# Patient Record
Sex: FEMALE | Race: WHITE | NOT HISPANIC OR LATINO | Employment: UNEMPLOYED | URBAN - METROPOLITAN AREA
[De-identification: names, ages, dates, MRNs, and addresses within clinical notes are randomized per-mention and may not be internally consistent; named-entity substitution may affect disease eponyms.]

---

## 2017-01-10 ENCOUNTER — ANTI-COAG VISIT (OUTPATIENT)
Dept: CARDIOLOGY | Facility: CLINIC | Age: 77
End: 2017-01-10
Payer: MEDICARE

## 2017-01-10 DIAGNOSIS — Z79.01 LONG TERM (CURRENT) USE OF ANTICOAGULANTS: Primary | ICD-10-CM

## 2017-01-10 LAB
CTP QC/QA: ABNORMAL
INR PPP: 3.4 (ref 2–3)

## 2017-01-10 PROCEDURE — 85610 PROTHROMBIN TIME: CPT | Mod: QW,S$GLB,,

## 2017-01-10 PROCEDURE — 99211 OFF/OP EST MAY X REQ PHY/QHP: CPT | Mod: 25,S$GLB,,

## 2017-01-10 NOTE — MR AVS SNAPSHOT
[facility]  [address] [facility]  [place] [address]  Phone: [phone]  Fax: [phone]                  Natalie Greene   1/10/2017 10:45 AM   Anti-coag visit    Description:  Female : 1940   Provider:  Noemi Montano PharmD   Department:  [facility]           Diagnoses this Visit        Comments    Long term (current) use of anticoagulants    -  Primary            To Do List           Future Appointments        Provider Department Dept Phone    1/10/2017 10:45 AM [facility] [phone]    2017 10:00 AM [facility] [phone]      Goals (5 Years of Data)     None      [facility]     [facility] On [facility] Care Line -  Assistance  Registered nurses in the [facility] On Call Center provide clinical advisement, health education, appointment booking, and other advisory services.  Call for this free service at [phone].             Medications           Message regarding Medications     Verify the changes and/or additions to your medication regime listed below are the same as discussed with your clinician today.  If any of these changes or additions are incorrect, please notify your healthcare provider.             Verify that the below list of medications is an accurate representation of the medications you are currently taking.  If none reported, the list may be blank. If incorrect, please contact your healthcare provider. Carry this list with you in case of emergency.           Current Medications     ACCU-CHEK JEN PLUS METER Misc     ACCU-CHEK JEN PLUS TEST STRP Strp     ACCU-CHEK SOFTCLIX LANCETS Misc     amlodipine (NORVASC) 5 MG tablet Take 5 mg by mouth once daily.    atenolol (TENORMIN) 100 MG tablet Take 100 mg by mouth once daily.     INCONTINENCE PAD,LINER,DISP (BLADDER CONTROL PADS EX ABSORB MISC)     indapamide (LOZOL) 1.25 MG Tab Take 1.25 mg by mouth once daily.      isosorbide mononitrate (IMDUR) 30 MG 24 hr tablet Take 1 tablet (30 mg total) by mouth once daily.    losartan (COZAAR) 100 MG tablet TAKE 1 TABLET ONCE DAILY.    potassium chloride SA (K-DUR,KLOR-CON) 20 MEQ tablet Take 20 mEq by mouth once daily.     tramadol (ULTRAM) 50 mg tablet     warfarin (COUMADIN) 6 MG tablet Take 1 tablet by mouth Monday through Saturday and 1 1/2 Sunday as directed by the [facility].           Clinical Reference Information           Allergies as of 1/10/2017     No Known Allergies      Immunizations Administered on Date of Encounter - 1/10/2017     None      Orders Placed During Today's Visit      Normal Orders This Visit    POCT PT/INR          1/10/2017 10:42 AM - Noemi Montano PharmD      Component Results     Component Value Flag Ref Range Units Status    INR 3.4 (A) 2.0 - 3.0  Final     Acceptable           December 2016 Details    Sun Mon Tue Wed Thu Fri Sat         1               2               3                 4               5               6   3.0   6 mg   See details      7      6 mg         8      6 mg         9      6 mg         10      6 mg           11      9 mg         12      6 mg         13      6 mg         14      6 mg         15      6 mg         16      6 mg         17      6 mg           18      9 mg         19      6 mg         20      6 mg         21      6 mg         22      6 mg         23      6 mg         24      6 mg           25      9 mg         26      6 mg         27      6 mg         28      6 mg         29      6 mg         30      6 mg         31      6 mg          Date Details   12/06 Last INR check   INR: 3.0                 January 2017 Details    Sun Mon Tue Wed Thu Fri Sat     1      9 mg         2      6 mg         3      6 mg         4      6 mg         5      6 mg         6      6 mg         7      6 mg           8      9 mg         9      6 mg         10   3.4   6 mg   See details      11      6 mg         12       6 mg         13      6 mg         14      6 mg           15      6 mg         16      6 mg         17      6 mg         18      6 mg         19      6 mg         20      6 mg         21      6 mg           22      6 mg         23      6 mg         24      6 mg         25      6 mg         26      6 mg         27      6 mg         28      6 mg           29      6 mg         30      6 mg         31                 Date Details   01/10 This INR check   INR: 3.4       Date of next INR:  1/31/2017               How to take your warfarin dose     To take:  6 mg Take 1 of the 6 mg tablets.           Anticoagulation Summary as of 1/10/2017     Maintenance plan 6 mg (6 mg x 1) every day    Full instructions 6 mg every day    Next INR check 1/31/2017      Anticoagulation Episode Summary     Comments       Patient Findings     Negatives Signs/symptoms of thrombosis, Signs/symptoms of bleeding, Laboratory test error suspected, Change in health, Change in alcohol use, Change in activity, Upcoming invasive procedure, Emergency department visit, Upcoming dental procedure, Missed doses, Extra doses, Change in medications, Change in diet/appetite, Hospital admission, Bruising, Other complaints      [facility] Sign-Up     Activating your [facility] account is as easy as 1-2-3!     1) Visit [email or web address], select Sign Up Now, enter this activation code and your date of birth, then select Next.  [email or web address]  Expires: 2/24/2017 10:44 AM      2) Create a username and password to use when you visit [facility] in the future and select a security question in case you lose your password and select Next.    3) Enter your e-mail address and click Sign Up!    Additional Information  If you have questions, please e-mail [email or web address] or call [phone] to talk to our [facility] staff. Remember, [facility] is NOT to be used for urgent needs. For medical emergencies, dial 911.

## 2017-01-10 NOTE — PROGRESS NOTES
INR remain supra-therapeutic despite dose adjustment.  Begin 6mg daily until follow-up. Patient reports no bleeding or bruising, no new medications and no diet changes.  I reminded the patient to call with any problems, changes or questions before the next visit.

## 2017-01-31 ENCOUNTER — ANTI-COAG VISIT (OUTPATIENT)
Dept: CARDIOLOGY | Facility: CLINIC | Age: 77
End: 2017-01-31
Payer: MEDICARE

## 2017-01-31 DIAGNOSIS — Z79.01 LONG TERM (CURRENT) USE OF ANTICOAGULANTS: Primary | ICD-10-CM

## 2017-01-31 LAB — INR PPP: 2.6 (ref 2–3)

## 2017-01-31 PROCEDURE — 85610 PROTHROMBIN TIME: CPT | Mod: QW,S$GLB,,

## 2017-01-31 NOTE — PROGRESS NOTES
INR is now therapeutic. Patient still displaced therefore possible diet changes. No other changes noted. Continue dose and diet until follow-up.

## 2017-01-31 NOTE — MR AVS SNAPSHOT
[facility]  [address] [facility]  [place] [address]  Phone: [phone]  Fax: [phone]                  Natalie Greene   2017 9:30 AM   Anti-coag visit    Description:  Female : 1940   Provider:  Noemi Montano PharmD   Department:  [facility]           Diagnoses this Visit        Comments    Long term (current) use of anticoagulants    -  Primary            To Do List           Future Appointments        Provider Department Dept Phone    2017 10:00 AM Noemi Montano PharmD [facility] [phone]      Goals (5 Years of Data)     None      [facility]     [facility] Care [facility] -  Assistance  Registered nurses in the [facility] On Call Center provide clinical advisement, health education, appointment booking, and other advisory services.  Call for this free service at [phone].             Medications           Message regarding Medications     Verify the changes and/or additions to your medication regime listed below are the same as discussed with your clinician today.  If any of these changes or additions are incorrect, please notify your healthcare provider.             Verify that the below list of medications is an accurate representation of the medications you are currently taking.  If none reported, the list may be blank. If incorrect, please contact your healthcare provider. Carry this list with you in case of emergency.           Current Medications     ACCU-CHEK JEN PLUS METER Misc     ACCU-CHEK JEN PLUS TEST STRP Strp     ACCU-CHEK SOFTCLIX LANCETS Misc     amlodipine (NORVASC) 5 MG tablet Take 5 mg by mouth once daily.    atenolol (TENORMIN) 100 MG tablet Take 100 mg by mouth once daily.     INCONTINENCE PAD,LINER,DISP (BLADDER CONTROL PADS EX ABSORB MISC)     indapamide (LOZOL) 1.25 MG Tab Take 1.25 mg by mouth once daily.     isosorbide mononitrate (IMDUR) 30 MG 24 hr tablet Take 1 tablet (30 mg total) by  mouth once daily.    losartan (COZAAR) 100 MG tablet TAKE 1 TABLET ONCE DAILY.    potassium chloride SA (K-DUR,KLOR-CON) 20 MEQ tablet Take 20 mEq by mouth once daily.     tramadol (ULTRAM) 50 mg tablet     warfarin (COUMADIN) 6 MG tablet Take 1 tablet by mouth Monday through Saturday and 1 1/2 Sunday as directed by the [facility].           Clinical Reference Information           Allergies as of 1/31/2017     No Known Allergies      Immunizations Administered on Date of Encounter - 1/31/2017     None      Orders Placed During Today's Visit      Normal Orders This Visit    POCT INR          1/31/2017  9:53 AM - Sarthak CruzD      Component Results     Component Value Flag Ref Range Units Status    INR 2.6  2.0 - 3.0  Final      January 2017 Details    Sun Mon Tue Wed Thu Fri Sat     1      9 mg         2      6 mg         3      6 mg         4      6 mg         5      6 mg         6      6 mg         7      6 mg           8      9 mg         9      6 mg         10   3.4   6 mg   See details      11      6 mg         12      6 mg         13      6 mg         14      6 mg           15      6 mg         16      6 mg         17      6 mg         18      6 mg         19      6 mg         20      6 mg         21      6 mg           22      6 mg         23      6 mg         24      6 mg         25      6 mg         26      6 mg         27      6 mg         28      6 mg           29      6 mg         30      6 mg         31   2.6   6 mg   See details           Date Details   01/10 Last INR check   INR: 3.4      01/31 This INR check   INR: 2.6                     How to take your warfarin dose     To take:  6 mg Take 1 of the 6 mg tablets.           February 2017 Details    Sun Mon Tue Wed Thu Fri Sat        1      6 mg         2      6 mg         3      6 mg         4      6 mg           5      6 mg         6      6 mg         7      6 mg         8      6 mg         9      6 mg         10      6 mg          11      6 mg           12      6 mg         13      6 mg         14      6 mg         15      6 mg         16      6 mg         17      6 mg         18      6 mg           19      6 mg         20      6 mg         21      6 mg         22      6 mg         23      6 mg         24      6 mg         25      6 mg           26      6 mg         27      6 mg         28                 Date Details   No additional details    Date of next INR:  2/28/2017         How to take your warfarin dose     To take:  6 mg Take 1 of the 6 mg tablets.           Anticoagulation Summary as of 1/31/2017     Maintenance plan 6 mg (6 mg x 1) every day    Full instructions 6 mg every day    Next INR check 2/28/2017      Anticoagulation Episode Summary     Comments       Patient Findings     Negatives Signs/symptoms of thrombosis, Signs/symptoms of bleeding, Laboratory test error suspected, Change in health, Change in alcohol use, Change in activity, Upcoming invasive procedure, Emergency department visit, Upcoming dental procedure, Missed doses, Extra doses, Change in medications, Change in diet/appetite, Hospital admission, Bruising, Other complaints      [facility] Sign-Up     Activating your [facility] account is as easy as 1-2-3!     1) Visit [email or web address], select Sign Up Now, enter this activation code and your date of birth, then select Next.  [email or web address]  Expires: 2/24/2017 10:44 AM      2) Create a username and password to use when you visit [facility] in the future and select a security question in case you lose your password and select Next.    3) Enter your e-mail address and click Sign Up!    Additional Information  If you have questions, please e-mail [email or web address] or call [phone] to talk to our [facility] staff. Remember, [facility] is NOT to be used for urgent needs. For medical emergencies, dial 911.

## 2017-02-15 DIAGNOSIS — Z79.01 LONG TERM (CURRENT) USE OF ANTICOAGULANTS: ICD-10-CM

## 2017-02-16 RX ORDER — WARFARIN 6 MG/1
TABLET ORAL
Qty: 102 TABLET | Refills: 0 | OUTPATIENT
Start: 2017-02-16

## 2017-02-28 ENCOUNTER — ANTI-COAG VISIT (OUTPATIENT)
Dept: CARDIOLOGY | Facility: CLINIC | Age: 77
End: 2017-02-28
Payer: MEDICARE

## 2017-02-28 DIAGNOSIS — Z79.01 LONG TERM (CURRENT) USE OF ANTICOAGULANTS: Primary | ICD-10-CM

## 2017-02-28 LAB — INR PPP: 3.5 (ref 2–3)

## 2017-02-28 PROCEDURE — 99211 OFF/OP EST MAY X REQ PHY/QHP: CPT | Mod: 25,S$GLB,,

## 2017-02-28 PROCEDURE — 85610 PROTHROMBIN TIME: CPT | Mod: QW,S$GLB,,

## 2017-02-28 RX ORDER — WARFARIN 6 MG/1
TABLET ORAL
Qty: 90 TABLET | Refills: 0 | Status: SHIPPED | OUTPATIENT
Start: 2017-02-28 | End: 2017-06-13 | Stop reason: SDUPTHER

## 2017-02-28 NOTE — PROGRESS NOTES
INR is supra-therapeutic. No bleeding issues. Patient now herbal medications for arthritis. Unsure of the names of these medications. Will hold x1 dose then lower total weekly dose. Repeat INR in 3 weeks.

## 2017-02-28 NOTE — MR AVS SNAPSHOT
[facility]  [address] [facility]  [place] [address]  Phone: [phone]  Fax: [phone]                  Natalie Greene   2017 10:00 AM   Anti-coag visit    Description:  Female : 1940   Provider:  Noemi Montano PharmD   Department:  [facility]           Diagnoses this Visit        Comments    Long term (current) use of anticoagulants    -  Primary            To Do List           Future Appointments        Provider Department Dept Phone    2017 10:00 AM [facility] [phone]    3/21/2017 10:00 AM [facility] [phone]      Goals (5 Years of Data)     None      [facility]     [facility] On Call Nurse Care Line -  Assistance  Registered nurses in the [facility] On Call Center provide clinical advisement, health education, appointment booking, and other advisory services.  Call for this free service at [phone].             Medications           Message regarding Medications     Verify the changes and/or additions to your medication regime listed below are the same as discussed with your clinician today.  If any of these changes or additions are incorrect, please notify your healthcare provider.             Verify that the below list of medications is an accurate representation of the medications you are currently taking.  If none reported, the list may be blank. If incorrect, please contact your healthcare provider. Carry this list with you in case of emergency.           Current Medications     ACCU-CHEK JEN PLUS METER Misc     ACCU-CHEK JEN PLUS TEST STRP Strp     ACCU-CHEK SOFTCLIX LANCETS Misc     amlodipine (NORVASC) 5 MG tablet Take 5 mg by mouth once daily.    atenolol (TENORMIN) 100 MG tablet Take 100 mg by mouth once daily.     INCONTINENCE PAD,LINER,DISP (BLADDER CONTROL PADS EX ABSORB MISC)     indapamide (LOZOL) 1.25 MG Tab Take 1.25 mg by mouth once daily.      isosorbide mononitrate (IMDUR) 30 MG 24 hr tablet Take 1 tablet (30 mg total) by mouth once daily.    losartan (COZAAR) 100 MG tablet TAKE 1 TABLET ONCE DAILY.    potassium chloride SA (K-DUR,KLOR-CON) 20 MEQ tablet Take 20 mEq by mouth once daily.     tramadol (ULTRAM) 50 mg tablet     warfarin (COUMADIN) 6 MG tablet Take 1 tablet by mouth Monday through Saturday and 1 1/2 Sunday as directed by the [facility] Clinic.           Clinical Reference Information           Allergies as of 2/28/2017     No Known Allergies      Immunizations Administered on Date of Encounter - 2/28/2017     None      Orders Placed During Today's Visit      Normal Orders This Visit    POCT INR          2/28/2017  9:49 AM - Noemi Montano, PharmD      Component Results     Component Value Flag Ref Range Units Status    INR 3.5 (A) 2.0 - 3.0  Final      January 2017 Details    Sun Mon Tue Wed Thu Fri Sat     1               2               3               4               5               6               7                 8               9               10               11               12               13               14                 15               16               17               18               19               20               21                 22               23               24               25               26               27               28                 29      6 mg         30      6 mg         31   2.6   6 mg   See details           Date Details   01/31 Last INR check   INR: 2.6                 February 2017 Details    Sun Mon Tue Wed Thu Fri Sat        1      6 mg         2      6 mg         3      6 mg         4      6 mg           5      6 mg         6      6 mg         7      6 mg         8      6 mg         9      6 mg         10      6 mg         11      6 mg           12      6 mg         13      6 mg         14      6 mg         15      6 mg         16      6 mg         17      6 mg         18      6 mg            19      6 mg         20      6 mg         21      6 mg         22      6 mg         23      6 mg         24      6 mg         25      6 mg           26      6 mg         27      6 mg         28   3.5   6 mg   See details           Date Details   02/28 This INR check   INR: 3.5                     How to take your warfarin dose     To take:  6 mg Take 1 of the 6 mg tablets.           March 2017 Details    Sun Mon Tue Wed Thu Fri Sat        1      Hold         2      3 mg         3      6 mg         4      6 mg           5      6 mg         6      6 mg         7      6 mg         8      6 mg         9      3 mg         10      6 mg         11      6 mg           12      6 mg         13      6 mg         14      6 mg         15      6 mg         16      3 mg         17      6 mg         18      6 mg           19      6 mg         20      6 mg         21            22               23               24               25                 26               27               28               29               30               31                 Date Details   No additional details    Date of next INR:  3/21/2017         How to take your warfarin dose     To take:  3 mg Take 0.5 of a 6 mg tablet.    To take:  6 mg Take 1 of the 6 mg tablets.    Hold Do not take your warfarin dose. See the Details table to the right for additional instructions.                Anticoagulation Summary as of 2/28/2017     Maintenance plan 3 mg (6 mg x 0.5) on Thu; 6 mg (6 mg x 1) all other days    Full instructions 3/1: Hold; 3/2: 3 mg; Otherwise 3 mg on Thu; 6 mg all other days    Next INR check 3/21/2017      Anticoagulation Episode Summary     Comments       Patient Findings     Negatives Signs/symptoms of thrombosis, Signs/symptoms of bleeding, Laboratory test error suspected, Change in health, Change in alcohol use, Change in activity, Upcoming invasive procedure, Emergency department visit, Upcoming dental procedure, Missed doses, Extra doses,  Change in medications, Change in diet/appetite, Hospital admission, Bruising, Other complaints      [facility] Sign-Up     Activating your [facility] account is as easy as 1-2-3!     1) Visit [email or web address], select Sign Up Now, enter this activation code and your date of birth, then select Next.  [email or web address]  Expires: 4/14/2017  9:51 AM      2) Create a username and password to use when you visit [facility] in the future and select a security question in case you lose your password and select Next.    3) Enter your e-mail address and click Sign Up!    Additional Information  If you have questions, please e-mail [email or web address] or call [phone] to talk to our [facility] staff. Remember, [facility] is NOT to be used for urgent needs. For medical emergencies, dial 911.         Language Assistance Services     ATTENTION: Language assistance services are available, free of charge. Please call [phone].      ATENCIÓN: Si habla español, tiene a lowe disposición servicios gratuitos de asistencia lingüística. Llame al [phone].     CHÚ Ý: N?u b?n nói Ti?ng Vi?t, có các d?ch v? h? tr? ngôn ng? mi?n phí dành cho b?n. G?i s? [phone].         [facility] complies with applicable Federal civil rights laws and does not discriminate on the basis of race, color, national origin, age, disability, or sex.

## 2017-03-21 ENCOUNTER — ANTI-COAG VISIT (OUTPATIENT)
Dept: CARDIOLOGY | Facility: CLINIC | Age: 77
End: 2017-03-21
Payer: MEDICARE

## 2017-03-21 DIAGNOSIS — Z79.01 LONG TERM (CURRENT) USE OF ANTICOAGULANTS: Primary | ICD-10-CM

## 2017-03-21 LAB — INR PPP: 2.2 (ref 2–3)

## 2017-03-21 PROCEDURE — 85610 PROTHROMBIN TIME: CPT | Mod: QW,S$GLB,,

## 2017-03-21 NOTE — PROGRESS NOTES
INR is therapeutic. Continue dose and diet until follow-up. Patient reports no bleeding or bruising, no new medications and no diet changes.  I reminded the patient to call with any problems, changes or questions before the next visit.

## 2017-03-21 NOTE — MR AVS SNAPSHOT
[facility]  [address] [facility]  [place] [address]  Phone: [phone]  Fax: [phone]                  Natalie Greene   3/21/2017 10:00 AM   Anti-coag visit    Description:  Female : 1940   Provider:  Noemi Montano PharmD   Department:  [facility]           Diagnoses this Visit        Comments    Long term (current) use of anticoagulants    -  Primary            To Do List           Future Appointments        Provider Department Dept Phone    3/21/2017 10:00 AM [facility] [phone]    2017 10:15 AM [facility] [phone]      Goals (5 Years of Data)     None      [facility]     [facility] Care Line -  Assistance  Registered nurses in the [facility] On Call Center provide clinical advisement, health education, appointment booking, and other advisory services.  Call for this free service at [phone].             Medications           Message regarding Medications     Verify the changes and/or additions to your medication regime listed below are the same as discussed with your clinician today.  If any of these changes or additions are incorrect, please notify your healthcare provider.             Verify that the below list of medications is an accurate representation of the medications you are currently taking.  If none reported, the list may be blank. If incorrect, please contact your healthcare provider. Carry this list with you in case of emergency.           Current Medications     ACCU-CHEK JEN PLUS METER Misc     ACCU-CHEK JEN PLUS TEST STRP Strp     ACCU-CHEK SOFTCLIX LANCETS Misc     amlodipine (NORVASC) 5 MG tablet Take 5 mg by mouth once daily.    atenolol (TENORMIN) 100 MG tablet Take 100 mg by mouth once daily.     INCONTINENCE PAD,LINER,DISP (BLADDER CONTROL PADS EX ABSORB MISC)     indapamide (LOZOL) 1.25 MG Tab Take 1.25 mg by mouth once daily.      isosorbide mononitrate (IMDUR) 30 MG 24 hr tablet Take 1 tablet (30 mg total) by mouth once daily.    losartan (COZAAR) 100 MG tablet TAKE 1 TABLET ONCE DAILY.    potassium chloride SA (K-DUR,KLOR-CON) 20 MEQ tablet Take 20 mEq by mouth once daily.     tramadol (ULTRAM) 50 mg tablet     warfarin (COUMADIN) 6 MG tablet Take 1 tablet by mouth every evening as directed by the [facility].           Clinical Reference Information           Allergies as of 3/21/2017     No Known Allergies      Immunizations Administered on Date of Encounter - 3/21/2017     None      Orders Placed During Today's Visit      Normal Orders This Visit    POCT INR          3/21/2017  9:46 AM - Sarthak CruzD      Component Results     Component Value Flag Ref Range Units Status    INR 2.2  2.0 - 3.0  Final      February 2017 Details    Sun Mon Tue Wed Thu Fri Sat        1               2               3               4                 5               6               7               8               9               10               11                 12               13               14               15               16               17               18                 19      6 mg         20      6 mg         21      6 mg         22      6 mg         23      6 mg         24      6 mg         25      6 mg           26      6 mg         27      6 mg         28   3.5   6 mg   See details           Date Details   02/28 Last INR check   INR: 3.5                 March 2017 Details    Sun Mon Tue Wed Thu Fri Sat        1      Hold         2      3 mg         3      6 mg         4      6 mg           5      6 mg         6      6 mg         7      6 mg         8      6 mg         9      3 mg         10      6 mg         11      6 mg           12      6 mg         13      6 mg         14      6 mg         15      6 mg         16      3 mg         17      6 mg         18      6 mg           19      6 mg         20      6 mg         21   2.2    6 mg   See details      22      6 mg         23      3 mg         24      6 mg         25      6 mg           26      6 mg         27      6 mg         28      6 mg         29      6 mg         30      3 mg         31      6 mg           Date Details   03/21 This INR check   INR: 2.2                     How to take your warfarin dose     To take:  3 mg Take 0.5 of a 6 mg tablet.    To take:  6 mg Take 1 of the 6 mg tablets.           April 2017 Details    Sun Mon Tue Wed Thu Fri Sat           1      6 mg           2      6 mg         3      6 mg         4      6 mg         5      6 mg         6      3 mg         7      6 mg         8      6 mg           9      6 mg         10      6 mg         11      6 mg         12      6 mg         13      3 mg         14      6 mg         15      6 mg           16      6 mg         17      6 mg         18            19               20               21               22                 23               24               25               26               27               28               29                 30                      Date Details   No additional details    Date of next INR:  4/18/2017         How to take your warfarin dose     To take:  3 mg Take 0.5 of a 6 mg tablet.    To take:  6 mg Take 1 of the 6 mg tablets.           Anticoagulation Summary as of 3/21/2017     Maintenance plan 3 mg (6 mg x 0.5) on Thu; 6 mg (6 mg x 1) all other days    Full instructions 3 mg on Thu; 6 mg all other days    Next INR check 4/18/2017      Anticoagulation Episode Summary     Comments       Patient Findings     Negatives Signs/symptoms of thrombosis, Signs/symptoms of bleeding, Laboratory test error suspected, Change in health, Change in alcohol use, Change in activity, Upcoming invasive procedure, Emergency department visit, Upcoming dental procedure, Missed doses, Extra doses, Change in medications, Change in diet/appetite, Hospital admission, Bruising, Other complaints       [facility] Sign-Up     Activating your [facility] account is as easy as 1-2-3!     1) Visit [email or web address], select Sign Up Now, enter this activation code and your date of birth, then select Next.  [email or web address]  Expires: 4/14/2017 10:51 AM      2) Create a username and password to use when you visit [facility] in the future and select a security question in case you lose your password and select Next.    3) Enter your e-mail address and click Sign Up!    Additional Information  If you have questions, please e-mail [email or web address] or call [phone] to talk to our [facility] staff. Remember, [facility] is NOT to be used for urgent needs. For medical emergencies, dial 911.         Language Assistance Services     ATTENTION: Language assistance services are available, free of charge. Please call [phone].      ATENCIÓN: Si habla pooja, tiene a lowe disposición servicios gratuitos de asistencia lingüística. Llame al [phone].     CHÚ Ý: N?u b?n nói Ti?ng Vi?t, có các d?ch v? h? tr? ngôn ng? mi?n phí dành cho b?n. G?i s? [phone].         [facility] complies with applicable Federal civil rights laws and does not discriminate on the basis of race, color, national origin, age, disability, or sex.

## 2017-04-18 ENCOUNTER — ANTI-COAG VISIT (OUTPATIENT)
Dept: CARDIOLOGY | Facility: CLINIC | Age: 77
End: 2017-04-18
Payer: MEDICARE

## 2017-04-18 DIAGNOSIS — Z79.01 LONG TERM (CURRENT) USE OF ANTICOAGULANTS: Primary | ICD-10-CM

## 2017-04-18 LAB — INR PPP: 2.1 (ref 2–3)

## 2017-04-18 PROCEDURE — 85610 PROTHROMBIN TIME: CPT | Mod: QW,S$GLB,, | Performed by: NUCLEAR MEDICINE

## 2017-04-18 NOTE — MR AVS SNAPSHOT
[facility]  [address] [facility]  [place] [address]  Phone: [phone]  Fax: [phone]                  Natalie Greene   2017 10:15 AM   Anti-coag visit    Description:  Female : 1940   Provider:  Noemi Montano PharmD   Department:  [facility]           Diagnoses this Visit        Comments    Long term (current) use of anticoagulants    -  Primary            To Do List           Future Appointments        Provider Department Dept Phone    2017 10:15 AM [facility] [phone]    2017 10:15 AM [facility] [phone]      Goals (5 Years of Data)     None      [facility]     [facility] On Call Nurse Care Line -  Assistance  Unless otherwise directed by your provider, please contact [facility], our nurse care line that is available for  assistance.     Registered nurses in the [facility] Center provide: appointment scheduling, clinical advisement, health education, and other advisory services.  Call: [phone] (toll free)               Medications           Message regarding Medications     Verify the changes and/or additions to your medication regime listed below are the same as discussed with your clinician today.  If any of these changes or additions are incorrect, please notify your healthcare provider.             Verify that the below list of medications is an accurate representation of the medications you are currently taking.  If none reported, the list may be blank. If incorrect, please contact your healthcare provider. Carry this list with you in case of emergency.           Current Medications     ACCU-CHEK JEN PLUS METER Misc     ACCU-CHEK JEN PLUS TEST STRP Strp     ACCU-CHEK SOFTCLIX LANCETS Misc     amlodipine (NORVASC) 5 MG tablet Take 5 mg by mouth once daily.    atenolol (TENORMIN) 100 MG tablet Take 100 mg by mouth once daily.      INCONTINENCE PAD,LINER,DISP (BLADDER CONTROL PADS EX ABSORB MISC)     indapamide (LOZOL) 1.25 MG Tab Take 1.25 mg by mouth once daily.     isosorbide mononitrate (IMDUR) 30 MG 24 hr tablet Take 1 tablet (30 mg total) by mouth once daily.    losartan (COZAAR) 100 MG tablet TAKE 1 TABLET ONCE DAILY.    potassium chloride SA (K-DUR,KLOR-CON) 20 MEQ tablet Take 20 mEq by mouth once daily.     tramadol (ULTRAM) 50 mg tablet     warfarin (COUMADIN) 6 MG tablet Take 1 tablet by mouth every evening as directed by the [facility] Clinic.           Clinical Reference Information           Allergies as of 4/18/2017     No Known Allergies      Immunizations Administered on Date of Encounter - 4/18/2017     None      Orders Placed During Today's Visit      Normal Orders This Visit    POCT INR          4/18/2017 10:09 AM - Sarthak CruzD      Component Results     Component Value Flag Ref Range Units Status    INR 2.1  2.0 - 3.0  Final      March 2017 Details    Sun Mon Tue Wed Thu Fri Sat        1               2               3               4                 5               6               7               8               9               10               11                 12               13               14               15               16               17               18                 19      6 mg         20      6 mg         21   2.2   6 mg   See details      22      6 mg         23      3 mg         24      6 mg         25      6 mg           26      6 mg         27      6 mg         28      6 mg         29      6 mg         30      3 mg         31      6 mg           Date Details   03/21 Last INR check   INR: 2.2                 April 2017 Details    Sun Mon Tue Wed Thu Fri Sat           1      6 mg           2      6 mg         3      6 mg         4      6 mg         5      6 mg         6      3 mg         7      6 mg         8      6 mg           9      6 mg         10      6 mg         11      6 mg          12      6 mg         13      3 mg         14      6 mg         15      6 mg           16      6 mg         17      6 mg         18   2.1   6 mg   See details      19      6 mg         20      3 mg         21      6 mg         22      6 mg           23      6 mg         24      6 mg         25      6 mg         26      6 mg         27      3 mg         28      6 mg         29      6 mg           30      6 mg                Date Details   04/18 This INR check   INR: 2.1                     How to take your warfarin dose     To take:  3 mg Take 0.5 of a 6 mg tablet.    To take:  6 mg Take 1 of the 6 mg tablets.           May 2017 Details    Sun Mon Tue Wed Thu Fri Sat      1      6 mg         2      6 mg         3      6 mg         4      3 mg         5      6 mg         6      6 mg           7      6 mg         8      6 mg         9      6 mg         10      6 mg         11      3 mg         12      6 mg         13      6 mg           14      6 mg         15      6 mg         16            17               18               19               20                 21               22               23               24               25               26               27                 28               29               30               31                   Date Details   No additional details    Date of next INR:  5/16/2017         How to take your warfarin dose     To take:  3 mg Take 0.5 of a 6 mg tablet.    To take:  6 mg Take 1 of the 6 mg tablets.           Anticoagulation Summary as of 4/18/2017     Maintenance plan 3 mg (6 mg x 0.5) on Thu; 6 mg (6 mg x 1) all other days    Full instructions 3 mg on Thu; 6 mg all other days    Next INR check 5/16/2017      Anticoagulation Episode Summary     Comments       Patient Findings     Negatives Signs/symptoms of thrombosis, Signs/symptoms of bleeding, Laboratory test error suspected, Change in health, Change in alcohol use, Change in activity, Upcoming invasive procedure,  Emergency department visit, Upcoming dental procedure, Missed doses, Extra doses, Change in medications, Change in diet/appetite, Hospital admission, Bruising, Other complaints      [facility] Sign-Up     Activating your [facility] account is as easy as 1-2-3!     1) Visit [email or web address], select Sign Up Now, enter this activation code and your date of birth, then select Next.  [email or web address]  Expires: 6/2/2017 10:10 AM      2) Create a username and password to use when you visit [facility] in the future and select a security question in case you lose your password and select Next.    3) Enter your e-mail address and click Sign Up!    Additional Information  If you have questions, please e-mail [email or web address] or call [phone] to talk to our [facility] staff. Remember, [facility] is NOT to be used for urgent needs. For medical emergencies, dial 911.         Language Assistance Services     ATTENTION: Language assistance services are available, free of charge. Please call [phone].      ATENCIÓN: Si habla español, tiene a lowe disposición servicios gratuitos de asistencia lingüística. Llame al [phone].     CHÚ Ý: N?u b?n nói Ti?ng Vi?t, có các d?ch v? h? tr? ngôn ng? mi?n phí dành cho b?n. G?i s? [phone].         [facility] complies with applicable Federal civil rights laws and does not discriminate on the basis of race, color, national origin, age, disability, or sex.

## 2017-04-18 NOTE — PROGRESS NOTES
INR remains therapeutic. Continue dose and diet until follow-up. Patient reports no bleeding or bruising, no new medications and no diet changes.

## 2017-05-16 ENCOUNTER — ANTI-COAG VISIT (OUTPATIENT)
Dept: CARDIOLOGY | Facility: CLINIC | Age: 77
End: 2017-05-16
Payer: MEDICARE

## 2017-05-16 DIAGNOSIS — Z79.01 LONG TERM (CURRENT) USE OF ANTICOAGULANTS: Primary | ICD-10-CM

## 2017-05-16 LAB — INR PPP: 2 (ref 2–3)

## 2017-05-16 PROCEDURE — 85610 PROTHROMBIN TIME: CPT | Mod: QW,S$GLB,, | Performed by: NUCLEAR MEDICINE

## 2017-05-16 NOTE — PROGRESS NOTES
INR remains therapeutic. Will watch possible downward trend. Patient reports no bleeding or bruising, no new medications and no diet changes.  Continue dose and diet until follow-up.

## 2017-05-16 NOTE — MR AVS SNAPSHOT
[facility]  [address] [facility]  [place] [address]  Phone: [phone]  Fax: [phone]                  Natalie Greene   2017 10:15 AM   Anti-coag visit    Description:  Female : 1940   Provider:  Noemi Montano PharmD   Department:  [facility]           Diagnoses this Visit        Comments    Long term (current) use of anticoagulants    -  Primary            To Do List           Future Appointments        Provider Department Dept Phone    2017 10:30 AM Noemi Montano, [facility] [phone]      Goals (5 Years of Data)     None      [facility]     [facility] On Call Nurse Care Line -  Assistance  Unless otherwise directed by your provider, please contact [facility], our nurse care line that is available for  assistance.     Registered nurses in the [facility] On Call Center provide: appointment scheduling, clinical advisement, health education, and other advisory services.  Call: [phone] (toll free)               Medications           Message regarding Medications     Verify the changes and/or additions to your medication regime listed below are the same as discussed with your clinician today.  If any of these changes or additions are incorrect, please notify your healthcare provider.             Verify that the below list of medications is an accurate representation of the medications you are currently taking.  If none reported, the list may be blank. If incorrect, please contact your healthcare provider. Carry this list with you in case of emergency.           Current Medications     ACCU-CHEK JEN PLUS METER Misc     ACCU-CHEK JEN PLUS TEST STRP Strp     ACCU-CHEK SOFTCLIX LANCETS Misc     amlodipine (NORVASC) 5 MG tablet Take 5 mg by mouth once daily.    atenolol (TENORMIN) 100 MG tablet Take 100 mg by mouth once daily.     INCONTINENCE PAD,LINER,DISP (BLADDER CONTROL PADS EX ABSORB MISC)     indapamide (LOZOL)  1.25 MG Tab Take 1.25 mg by mouth once daily.     isosorbide mononitrate (IMDUR) 30 MG 24 hr tablet Take 1 tablet (30 mg total) by mouth once daily.    losartan (COZAAR) 100 MG tablet TAKE 1 TABLET ONCE DAILY.    potassium chloride SA (K-DUR,KLOR-CON) 20 MEQ tablet Take 20 mEq by mouth once daily.     tramadol (ULTRAM) 50 mg tablet     warfarin (COUMADIN) 6 MG tablet Take 1 tablet by mouth every evening as directed by the [facility]madin Clinic.           Clinical Reference Information           Allergies as of 5/16/2017     No Known Allergies      Immunizations Administered on Date of Encounter - 5/16/2017     None      Orders Placed During Today's Visit      Normal Orders This Visit    POCT INR          5/16/2017 10:14 AM - Sarthak CruzD      Component Results     Component Value Flag Ref Range Units Status    INR 2.0  2.0 - 3.0  Final      April 2017 Details    Sun Mon Tue Wed Thu Fri Sat           1                 2               3               4               5               6               7               8                 9               10               11               12               13               14               15                 16      6 mg         17      6 mg         18   2.1   6 mg   See details      19      6 mg         20      3 mg         21      6 mg         22      6 mg           23      6 mg         24      6 mg         25      6 mg         26      6 mg         27      3 mg         28      6 mg         29      6 mg           30      6 mg                Date Details   04/18 Last INR check   INR: 2.1                 May 2017 Details    Sun Mon Tue Wed Thu Fri Sat      1      6 mg         2      6 mg         3      6 mg         4      3 mg         5      6 mg         6      6 mg           7      6 mg         8      6 mg         9      6 mg         10      6 mg         11      3 mg         12      6 mg         13      6 mg           14      6 mg         15      6 mg         16   2.0    6 mg   See details      17      6 mg         18      3 mg         19      6 mg         20      6 mg           21      6 mg         22      6 mg         23      6 mg         24      6 mg         25      3 mg         26      6 mg         27      6 mg           28      6 mg         29      6 mg         30      6 mg         31      6 mg             Date Details   05/16 This INR check   INR: 2.0                     How to take your warfarin dose     To take:  3 mg Take 0.5 of a 6 mg tablet.    To take:  6 mg Take 1 of the 6 mg tablets.           June 2017 Details    Sun Mon Tue Wed Thu Fri Sat         1      3 mg         2      6 mg         3      6 mg           4      6 mg         5      6 mg         6      6 mg         7      6 mg         8      3 mg         9      6 mg         10      6 mg           11      6 mg         12      6 mg         13            14               15               16               17                 18               19               20               21               22               23               24                 25               26               27               28               29               30                 Date Details   No additional details    Date of next INR:  6/13/2017         How to take your warfarin dose     To take:  3 mg Take 0.5 of a 6 mg tablet.    To take:  6 mg Take 1 of the 6 mg tablets.           Anticoagulation Summary as of 5/16/2017     Maintenance plan 3 mg (6 mg x 0.5) on Thu; 6 mg (6 mg x 1) all other days    Full instructions 3 mg on Thu; 6 mg all other days    Next INR check 6/13/2017      Anticoagulation Episode Summary     Comments       Patient Findings     Negatives Signs/symptoms of thrombosis, Signs/symptoms of bleeding, Laboratory test error suspected, Change in health, Change in alcohol use, Change in activity, Upcoming invasive procedure, Emergency department visit, Upcoming dental procedure, Missed doses, Extra doses, Change in medications,  Change in diet/appetite, Hospital admission, Bruising, Other complaints      [facility] Sign-Up     Activating your [facility] account is as easy as 1-2-3!     1) Visit [email or web address], select Sign Up Now, enter this activation code and your date of birth, then select Next.  [email or web address]  Expires: 6/2/2017 10:10 AM      2) Create a username and password to use when you visit [facility] in the future and select a security question in case you lose your password and select Next.    3) Enter your e-mail address and click Sign Up!    Additional Information  If you have questions, please e-mail [email or web address] or call [phone] to talk to our [facility] staff. Remember, [facility] is NOT to be used for urgent needs. For medical emergencies, dial 911.         Language Assistance Services     ATTENTION: Language assistance services are available, free of charge. Please call [phone].      ATENCIÓN: Si habla español, tiene a lowe disposición servicios gratuitos de asistencia lingüística. Llame al [phone].     CHÚ Ý: N?u b?n nói Ti?ng Vi?t, có các d?ch v? h? tr? ngôn ng? mi?n phí dành cho b?n. G?i s? [phone].         [facility] complies with applicable Federal civil rights laws and does not discriminate on the basis of race, color, national origin, age, disability, or sex.

## 2017-05-17 DIAGNOSIS — Z79.01 LONG TERM (CURRENT) USE OF ANTICOAGULANTS: ICD-10-CM

## 2017-05-17 RX ORDER — WARFARIN 6 MG/1
TABLET ORAL
Qty: 102 TABLET | Refills: 0 | Status: CANCELLED | OUTPATIENT
Start: 2017-05-17

## 2017-06-13 ENCOUNTER — ANTI-COAG VISIT (OUTPATIENT)
Dept: CARDIOLOGY | Facility: CLINIC | Age: 77
End: 2017-06-13
Payer: MEDICARE

## 2017-06-13 DIAGNOSIS — Z79.01 LONG TERM (CURRENT) USE OF ANTICOAGULANTS: Primary | ICD-10-CM

## 2017-06-13 LAB — INR PPP: 2.8 (ref 2–3)

## 2017-06-13 PROCEDURE — 85610 PROTHROMBIN TIME: CPT | Mod: QW,S$GLB,, | Performed by: NUCLEAR MEDICINE

## 2017-06-13 RX ORDER — WARFARIN 6 MG/1
TABLET ORAL
Qty: 90 TABLET | Refills: 0 | Status: SHIPPED | OUTPATIENT
Start: 2017-06-13 | End: 2017-08-22 | Stop reason: SDUPTHER

## 2017-06-28 ENCOUNTER — OFFICE VISIT (OUTPATIENT)
Dept: CARDIOLOGY | Facility: CLINIC | Age: 77
End: 2017-06-28
Payer: MEDICARE

## 2017-06-28 ENCOUNTER — LAB VISIT (OUTPATIENT)
Dept: LAB | Facility: HOSPITAL | Age: 77
End: 2017-06-28
Attending: FAMILY MEDICINE
Payer: MEDICARE

## 2017-06-28 ENCOUNTER — OFFICE VISIT (OUTPATIENT)
Dept: INTERNAL MEDICINE | Facility: CLINIC | Age: 77
End: 2017-06-28
Payer: MEDICARE

## 2017-06-28 VITALS
HEIGHT: 67 IN | DIASTOLIC BLOOD PRESSURE: 64 MMHG | WEIGHT: 217.5 LBS | BODY MASS INDEX: 34.14 KG/M2 | SYSTOLIC BLOOD PRESSURE: 138 MMHG | HEART RATE: 84 BPM

## 2017-06-28 VITALS
BODY MASS INDEX: 34.08 KG/M2 | WEIGHT: 217.13 LBS | HEIGHT: 67 IN | OXYGEN SATURATION: 96 % | TEMPERATURE: 97 F | HEART RATE: 96 BPM | SYSTOLIC BLOOD PRESSURE: 120 MMHG | DIASTOLIC BLOOD PRESSURE: 60 MMHG

## 2017-06-28 DIAGNOSIS — I10 ESSENTIAL HYPERTENSION: ICD-10-CM

## 2017-06-28 DIAGNOSIS — I20.89 ANGINAL EQUIVALENT: ICD-10-CM

## 2017-06-28 DIAGNOSIS — E78.5 HYPERLIPIDEMIA WITH TARGET LDL LESS THAN 100: ICD-10-CM

## 2017-06-28 DIAGNOSIS — M79.602 LEFT ARM PAIN: ICD-10-CM

## 2017-06-28 DIAGNOSIS — I48.20 CHRONIC ATRIAL FIBRILLATION: Primary | ICD-10-CM

## 2017-06-28 DIAGNOSIS — E66.01 MORBID OBESITY, UNSPECIFIED OBESITY TYPE: ICD-10-CM

## 2017-06-28 DIAGNOSIS — Z79.01 ANTICOAGULATED ON COUMADIN: ICD-10-CM

## 2017-06-28 DIAGNOSIS — E11.9 TYPE 2 DIABETES MELLITUS WITHOUT COMPLICATION, WITHOUT LONG-TERM CURRENT USE OF INSULIN: ICD-10-CM

## 2017-06-28 DIAGNOSIS — I48.20 CHRONIC ATRIAL FIBRILLATION: ICD-10-CM

## 2017-06-28 DIAGNOSIS — I10 ESSENTIAL HYPERTENSION: Primary | ICD-10-CM

## 2017-06-28 DIAGNOSIS — E66.9 OBESITY (BMI 30-39.9): ICD-10-CM

## 2017-06-28 LAB
ALBUMIN SERPL BCP-MCNC: 3.8 G/DL
ALP SERPL-CCNC: 83 U/L
ALT SERPL W/O P-5'-P-CCNC: 23 U/L
ANION GAP SERPL CALC-SCNC: 11 MMOL/L
AST SERPL-CCNC: 26 U/L
BASOPHILS # BLD AUTO: 0.01 K/UL
BASOPHILS NFR BLD: 0.2 %
BILIRUB SERPL-MCNC: 1 MG/DL
BUN SERPL-MCNC: 12 MG/DL
CALCIUM SERPL-MCNC: 9.4 MG/DL
CHLORIDE SERPL-SCNC: 102 MMOL/L
CO2 SERPL-SCNC: 27 MMOL/L
CREAT SERPL-MCNC: 0.8 MG/DL
DIFFERENTIAL METHOD: ABNORMAL
EOSINOPHIL # BLD AUTO: 0 K/UL
EOSINOPHIL NFR BLD: 0.7 %
ERYTHROCYTE [DISTWIDTH] IN BLOOD BY AUTOMATED COUNT: 14.3 %
EST. GFR  (AFRICAN AMERICAN): >60 ML/MIN/1.73 M^2
EST. GFR  (NON AFRICAN AMERICAN): >60 ML/MIN/1.73 M^2
GLUCOSE SERPL-MCNC: 131 MG/DL
HCT VFR BLD AUTO: 38.2 %
HGB BLD-MCNC: 12.5 G/DL
INR PPP: 1.9
LYMPHOCYTES # BLD AUTO: 1 K/UL
LYMPHOCYTES NFR BLD: 18 %
MCH RBC QN AUTO: 29.7 PG
MCHC RBC AUTO-ENTMCNC: 32.7 %
MCV RBC AUTO: 91 FL
MONOCYTES # BLD AUTO: 0.4 K/UL
MONOCYTES NFR BLD: 6.8 %
NEUTROPHILS # BLD AUTO: 4.3 K/UL
NEUTROPHILS NFR BLD: 74.3 %
PLATELET # BLD AUTO: 202 K/UL
PMV BLD AUTO: 11.3 FL
POTASSIUM SERPL-SCNC: 4.2 MMOL/L
PROT SERPL-MCNC: 7.6 G/DL
PROTHROMBIN TIME: 19.6 SEC
RBC # BLD AUTO: 4.21 M/UL
SODIUM SERPL-SCNC: 140 MMOL/L
WBC # BLD AUTO: 5.73 K/UL

## 2017-06-28 PROCEDURE — 1159F MED LIST DOCD IN RCRD: CPT | Mod: S$GLB,,, | Performed by: FAMILY MEDICINE

## 2017-06-28 PROCEDURE — 99214 OFFICE O/P EST MOD 30 MIN: CPT | Mod: S$GLB,,, | Performed by: INTERNAL MEDICINE

## 2017-06-28 PROCEDURE — 99999 PR PBB SHADOW E&M-EST. PATIENT-LVL IV: CPT | Mod: PBBFAC,,, | Performed by: FAMILY MEDICINE

## 2017-06-28 PROCEDURE — 4010F ACE/ARB THERAPY RXD/TAKEN: CPT | Mod: S$GLB,,, | Performed by: FAMILY MEDICINE

## 2017-06-28 PROCEDURE — 1159F MED LIST DOCD IN RCRD: CPT | Mod: S$GLB,,, | Performed by: INTERNAL MEDICINE

## 2017-06-28 PROCEDURE — 1126F AMNT PAIN NOTED NONE PRSNT: CPT | Mod: S$GLB,,, | Performed by: FAMILY MEDICINE

## 2017-06-28 PROCEDURE — 93000 ELECTROCARDIOGRAM COMPLETE: CPT | Mod: S$GLB,,, | Performed by: INTERNAL MEDICINE

## 2017-06-28 PROCEDURE — 99214 OFFICE O/P EST MOD 30 MIN: CPT | Mod: S$GLB,,, | Performed by: FAMILY MEDICINE

## 2017-06-28 PROCEDURE — 99999 PR PBB SHADOW E&M-EST. PATIENT-LVL III: CPT | Mod: PBBFAC,,, | Performed by: INTERNAL MEDICINE

## 2017-06-28 PROCEDURE — 3044F HG A1C LEVEL LT 7.0%: CPT | Mod: S$GLB,,, | Performed by: FAMILY MEDICINE

## 2017-06-28 RX ORDER — ATENOLOL 100 MG/1
100 TABLET ORAL DAILY
Qty: 30 TABLET | Refills: 0 | Status: SHIPPED | OUTPATIENT
Start: 2017-06-28 | End: 2017-07-19

## 2017-06-28 RX ORDER — METOPROLOL SUCCINATE 100 MG/1
TABLET, EXTENDED RELEASE ORAL
COMMUNITY
Start: 2017-06-27 | End: 2017-06-28

## 2017-06-28 NOTE — PATIENT INSTRUCTIONS
Discharge Instructions for Atrial Fibrillation  You have been diagnosed with an abnormal heart rhythm called atrial fibrillation. With this condition, your hearts 2 upper chambers quiver rather than squeeze the blood out in a normal pattern. This leads to an irregular and sometimes rapid heartbeat. Some people will develop associated symptoms such as a flip-flopping heartbeat, chest pain, lightheadedness, or shortness of breath. Other people may have no symptoms at all. Atrial fibrillation is serious because it affects the hearts ability to fill with blood as it should. Blood clots may form. This increases the risk for stroke. Untreated atrial fibrillation can also lead to heart failure. Atrial fibrillation can be controlled. With treatment, most people with atrial fibrillation lead normal lives.  Treatment options  Recommended treatment for atrial fibrillation depends on your age, symptoms, how long you have had atrial fibrillation, and other factors. You will have a complete evaluation to find out if you have any abnormalities that caused your heart to go into atrial fibrillation. This might be blocked heart arteries or a thyroid problem. Your doctor will assess your particular case and discuss choices with you.  Treatment choices may include:  · Treating an underlying disorder that puts you at risk for atrial fibrillation. For example, correcting an abnormal thyroid or electrolyte problem, or treating a blocked heart artery.  · Restoring a normal heart rhythm with an electrical shock (cardioversion) or with an antiarrhythmic medicine (chemical cardioversion)  · Using medicine to control your heart rate in atrial fibrillation.  · Preventing the risk for blood clot and stroke using blood-thinning medicines. Your doctor will tell you what he or she recommends. Choices may include aspirin, clopidogrel, warfarin, dabigatran, rivaroxaban, apixaban, and edoxaban.  · Doing catheter ablation or a surgical maze  procedure. These use different methods to destroy certain areas of heart tissue. This interrupts the electrical signals causing atrial fibrillation. One of these procedures may be a choice when medicines do not work, or as an alternative to long-term medicine.  · Other treatment choices may be recommended for you by your doctor.  Managing risk factors for stroke and preventing heart failure are important parts of any treatment plan for atrial fibrillation.  Home care  · Take your medicines exactly as directed. Dont skip doses.  · Work with your doctor to find the right medicines and doses for you.  · Learn to take your own pulse. Keep a record of your results. Ask your doctor which pulse rates mean that you need medical attention. Slowing your pulse is often the goal of treatment. Ask your doctor if its OK for you to use an automatic machine to check your pulse at home. Sometimes these machines dont count the pulse correctly when you have atrial fibrillation.  · Limit your intake of coffee, tea, cola, and other beverages with caffeine. Talk with your doctor about whether you should eliminate caffeine.  · Avoid over-the-counter medicines that have caffeine in them.  · Let your doctor know what medicines you take, including prescription and over-the-counter medicines, as well as any supplements. They interfere with some medicines given for atrial fibrillation.  · Ask your doctor about whether you can drink alcohol. Some people need to avoid alcohol to better treat atrial fibrillation. If you are taking blood-thinner medicines, alcohol may interfere with them by increasing their effect.  · Never take stimulants such as amphetamines or cocaine. These drugs can speed up your heart rate and trigger atrial fibrillation.  Follow-up care  Follow up with your doctor, or as advised.     When should I call my healthcare provider  Call your healthcare provider right away if you have any of the  following:  · Weakness  · Dizziness  · Fainting  · Fatigue  · Shortness of breath  · Chest pain with increased activity  · A change in the usual regularity of your heartbeat, or an unusually fast heartbeat   Date Last Reviewed: 4/23/2016  © 8722-3237 [employer]. [address] [place] [address], [place] [address]. All rights reserved. This information is not intended as a substitute for professional medical care. Always follow your healthcare professional's instructions.

## 2017-06-28 NOTE — ASSESSMENT & PLAN NOTE
On coumadin, followed by [facility]  Last visit with Cards in Dec 2016 and was told to follow up in year  Cath in 4/2016 with no sign of disease, normal EF  Rate not controlled, as she has been out of Toprol for a couple of weeks.  She is looking to establish with new Cards, as her previous had left.  She will get Rx for Atenolol 100 daily sent to her pharmacy and not wait for mail order.   Cards follow up coordinated during visit today  EKG Afib with rate 108, not controlled.  Same ST T wave changes in Anterior leads that she had prior to cath last year.

## 2017-06-28 NOTE — ASSESSMENT & PLAN NOTE
Lab Results   Component Value Date    CHOL 140 08/05/2016    CHOL 155 03/31/2016    CHOL 112 (L) 12/02/2014     Lab Results   Component Value Date    HDL 28 (L) 08/05/2016    HDL 38 (L) 03/31/2016    HDL 30 (L) 12/02/2014     Lab Results   Component Value Date    LDLCALC 75.0 08/05/2016    LDLCALC 94.6 03/31/2016    LDLCALC 64.2 12/02/2014     Lab Results   Component Value Date    TRIG 185 (H) 08/05/2016    TRIG 112 03/31/2016    TRIG 89 12/02/2014     Lab Results   Component Value Date    CHOLHDL 20.0 08/05/2016    CHOLHDL 24.5 03/31/2016    CHOLHDL 26.8 12/02/2014   LDL at goal

## 2017-06-28 NOTE — LETTER
July 1, 2017      Annabella Fenton MD  [address] [place] [id number]           [facility]  [address] [place] [id number]  Phone: [phone]  Fax: [phone]          Patient: Natalie Greene   MR Number: [id number]   YOB: 1940   Date of Visit: 6/28/2017       Dear Dr. Annabella Fenton:    Thank you for referring Natalie Greene to me for evaluation. Attached you will find relevant portions of my assessment and plan of care.    If you have questions, please do not hesitate to call me. I look forward to following Natalie Greene along with you.    Sincerely,    Danny Bonilla MD    Enclosure  CC:  No Recipients    If you would like to receive this communication electronically, please contact [email or web address] or [phone] to request more information on [employer] Link access.    For providers and/or their staff who would like to refer a patient to [facility], please contact us through our one-stop-shop provider referral line, [facility], [place], at [phone].    If you feel you have received this communication in error or would no longer like to receive these types of communications, please e-mail [email or web address]
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
## 2017-06-28 NOTE — ASSESSMENT & PLAN NOTE
Lab Results   Component Value Date    HGBA1C 6.4 (H) 08/05/2016     Diet controlled  Last HgA1c in August 6.4  Eye exam up to date  Microalbumin due in August  Foot exam due and completed in clinic today

## 2017-06-28 NOTE — ASSESSMENT & PLAN NOTE
Bp stable  Treated with Norvasc 5, Atenolol 100 daily, Indapamide 1.25 trujillo, Imdur 30 mg daily, Cozaar 100 mg daily

## 2017-06-28 NOTE — PROGRESS NOTES
Subjective:   Patient ID:  Natalie Greene is a 76 y.o. female who presents for follow-up of Atrial Fibrillation  Pt was seeing Dr. Montero. Pt with atypical CP non exertional.Pt ran out b blockers a few weeks.  Cath 2016 nonobstructive    Hypertension   This is a chronic problem. The current episode started more than 1 year ago. The problem has been gradually improving since onset. The problem is controlled. Pertinent negatives include no chest pain, palpitations or shortness of breath. Past treatments include calcium channel blockers and angiotensin blockers. The current treatment provides moderate improvement. Compliance problems include exercise.    Atrial Fibrillation   Presents for follow-up visit. Symptoms are negative for bradycardia, chest pain, dizziness, hemodynamic instability, hypertension, hypotension, pacemaker problem, palpitations, shortness of breath, syncope, tachycardia and weakness. The symptoms have been stable. Past medical history includes atrial fibrillation. Medication compliance problems include medication side effects.       Review of Systems   Constitution: Negative. Negative for weakness and weight gain.   HENT: Negative.    Eyes: Negative.    Cardiovascular: Negative.  Negative for chest pain, leg swelling, palpitations and syncope.   Respiratory: Negative.  Negative for shortness of breath.    Endocrine: Negative.    Hematologic/Lymphatic: Negative.    Skin: Negative.    Musculoskeletal: Negative for muscle weakness.   Gastrointestinal: Negative.    Genitourinary: Negative.    Neurological: Negative.  Negative for dizziness.   Psychiatric/Behavioral: Negative.    Allergic/Immunologic: Negative.      Family History   Problem Relation Age of Onset    Diabetes Sister     Hypertension Sister      Past Medical History:   Diagnosis Date    *Atrial fibrillation     Diabetes mellitus     Hyperlipidemia     Hypertension      Current Outpatient Prescriptions on File Prior to Visit    Medication Sig Dispense Refill    amlodipine (NORVASC) 5 MG tablet Take 5 mg by mouth once daily.      indapamide (LOZOL) 1.25 MG Tab Take 1.25 mg by mouth once daily.       losartan (COZAAR) 100 MG tablet TAKE 1 TABLET ONCE DAILY. 90 tablet 3    warfarin (COUMADIN) 6 MG tablet Take 1 tablet by mouth every evening as directed by the [facility]. 90 tablet 0    ACCU-CHEK JEN PLUS METER Misc       ACCU-CHEK JEN PLUS TEST STRP Strp       ACCU-CHEK SOFTCLIX LANCETS Misc       atenolol (TENORMIN) 100 MG tablet Take 1 tablet (100 mg total) by mouth once daily. 30 tablet 0    INCONTINENCE PAD,LINER,DISP (BLADDER CONTROL PADS EX ABSORB MISC)       INCONTINENCE PAD,LINER,DISP (BLADDER CONTROL PADS EX ABSORB MISC)       isosorbide mononitrate (IMDUR) 30 MG 24 hr tablet Take 1 tablet (30 mg total) by mouth once daily. 30 tablet 11    [DISCONTINUED] atenolol (TENORMIN) 100 MG tablet Take 100 mg by mouth once daily.       [DISCONTINUED] metoprolol succinate (TOPROL-XL) 100 MG 24 hr tablet       [DISCONTINUED] multivit,stress formula-zinc (STRESS FORMULA WITH ZINC) Tab Take by mouth once daily at 6am.      [DISCONTINUED] potassium chloride SA (K-DUR,KLOR-CON) 20 MEQ tablet Take 20 mEq by mouth once daily.       [DISCONTINUED] tramadol (ULTRAM) 50 mg tablet        No current facility-administered medications on file prior to visit.      Review of patient's allergies indicates:  No Known Allergies    Objective:     Physical Exam   Constitutional: She is oriented to person, place, and time. She appears well-developed and well-nourished.   HENT:   Head: Normocephalic and atraumatic.   Eyes: Conjunctivae and EOM are normal. Pupils are equal, round, and reactive to light.   Neck: Normal range of motion. Neck supple.   Cardiovascular: Normal rate, regular rhythm, normal heart sounds and intact distal pulses.    Pulmonary/Chest: Effort normal and breath sounds normal.   Abdominal: Soft. Bowel sounds are normal.    Musculoskeletal: Normal range of motion.   Neurological: She is alert and oriented to person, place, and time.   Skin: Skin is warm and dry.   Psychiatric: She has a normal mood and affect.   Nursing note and vitals reviewed.      Assessment:     1. Chronic atrial fibrillation    2. Essential hypertension    3. Hyperlipidemia with target LDL less than 100    4. Anticoagulated on Coumadin        Plan:     Chronic atrial fibrillation    Essential hypertension    Hyperlipidemia with target LDL less than 100    Anticoagulated on Coumadin    continue norvasc, losartan-htn  Restart b blockers  echo

## 2017-06-28 NOTE — ASSESSMENT & PLAN NOTE
Followed by [facility]  Lab Results   Component Value Date    WBC 6.35 04/21/2016    HGB 12.0 04/21/2016    HCT 36.7 (L) 04/21/2016    MCV 91 04/21/2016     04/21/2016     Lab Results   Component Value Date    INR 2.8 06/13/2017    INR 2.0 05/16/2017    INR 2.1 04/18/2017     INR in therapeutic range

## 2017-06-29 LAB
ESTIMATED AVG GLUCOSE: 146 MG/DL
HBA1C MFR BLD HPLC: 6.7 %

## 2017-07-03 LAB
ESTIMATED PA SYSTOLIC PRESSURE: 38.44
MITRAL VALVE MOBILITY: NORMAL
MITRAL VALVE REGURGITATION: ABNORMAL
RETIRED EF AND QEF - SEE NOTES: 60 (ref 55–65)
TRICUSPID VALVE REGURGITATION: ABNORMAL

## 2017-07-18 ENCOUNTER — ANTI-COAG VISIT (OUTPATIENT)
Dept: CARDIOLOGY | Facility: CLINIC | Age: 77
End: 2017-07-18
Payer: MEDICARE

## 2017-07-18 DIAGNOSIS — Z79.01 LONG TERM (CURRENT) USE OF ANTICOAGULANTS: Primary | ICD-10-CM

## 2017-07-18 LAB — INR PPP: 2.7 (ref 2–3)

## 2017-07-18 PROCEDURE — 99211 OFF/OP EST MAY X REQ PHY/QHP: CPT | Mod: 25,S$GLB,,

## 2017-07-18 PROCEDURE — 85610 PROTHROMBIN TIME: CPT | Mod: QW,S$GLB,,

## 2017-07-18 NOTE — PROGRESS NOTES
Natalie Greene  07/19/2017  [id number]    Annabella Fenton MD  Patient Care Team:  Annabella Fenton MD as PCP - General (Family Medicine)  Annabella Fenton MD as Consulting Physician (Family Medicine)  Has the patient seen any provider outside of the [facility] network since the last visit? (no). If yes, HIPPA forms completed and records requested.        Visit Type:a scheduled routine follow-up visit    Chief Complaint:  Chief Complaint   Patient presents with    Follow-up       History of Present Illness:  76 year old here for follow up.  She saw Cards for visit. She is back on her Metoprolol  mg. Rate is better.  She feels better than her last visit.    She would like to get a handicap sticker. She has one leg longer than other and currently ambulates with cane. She cannot walk long distances without stopping. She reports she has lower back pain and she gets short of breath.    We are still awaiting old PCP records. She believes she had immunizations.  None on file.    BS is controlled. HgA1c in goal range.    She denies any CP or Papulations. She is followed by Coumadin clinic for her A fib. INR therapeutic.        History:  Past Medical History:   Diagnosis Date    *Atrial fibrillation     Diabetes mellitus     Hyperlipidemia     Hypertension      Past Surgical History:   Procedure Laterality Date    CHOLECYSTECTOMY      COLON SURGERY      HYSTERECTOMY       Family History   Problem Relation Age of Onset    Diabetes Sister     Hypertension Sister      Social History     Social History    Marital status:      Spouse name: N/A    Number of children: N/A    Years of education: N/A     Occupational History    Not on file.     Social History Main Topics    Smoking status: Never Smoker    Smokeless tobacco: Never Used    Alcohol use No    Drug use: Unknown    Sexual activity: Not on file     Other Topics Concern    Not on file     Social History Narrative    No narrative on file      Patient Active Problem List   Diagnosis    A-fib    HTN (hypertension)    Hyperlipidemia with target LDL less than 100    DM (diabetes mellitus)    Anticoagulated on Coumadin    Obesity (BMI 30-39.9)    Abnormal stress test    Anginal equivalent    History of malignant neoplasm of colon    Left arm pain     Review of patient's allergies indicates:  No Known Allergies    The following were reviewed at this visit: active problem list, medication list, allergies, family history, social history, and health maintenance.    Medications:  Current Outpatient Prescriptions on File Prior to Visit   Medication Sig Dispense Refill    ACCU-CHEK JEN PLUS METER Misc       ACCU-CHEK JEN PLUS TEST STRP Strp       ACCU-CHEK SOFTCLIX LANCETS Misc       amlodipine (NORVASC) 5 MG tablet Take 5 mg by mouth once daily.      INCONTINENCE PAD,LINER,DISP (BLADDER CONTROL PADS EX ABSORB MISC)       INCONTINENCE PAD,LINER,DISP (BLADDER CONTROL PADS EX ABSORB MISC)       indapamide (LOZOL) 1.25 MG Tab Take 1.25 mg by mouth once daily.       isosorbide mononitrate (IMDUR) 30 MG 24 hr tablet Take 1 tablet (30 mg total) by mouth once daily. 30 tablet 11    losartan (COZAAR) 100 MG tablet TAKE 1 TABLET ONCE DAILY. 90 tablet 3    warfarin (COUMADIN) 6 MG tablet Take 1 tablet by mouth every evening as directed by the Coumadin Clinic. 90 tablet 0    [DISCONTINUED] atenolol (TENORMIN) 100 MG tablet Take 1 tablet (100 mg total) by mouth once daily. 30 tablet 0     No current facility-administered medications on file prior to visit.        Medications have been reviewed and reconciled with patient at this visit.  Barriers to medications present (no)    Adverse reactions to current medications (no)    Over the counter medications reviewed (Yes ), and if needed added to active Medication list at this visit.     Exam:  Wt Readings from Last 3 Encounters:   06/28/17 98.6 kg (217 lb 7.7 oz)   06/28/17 98.5 kg (217 lb 2.5 oz)    12/06/16 99.4 kg (219 lb 2.2 oz)     Temp Readings from Last 3 Encounters:   06/28/17 97.2 °F (36.2 °C) (Tympanic)   07/21/16 98.2 °F (36.8 °C)   04/21/16 96.4 °F (35.8 °C)     BP Readings from Last 3 Encounters:   06/28/17 138/64   06/28/17 120/60   12/06/16 138/74     Pulse Readings from Last 3 Encounters:   06/28/17 84   06/28/17 96   07/21/16 80     There is no height or weight on file to calculate BMI.      Review of Systems   Constitutional: Negative.  Negative for chills and fever.   HENT: Negative.    Eyes: Negative for blurred vision and double vision.   Cardiovascular: Negative for chest pain and palpitations.   Gastrointestinal: Negative.    Musculoskeletal: Positive for back pain and joint pain. Negative for falls.   Skin: Negative.  Negative for itching and rash.   Neurological: Negative.  Negative for dizziness and tingling.   Endo/Heme/Allergies: Negative.  Negative for polydipsia. Does not bruise/bleed easily.   Psychiatric/Behavioral: Negative.  Negative for depression and suicidal ideas.       Physical Exam    Laboratory Reviewed ({Yes)  Lab Results   Component Value Date    WBC 5.73 06/28/2017    HGB 12.5 06/28/2017    HCT 38.2 06/28/2017     06/28/2017    CHOL 140 08/05/2016    TRIG 185 (H) 08/05/2016    HDL 28 (L) 08/05/2016    ALT 23 06/28/2017    AST 26 06/28/2017     06/28/2017    K 4.2 06/28/2017     06/28/2017    CREATININE 0.8 06/28/2017    BUN 12 06/28/2017    CO2 27 06/28/2017    TSH 1.785 07/29/2014    INR 2.7 07/18/2017    HGBA1C 6.7 (H) 06/28/2017       Assessment:  The primary encounter diagnosis was Need for zoster vaccination. Diagnoses of Essential hypertension, Chronic atrial fibrillation, Type 2 diabetes mellitus without complication, without long-term current use of insulin, Obesity (BMI 30-39.9), Abnormal stress test, Anticoagulated on Coumadin, and Need for pneumococcal vaccination were also pertinent to this visit.     Plan  HTN (hypertension)  BP  stable  No change in medications.  Norvasc 5, Atenolol 100 daily  Indapamide 1.25 daily, Imdur 30 mg daily,   Cozaar 100 mg daily.    EF 55 - 65 60   Mitral Valve Regurgitation   MODERATE    Est. PA Systolic Pressure  38.44   Mitral Valve Mobility  NORMAL   Tricuspid Valve Regurgitation  MILD   Resulting Agency  [facility]   Narrative     Date of Procedure: 07/03/2017            A-fib  EF 55 - 65 60   Mitral Valve Regurgitation   MODERATE    Est. PA Systolic Pressure  38.44   Mitral Valve Mobility  NORMAL   Tricuspid Valve Regurgitation  MILD   Resulting Agency  [facility]   Narrative     Date of Procedure: 07/03/2017        Rate better with Atenolol  Followed by Coumadin clinic  Lab Results   Component Value Date    INR 2.7 07/18/2017    INR 1.9 (H) 06/28/2017    INR 2.8 06/13/2017     Therapeutic range      DM (diabetes mellitus)  Lab Results   Component Value Date    HGBA1C 6.7 (H) 06/28/2017     DM controlled  Never received documents on Pneumovax, will restart immunizations today        Obesity (BMI 30-39.9)  Diet, exercise, weight loss      Abnormal stress test  2/4/2014  Nuclear Quantitative Functional Analysis:   LVEF: 66 %    Impression: ABNORMAL MYOCARDIAL PERFUSION  1. There is evidence for mild myocardial ischemia in the anteroapical wall of the left ventricle.   2. The perfusion scan is free of evidence for myocardial injury.   3. Resting wall motion is physiologic.   4. Resting LV function is normal.   5. The ventricular volumes are normal at rest and stress.   6. The extracardiac distribution of radioactivity is normal.     Normal [facility] Cath in 4/2016 reported    Last visit with Cards, 6/28/2017      Anticoagulated on Coumadin  Lab Results   Component Value Date    INR 2.7 07/18/2017    INR 1.9 (H) 06/28/2017    INR 2.8 06/13/2017     Lab Results   Component Value Date    WBC 5.73 06/28/2017    HGB 12.5 06/28/2017    HCT 38.2 06/28/2017    MCV 91 06/28/2017     06/28/2017     Stable, managed by Coumadin  Clinic    Plan to get records, if not received by Sept, will give Pneumovax and Influenza  Recheck 2 months. Microalbumin and Eye exam then    Rate controlled today in office      Care Plan/Goals: Reviewed  (Yes)  Goals      Take at least one BP reading per week at various times of the day            Hypertension Goals/Individual Care Plan    Hypertension Goal Care Plan    1. Blood pressure goal is to be below 140/90. Normal Blood pressure is 120/80.  Patient's blood pressure is at goal today. (Yes)    2. Goal for self management is to check Blood Pressure daily. Record on Individual log. Patient is agreeable to self management plan. blood pressure log.   Patient will bring logs at next office visit, or communicate to /Care Management team for review for interval follow up.     3. Moderately intense physical activity, such as brisk walking, is beneficial when done regularly. Aim for a total of 40 minutes of moderate to vigorous activity three to four times a week to help lower blood pressure. Exercise of patients choice was recommended at this office visit. walking    4. Eating Healthy Diet is important in Blood Pressure control. As its name implies, the DASH (Dietary Approaches to Stop Hypertension) eating plan is designed to help you manage blood pressure. Emphasizing healthy food sources, it also limits:  Red meat   Sodium (salt)   Sweets, added sugars and sugar-containing beverages.     5. Barriers to goals reviewed and addressed with patient at visit. (Yes)    6. Adherence and Medication Side effects discussed (Yes)    Referral to on-site Pharmacy for Co-management of hypertension (No)  Patient enrolled in Tele-health Hypertension Management. (No)    Patient is under care of /Care management (No) Referral Sent (No)                  Follow up: No Follow-up on file.    After visit summary was printed and given to patient upon discharge today.  Patient goals and care plan are included in  After Visit Summary.

## 2017-07-18 NOTE — PROGRESS NOTES
INR remains therapeutic. Atenolol restarted. No other changes noted. Continue dose and diet until follow-up.

## 2017-07-19 ENCOUNTER — OFFICE VISIT (OUTPATIENT)
Dept: INTERNAL MEDICINE | Facility: CLINIC | Age: 77
End: 2017-07-19
Payer: MEDICARE

## 2017-07-19 VITALS
HEIGHT: 67 IN | OXYGEN SATURATION: 99 % | DIASTOLIC BLOOD PRESSURE: 78 MMHG | TEMPERATURE: 97 F | HEART RATE: 84 BPM | WEIGHT: 218.94 LBS | RESPIRATION RATE: 18 BRPM | BODY MASS INDEX: 34.36 KG/M2 | SYSTOLIC BLOOD PRESSURE: 120 MMHG

## 2017-07-19 DIAGNOSIS — Z23 NEED FOR ZOSTER VACCINATION: Primary | ICD-10-CM

## 2017-07-19 DIAGNOSIS — E11.9 TYPE 2 DIABETES MELLITUS WITHOUT COMPLICATION, WITHOUT LONG-TERM CURRENT USE OF INSULIN: ICD-10-CM

## 2017-07-19 DIAGNOSIS — R94.39 ABNORMAL STRESS TEST: ICD-10-CM

## 2017-07-19 DIAGNOSIS — E66.9 OBESITY (BMI 30-39.9): ICD-10-CM

## 2017-07-19 DIAGNOSIS — I48.20 CHRONIC ATRIAL FIBRILLATION: ICD-10-CM

## 2017-07-19 DIAGNOSIS — I10 ESSENTIAL HYPERTENSION: ICD-10-CM

## 2017-07-19 DIAGNOSIS — Z23 NEED FOR PNEUMOCOCCAL VACCINATION: ICD-10-CM

## 2017-07-19 DIAGNOSIS — Z79.01 ANTICOAGULATED ON COUMADIN: ICD-10-CM

## 2017-07-19 PROCEDURE — 1159F MED LIST DOCD IN RCRD: CPT | Mod: S$GLB,,, | Performed by: FAMILY MEDICINE

## 2017-07-19 PROCEDURE — 99999 PR PBB SHADOW E&M-EST. PATIENT-LVL III: CPT | Mod: PBBFAC,,, | Performed by: FAMILY MEDICINE

## 2017-07-19 PROCEDURE — 1126F AMNT PAIN NOTED NONE PRSNT: CPT | Mod: S$GLB,,, | Performed by: FAMILY MEDICINE

## 2017-07-19 PROCEDURE — 4010F ACE/ARB THERAPY RXD/TAKEN: CPT | Mod: S$GLB,,, | Performed by: FAMILY MEDICINE

## 2017-07-19 PROCEDURE — 99214 OFFICE O/P EST MOD 30 MIN: CPT | Mod: S$GLB,,, | Performed by: FAMILY MEDICINE

## 2017-07-19 PROCEDURE — 3044F HG A1C LEVEL LT 7.0%: CPT | Mod: S$GLB,,, | Performed by: FAMILY MEDICINE

## 2017-07-19 RX ORDER — METOPROLOL SUCCINATE 100 MG/1
100 TABLET, EXTENDED RELEASE ORAL DAILY
Qty: 30 TABLET | Refills: 11
Start: 2017-07-19 | End: 2018-04-20 | Stop reason: SDUPTHER

## 2017-07-19 NOTE — PATIENT INSTRUCTIONS
Discharge Instructions for Atrial Fibrillation  You have been diagnosed with an abnormal heart rhythm called atrial fibrillation. With this condition, your hearts 2 upper chambers quiver rather than squeeze the blood out in a normal pattern. This leads to an irregular and sometimes rapid heartbeat. Some people will develop associated symptoms such as a flip-flopping heartbeat, chest pain, lightheadedness, or shortness of breath. Other people may have no symptoms at all. Atrial fibrillation is serious because it affects the hearts ability to fill with blood as it should. Blood clots may form. This increases the risk for stroke. Untreated atrial fibrillation can also lead to heart failure. Atrial fibrillation can be controlled. With treatment, most people with atrial fibrillation lead normal lives.  Treatment options  Recommended treatment for atrial fibrillation depends on your age, symptoms, how long you have had atrial fibrillation, and other factors. You will have a complete evaluation to find out if you have any abnormalities that caused your heart to go into atrial fibrillation. This might be blocked heart arteries or a thyroid problem. Your doctor will assess your particular case and discuss choices with you.  Treatment choices may include:  · Treating an underlying disorder that puts you at risk for atrial fibrillation. For example, correcting an abnormal thyroid or electrolyte problem, or treating a blocked heart artery.  · Restoring a normal heart rhythm with an electrical shock (cardioversion) or with an antiarrhythmic medicine (chemical cardioversion)  · Using medicine to control your heart rate in atrial fibrillation.  · Preventing the risk for blood clot and stroke using blood-thinning medicines. Your doctor will tell you what he or she recommends. Choices may include aspirin, clopidogrel, warfarin, dabigatran, rivaroxaban, apixaban, and edoxaban.  · Doing catheter ablation or a surgical maze  procedure. These use different methods to destroy certain areas of heart tissue. This interrupts the electrical signals causing atrial fibrillation. One of these procedures may be a choice when medicines do not work, or as an alternative to long-term medicine.  · Other treatment choices may be recommended for you by your doctor.  Managing risk factors for stroke and preventing heart failure are important parts of any treatment plan for atrial fibrillation.  Home care  · Take your medicines exactly as directed. Dont skip doses.  · Work with your doctor to find the right medicines and doses for you.  · Learn to take your own pulse. Keep a record of your results. Ask your doctor which pulse rates mean that you need medical attention. Slowing your pulse is often the goal of treatment. Ask your doctor if its OK for you to use an automatic machine to check your pulse at home. Sometimes these machines dont count the pulse correctly when you have atrial fibrillation.  · Limit your intake of coffee, tea, cola, and other beverages with caffeine. Talk with your doctor about whether you should eliminate caffeine.  · Avoid over-the-counter medicines that have caffeine in them.  · Let your doctor know what medicines you take, including prescription and over-the-counter medicines, as well as any supplements. They interfere with some medicines given for atrial fibrillation.  · Ask your doctor about whether you can drink alcohol. Some people need to avoid alcohol to better treat atrial fibrillation. If you are taking blood-thinner medicines, alcohol may interfere with them by increasing their effect.  · Never take stimulants such as amphetamines or cocaine. These drugs can speed up your heart rate and trigger atrial fibrillation.  Follow-up care  Follow up with your doctor, or as advised.     When should I call my healthcare provider  Call your healthcare provider right away if you have any of the  following:  · Weakness  · Dizziness  · Fainting  · Fatigue  · Shortness of breath  · Chest pain with increased activity  · A change in the usual regularity of your heartbeat, or an unusually fast heartbeat   Date Last Reviewed: 4/23/2016  © 7872-4408 [employer]. [address] [place] [address], [place] [address]. All rights reserved. This information is not intended as a substitute for professional medical care. Always follow your healthcare professional's instructions.

## 2017-08-17 ENCOUNTER — TELEPHONE (OUTPATIENT)
Dept: INTERNAL MEDICINE | Facility: CLINIC | Age: 77
End: 2017-08-17

## 2017-08-17 NOTE — TELEPHONE ENCOUNTER
----- Message from Annabella Fenton MD sent at 8/14/2017 11:59 AM CDT -----  Her eye exam is now due.  Please ask who completed her eye exam last year.  I need her to get it done this month, and if she needs help, we can schedule with [facility].    Dr. Fenton

## 2017-08-17 NOTE — TELEPHONE ENCOUNTER
----- Message from Annabella Fenton MD sent at 8/17/2017 12:15 PM CDT -----  Dr. Vazquez is her eye doctor  Last exam was 8.9.2016  Please schedule her for an eye exam, she is due.

## 2017-08-17 NOTE — TELEPHONE ENCOUNTER
Spoke with pt, notified of message below, offered to make appt for pt,  pt says that she is driving and will call back to make appt

## 2017-08-22 ENCOUNTER — ANTI-COAG VISIT (OUTPATIENT)
Dept: CARDIOLOGY | Facility: CLINIC | Age: 77
End: 2017-08-22
Payer: MEDICARE

## 2017-08-22 DIAGNOSIS — Z79.01 LONG TERM (CURRENT) USE OF ANTICOAGULANTS: Primary | ICD-10-CM

## 2017-08-22 LAB — INR PPP: 2.1 (ref 2–3)

## 2017-08-22 PROCEDURE — 85610 PROTHROMBIN TIME: CPT | Mod: QW,S$GLB,,

## 2017-08-22 RX ORDER — WARFARIN 6 MG/1
TABLET ORAL
Qty: 90 TABLET | Refills: 0 | Status: SHIPPED | OUTPATIENT
Start: 2017-08-22 | End: 2017-10-24 | Stop reason: SDUPTHER

## 2017-08-23 ENCOUNTER — OFFICE VISIT (OUTPATIENT)
Dept: INTERNAL MEDICINE | Facility: CLINIC | Age: 77
End: 2017-08-23
Payer: MEDICARE

## 2017-08-23 ENCOUNTER — HOSPITAL ENCOUNTER (OUTPATIENT)
Dept: RADIOLOGY | Facility: HOSPITAL | Age: 77
Discharge: HOME | End: 2017-08-23
Attending: FAMILY MEDICINE
Payer: MEDICARE

## 2017-08-23 VITALS
TEMPERATURE: 98 F | WEIGHT: 213.81 LBS | BODY MASS INDEX: 33.56 KG/M2 | OXYGEN SATURATION: 98 % | HEIGHT: 67 IN | HEART RATE: 92 BPM | SYSTOLIC BLOOD PRESSURE: 120 MMHG | DIASTOLIC BLOOD PRESSURE: 60 MMHG

## 2017-08-23 DIAGNOSIS — M25.512 CHRONIC LEFT SHOULDER PAIN: ICD-10-CM

## 2017-08-23 DIAGNOSIS — E78.5 HYPERLIPIDEMIA WITH TARGET LDL LESS THAN 100: ICD-10-CM

## 2017-08-23 DIAGNOSIS — Z79.01 ANTICOAGULATED ON COUMADIN: ICD-10-CM

## 2017-08-23 DIAGNOSIS — G89.29 CHRONIC LEFT SHOULDER PAIN: ICD-10-CM

## 2017-08-23 DIAGNOSIS — I20.89 ANGINAL EQUIVALENT: ICD-10-CM

## 2017-08-23 DIAGNOSIS — E11.9 TYPE 2 DIABETES MELLITUS WITHOUT COMPLICATION, WITHOUT LONG-TERM CURRENT USE OF INSULIN: ICD-10-CM

## 2017-08-23 DIAGNOSIS — L97.521 FOOT ULCER, LEFT, LIMITED TO BREAKDOWN OF SKIN: ICD-10-CM

## 2017-08-23 DIAGNOSIS — I48.20 CHRONIC ATRIAL FIBRILLATION: ICD-10-CM

## 2017-08-23 DIAGNOSIS — I10 ESSENTIAL HYPERTENSION: ICD-10-CM

## 2017-08-23 PROBLEM — L97.529 FOOT ULCER, LEFT: Status: ACTIVE | Noted: 2017-08-23

## 2017-08-23 PROCEDURE — 73030 X-RAY EXAM OF SHOULDER: CPT | Mod: 26,LT,, | Performed by: RADIOLOGY

## 2017-08-23 PROCEDURE — 73630 X-RAY EXAM OF FOOT: CPT | Mod: TC,LT

## 2017-08-23 PROCEDURE — 73630 X-RAY EXAM OF FOOT: CPT | Mod: 26,LT,, | Performed by: RADIOLOGY

## 2017-08-23 PROCEDURE — 3044F HG A1C LEVEL LT 7.0%: CPT | Mod: S$GLB,,, | Performed by: FAMILY MEDICINE

## 2017-08-23 PROCEDURE — 3008F BODY MASS INDEX DOCD: CPT | Mod: S$GLB,,, | Performed by: FAMILY MEDICINE

## 2017-08-23 PROCEDURE — 99214 OFFICE O/P EST MOD 30 MIN: CPT | Mod: S$GLB,,, | Performed by: FAMILY MEDICINE

## 2017-08-23 PROCEDURE — 4010F ACE/ARB THERAPY RXD/TAKEN: CPT | Mod: S$GLB,,, | Performed by: FAMILY MEDICINE

## 2017-08-23 PROCEDURE — 3074F SYST BP LT 130 MM HG: CPT | Mod: S$GLB,,, | Performed by: FAMILY MEDICINE

## 2017-08-23 PROCEDURE — 3078F DIAST BP <80 MM HG: CPT | Mod: S$GLB,,, | Performed by: FAMILY MEDICINE

## 2017-08-23 PROCEDURE — 1159F MED LIST DOCD IN RCRD: CPT | Mod: S$GLB,,, | Performed by: FAMILY MEDICINE

## 2017-08-23 PROCEDURE — 99999 PR PBB SHADOW E&M-EST. PATIENT-LVL IV: CPT | Mod: PBBFAC,,, | Performed by: FAMILY MEDICINE

## 2017-08-23 PROCEDURE — 73030 X-RAY EXAM OF SHOULDER: CPT | Mod: TC,LT

## 2017-08-23 NOTE — PROGRESS NOTES
Natalie Greene  08/23/2017  [id number]    Annabella Fenton MD  Patient Care Team:  Annabella Fenton MD as PCP - General (Family Medicine)  Annabella Fenton MD as Consulting Physician (Family Medicine)  Has the patient seen any provider outside of the [facility] network since the last visit? (no). If yes, HIPPA forms completed and records requested.        Visit Type:an urgent visit for a new problem    Chief Complaint:  Chief Complaint   Patient presents with    Foot Injury     Under Left Foot    Arm Pain     Left       History of Present Illness:76 here for follow up.  She has history of a fib- she is now followed by Dr. Bonilla.  Rate is more controlled with compliance with Beta Blocker.    She has type 2 diabetes. Last HgA1c 6.7. Diet controlling.  She is due for eye exam and labs today.    She does not check BP at home. Bp controlled in office today.    She has injury under left foot. Not sure when it happened. Slight pain.   She is a DM, and is concerned.     She also is having left shoulder joint pain. Weakness when lifting arm. Has been chronic. Never xray. No injury. Possible chronic arthritis with now joint/muscle weakness. Does start to interfere with sleep and ADL.      History:  Past Medical History:   Diagnosis Date    *Atrial fibrillation     Diabetes mellitus     Hyperlipidemia     Hypertension      Past Surgical History:   Procedure Laterality Date    CHOLECYSTECTOMY      COLON SURGERY      HYSTERECTOMY       Family History   Problem Relation Age of Onset    Diabetes Sister     Hypertension Sister      Social History     Social History    Marital status:      Spouse name: N/A    Number of children: N/A    Years of education: N/A     Occupational History    Not on file.     Social History Main Topics    Smoking status: Never Smoker    Smokeless tobacco: Never Used    Alcohol use No    Drug use: No    Sexual activity: Not on file     Other Topics Concern    Not on file      Social History Narrative    No narrative on file     Patient Active Problem List   Diagnosis    A-fib    HTN (hypertension)    Hyperlipidemia with target LDL less than 100    DM (diabetes mellitus)    Anticoagulated on Coumadin    Obesity (BMI 30-39.9)    Abnormal stress test    Anginal equivalent    History of malignant neoplasm of colon    Left arm pain    Shoulder pain, left    Foot ulcer, left     Review of patient's allergies indicates:  No Known Allergies    The following were reviewed at this visit: active problem list, medication list, allergies, family history, social history, and health maintenance.    Medications:  Current Outpatient Prescriptions on File Prior to Visit   Medication Sig Dispense Refill    ACCU-CHEK JEN PLUS METER Misc       ACCU-CHEK JEN PLUS TEST STRP Strp       ACCU-CHEK SOFTCLIX LANCETS Misc       amlodipine (NORVASC) 5 MG tablet Take 5 mg by mouth once daily.      INCONTINENCE PAD,LINER,DISP (BLADDER CONTROL PADS EX ABSORB MISC)       indapamide (LOZOL) 1.25 MG Tab Take 1.25 mg by mouth once daily.       losartan (COZAAR) 100 MG tablet TAKE 1 TABLET ONCE DAILY. 90 tablet 3    metoprolol succinate (TOPROL-XL) 100 MG 24 hr tablet Take 1 tablet (100 mg total) by mouth once daily. 30 tablet 11    warfarin (COUMADIN) 6 MG tablet Take 1/2 tablet on Thursdays and 1 tablet all other days as directed by the [facility]. 90 tablet 0    isosorbide mononitrate (IMDUR) 30 MG 24 hr tablet Take 1 tablet (30 mg total) by mouth once daily. 30 tablet 11    [DISCONTINUED] INCONTINENCE PAD,LINER,DISP (BLADDER CONTROL PADS EX ABSORB MISC)        No current facility-administered medications on file prior to visit.        Medications have been reviewed and reconciled with patient at this visit.  Barriers to medications present (no)    Adverse reactions to current medications (no)    Over the counter medications reviewed (Yes ), and if needed added to active Medication  list at this visit.     Exam:  Wt Readings from Last 3 Encounters:   08/23/17 97 kg (213 lb 12.8 oz)   07/19/17 99.3 kg (218 lb 14.7 oz)   06/28/17 98.6 kg (217 lb 7.7 oz)     Temp Readings from Last 3 Encounters:   08/23/17 98.1 °F (36.7 °C) (Tympanic)   07/19/17 97.4 °F (36.3 °C)   06/28/17 97.2 °F (36.2 °C) (Tympanic)     BP Readings from Last 3 Encounters:   08/23/17 120/60   07/19/17 120/78   06/28/17 138/64     Pulse Readings from Last 3 Encounters:   08/23/17 92   07/19/17 84   06/28/17 84     Body mass index is 33.48 kg/m².      Review of Systems   Constitutional: Negative.  Negative for chills and fever.   HENT: Negative.    Eyes: Negative for blurred vision, double vision and photophobia.   Respiratory: Negative for cough.    Cardiovascular: Negative for chest pain, palpitations and orthopnea.   Gastrointestinal: Negative for abdominal pain, heartburn, nausea and vomiting.   Genitourinary: Negative.    Musculoskeletal: Positive for joint pain.   Skin: Negative for rash.        Ulcer under toe, left     Neurological: Negative for dizziness and tingling.   Endo/Heme/Allergies: Negative for polydipsia. Does not bruise/bleed easily.   Psychiatric/Behavioral: Negative for depression and substance abuse.       Physical Exam   Constitutional: She is oriented to person, place, and time. She appears well-developed and well-nourished.   HENT:   Head: Normocephalic and atraumatic.   Mouth/Throat: Oropharynx is clear and moist.   Eyes: EOM are normal. Pupils are equal, round, and reactive to light.   Neck: Normal range of motion. Neck supple. No thyromegaly present.   Cardiovascular: Normal rate and normal heart sounds.  An irregularly irregular rhythm present.   No murmur heard.  Pulmonary/Chest: Effort normal and breath sounds normal. No respiratory distress. She exhibits no tenderness.   Abdominal: Soft. Bowel sounds are normal. She exhibits no distension. There is no tenderness.   Musculoskeletal: She exhibits  tenderness. She exhibits no edema.        Left shoulder: She exhibits decreased range of motion and tenderness. She exhibits no bony tenderness.        Left ankle: She exhibits normal range of motion.        Arms:       Feet:    Varicose Veins    Bottom of left foot, below great toe.  Purple lesion in center of callus.      Lymphadenopathy:     She has no cervical adenopathy.   Neurological: She is alert and oriented to person, place, and time.   Psychiatric: She has a normal mood and affect. Her behavior is normal. Judgment and thought content normal.   Nursing note and vitals reviewed.      Laboratory Reviewed ({Yes)  Lab Results   Component Value Date    WBC 5.73 06/28/2017    HGB 12.5 06/28/2017    HCT 38.2 06/28/2017     06/28/2017    CHOL 140 08/05/2016    TRIG 185 (H) 08/05/2016    HDL 28 (L) 08/05/2016    ALT 23 06/28/2017    AST 26 06/28/2017     06/28/2017    K 4.2 06/28/2017     06/28/2017    CREATININE 0.8 06/28/2017    BUN 12 06/28/2017    CO2 27 06/28/2017    TSH 1.785 07/29/2014    INR 2.1 08/22/2017    HGBA1C 6.7 (H) 06/28/2017       Assessment:  Diagnoses of Hyperlipidemia with target LDL less than 100, Essential hypertension, Anginal equivalent, Chronic atrial fibrillation, Anticoagulated on Coumadin, Type 2 diabetes mellitus without complication, without long-term current use of insulin, Chronic left shoulder pain, and Foot ulcer, left, limited to breakdown of skin were pertinent to this visit.     Plan  Hyperlipidemia with target LDL less than 100  Lab Results   Component Value Date    CHOL 140 08/05/2016    CHOL 155 03/31/2016    CHOL 112 (L) 12/02/2014     Lab Results   Component Value Date    HDL 28 (L) 08/05/2016    HDL 38 (L) 03/31/2016    HDL 30 (L) 12/02/2014     Lab Results   Component Value Date    LDLCALC 75.0 08/05/2016    LDLCALC 94.6 03/31/2016    LDLCALC 64.2 12/02/2014     Lab Results   Component Value Date    TRIG 185 (H) 08/05/2016    TRIG 112 03/31/2016     TRIG 89 12/02/2014     Lab Results   Component Value Date    CHOLHDL 20.0 08/05/2016    CHOLHDL 24.5 03/31/2016    CHOLHDL 26.8 12/02/2014     LDL at goal      HTN (hypertension)  BP at goal  No change in medication.  Indapamide, Imdur, Losartan, Toprol 100 mg  BMP  Lab Results   Component Value Date     06/28/2017    K 4.2 06/28/2017     06/28/2017    CO2 27 06/28/2017    BUN 12 06/28/2017    CREATININE 0.8 06/28/2017    CALCIUM 9.4 06/28/2017    ANIONGAP 11 06/28/2017    ESTGFRAFRICA >60.0 06/28/2017    EGFRNONAA >60.0 06/28/2017         Anginal equivalent  Recent visit with Cards  No angina reported today  On Coumadin currently    A-fib  Rate control improved with compliance with Beta Blocker  Recent visit with Cards notes reviewed  Continue Toprol and Coumadin      Anticoagulated on Coumadin  Lab Results   Component Value Date    INR 2.1 08/22/2017    INR 2.7 07/18/2017    INR 1.9 (H) 06/28/2017         DM (diabetes mellitus)  Lab Results   Component Value Date    HGBA1C 6.7 (H) 06/28/2017     Diet controlled currently  Monitoring HgA1c  Schedule eye exam  No pneumovax came over in records  May need to start immunizations as no definitive records      Sent to Pharm for Flu and Prevnar 13  Recheck in 1-2 weeks.    Care Plan/Goals: Reviewed  (Yes)  Goals      Take at least one BP reading per week at various times of the day            Hypertension Goals/Individual Care Plan    Hypertension Goal Care Plan    1. Blood pressure goal is to be below 140/90. Normal Blood pressure is 120/80.  Patient's blood pressure is at goal today. (Yes)    2. Goal for self management is to check Blood Pressure daily. Record on Individual log. Patient is agreeable to self management plan. blood pressure log.   Patient will bring logs at next office visit, or communicate to /Care Management team for review for interval follow up.     3. Moderately intense physical activity, such as brisk walking, is beneficial  when done regularly. Aim for a total of 40 minutes of moderate to vigorous activity three to four times a week to help lower blood pressure. Exercise of patients choice was recommended at this office visit. walking    4. Eating Healthy Diet is important in Blood Pressure control. As its name implies, the DASH (Dietary Approaches to Stop Hypertension) eating plan is designed to help you manage blood pressure. Emphasizing healthy food sources, it also limits:  Red meat   Sodium (salt)   Sweets, added sugars and sugar-containing beverages.     5. Barriers to goals reviewed and addressed with patient at visit. (Yes)    6. Adherence and Medication Side effects discussed (Yes)    Referral to on-site Pharmacy for Co-management of hypertension (No)  Patient enrolled in Tele-health Hypertension Management. (No)    Patient is under care of /Care management (No) Referral Sent (No)                  Follow up: Return in about 3 months (around 11/23/2017) for Diabetes Recheck, Hypertension Recheck, Medication Recheck.    After visit summary was printed and given to patient upon discharge today.  Patient goals and care plan are included in After Visit Summary.

## 2017-08-23 NOTE — PATIENT INSTRUCTIONS
Taking Your Blood Pressure  Blood pressure is the force of blood against the artery wall as it moves from the heart through the blood vessels. You can take your own blood pressure reading using a digital monitor. Take readings as often as your healthcare provider instructs. Take each reading at the same time of day.  Step 1. Relax    · Take your blood pressure at the same time every day, such as in the morning or evening, or at the time your healthcare provider recommends.  · Wait at least a half-hour after smoking, eating, drinking caffeinated beverages, or exercising.  · Sit comfortably at a table with both feet on the floor. Do not cross your legs or feet. Place the monitor near you.  · Rest for a few minutes before you begin.  Step 2. Wrap the cuff    · Place your arm on the table, palm up. Your arm should be at the level of your heart. Wrap the cuff around your upper arm, just above your elbow. Its best done on bare skin, not over clothing. Most cuffs will indicate where the brachial artery (the blood vessel in the middle of the arm at the inner side of the elbow) should line up with the cuff. Look in your monitor's instruction booklet for an illustration. You can also bring your cuff to your healthcare provider and have them show you how to correctly place the cuff.  · Make sure your cuff fits. If it doesnt wrap around your upper arm, order a larger cuff.  Step 3. Inflate the cuff    · Pump the cuff until the scale reads 160. If you have a self-inflating cuff, push the button that starts the pump.  · The cuff will tighten, then loosen.  · The numbers will change. When they stop changing, your blood pressure reading will appear.  · Take 2 or 3 readings one minute apart.  Step 4. Write down the results of each reading    · Write down your blood pressure numbers for each reading. Note the date and time. Keep your results in one place, such as a notebook. Even if your monitor has a built-in memory, keep a hard  copy of the readings.  · Remove the cuff from your arm. Turn off the machine.  · Share your blood pressure records with your healthcare providers at each visit.  Date Last Reviewed: 4/27/2016  © 5224-8025 [employer]. [address] [place] [address], [place] [address]. All rights reserved. This information is not intended as a substitute for professional medical care. Always follow your healthcare professional's instructions.

## 2017-08-23 NOTE — ASSESSMENT & PLAN NOTE
Lab Results   Component Value Date    HGBA1C 6.7 (H) 06/28/2017     Diet controlled currently  Monitoring HgA1c  Schedule eye exam  No pneumovax came over in records  May need to start immunizations as no definitive records

## 2017-08-23 NOTE — ASSESSMENT & PLAN NOTE
Lab Results   Component Value Date    INR 2.1 08/22/2017    INR 2.7 07/18/2017    INR 1.9 (H) 06/28/2017

## 2017-08-23 NOTE — ASSESSMENT & PLAN NOTE
Rate control improved with compliance with Beta Blocker  Recent visit with Cards notes reviewed  Continue Toprol and Coumadin

## 2017-08-23 NOTE — ASSESSMENT & PLAN NOTE
BP at goal  No change in medication.  Indapamide, Imdur, Losartan, Toprol 100 mg  BMP  Lab Results   Component Value Date     06/28/2017    K 4.2 06/28/2017     06/28/2017    CO2 27 06/28/2017    BUN 12 06/28/2017    CREATININE 0.8 06/28/2017    CALCIUM 9.4 06/28/2017    ANIONGAP 11 06/28/2017    ESTGFRAFRICA >60.0 06/28/2017    EGFRNONAA >60.0 06/28/2017

## 2017-08-27 NOTE — ASSESSMENT & PLAN NOTE
Lab Results   Component Value Date    HGBA1C 6.5 (H) 08/23/2017     BS controlled  Eye exam next week  Microalbumin today

## 2017-08-27 NOTE — ASSESSMENT & PLAN NOTE
Xray completed  Comparison: none    Findings: There is no radiographic evidence of acute osseous, articular, or soft tissue abnormality. There are mild degenerative changes present at the acromioclavicular and glenohumeral joints.   Impression       As above. No acute findings.     Patient offered PT   She declines  Tylenol for arthritis pain

## 2017-08-27 NOTE — PROGRESS NOTES
Natalie Greene  08/28/2017  [id number]    Annabella Fenton MD  Patient Care Team:  Annabella Fenton MD as PCP - General (Family Medicine)  Annabella Fenton MD as Consulting Physician (Family Medicine)  Has the patient seen any provider outside of the [facility] network since the last visit? (no). If yes, HIPPA forms completed and records requested.        Visit Type:a scheduled routine follow-up visit    Chief Complaint:  Chief Complaint   Patient presents with    Follow-up     Check Left Foot       History of Present Illness:  Patient here for follow up  Seen last week for ulcer on left great toe.  She had Xray that did not show any gas or bone involvement. No real pain. She has appt with Podiatry today.    She has HTn, Afib- followed by Cards.  She is anticoagulated with Coumadin.    She was c/o pain, left shoulder.  Xray shows mild arthritis.  She was offered PT, but declined.    She had her Flu vaccine and Prevnar 13 vaccine last week.    She needs to complete the Microalbumin. Was not done with labs done last week.    LDL did improve with labs, 101, goal is 100. Working on Diet.         History:  Past Medical History:   Diagnosis Date    *Atrial fibrillation     Diabetes mellitus     Hyperlipidemia     Hypertension      Past Surgical History:   Procedure Laterality Date    CHOLECYSTECTOMY      COLON SURGERY      HYSTERECTOMY       Family History   Problem Relation Age of Onset    Diabetes Sister     Hypertension Sister      Social History     Social History    Marital status:      Spouse name: N/A    Number of children: N/A    Years of education: N/A     Occupational History    Not on file.     Social History Main Topics    Smoking status: Never Smoker    Smokeless tobacco: Never Used    Alcohol use No    Drug use: No    Sexual activity: Not on file     Other Topics Concern    Not on file     Social History Narrative    No narrative on file     Patient Active Problem List   Diagnosis     A-fib    HTN (hypertension)    Hyperlipidemia with target LDL less than 100    DM (diabetes mellitus)    Anticoagulated on Coumadin    Obesity (BMI 30-39.9)    Abnormal stress test    Anginal equivalent    History of malignant neoplasm of colon    Left arm pain    Shoulder pain, left    Foot ulcer, left    Aortic atherosclerosis     Review of patient's allergies indicates:  No Known Allergies    The following were reviewed at this visit: active problem list, medication list, allergies, family history, social history, and health maintenance.    Medications:  Current Outpatient Prescriptions on File Prior to Visit   Medication Sig Dispense Refill    ACCU-CHEK JEN PLUS METER Misc       ACCU-CHEK JEN PLUS TEST STRP Strp       ACCU-CHEK SOFTCLIX LANCETS Misc       amlodipine (NORVASC) 5 MG tablet Take 5 mg by mouth once daily.      INCONTINENCE PAD,LINER,DISP (BLADDER CONTROL PADS EX ABSORB MISC)       indapamide (LOZOL) 1.25 MG Tab Take 1.25 mg by mouth once daily.       losartan (COZAAR) 100 MG tablet TAKE 1 TABLET ONCE DAILY. 90 tablet 3    metoprolol succinate (TOPROL-XL) 100 MG 24 hr tablet Take 1 tablet (100 mg total) by mouth once daily. 30 tablet 11    warfarin (COUMADIN) 6 MG tablet Take 1/2 tablet on Thursdays and 1 tablet all other days as directed by the Coumadin Clinic. 90 tablet 0    isosorbide mononitrate (IMDUR) 30 MG 24 hr tablet Take 1 tablet (30 mg total) by mouth once daily. 30 tablet 11     No current facility-administered medications on file prior to visit.        Medications have been reviewed and reconciled with patient at this visit.  Barriers to medications present (no)    Adverse reactions to current medications (no)    Over the counter medications reviewed (Yes ), and if needed added to active Medication list at this visit.     Exam:  Wt Readings from Last 3 Encounters:   08/28/17 93 kg (205 lb)   08/23/17 97 kg (213 lb 12.8 oz)   07/19/17 99.3 kg (218 lb 14.7 oz)      Temp Readings from Last 3 Encounters:   08/28/17 97.2 °F (36.2 °C) (Tympanic)   08/23/17 98.1 °F (36.7 °C) (Tympanic)   07/19/17 97.4 °F (36.3 °C)     BP Readings from Last 3 Encounters:   08/28/17 118/70   08/23/17 120/60   07/19/17 120/78     Pulse Readings from Last 3 Encounters:   08/28/17 82   08/23/17 92   07/19/17 84     Body mass index is 32.1 kg/m².      Review of Systems   Constitutional: Negative.  Negative for chills and fever.   HENT: Negative.    Eyes: Negative for blurred vision and double vision.   Respiratory: Negative for cough and shortness of breath.    Cardiovascular: Negative.  Negative for chest pain and palpitations.   Gastrointestinal: Negative for heartburn, nausea and vomiting.   Musculoskeletal: Positive for joint pain.   Skin:        Toe Ulcer     Neurological: Negative for dizziness, tingling and tremors.   Endo/Heme/Allergies: Does not bruise/bleed easily.   Psychiatric/Behavioral: Negative for depression, substance abuse and suicidal ideas.       Physical Exam   Constitutional: She is oriented to person, place, and time. She appears well-developed and well-nourished.   HENT:   Head: Normocephalic and atraumatic.   Mouth/Throat: Oropharynx is clear and moist.   Eyes: EOM are normal. Pupils are equal, round, and reactive to light.   Neck: Normal range of motion. Neck supple.   Cardiovascular: Normal pulses.  An irregularly irregular rhythm present.   No murmur heard.  Pulmonary/Chest: Effort normal and breath sounds normal.   Abdominal: Soft. Bowel sounds are normal.   Lymphadenopathy:     She has no cervical adenopathy.   Neurological: She is alert and oriented to person, place, and time.   Skin: Capillary refill takes less than 2 seconds.   Plantar left foot/ great toe  Having peeling of callus, discoloration  Xray reviewed  No surrounding cellulitis. No pain  Podiatry consult today     Psychiatric: She has a normal mood and affect. Her behavior is normal. Judgment and thought  content normal.   Nursing note and vitals reviewed.      Laboratory Reviewed ({Yes)  Lab Results   Component Value Date    WBC 6.05 08/23/2017    HGB 12.2 08/23/2017    HCT 37.5 08/23/2017     08/23/2017    CHOL 155 08/23/2017    TRIG 150 08/23/2017    HDL 33 (L) 08/23/2017    ALT 23 06/28/2017    AST 26 06/28/2017     08/23/2017    K 3.7 08/23/2017    CL 99 08/23/2017    CREATININE 0.8 08/23/2017    BUN 14 08/23/2017    CO2 29 08/23/2017    TSH 1.785 07/29/2014    INR 2.1 08/22/2017    HGBA1C 6.5 (H) 08/23/2017       Assessment:  Diagnoses of Foot ulcer, left, limited to breakdown of skin, Essential hypertension, Chronic atrial fibrillation, Type 2 diabetes mellitus without complication, without long-term current use of insulin, Chronic left shoulder pain, Aortic atherosclerosis, and Obesity (BMI 30-39.9) were pertinent to this visit.     Plan  Foot ulcer, left  She will see Podiatry at end of day  Local wound care  Xray did not show any gas or bone involvement      HTN (hypertension)  BP in goal range  No change in medication      A-fib  Rate controlled  Recent Cards visit  On Toprol for rate control.  She is on Coumadin      DM (diabetes mellitus)  Lab Results   Component Value Date    HGBA1C 6.5 (H) 08/23/2017     BS controlled  Eye exam next week  Microalbumin today        Shoulder pain, left  Xray completed  Comparison: none    Findings: There is no radiographic evidence of acute osseous, articular, or soft tissue abnormality. There are mild degenerative changes present at the acromioclavicular and glenohumeral joints.   Impression       As above. No acute findings.     Patient offered PT   She declines  Tylenol for arthritis pain      Aortic atherosclerosis  Seen on Echo.  Followed by Cards  BP control  On Coumadin  LDL controlled.     Obesity (BMI 30-39.9)  Patient not in morbid obesity range with diet  Continue weight loss efforts        Care Plan/Goals: Reviewed  (Yes)  Goals      Take at  least one BP reading per week at various times of the day            Hypertension Goals/Individual Care Plan    Hypertension Goal Care Plan    1. Blood pressure goal is to be below 140/90. Normal Blood pressure is 120/80.  Patient's blood pressure is at goal today. (Yes)    2. Goal for self management is to check Blood Pressure daily. Record on Individual log. Patient is agreeable to self management plan. blood pressure log.   Patient will bring logs at next office visit, or communicate to /Care Management team for review for interval follow up.     3. Moderately intense physical activity, such as brisk walking, is beneficial when done regularly. Aim for a total of 40 minutes of moderate to vigorous activity three to four times a week to help lower blood pressure. Exercise of patients choice was recommended at this office visit. walking    4. Eating Healthy Diet is important in Blood Pressure control. As its name implies, the DASH (Dietary Approaches to Stop Hypertension) eating plan is designed to help you manage blood pressure. Emphasizing healthy food sources, it also limits:  Red meat   Sodium (salt)   Sweets, added sugars and sugar-containing beverages.     5. Barriers to goals reviewed and addressed with patient at visit. (Yes)    6. Adherence and Medication Side effects discussed (Yes)    Referral to on-site Pharmacy for Co-management of hypertension (No)  Patient enrolled in Tele-health Hypertension Management. (No)    Patient is under care of /Care management (No) Referral Sent (No)                  Follow up: Return in about 3 months (around 11/28/2017) for Diabetes Recheck.    After visit summary was printed and given to patient upon discharge today.  Patient goals and care plan are included in After Visit Summary.

## 2017-08-27 NOTE — ASSESSMENT & PLAN NOTE
She will see Podiatry at end of day  Local wound care  Xray did not show any gas or bone involvement

## 2017-08-28 ENCOUNTER — OFFICE VISIT (OUTPATIENT)
Dept: PODIATRY | Facility: CLINIC | Age: 77
End: 2017-08-28
Payer: MEDICARE

## 2017-08-28 ENCOUNTER — OFFICE VISIT (OUTPATIENT)
Dept: INTERNAL MEDICINE | Facility: CLINIC | Age: 77
End: 2017-08-28
Payer: MEDICARE

## 2017-08-28 VITALS
HEIGHT: 67 IN | HEART RATE: 82 BPM | BODY MASS INDEX: 32.18 KG/M2 | HEIGHT: 67 IN | DIASTOLIC BLOOD PRESSURE: 72 MMHG | BODY MASS INDEX: 32.18 KG/M2 | OXYGEN SATURATION: 96 % | WEIGHT: 205 LBS | DIASTOLIC BLOOD PRESSURE: 70 MMHG | HEART RATE: 82 BPM | TEMPERATURE: 97 F | WEIGHT: 205 LBS | SYSTOLIC BLOOD PRESSURE: 118 MMHG | SYSTOLIC BLOOD PRESSURE: 118 MMHG

## 2017-08-28 DIAGNOSIS — E11.9 TYPE 2 DIABETES MELLITUS WITHOUT COMPLICATION, WITHOUT LONG-TERM CURRENT USE OF INSULIN: ICD-10-CM

## 2017-08-28 DIAGNOSIS — E11.9 COMPREHENSIVE DIABETIC FOOT EXAMINATION, TYPE 2 DM, ENCOUNTER FOR: Primary | ICD-10-CM

## 2017-08-28 DIAGNOSIS — L84 PRE-ULCERATIVE CALLUSES: ICD-10-CM

## 2017-08-28 DIAGNOSIS — I10 ESSENTIAL HYPERTENSION: ICD-10-CM

## 2017-08-28 DIAGNOSIS — L97.521 FOOT ULCER, LEFT, LIMITED TO BREAKDOWN OF SKIN: ICD-10-CM

## 2017-08-28 DIAGNOSIS — I70.0 AORTIC ATHEROSCLEROSIS: ICD-10-CM

## 2017-08-28 DIAGNOSIS — G89.29 CHRONIC LEFT SHOULDER PAIN: ICD-10-CM

## 2017-08-28 DIAGNOSIS — M25.512 CHRONIC LEFT SHOULDER PAIN: ICD-10-CM

## 2017-08-28 DIAGNOSIS — E66.9 OBESITY (BMI 30-39.9): ICD-10-CM

## 2017-08-28 DIAGNOSIS — B35.1 DERMATOPHYTOSIS OF NAIL: ICD-10-CM

## 2017-08-28 DIAGNOSIS — E11.49 TYPE II DIABETES MELLITUS WITH NEUROLOGICAL MANIFESTATIONS: ICD-10-CM

## 2017-08-28 DIAGNOSIS — I48.20 CHRONIC ATRIAL FIBRILLATION: ICD-10-CM

## 2017-08-28 LAB
CREAT UR-MCNC: 189 MG/DL
MICROALBUMIN UR DL<=1MG/L-MCNC: 40 UG/ML
MICROALBUMIN/CREATININE RATIO: 21.2 UG/MG

## 2017-08-28 PROCEDURE — 11055 PARING/CUTG B9 HYPRKER LES 1: CPT | Mod: Q9,S$GLB,, | Performed by: PODIATRIST

## 2017-08-28 PROCEDURE — 11721 DEBRIDE NAIL 6 OR MORE: CPT | Mod: 59,Q9,S$GLB, | Performed by: PODIATRIST

## 2017-08-28 PROCEDURE — 3044F HG A1C LEVEL LT 7.0%: CPT | Mod: S$GLB,,, | Performed by: PODIATRIST

## 2017-08-28 PROCEDURE — 3008F BODY MASS INDEX DOCD: CPT | Mod: S$GLB,,, | Performed by: FAMILY MEDICINE

## 2017-08-28 PROCEDURE — 99999 PR PBB SHADOW E&M-EST. PATIENT-LVL III: CPT | Mod: PBBFAC,,, | Performed by: PODIATRIST

## 2017-08-28 PROCEDURE — 1159F MED LIST DOCD IN RCRD: CPT | Mod: S$GLB,,, | Performed by: PODIATRIST

## 2017-08-28 PROCEDURE — 3078F DIAST BP <80 MM HG: CPT | Mod: S$GLB,,, | Performed by: PODIATRIST

## 2017-08-28 PROCEDURE — 99214 OFFICE O/P EST MOD 30 MIN: CPT | Mod: 25,S$GLB,, | Performed by: PODIATRIST

## 2017-08-28 PROCEDURE — 3008F BODY MASS INDEX DOCD: CPT | Mod: S$GLB,,, | Performed by: PODIATRIST

## 2017-08-28 PROCEDURE — 1159F MED LIST DOCD IN RCRD: CPT | Mod: S$GLB,,, | Performed by: FAMILY MEDICINE

## 2017-08-28 PROCEDURE — 3078F DIAST BP <80 MM HG: CPT | Mod: S$GLB,,, | Performed by: FAMILY MEDICINE

## 2017-08-28 PROCEDURE — 82570 ASSAY OF URINE CREATININE: CPT

## 2017-08-28 PROCEDURE — 1126F AMNT PAIN NOTED NONE PRSNT: CPT | Mod: S$GLB,,, | Performed by: PODIATRIST

## 2017-08-28 PROCEDURE — 1126F AMNT PAIN NOTED NONE PRSNT: CPT | Mod: S$GLB,,, | Performed by: FAMILY MEDICINE

## 2017-08-28 PROCEDURE — 3074F SYST BP LT 130 MM HG: CPT | Mod: S$GLB,,, | Performed by: PODIATRIST

## 2017-08-28 PROCEDURE — 3044F HG A1C LEVEL LT 7.0%: CPT | Mod: S$GLB,,, | Performed by: FAMILY MEDICINE

## 2017-08-28 PROCEDURE — 99999 PR PBB SHADOW E&M-EST. PATIENT-LVL III: CPT | Mod: PBBFAC,,, | Performed by: FAMILY MEDICINE

## 2017-08-28 PROCEDURE — 4010F ACE/ARB THERAPY RXD/TAKEN: CPT | Mod: S$GLB,,, | Performed by: FAMILY MEDICINE

## 2017-08-28 PROCEDURE — 4010F ACE/ARB THERAPY RXD/TAKEN: CPT | Mod: S$GLB,,, | Performed by: PODIATRIST

## 2017-08-28 PROCEDURE — 3074F SYST BP LT 130 MM HG: CPT | Mod: S$GLB,,, | Performed by: FAMILY MEDICINE

## 2017-08-28 PROCEDURE — 99214 OFFICE O/P EST MOD 30 MIN: CPT | Mod: S$GLB,,, | Performed by: FAMILY MEDICINE

## 2017-08-28 RX ORDER — PNEUMOCOCCAL 13-VALENT CONJUGATE VACCINE 2.2; 2.2; 2.2; 2.2; 2.2; 4.4; 2.2; 2.2; 2.2; 2.2; 2.2; 2.2; 2.2 UG/.5ML; UG/.5ML; UG/.5ML; UG/.5ML; UG/.5ML; UG/.5ML; UG/.5ML; UG/.5ML; UG/.5ML; UG/.5ML; UG/.5ML; UG/.5ML; UG/.5ML
INJECTION, SUSPENSION INTRAMUSCULAR
COMMUNITY
Start: 2017-08-23 | End: 2017-12-26

## 2017-08-28 NOTE — LETTER
September 5, 2017      Annabella Fenton MD  [id number] [place] [id number]           [facility]  [address] [place] [id number]  Phone: [phone]  Fax: [phone]          Patient: Natalie Greene   MR Number: [id number]   YOB: 1940   Date of Visit: 8/28/2017       Dear Dr. Annabella Fenton:    Thank you for referring Natalie Greene to me for evaluation. Attached you will find relevant portions of my assessment and plan of care.    If you have questions, please do not hesitate to call me. I look forward to following Natalie Greene along with you.    Sincerely,    Raul Farooq  CC:  No Recipients    If you would like to receive this communication electronically, please contact [email or web address] or [phone] to request more information on [employer] Link access.    For providers and/or their staff who would like to refer a patient to [facility], please contact us through our one-stop-shop provider referral line, Milly Gonsales, at [phone].    If you feel you have received this communication in error or would no longer like to receive these types of communications, please e-mail [email or web address]

## 2017-08-28 NOTE — PATIENT INSTRUCTIONS

## 2017-08-28 NOTE — PROGRESS NOTES
Subjective:     Patient ID: Natalie Greene is a 76 y.o. female.    Chief Complaint: Foot Ulcer (Left foot ulcer ( area appears to be closed with dry skin peeling). Patient states no current pain.) and Diabetes Mellitus (Last PCP visit with - 8/28/17.)    Natalie is a 76 y.o. female who presents to the clinic for evaluation and treatment of high risk feet. Natalie has a past medical history of *Atrial fibrillation; Diabetes mellitus; DM (diabetes mellitus) (2002); Hyperlipidemia; and Hypertension. The patient's chief complaint is diabetic foot ulcer, left foot Patient states the ulcer is closed but there is a lot of peeling skin at the area. Patient admits she does not wear shoes around the house. This patient has documented high risk feet requiring routine maintenance secondary to diabetes mellitis and those secondary complications of diabetes, as mentioned..    PCP: Annabella Fenton MD    Date Last Seen by PCP: 8/28/17    Current shoe gear:  Affected Foot: Slip-on shoes     Unaffected Foot: Slip-on shoes    History of Trauma: positive  Sign of Infection: none    Hemoglobin A1C   Date Value Ref Range Status   08/23/2017 6.5 (H) 4.0 - 5.6 % Final     Comment:     According to ADA guidelines, hemoglobin A1c <7.0% represents  optimal control in non-pregnant diabetic patients. Different  metrics may apply to specific patient populations.   Standards of Medical Care in Diabetes-2016.  For the purpose of screening for the presence of diabetes:  <5.7%     Consistent with the absence of diabetes  5.7-6.4%  Consistent with increasing risk for diabetes   (prediabetes)  >or=6.5%  Consistent with diabetes  Currently, no consensus exists for use of hemoglobin A1c  for diagnosis of diabetes for children.  This Hemoglobin A1c assay has significant interference with fetal   hemoglobin   (HbF). The results are invalid for patients with abnormal amounts of   HbF,   including those with known Hereditary Persistence   of Fetal  Hemoglobin. Heterozygous hemoglobin variants (HbAS, HbAC,   HbAD, HbAE, HbA2) do not significantly interfere with this assay;   however, presence of multiple variants in a sample may impact the %   interference.     06/28/2017 6.7 (H) 4.0 - 5.6 % Final     Comment:     According to ADA guidelines, hemoglobin A1c <7.0% represents  optimal control in non-pregnant diabetic patients. Different  metrics may apply to specific patient populations.   Standards of Medical Care in Diabetes-2016.  For the purpose of screening for the presence of diabetes:  <5.7%     Consistent with the absence of diabetes  5.7-6.4%  Consistent with increasing risk for diabetes   (prediabetes)  >or=6.5%  Consistent with diabetes  Currently, no consensus exists for use of hemoglobin A1c  for diagnosis of diabetes for children.  This Hemoglobin A1c assay has significant interference with fetal   hemoglobin   (HbF). The results are invalid for patients with abnormal amounts of   HbF,   including those with known Hereditary Persistence   of Fetal Hemoglobin. Heterozygous hemoglobin variants (HbAS, HbAC,   HbAD, HbAE, HbA2) do not significantly interfere with this assay;   however, presence of multiple variants in a sample may impact the %   interference.     08/05/2016 6.4 (H) 4.5 - 6.2 % Final     Comment:     According to ADA guidelines, hemoglobin A1C <7.0% represents  optimal control in non-pregnant diabetic patients.  Different  metrics may apply to specific populations.   Standards of Medical Care in Diabetes - 2016.  For the purpose of screening for the presence of diabetes:  <5.7%     Consistent with the absence of diabetes  5.7-6.4%  Consistent with increasing risk for diabetes   (prediabetes)  >or=6.5%  Consistent with diabetes  Currently no consensus exists for use of hemoglobin A1C  for diagnosis of diabetes for children.             Patient Active Problem List   Diagnosis    A-fib    HTN (hypertension)    Hyperlipidemia with target  LDL less than 100    DM (diabetes mellitus)    Anticoagulated on Coumadin    Obesity (BMI 30-39.9)    Abnormal stress test    Anginal equivalent    History of malignant neoplasm of colon    Left arm pain    Shoulder pain, left    Foot ulcer, left    Aortic atherosclerosis       Medication List with Changes/Refills   Current Medications    ACCU-CHEK JEN PLUS METER MISC        ACCU-CHEK JEN PLUS TEST STRP STRP        ACCU-CHEK SOFTCLIX LANCETS MISC        AMLODIPINE (NORVASC) 5 MG TABLET    Take 5 mg by mouth once daily.    FLUZONE HIGH-DOSE 2017-18, PF, 180 MCG/0.5 ML VACCINE        INCONTINENCE PAD,LINER,DISP (BLADDER CONTROL PADS EX ABSORB MISC)        INDAPAMIDE (LOZOL) 1.25 MG TAB    Take 1.25 mg by mouth once daily.     ISOSORBIDE MONONITRATE (IMDUR) 30 MG 24 HR TABLET    Take 1 tablet (30 mg total) by mouth once daily.    LOSARTAN (COZAAR) 100 MG TABLET    TAKE 1 TABLET ONCE DAILY.    METOPROLOL SUCCINATE (TOPROL-XL) 100 MG 24 HR TABLET    Take 1 tablet (100 mg total) by mouth once daily.    PREVNAR 13, PF, 0.5 ML SYRG        WARFARIN (COUMADIN) 6 MG TABLET    Take 1/2 tablet on Thursdays and 1 tablet all other days as directed by the Coumadin Clinic.       Review of patient's allergies indicates:  No Known Allergies    Past Surgical History:   Procedure Laterality Date    CHOLECYSTECTOMY      COLON SURGERY      HYSTERECTOMY         Family History   Problem Relation Age of Onset    Diabetes Sister     Hypertension Sister     Hypertension Mother     Diabetes Sister        Social History     Social History    Marital status:      Spouse name: N/A    Number of children: N/A    Years of education: N/A     Occupational History    Not on file.     Social History Main Topics    Smoking status: Never Smoker    Smokeless tobacco: Never Used    Alcohol use No    Drug use: No    Sexual activity: Not on file     Other Topics Concern    Not on file     Social History Narrative    No  "narrative on file       Vitals:    08/28/17 1035   BP: 118/72   Pulse: 82   Weight: 93 kg (205 lb)   Height: 5' 7.01" (1.702 m)   PainSc: 0-No pain       Hemoglobin A1C   Date Value Ref Range Status   08/23/2017 6.5 (H) 4.0 - 5.6 % Final     Comment:     According to ADA guidelines, hemoglobin A1c <7.0% represents  optimal control in non-pregnant diabetic patients. Different  metrics may apply to specific patient populations.   Standards of Medical Care in Diabetes-2016.  For the purpose of screening for the presence of diabetes:  <5.7%     Consistent with the absence of diabetes  5.7-6.4%  Consistent with increasing risk for diabetes   (prediabetes)  >or=6.5%  Consistent with diabetes  Currently, no consensus exists for use of hemoglobin A1c  for diagnosis of diabetes for children.  This Hemoglobin A1c assay has significant interference with fetal   hemoglobin   (HbF). The results are invalid for patients with abnormal amounts of   HbF,   including those with known Hereditary Persistence   of Fetal Hemoglobin. Heterozygous hemoglobin variants (HbAS, HbAC,   HbAD, HbAE, HbA2) do not significantly interfere with this assay;   however, presence of multiple variants in a sample may impact the %   interference.     06/28/2017 6.7 (H) 4.0 - 5.6 % Final     Comment:     According to ADA guidelines, hemoglobin A1c <7.0% represents  optimal control in non-pregnant diabetic patients. Different  metrics may apply to specific patient populations.   Standards of Medical Care in Diabetes-2016.  For the purpose of screening for the presence of diabetes:  <5.7%     Consistent with the absence of diabetes  5.7-6.4%  Consistent with increasing risk for diabetes   (prediabetes)  >or=6.5%  Consistent with diabetes  Currently, no consensus exists for use of hemoglobin A1c  for diagnosis of diabetes for children.  This Hemoglobin A1c assay has significant interference with fetal   hemoglobin   (HbF). The results are invalid for patients " with abnormal amounts of   HbF,   including those with known Hereditary Persistence   of Fetal Hemoglobin. Heterozygous hemoglobin variants (HbAS, HbAC,   HbAD, HbAE, HbA2) do not significantly interfere with this assay;   however, presence of multiple variants in a sample may impact the %   interference.     08/05/2016 6.4 (H) 4.5 - 6.2 % Final     Comment:     According to ADA guidelines, hemoglobin A1C <7.0% represents  optimal control in non-pregnant diabetic patients.  Different  metrics may apply to specific populations.   Standards of Medical Care in Diabetes - 2016.  For the purpose of screening for the presence of diabetes:  <5.7%     Consistent with the absence of diabetes  5.7-6.4%  Consistent with increasing risk for diabetes   (prediabetes)  >or=6.5%  Consistent with diabetes  Currently no consensus exists for use of hemoglobin A1C  for diagnosis of diabetes for children.         Review of Systems   Constitutional: Negative for chills and fever.   Respiratory: Negative for shortness of breath.    Cardiovascular: Negative for chest pain, palpitations, orthopnea, claudication and leg swelling.   Gastrointestinal: Negative for diarrhea, nausea and vomiting.   Musculoskeletal: Negative for joint pain.   Skin: Negative for rash.   Neurological: Positive for tingling and sensory change. Negative for dizziness, focal weakness and weakness.   Psychiatric/Behavioral: Negative.              Objective:      PHYSICAL EXAM: Apperance: Alert and orient in no distress,well developed, and with good attention to grooming and body habits  Patient presents ambulating in [facility]  Lower Extremity Exam  VASCULAR: Dorsalis pedis pulses 1/4 bilateral and Posterior Tibial pulses 1/4 bilateral. Capillary fill time <4 seconds bilateral. Mild edema observed bilateral. Varicosities absent bilateral. Skin temperature of the lower extremities is warm to warm, proximal to distal. Hair growth dim bilateral. (--) lymphangitis or (--)  cellulitis noted bilateral.  DERMATOLOGICAL: (+) edema, (--) erythema, (--) malodor, (--) drainage, (--) warmth to left foot. No ope area noted to left plantar 1st submetatarsal. Dry peeling skin noted to left plantar medial foot. The dorsum surface of the feet are soft and supple.  The plantar aspects of both feet are dry and scaly. Webspaces 14clean, dry and without evidence of break in skin integrity bilateral. Nails 1,2,3,4,5 bilateral elongated, thickened, and discolored with subungual debris.  NEUROLOGICAL: Light touch, sharp-dull, proprioception all present and equal bilaterally.  Vibratory sensation absent at bilateral hallux and diminished at bilateral navicular. Protective sensation absent at 6/10 sites as tested with a Bulpitt-Jermaine 5.07 monofilament.   MUSCULOSKELETAL: Muscle strength is 5/5 for foot inverters, everters, plantarflexors, and dorsiflexors. Muscle tone is normal.  Inspection/palpation of bone, joints and muscles unremarkable with the exception of that noted above.          Assessment:       Encounter Diagnoses   Name Primary?    Comprehensive diabetic foot examination, type 2 DM, encounter for Yes    Pre-ulcerative calluses     Dermatophytosis of nail     Type II diabetes mellitus with neurological manifestations          Plan:   Comprehensive diabetic foot examination, type 2 DM, encounter for    Pre-ulcerative calluses    Dermatophytosis of nail    Type II diabetes mellitus with neurological manifestations      I counseled the patient on her conditions, regarding findings of my examination, my impressions, and usual treatment plan.   Greater than 50% of this visit spent on counseling and coordination of care.  Greater than 15 minutes of a 20 minute appointment spent on education about the diabetic foot, neuropathy, and prevention of limb loss.  Shoe inspection. Diabetic Foot Education. Patient reminded of the importance of good nutrition and blood sugar control to help prevent  podiatric complications of diabetes. Patient instructed on proper foot hygeine. We discussed wearing proper shoe gear, daily foot inspections, never walking without protective shoe gear, never putting sharp instruments to feet.    With patient's permission, nails 1-5 bilateral were debrided/trimmed in length and thickness aggressively to their soft tissue attachment mechanically and with electric , removing all offending nail and debris. Patient relates relief following the procedure.  With patient's permission, left dry skin callus trimmed in thickness with #15 blade in thickness without incident.   Patient  will continue to monitor the areas daily, inspect feet, wear protective shoe gear when ambulatory, moisturizer to maintain skin integrity. Patient reminded of the importance of good nutrition and blood sugar control to help prevent podiatric complications of diabetes.  Patient to return 5 months or sooner if needed.                 Isidro Menendez DPM  [facility]

## 2017-09-07 ENCOUNTER — OFFICE VISIT (OUTPATIENT)
Dept: OPHTHALMOLOGY | Facility: CLINIC | Age: 77
End: 2017-09-07
Payer: MEDICARE

## 2017-09-07 DIAGNOSIS — H52.7 REFRACTIVE ERROR: ICD-10-CM

## 2017-09-07 DIAGNOSIS — E11.9 DIABETES MELLITUS TYPE 2 WITHOUT RETINOPATHY: Primary | ICD-10-CM

## 2017-09-07 PROCEDURE — 92015 DETERMINE REFRACTIVE STATE: CPT | Mod: S$GLB,,, | Performed by: OPTOMETRIST

## 2017-09-07 PROCEDURE — 92014 COMPRE OPH EXAM EST PT 1/>: CPT | Mod: S$GLB,,, | Performed by: OPTOMETRIST

## 2017-09-07 PROCEDURE — 99999 PR PBB SHADOW E&M-EST. PATIENT-LVL I: CPT | Mod: PBBFAC,,, | Performed by: OPTOMETRIST

## 2017-09-07 NOTE — PROGRESS NOTES
HPI     NIDDM exam. Decrease near and distance visual acuity since last eye   visit. Last eye exam 08/09/2016 [facility]. Update glasses RX. New patient to   [facility]. DM since 2000    Last edited by Henry Rojas, OD on 9/7/2017 10:41 AM. (History)            Assessment /Plan     For exam results, see Encounter Report.    Diabetes mellitus type 2 without retinopathy    Nuclear cataract, bilateral    Refractive error      No Background Diabetic Retinopathy    Moderate cataracts OU, not surgical    Dispense Final Rx for glasses.  RTC 1 year

## 2017-09-26 ENCOUNTER — ANTI-COAG VISIT (OUTPATIENT)
Dept: CARDIOLOGY | Facility: CLINIC | Age: 77
End: 2017-09-26
Payer: MEDICARE

## 2017-09-26 DIAGNOSIS — Z79.01 LONG TERM (CURRENT) USE OF ANTICOAGULANTS: Primary | ICD-10-CM

## 2017-09-26 LAB — INR PPP: 1.6 (ref 2–3)

## 2017-09-26 PROCEDURE — 85610 PROTHROMBIN TIME: CPT | Mod: QW,S$GLB,,

## 2017-09-26 PROCEDURE — 99211 OFF/OP EST MAY X REQ PHY/QHP: CPT | Mod: 25,S$GLB,,

## 2017-09-26 NOTE — PROGRESS NOTES
INR is sub-therapeutic. Patient reports high vitamin k in diet, more than usual. Denies missed doses. Recent foot injury, no medication changes. Recently received flu vaccine. Will increase tonight's dose then re-challenge dose and diet until follow-up. Repeat INR in 2 weeks.

## 2017-10-12 ENCOUNTER — ANTI-COAG VISIT (OUTPATIENT)
Dept: CARDIOLOGY | Facility: CLINIC | Age: 77
End: 2017-10-12
Payer: MEDICARE

## 2017-10-12 DIAGNOSIS — Z79.01 LONG-TERM (CURRENT) USE OF ANTICOAGULANTS: Primary | ICD-10-CM

## 2017-10-12 LAB — INR PPP: 2.6 (ref 2–3)

## 2017-10-12 PROCEDURE — 85610 PROTHROMBIN TIME: CPT | Mod: QW,S$GLB,,

## 2017-10-12 PROCEDURE — 99211 OFF/OP EST MAY X REQ PHY/QHP: CPT | Mod: 25,S$GLB,,

## 2017-10-12 NOTE — PROGRESS NOTES
INR is now therapeutic. This is a quick follow-up after a sub-therapeutic INR on 9/26. Continue dose and diet until follow-up. Patient reports no bleeding or bruising, no new medications and no diet changes.  I reminded the patient to call with any problems, changes or questions before the next visit.

## 2017-10-24 DIAGNOSIS — Z79.01 LONG TERM CURRENT USE OF ANTICOAGULANT THERAPY: ICD-10-CM

## 2017-10-24 RX ORDER — WARFARIN 6 MG/1
TABLET ORAL
Qty: 84 TABLET | Refills: 0 | Status: SHIPPED | OUTPATIENT
Start: 2017-10-24 | End: 2017-11-29 | Stop reason: SDUPTHER

## 2017-11-09 ENCOUNTER — ANTI-COAG VISIT (OUTPATIENT)
Dept: CARDIOLOGY | Facility: CLINIC | Age: 77
End: 2017-11-09
Payer: MEDICARE

## 2017-11-09 DIAGNOSIS — Z79.01 LONG-TERM (CURRENT) USE OF ANTICOAGULANTS: Primary | ICD-10-CM

## 2017-11-09 LAB — INR PPP: 2.3 (ref 2–3)

## 2017-11-09 PROCEDURE — 85610 PROTHROMBIN TIME: CPT | Mod: QW,S$GLB,,

## 2017-11-28 PROBLEM — M79.602 LEFT ARM PAIN: Status: RESOLVED | Noted: 2017-06-28 | Resolved: 2017-11-28

## 2017-11-29 ENCOUNTER — OFFICE VISIT (OUTPATIENT)
Dept: INTERNAL MEDICINE | Facility: CLINIC | Age: 77
End: 2017-11-29
Payer: MEDICARE

## 2017-11-29 ENCOUNTER — LAB VISIT (OUTPATIENT)
Dept: LAB | Facility: HOSPITAL | Age: 77
End: 2017-11-29
Attending: FAMILY MEDICINE
Payer: MEDICARE

## 2017-11-29 VITALS
SYSTOLIC BLOOD PRESSURE: 114 MMHG | WEIGHT: 216.13 LBS | TEMPERATURE: 97 F | HEART RATE: 92 BPM | DIASTOLIC BLOOD PRESSURE: 58 MMHG | HEIGHT: 67 IN | OXYGEN SATURATION: 97 % | BODY MASS INDEX: 33.92 KG/M2

## 2017-11-29 DIAGNOSIS — R94.39 ABNORMAL STRESS TEST: ICD-10-CM

## 2017-11-29 DIAGNOSIS — E11.9 TYPE 2 DIABETES MELLITUS WITHOUT COMPLICATION, WITHOUT LONG-TERM CURRENT USE OF INSULIN: ICD-10-CM

## 2017-11-29 DIAGNOSIS — Z79.01 ANTICOAGULATED ON COUMADIN: ICD-10-CM

## 2017-11-29 DIAGNOSIS — E78.5 HYPERLIPIDEMIA WITH TARGET LDL LESS THAN 100: ICD-10-CM

## 2017-11-29 DIAGNOSIS — J32.2 ETHMOID SINUSITIS, UNSPECIFIED CHRONICITY: Primary | ICD-10-CM

## 2017-11-29 DIAGNOSIS — Z79.01 LONG TERM CURRENT USE OF ANTICOAGULANT THERAPY: ICD-10-CM

## 2017-11-29 DIAGNOSIS — L97.521 SKIN ULCER OF LEFT FOOT, LIMITED TO BREAKDOWN OF SKIN: ICD-10-CM

## 2017-11-29 DIAGNOSIS — I10 ESSENTIAL HYPERTENSION: ICD-10-CM

## 2017-11-29 DIAGNOSIS — I70.0 AORTIC ATHEROSCLEROSIS: ICD-10-CM

## 2017-11-29 DIAGNOSIS — I48.20 CHRONIC ATRIAL FIBRILLATION: ICD-10-CM

## 2017-11-29 LAB
ANION GAP SERPL CALC-SCNC: 10 MMOL/L
BUN SERPL-MCNC: 13 MG/DL
CALCIUM SERPL-MCNC: 8.9 MG/DL
CHLORIDE SERPL-SCNC: 102 MMOL/L
CO2 SERPL-SCNC: 26 MMOL/L
CREAT SERPL-MCNC: 0.8 MG/DL
EST. GFR  (AFRICAN AMERICAN): >60 ML/MIN/1.73 M^2
EST. GFR  (NON AFRICAN AMERICAN): >60 ML/MIN/1.73 M^2
ESTIMATED AVG GLUCOSE: 143 MG/DL
GLUCOSE SERPL-MCNC: 131 MG/DL
HBA1C MFR BLD HPLC: 6.6 %
POTASSIUM SERPL-SCNC: 3.5 MMOL/L
SODIUM SERPL-SCNC: 138 MMOL/L

## 2017-11-29 PROCEDURE — 80048 BASIC METABOLIC PNL TOTAL CA: CPT

## 2017-11-29 PROCEDURE — 83036 HEMOGLOBIN GLYCOSYLATED A1C: CPT

## 2017-11-29 PROCEDURE — 99214 OFFICE O/P EST MOD 30 MIN: CPT | Mod: S$GLB,,, | Performed by: FAMILY MEDICINE

## 2017-11-29 PROCEDURE — 99999 PR PBB SHADOW E&M-EST. PATIENT-LVL III: CPT | Mod: PBBFAC,,, | Performed by: FAMILY MEDICINE

## 2017-11-29 PROCEDURE — 36415 COLL VENOUS BLD VENIPUNCTURE: CPT

## 2017-11-29 RX ORDER — AMOXICILLIN 875 MG/1
875 TABLET, FILM COATED ORAL 2 TIMES DAILY
Qty: 20 TABLET | Refills: 0 | Status: SHIPPED | OUTPATIENT
Start: 2017-11-29 | End: 2017-12-26 | Stop reason: ALTCHOICE

## 2017-11-29 RX ORDER — BENZONATATE 200 MG/1
200 CAPSULE ORAL 3 TIMES DAILY PRN
Qty: 15 CAPSULE | Refills: 0 | Status: SHIPPED | OUTPATIENT
Start: 2017-11-29 | End: 2017-12-09

## 2017-11-29 RX ORDER — WARFARIN 6 MG/1
TABLET ORAL
Qty: 84 TABLET | Refills: 0 | Status: SHIPPED | OUTPATIENT
Start: 2017-11-29 | End: 2017-12-28 | Stop reason: SDUPTHER

## 2017-11-29 NOTE — PROGRESS NOTES
Natalie Greene  11/29/2017  [id number]    Annabella Fenton MD  Patient Care Team:  Annabella Fenton MD as PCP - General (Family Medicine)  Annabella Fenton MD as Consulting Physician (Family Medicine)  Has the patient seen any provider outside of the [facility] network since the last visit? (no). If yes, HIPPA forms completed and records requested.        Visit Type:a scheduled routine follow-up visit    Chief Complaint:  Chief Complaint   Patient presents with    Follow-up    Sinus Problem     x 1 Week    Medication Refill     Coumadin       History of Present Illness:  77 year old here for follow up.    She has history of atrial fib, which is rate controlled when compliant with Beta Blocker. She reports compliance today.  She is on chronic anticoagulation and is followed by [facility]. Last INR in goal range.    She had DM, which is diet control. Last HgA1c in goal range.    She has HLD, and is not currently on Statin. She has been able to diet control.    She needs her Coumadin refilled today    She has completed her seasonal flu vaccine and prevnar.  She does not have Zostavax on file    She is complaining of mild sinus congestion. No fever. She reports congestion for over 8 days.Sore throat from the PND.  She reports the drip makes her cough.  She denies wheeze.   She hasn't taken anything OTC for this.                  History:  Past Medical History:   Diagnosis Date    *Atrial fibrillation     Diabetes mellitus     DM (diabetes mellitus) 2002     09/06/2017 no medication    Hyperlipidemia     Hypertension      Past Surgical History:   Procedure Laterality Date    CHOLECYSTECTOMY      COLON SURGERY      HYSTERECTOMY       Family History   Problem Relation Age of Onset    Diabetes Sister     Hypertension Sister     Hypertension Mother     Diabetes Sister      Social History     Social History    Marital status:      Spouse name: N/A    Number of children: N/A    Years of  education: N/A     Occupational History    Not on file.     Social History Main Topics    Smoking status: Never Smoker    Smokeless tobacco: Never Used    Alcohol use No    Drug use: No    Sexual activity: Not on file     Other Topics Concern    Not on file     Social History Narrative    No narrative on file     Patient Active Problem List   Diagnosis    A-fib    HTN (hypertension)    Hyperlipidemia with target LDL less than 100    DM (diabetes mellitus)    Anticoagulated on Coumadin    Obesity (BMI 30-39.9)    Abnormal stress test    Anginal equivalent    History of malignant neoplasm of colon    Shoulder pain, left    Foot ulcer, left    Aortic atherosclerosis    Ethmoid sinusitis    Long term current use of anticoagulant therapy     Review of patient's allergies indicates:  No Known Allergies    The following were reviewed at this visit: active problem list, medication list, allergies, family history, social history, and health maintenance.    Medications:  Current Outpatient Prescriptions on File Prior to Visit   Medication Sig Dispense Refill    ACCU-CHEK JEN PLUS METER Misc       ACCU-CHEK JEN PLUS TEST STRP Strp       ACCU-CHEK SOFTCLIX LANCETS Misc       amlodipine (NORVASC) 5 MG tablet Take 5 mg by mouth once daily.      FLUZONE HIGH-DOSE 2017-18, PF, 180 mcg/0.5 mL vaccine       INCONTINENCE PAD,LINER,DISP (BLADDER CONTROL PADS EX ABSORB MISC)       indapamide (LOZOL) 1.25 MG Tab Take 1.25 mg by mouth once daily.       losartan (COZAAR) 100 MG tablet TAKE 1 TABLET ONCE DAILY. 90 tablet 3    metoprolol succinate (TOPROL-XL) 100 MG 24 hr tablet Take 1 tablet (100 mg total) by mouth once daily. 30 tablet 11    [DISCONTINUED] warfarin (COUMADIN) 6 MG tablet TAKE 1/2 TABLET ON THURSDAYS AND 1 TABLET ALL OTHER DAYS AS DIRECTED BY THE COUMADIN CLINIC. 84 tablet 0    isosorbide mononitrate (IMDUR) 30 MG 24 hr tablet Take 1 tablet (30 mg total) by mouth once daily. 30 tablet 11     PREVNAR 13, PF, 0.5 mL Syrg        No current facility-administered medications on file prior to visit.        Medications have been reviewed and reconciled with patient at this visit.  Barriers to medications present (no)    Adverse reactions to current medications (no)    Over the counter medications reviewed (Yes ), and if needed added to active Medication list at this visit.     Exam:  Wt Readings from Last 3 Encounters:   11/29/17 98 kg (216 lb 1.6 oz)   08/28/17 93 kg (205 lb)   08/28/17 93 kg (205 lb)     Temp Readings from Last 3 Encounters:   11/29/17 97 °F (36.1 °C) (Tympanic)   08/28/17 97.2 °F (36.2 °C) (Tympanic)   08/23/17 98.1 °F (36.7 °C) (Tympanic)     BP Readings from Last 3 Encounters:   11/29/17 (!) 114/58   08/28/17 118/72   08/28/17 118/70     Pulse Readings from Last 3 Encounters:   11/29/17 92   08/28/17 82   08/28/17 82     Body mass index is 33.84 kg/m².      Review of Systems   Constitutional: Negative.  Negative for chills and fever.   HENT: Negative.  Negative for congestion, sinus pain and sore throat.    Eyes: Negative for blurred vision and double vision.   Respiratory: Negative for cough, sputum production, shortness of breath and wheezing.    Cardiovascular: Negative for chest pain, palpitations and leg swelling.   Gastrointestinal: Negative for abdominal pain, constipation, diarrhea, heartburn, nausea and vomiting.   Genitourinary: Negative.    Musculoskeletal: Negative.    Skin: Negative.  Negative for rash.   Neurological: Negative.    Endo/Heme/Allergies: Negative.  Negative for polydipsia. Does not bruise/bleed easily.   Psychiatric/Behavioral: Negative for depression and substance abuse.       Physical Exam   Constitutional: She is oriented to person, place, and time. She appears well-developed and well-nourished. No distress.   HENT:   Head: Normocephalic and atraumatic.   Right Ear: External ear normal.   Left Ear: External ear normal.   Nose: Rhinorrhea present.    Mouth/Throat: Oropharynx is clear and moist. No oropharyngeal exudate. Tonsils are 0 on the right. Tonsils are 0 on the left. No tonsillar exudate.   Eyes: Conjunctivae and EOM are normal. Pupils are equal, round, and reactive to light. Right eye exhibits no discharge. Left eye exhibits no discharge.   Neck: Normal range of motion. Neck supple. No thyromegaly present.   Cardiovascular: Normal rate, regular rhythm, normal heart sounds and intact distal pulses.    No murmur heard.  Pulmonary/Chest: Effort normal and breath sounds normal. No respiratory distress. She has no wheezes.   Abdominal: Soft. Bowel sounds are normal. She exhibits no distension and no mass. There is no tenderness.   Musculoskeletal: Normal range of motion. She exhibits no edema.   Lymphadenopathy:     She has no cervical adenopathy.   Neurological: She is alert and oriented to person, place, and time. No cranial nerve deficit.   Skin: Capillary refill takes less than 2 seconds. She is not diaphoretic.   Psychiatric: She has a normal mood and affect. Her behavior is normal. Judgment and thought content normal.   Nursing note and vitals reviewed.      Laboratory Reviewed ({Yes)  Lab Results   Component Value Date    WBC 6.05 08/23/2017    HGB 12.2 08/23/2017    HCT 37.5 08/23/2017     08/23/2017    CHOL 155 08/23/2017    TRIG 150 08/23/2017    HDL 33 (L) 08/23/2017    ALT 23 06/28/2017    AST 26 06/28/2017     08/23/2017    K 3.7 08/23/2017    CL 99 08/23/2017    CREATININE 0.8 08/23/2017    BUN 14 08/23/2017    CO2 29 08/23/2017    TSH 1.785 07/29/2014    INR 2.3 11/09/2017    HGBA1C 6.5 (H) 08/23/2017       Assessment:  The primary encounter diagnosis was Ethmoid sinusitis, unspecified chronicity. Diagnoses of Skin ulcer of left foot, limited to breakdown of skin, Chronic atrial fibrillation, Essential hypertension, Hyperlipidemia with target LDL less than 100, Abnormal stress test, Aortic atherosclerosis, Anticoagulated on  Coumadin, Type 2 diabetes mellitus without complication, without long-term current use of insulin, and Long term current use of anticoagulant therapy were also pertinent to this visit.     Plan  Foot ulcer, left  Closed on toe.  Referred to podiatry in August.  Recommend proper shoe wear      A-fib  Rate controlled on Beta Blocker  On coumadin  INR with [facility] clinic  Lab Results   Component Value Date    INR 2.3 11/09/2017    INR 2.6 10/12/2017    INR 1.6 (A) 09/26/2017         HTN (hypertension)  BP in goal range  Continue Norvasc, Indapamide, Imdur, Cozaar, Toprol xl  BMP  Lab Results   Component Value Date     08/23/2017    K 3.7 08/23/2017    CL 99 08/23/2017    CO2 29 08/23/2017    BUN 14 08/23/2017    CREATININE 0.8 08/23/2017    CALCIUM 8.8 08/23/2017    ANIONGAP 8 08/23/2017    ESTGFRAFRICA >60.0 08/23/2017    EGFRNONAA >60.0 08/23/2017         Hyperlipidemia with target LDL less than 100  Lipid up to date.  Last LDL at goal, <100    Abnormal stress test  Followed by [facility]    Aortic atherosclerosis  Seen on echo  Cards follows  BP control  Lipid control    Anticoagulated on Coumadin  Lab Results   Component Value Date    INR 2.3 11/09/2017    INR 2.6 10/12/2017    INR 1.6 (A) 09/26/2017         DM (diabetes mellitus)  Lab Results   Component Value Date    HGBA1C 6.5 (H) 08/23/2017     Continue diet control  DM measure up to date      Ethmoid sinusitis  Amoxil 875 BID  Tessalon at night for cough  Coricidin congestion OTC    She declines Zostavax and Tdap today.      Care Plan/Goals: Reviewed  (Yes)  Goals      Take at least one BP reading per week at various times of the day            Hypertension Goals/Individual Care Plan    Hypertension Goal Care Plan    1. Blood pressure goal is to be below 140/90. Normal Blood pressure is 120/80.  Patient's blood pressure is at goal today. (Yes)    2. Goal for self management is to check Blood Pressure daily. Record on Individual log. Patient is agreeable to  self management plan. blood pressure log.   Patient will bring logs at next office visit, or communicate to /Care Management team for review for interval follow up.     3. Moderately intense physical activity, such as brisk walking, is beneficial when done regularly. Aim for a total of 40 minutes of moderate to vigorous activity three to four times a week to help lower blood pressure. Exercise of patients choice was recommended at this office visit. walking    4. Eating Healthy Diet is important in Blood Pressure control. As its name implies, the DASH (Dietary Approaches to Stop Hypertension) eating plan is designed to help you manage blood pressure. Emphasizing healthy food sources, it also limits:  Red meat   Sodium (salt)   Sweets, added sugars and sugar-containing beverages.     5. Barriers to goals reviewed and addressed with patient at visit. (Yes)    6. Adherence and Medication Side effects discussed (Yes)    Referral to on-site Pharmacy for Co-management of hypertension (No)  Patient enrolled in Tele-health Hypertension Management. (No)    Patient is under care of /Care management (No) Referral Sent (No)                  Follow up: No Follow-up on file.    After visit summary was printed and given to patient upon discharge today.  Patient goals and care plan are included in After Visit Summary.

## 2017-11-29 NOTE — ASSESSMENT & PLAN NOTE
Lab Results   Component Value Date    INR 2.3 11/09/2017    INR 2.6 10/12/2017    INR 1.6 (A) 09/26/2017

## 2017-11-29 NOTE — ASSESSMENT & PLAN NOTE
Rate controlled on Beta Blocker  On coumadin  INR with [facility] clinic  Lab Results   Component Value Date    INR 2.3 11/09/2017    INR 2.6 10/12/2017    INR 1.6 (A) 09/26/2017

## 2017-11-29 NOTE — ASSESSMENT & PLAN NOTE
Lab Results   Component Value Date    HGBA1C 6.5 (H) 08/23/2017     Continue diet control  DM measure up to date

## 2017-11-29 NOTE — ASSESSMENT & PLAN NOTE
BP in goal range  Continue Norvasc, Indapamide, Imdur, Cozaar, Toprol xl  BMP  Lab Results   Component Value Date     08/23/2017    K 3.7 08/23/2017    CL 99 08/23/2017    CO2 29 08/23/2017    BUN 14 08/23/2017    CREATININE 0.8 08/23/2017    CALCIUM 8.8 08/23/2017    ANIONGAP 8 08/23/2017    ESTGFRAFRICA >60.0 08/23/2017    EGFRNONAA >60.0 08/23/2017

## 2017-12-07 ENCOUNTER — ANTI-COAG VISIT (OUTPATIENT)
Dept: CARDIOLOGY | Facility: CLINIC | Age: 77
End: 2017-12-07
Payer: MEDICARE

## 2017-12-07 DIAGNOSIS — Z79.01 LONG-TERM (CURRENT) USE OF ANTICOAGULANTS: Primary | ICD-10-CM

## 2017-12-07 LAB — INR PPP: 3.1 (ref 2–3)

## 2017-12-07 PROCEDURE — 99211 OFF/OP EST MAY X REQ PHY/QHP: CPT | Mod: 25,S$GLB,,

## 2017-12-07 PROCEDURE — 85610 PROTHROMBIN TIME: CPT | Mod: QW,S$GLB,,

## 2017-12-07 NOTE — PROGRESS NOTES
INR is supra-therapeutic. No bleeding issues. Currently completing 10-day regimen of amoxil began 11/29. Will lower this week's dose then resume dose and diet until follow-up. Patient verbalized understanding.

## 2017-12-26 ENCOUNTER — OFFICE VISIT (OUTPATIENT)
Dept: URGENT CARE | Facility: CLINIC | Age: 77
End: 2017-12-26
Payer: MEDICARE

## 2017-12-26 VITALS
BODY MASS INDEX: 35.28 KG/M2 | HEART RATE: 84 BPM | TEMPERATURE: 97 F | DIASTOLIC BLOOD PRESSURE: 78 MMHG | WEIGHT: 225.31 LBS | SYSTOLIC BLOOD PRESSURE: 134 MMHG

## 2017-12-26 DIAGNOSIS — R21 RASH: Primary | ICD-10-CM

## 2017-12-26 PROCEDURE — 99999 PR PBB SHADOW E&M-EST. PATIENT-LVL III: CPT | Mod: PBBFAC,,, | Performed by: NURSE PRACTITIONER

## 2017-12-26 PROCEDURE — 99213 OFFICE O/P EST LOW 20 MIN: CPT | Mod: S$GLB,,, | Performed by: NURSE PRACTITIONER

## 2017-12-26 RX ORDER — HYDROXYZINE HYDROCHLORIDE 25 MG/1
25 TABLET, FILM COATED ORAL
Qty: 30 TABLET | Refills: 0 | Status: SHIPPED | OUTPATIENT
Start: 2017-12-26 | End: 2018-01-02

## 2017-12-26 NOTE — PATIENT INSTRUCTIONS
Self-Care for Skin Rashes     Pat your skin dry. Do not rub.     When your skin reacts to a substance your body is sensitive to, it can cause a rash. You can treat most rashes at home by keeping the skin clean and dry. Many rashes may get better on their own within 2 to 3 days. You may need medical attention if your rash itches, drains, or hurts, particularly if the rash is getting worse.  What can cause a skin rash?  · Sun poisoning, caused by too much exposure to the sun  · An irritant or allergic reaction to a certain type of food, plant, or chemical, such as  shellfish, poison ivy, and or cleaning products  · An infection caused by a fungus (ringworm), virus (chickenpox), or bacteria (strep)  · Bites or infestation caused by insects or pests, such as ticks, lice, or mites  · Dry skin, which is often seen during the winter months and in older people  How can I control itching and skin damage?  · Take soothing lukewarm baths in a colloidal oatmeal product. You can buy this at the [employer].  · Do your best not to scratch. Clip fingernails short, especially in young children, to reduce skin damage if scratching does occur.  · Use moisturizing skin lotion instead of scratching your dry skin.  · Use sunscreen whenever going out into direct sun.  · Use only mild cleansing agents whenever possible.  · Wash with mild, nonirritating soap and warm water.  · Wear clothing that breathes, such as cotton shirts or canvas shoes.  · If fluid is seeping from the rash, cover it loosely with clean gauze to absorb the discharge.  · Many rashes are contagious. Prevent the rash from spreading to others by washing your hands often before or after touching others with any skin rash.  Use medicine  · Antihistamines such as diphenhydramine can help control itching. But use with caution because they can make you drowsy.  · Using over-the-counter hydrocortisone cream on small rashes may help reduce swelling and itching  · Most  over-the-counter antifungal medicines can treat athletes foot and many other fungal infections of the skin.  Check with your healthcare provider  Call your healthcare provider if:  · You were told that you have a fungal infection on your skin to make sure you have the correct type of medicine.  · You have questions or concerns about medicines or their side effects.     Call 911  Call 911 if either of these occur:  · Your tongue or lips start to swell  · You have difficulty breathing      Call your healthcare provider  Call your healthcare provider if any of these occur:  · Temperature of more than 101.0°F (38.3°C), or as directed  · Sore throat, a cough, or unusual fatigue  · Red, oozy, or painful rash gets worse. These are signs of infection.  · Rash covers your face, genitals, or most of your body  · Crusty sores or red rings that begin to spread  · You were exposed to someone who has a contagious rash, such as scabies or lice.  · Red bulls-eye rash with a white center (a sign of Lyme disease)  · You were told that you have resistant bacteria (MRSA) on your skin.   Date Last Reviewed: 5/12/2015  © 6970-5363 [employer]. [address] [place] [address], [place] [address]. All rights reserved. This information is not intended as a substitute for professional medical care. Always follow your healthcare professional's instructions.

## 2017-12-26 NOTE — PROGRESS NOTES
Subjective:      Patient ID: Natalie Greene is a 77 y.o. female.    Chief Complaint: Rash    Rash   This is a new problem. The current episode started in the past 7 days. The problem has been gradually worsening since onset. The rash is characterized by itchiness and dryness. It is unknown if there was an exposure to a precipitant. Associated symptoms include coughing. Pertinent negatives include no facial edema, fever or shortness of breath. Past treatments include anti-itch cream. The treatment provided no relief. (Hives (unknown cause))     Review of Systems   Constitutional: Negative.  Negative for fever.   HENT: Negative.    Respiratory: Positive for cough. Negative for shortness of breath and wheezing.    Cardiovascular: Negative.    Gastrointestinal: Negative.    Musculoskeletal: Negative.    Skin: Positive for rash.   Neurological: Negative.    Hematological: Negative.        Objective:   /78   Pulse 84   Temp 96.8 °F (36 °C) (Tympanic)   Wt 102.2 kg (225 lb 5 oz)   BMI 35.28 kg/m²   Physical Exam   Constitutional: She is oriented to person, place, and time. She appears well-developed and well-nourished. No distress.   Neck: Normal range of motion. Neck supple.   Cardiovascular: Normal rate.    Pulmonary/Chest: Effort normal. No respiratory distress.   Neurological: She is alert and oriented to person, place, and time.   Skin: Skin is warm and dry. Rash noted. Rash is urticarial. She is not diaphoretic.   Urticarial appearing rash to abdomen. Legs and arms appear dry and have some excoriation marks from scratching.    Nursing note and vitals reviewed.    Assessment:      1. Rash       Plan:   Rash  -     hydrOXYzine HCl (ATARAX) 25 MG tablet; Take 1 tablet (25 mg total) by mouth every 6 to 8 hours as needed for Itching.  Dispense: 30 tablet; Refill: 0    Instructions, follow up, and supportive care as per AVS.  Follow up with PCP/derm if not improved or for any new or worsening symptoms.  Advised  of potential for drowsiness with hydroxyzine.

## 2017-12-28 DIAGNOSIS — Z79.01 LONG TERM CURRENT USE OF ANTICOAGULANT THERAPY: ICD-10-CM

## 2017-12-29 RX ORDER — WARFARIN 6 MG/1
TABLET ORAL
Qty: 84 TABLET | Refills: 0 | Status: SHIPPED | OUTPATIENT
Start: 2017-12-29 | End: 2018-02-13 | Stop reason: SDUPTHER

## 2018-01-11 ENCOUNTER — ANTI-COAG VISIT (OUTPATIENT)
Dept: CARDIOLOGY | Facility: CLINIC | Age: 78
End: 2018-01-11
Payer: MEDICARE

## 2018-01-11 DIAGNOSIS — Z79.01 LONG TERM (CURRENT) USE OF ANTICOAGULANTS: Primary | ICD-10-CM

## 2018-01-11 LAB — INR PPP: 2.6 (ref 2–3)

## 2018-01-11 PROCEDURE — 99211 OFF/OP EST MAY X REQ PHY/QHP: CPT | Mod: 25,S$GLB,,

## 2018-01-11 PROCEDURE — 85610 PROTHROMBIN TIME: CPT | Mod: QW,S$GLB,,

## 2018-01-11 NOTE — PROGRESS NOTES
INR is now therapeutic. Recently evaluated for a rash, prescribed hydroxyzine prn. No other changes since last visit. Continue dose and diet until follow-up. Patient reports no bleeding or bruising.

## 2018-01-30 ENCOUNTER — OFFICE VISIT (OUTPATIENT)
Dept: INTERNAL MEDICINE | Facility: CLINIC | Age: 78
End: 2018-01-30
Payer: MEDICARE

## 2018-01-30 VITALS
HEIGHT: 67 IN | DIASTOLIC BLOOD PRESSURE: 80 MMHG | BODY MASS INDEX: 34.81 KG/M2 | RESPIRATION RATE: 18 BRPM | TEMPERATURE: 98 F | SYSTOLIC BLOOD PRESSURE: 130 MMHG | WEIGHT: 221.81 LBS | HEART RATE: 76 BPM

## 2018-01-30 DIAGNOSIS — R07.9 CHEST PAIN, UNSPECIFIED TYPE: Primary | ICD-10-CM

## 2018-01-30 PROCEDURE — 1125F AMNT PAIN NOTED PAIN PRSNT: CPT | Mod: S$GLB,,, | Performed by: FAMILY MEDICINE

## 2018-01-30 PROCEDURE — 1159F MED LIST DOCD IN RCRD: CPT | Mod: S$GLB,,, | Performed by: FAMILY MEDICINE

## 2018-01-30 PROCEDURE — 99213 OFFICE O/P EST LOW 20 MIN: CPT | Mod: S$GLB,,, | Performed by: FAMILY MEDICINE

## 2018-01-30 PROCEDURE — 93005 ELECTROCARDIOGRAM TRACING: CPT | Mod: S$GLB,,, | Performed by: FAMILY MEDICINE

## 2018-01-30 PROCEDURE — 3008F BODY MASS INDEX DOCD: CPT | Mod: S$GLB,,, | Performed by: FAMILY MEDICINE

## 2018-01-30 PROCEDURE — 99999 PR PBB SHADOW E&M-EST. PATIENT-LVL III: CPT | Mod: PBBFAC,,, | Performed by: FAMILY MEDICINE

## 2018-01-30 PROCEDURE — 93010 ELECTROCARDIOGRAM REPORT: CPT | Mod: S$GLB,,, | Performed by: INTERNAL MEDICINE

## 2018-01-30 NOTE — PROGRESS NOTES
"Subjective:       Patient ID: Natalie Greene is a 77 y.o. female.    Chief Complaint: Chest Pain (Patient is experiencing some anxiety due to recent deaths but also has irregular HR and is on coumadin)    HPI     78 yo F    Chest aching   Yesterday and last night.  Now it doesn't hurt or ache.  She states she doesn't feel good.    States having no trouble breathing  She states she woke with the discomfort yesterday.  No pain with exertion.  Not present today.  No sweating  No nausea  No arm pain ( no new onset )   No new numnbess tingling in arms - ( R hand falls asleep)  No new jaw or back pain    Non smoker  Never smoked  No alcohol    She finds she has worse pain with stress - which she has had significant pain.    She has been coughing - for years (per ).  She believes this is from     She feels she is under significant stress.  2 deaths in her [facility] last week -  1 of them was a massive heart attack.      4/11/16 -  Cath Lab procedure  Normal LVEF  Non-obstructive CAD.    Formerly saw Dr. Montero.    States has had to pee several times.    Reports positional. When turns on right side.  Doesn't "necessarily" get chest pain when she moves arm.      Review of Systems   Constitutional: Negative for chills and fever.        States may have a small fever this morning.   HENT: Negative for trouble swallowing.    Respiratory: Positive for cough. Negative for shortness of breath.    Cardiovascular:        Chest pain not present today   Gastrointestinal: Negative for blood in stool.   Genitourinary: Negative for difficulty urinating.   Skin: Negative for color change.   Neurological:        Reports sometimes has some dizziness.  Today it is not worse than usual.       Objective:       Vitals:    01/30/18 1343   BP: 130/80   Pulse: 76   Resp: 18   Temp: 97.6 °F (36.4 °C)       Physical Exam   Constitutional: She is oriented to person, place, and time. She appears well-developed and well-nourished. She does not have a " sickly appearance. No distress.   HENT:   Head: Normocephalic and atraumatic.   Right Ear: External ear normal.   Left Ear: External ear normal.   Eyes: Conjunctivae, EOM and lids are normal.   Neck: Trachea normal, normal range of motion and full passive range of motion without pain.   Cardiovascular: Normal rate, regular rhythm and normal heart sounds.    Pulmonary/Chest: Effort normal and breath sounds normal. No stridor. No respiratory distress.   Abdominal: Soft. Normal appearance and bowel sounds are normal. She exhibits no distension. There is no tenderness. There is no guarding. No hernia.   Musculoskeletal: Normal range of motion.   Left chest wall tender to touch.   Lymphadenopathy:     She has no cervical adenopathy.   Neurological: She is alert and oriented to person, place, and time. She is not disoriented.   Skin: Skin is warm, dry and intact. No rash noted. She is not diaphoretic.   Psychiatric: She has a normal mood and affect. Her speech is normal and behavior is normal. Thought content normal.       Assessment:       1. Chest pain, unspecified type        Plan:   Chest pain, unspecified type  -     IN OFFICE EKG 12-LEAD (to Muse)    Resolved last night.  Was present yesterday. Given she has had cough and there is positional pain and pain with palpation of L chest I suspect this is muscular in nature.  EKG obtained. No obvious change as compared to previous EKG - June 28/17.  No ST changes - elevations or depressions.  No current chest pain or shortness of breath.  No exertional symptoms  Noted last Cath - stated non obstructive CAD.  Plan on F/U with Cardiologist. Patient will call and follow up with her cardiologist.  Encourage close follow up if chest pain return - especially if red flags symptoms develop as we discussed.       No Follow-up on file.

## 2018-02-01 RX ORDER — TRIAMCINOLONE ACETONIDE 1 MG/G
CREAM TOPICAL
COMMUNITY
Start: 2018-01-22 | End: 2019-02-09

## 2018-02-01 RX ORDER — HYDROXYZINE HYDROCHLORIDE 25 MG/1
25 TABLET, FILM COATED ORAL
COMMUNITY
Start: 2018-01-22 | End: 2018-04-10

## 2018-02-13 DIAGNOSIS — Z79.01 LONG TERM CURRENT USE OF ANTICOAGULANT THERAPY: ICD-10-CM

## 2018-02-13 RX ORDER — WARFARIN 6 MG/1
TABLET ORAL
Qty: 84 TABLET | Refills: 0 | Status: SHIPPED | OUTPATIENT
Start: 2018-02-13 | End: 2018-05-24 | Stop reason: SDUPTHER

## 2018-02-14 RX ORDER — AMLODIPINE BESYLATE 5 MG/1
TABLET ORAL
Qty: 90 TABLET | Refills: 1 | Status: SHIPPED | OUTPATIENT
Start: 2018-02-14 | End: 2019-10-23 | Stop reason: SDUPTHER

## 2018-02-14 RX ORDER — LOSARTAN POTASSIUM 100 MG/1
TABLET ORAL
Qty: 90 TABLET | Refills: 1 | Status: SHIPPED | OUTPATIENT
Start: 2018-02-14 | End: 2018-07-26 | Stop reason: SDUPTHER

## 2018-02-14 RX ORDER — INDAPAMIDE 1.25 MG/1
TABLET ORAL
Qty: 90 TABLET | Refills: 1 | Status: SHIPPED | OUTPATIENT
Start: 2018-02-14 | End: 2018-07-26 | Stop reason: SDUPTHER

## 2018-02-15 ENCOUNTER — ANTI-COAG VISIT (OUTPATIENT)
Dept: CARDIOLOGY | Facility: CLINIC | Age: 78
End: 2018-02-15
Payer: MEDICARE

## 2018-02-15 DIAGNOSIS — Z79.01 LONG TERM (CURRENT) USE OF ANTICOAGULANTS: Primary | ICD-10-CM

## 2018-02-15 LAB — INR PPP: 2.6 (ref 2–3)

## 2018-02-15 PROCEDURE — 85610 PROTHROMBIN TIME: CPT | Mod: QW,S$GLB,,

## 2018-03-15 ENCOUNTER — ANTI-COAG VISIT (OUTPATIENT)
Dept: CARDIOLOGY | Facility: CLINIC | Age: 78
End: 2018-03-15
Payer: MEDICARE

## 2018-03-15 DIAGNOSIS — Z79.01 LONG TERM (CURRENT) USE OF ANTICOAGULANTS: Primary | ICD-10-CM

## 2018-03-15 LAB — INR PPP: 2.3 (ref 2–3)

## 2018-03-15 PROCEDURE — 85610 PROTHROMBIN TIME: CPT | Mod: QW,S$GLB,,

## 2018-03-28 ENCOUNTER — TELEPHONE (OUTPATIENT)
Dept: INTERNAL MEDICINE | Facility: CLINIC | Age: 78
End: 2018-03-28

## 2018-03-28 NOTE — TELEPHONE ENCOUNTER
Called pt to schedule check up. Pt was unavailable. Left message for pt to call back at earliest convenience.

## 2018-03-28 NOTE — TELEPHONE ENCOUNTER
----- Message from Annabella Fenton MD sent at 3/26/2018 11:10 PM CDT -----  Please schedule patient with me, due for check up and to discuss lipid

## 2018-04-10 ENCOUNTER — OFFICE VISIT (OUTPATIENT)
Dept: CARDIOLOGY | Facility: CLINIC | Age: 78
End: 2018-04-10
Payer: MEDICARE

## 2018-04-10 VITALS
BODY MASS INDEX: 35.84 KG/M2 | WEIGHT: 228.38 LBS | HEIGHT: 67 IN | DIASTOLIC BLOOD PRESSURE: 64 MMHG | SYSTOLIC BLOOD PRESSURE: 122 MMHG | HEART RATE: 64 BPM

## 2018-04-10 DIAGNOSIS — G47.30 SLEEP APNEA, UNSPECIFIED TYPE: ICD-10-CM

## 2018-04-10 DIAGNOSIS — E66.9 OBESITY (BMI 30-39.9): ICD-10-CM

## 2018-04-10 DIAGNOSIS — I10 ESSENTIAL HYPERTENSION: ICD-10-CM

## 2018-04-10 DIAGNOSIS — Z79.01 LONG TERM CURRENT USE OF ANTICOAGULANT THERAPY: ICD-10-CM

## 2018-04-10 DIAGNOSIS — R06.00 DYSPNEA, UNSPECIFIED TYPE: ICD-10-CM

## 2018-04-10 DIAGNOSIS — I48.20 CHRONIC ATRIAL FIBRILLATION: ICD-10-CM

## 2018-04-10 DIAGNOSIS — E78.5 HYPERLIPIDEMIA WITH TARGET LDL LESS THAN 100: ICD-10-CM

## 2018-04-10 DIAGNOSIS — R07.9 CHEST PAIN, UNSPECIFIED TYPE: ICD-10-CM

## 2018-04-10 DIAGNOSIS — I70.0 AORTIC ATHEROSCLEROSIS: ICD-10-CM

## 2018-04-10 DIAGNOSIS — I25.119 CORONARY ARTERY DISEASE INVOLVING NATIVE CORONARY ARTERY OF NATIVE HEART WITH ANGINA PECTORIS: Primary | ICD-10-CM

## 2018-04-10 DIAGNOSIS — I20.89 ANGINAL EQUIVALENT: ICD-10-CM

## 2018-04-10 PROCEDURE — 99215 OFFICE O/P EST HI 40 MIN: CPT | Mod: S$GLB,,, | Performed by: INTERNAL MEDICINE

## 2018-04-10 PROCEDURE — 3074F SYST BP LT 130 MM HG: CPT | Mod: CPTII,S$GLB,, | Performed by: INTERNAL MEDICINE

## 2018-04-10 PROCEDURE — 3078F DIAST BP <80 MM HG: CPT | Mod: CPTII,S$GLB,, | Performed by: INTERNAL MEDICINE

## 2018-04-10 PROCEDURE — 99499 UNLISTED E&M SERVICE: CPT | Mod: S$GLB,,, | Performed by: INTERNAL MEDICINE

## 2018-04-10 PROCEDURE — 99999 PR PBB SHADOW E&M-EST. PATIENT-LVL III: CPT | Mod: PBBFAC,,, | Performed by: INTERNAL MEDICINE

## 2018-04-10 NOTE — PROGRESS NOTES
Patient, Natalie Greene (MRN #[id number]), presented with a recorded BMI of 35.77 kg/m^2 and a documented comorbidity(s):  - Hypertension  - Hyperlipidemia  - Atrial Fibrillation  to which the severe obesity is a contributing factor. This is consistent with the definition of severe obesity (BMI 35.0-35.9) with comorbidity (ICD-10 E66.01, Z68.35). The patient's severe obesity was monitored, evaluated, addressed and/or treated. This addendum to the medical record is made on 04/10/2018.

## 2018-04-10 NOTE — PROGRESS NOTES
Subjective:   Patient ID:  Natalie Greene is a 77 y.o. female who presents for cardiac consult of Chest Pain and Atrial Fibrillation      HPI  The patient came in today for cardiac consult of Chest Pain and Atrial Fibrillation    This is a 77 year old female pt with PMHx of HTN, non-obs CAD, atrial fibrillation (on Coumadin), hyperlipidemia, obesity, and DM.    Pt has left sided chest pain described as ache. Feels like it goes to left shoulder,arm,fingers. Feels like it goes straight through. The pain is intermittent, occurred the night before yesterday, feels more tired now due to it. Pain can occur at rest but not really exertional. Pt also has mild SOB but not necessary with ache, no diaphoresis but does have mild nausea. No dizziness/lightheaded. Does feel palpitations with Afib. Pt also has significant fatigue, thinks she snores a lot, has a dry mouth always and has not been tested for sleep apnea. Prior echo and cath results below - negative for signif CAD.     Patient feels no leg swelling, no PND, no dizziness, no syncope, no CNS symptoms.    Patient is compliant with medications.    2D ECHO 7/17  CONCLUSIONS     1 - Biatrial enlargement.     2 - Concentric hypertrophy.     3 - No wall motion abnormalities.     4 - Normal left ventricular systolic function (EF 60-65%).     5 - Indeterminate LV diastolic function.     6 - Normal right ventricular systolic function .     7 - Moderate mitral regurgitation.     8 - Mild tricuspid regurgitation.       4/11/2016 [facility]    Patient has a right dominant coronary artery.      - Left Main Coronary Artery:             The LM is normal. There is NICOLE 3 flow.     - Left Anterior Descending Artery:             The LAD has luminal irregularities. There is NICOLE 3 flow.     - Left Circumflex Artery:             The LCX is normal. There is NICOLE 3 flow.     - Right Coronary Artery:             The RCA has luminal irregularities. There is NICOLE 3 flow.    D. SUMMARY/POST-OPERATIVE  DIAGNOSIS:  1. Non-obstructive CAD.  2. Normal LVEF.    Past Medical History:   Diagnosis Date    *Atrial fibrillation     Coronary artery disease involving native coronary artery of native heart with angina pectoris 4/10/2018    Diabetes mellitus     DM (diabetes mellitus) 2002     09/06/2017 no medication    Hyperlipidemia     Hypertension        Past Surgical History:   Procedure Laterality Date    CHOLECYSTECTOMY      COLON SURGERY      HYSTERECTOMY         Social History   Substance Use Topics    Smoking status: Never Smoker    Smokeless tobacco: Never Used    Alcohol use No       Family History   Problem Relation Age of Onset    Diabetes Sister     Hypertension Sister     Hypertension Mother     Diabetes Sister        Patient's Medications   New Prescriptions    No medications on file   Previous Medications    ACCU-CHEK JEN PLUS METER MISC        ACCU-CHEK JEN PLUS TEST STRP STRP        ACCU-CHEK SOFTCLIX LANCETS MISC        AMLODIPINE (NORVASC) 5 MG TABLET    TAKE 1 TABLET EVERY DAY    FLUZONE HIGH-DOSE 2017-18, PF, 180 MCG/0.5 ML VACCINE        INCONTINENCE PAD,LINER,DISP (BLADDER CONTROL PADS EX ABSORB MISC)        INCONTINENCE PAD,LINER,DISP (BLADDER CONTROL PADS EX ABSORB MISC)        INDAPAMIDE (LOZOL) 1.25 MG TAB    TAKE 1 TABLET EVERY DAY    LOSARTAN (COZAAR) 100 MG TABLET    TAKE 1 TABLET EVERY DAY    METOPROLOL SUCCINATE (TOPROL-XL) 100 MG 24 HR TABLET    Take 1 tablet (100 mg total) by mouth once daily.    TRIAMCINOLONE ACETONIDE 0.1% (KENALOG) 0.1 % CREAM        WARFARIN (COUMADIN) 6 MG TABLET    TAKE 1/2 TABLET ON THURSDAYS AND 1 TABLET ALL OTHER DAYS AS DIRECTED BY THE COUMADIN CLINIC.   Modified Medications    No medications on file   Discontinued Medications    HYDROXYZINE HCL (ATARAX) 25 MG TABLET    Take 25 mg by mouth.        Review of Systems   Constitutional: Negative.    HENT: Negative.    Eyes: Negative.    Respiratory: Positive for shortness of breath.   "  Cardiovascular: Positive for chest pain and palpitations.   Gastrointestinal: Negative.    Genitourinary: Negative.    Musculoskeletal: Negative.    Skin: Negative.    Neurological: Negative.  Negative for dizziness.   Endo/Heme/Allergies: Negative.    Psychiatric/Behavioral: Negative.    All 12 systems otherwise negative.      Wt Readings from Last 3 Encounters:   04/10/18 103.6 kg (228 lb 6.3 oz)   01/30/18 100.6 kg (221 lb 12.5 oz)   12/26/17 102.2 kg (225 lb 5 oz)     Temp Readings from Last 3 Encounters:   01/30/18 97.6 °F (36.4 °C) (Tympanic)   12/26/17 96.8 °F (36 °C) (Tympanic)   11/29/17 97 °F (36.1 °C) (Tympanic)     BP Readings from Last 3 Encounters:   04/10/18 122/64   01/30/18 130/80   12/26/17 134/78     Pulse Readings from Last 3 Encounters:   04/10/18 64   01/30/18 76   12/26/17 84       /64 (BP Method: Large (Manual))   Pulse 64   Ht 5' 7" (1.702 m)   Wt 103.6 kg (228 lb 6.3 oz)   BMI 35.77 kg/m²     Objective:   Physical Exam   Constitutional: She is oriented to person, place, and time. She appears well-developed and well-nourished. No distress.   HENT:   Head: Normocephalic and atraumatic.   Nose: Nose normal.   Mouth/Throat: Oropharynx is clear and moist.   Eyes: Conjunctivae and EOM are normal. No scleral icterus.   Neck: Normal range of motion. Neck supple. No JVD present. No thyromegaly present.   Cardiovascular: Normal rate, S1 normal and S2 normal.  An irregularly irregular rhythm present. Exam reveals no gallop, no S3, no S4 and no friction rub.    No murmur heard.  Pulmonary/Chest: Effort normal and breath sounds normal. No stridor. No respiratory distress. She has no wheezes. She has no rales. She exhibits no tenderness.   Abdominal: Soft. Bowel sounds are normal. She exhibits no distension and no mass. There is no tenderness. There is no rebound.   Genitourinary:   Genitourinary Comments: Deferred   Musculoskeletal: Normal range of motion. She exhibits edema. She exhibits no " tenderness or deformity.   Lymphadenopathy:     She has no cervical adenopathy.   Neurological: She is alert and oriented to person, place, and time. She exhibits normal muscle tone. Coordination normal.   Skin: Skin is warm and dry. No rash noted. She is not diaphoretic. No erythema. No pallor.   Psychiatric: She has a normal mood and affect. Her behavior is normal. Judgment and thought content normal.   Nursing note and vitals reviewed.      Lab Results   Component Value Date     11/29/2017    K 3.5 11/29/2017     11/29/2017    CO2 26 11/29/2017    BUN 13 11/29/2017    CREATININE 0.8 11/29/2017     (H) 11/29/2017    HGBA1C 6.6 (H) 11/29/2017    MG 1.7 04/21/2016    AST 26 06/28/2017    ALT 23 06/28/2017    ALBUMIN 3.8 06/28/2017    PROT 7.6 06/28/2017    BILITOT 1.0 06/28/2017    WBC 6.05 08/23/2017    HGB 12.2 08/23/2017    HCT 37.5 08/23/2017    MCV 90 08/23/2017     08/23/2017    INR 2.3 03/15/2018    INR 1.9 (H) 06/28/2017    TSH 1.785 07/29/2014    CHOL 155 08/23/2017    HDL 33 (L) 08/23/2017    LDLCALC 92.0 08/23/2017    TRIG 150 08/23/2017    BNP 81 04/21/2016     Assessment:      1. Coronary artery disease involving native coronary artery of native heart with angina pectoris    2. Chronic atrial fibrillation    3. Essential hypertension    4. Hyperlipidemia with target LDL less than 100    5. Anginal equivalent    6. Aortic atherosclerosis    7. Long term current use of anticoagulant therapy    8. Obesity (BMI 30-39.9)    9. Chest pain, unspecified type    10. Dyspnea, unspecified type    11. Sleep apnea, unspecified type        Plan:   1. Chest pain, non-obs CAD  - will order pharm nuclear stress test, pt cannot walk due to back pain  - discussed symptoms atypical can also be due to reflux, MSK/OA, Pulm etiology/anxiety    2. Palpitations  - will check Holter    3. AF  - cont meds  - cont coumadin    4. LE edema  - rec stockings, elevate legs    5. Sleep apnea symptoms  - will  refer for sleep study    6. Obesity  - rec weight loss with diet and exercise    7. HTN  - cont meds    Thank you for allowing me to participate in this patient's care. Please do not hesitate to contact me with any questions or concerns. Consult note has been forwarded to the referral physician.

## 2018-04-11 ENCOUNTER — PATIENT OUTREACH (OUTPATIENT)
Dept: ADMINISTRATIVE | Facility: HOSPITAL | Age: 78
End: 2018-04-11

## 2018-04-11 NOTE — LETTER
April 11, 2018    [facility]               [facility]  [address]  [facility] [address]  Phone: [phone] April 11, 2018     Patient: Natalie Greene    YOB: 1940   Date of Visit: 4/11/2018         To whom it may concern       We are seeing Natalie GREEN LYNNETTE Greene.O.B is 1940, at [facility]. Annabella Fenton MD is their primary care physician. To help with our health maintenance records could you please send the following:     Most recent Colonoscopy Result.      Please send fax to [phone].    Thank-you in advance for your assistance. If you have any questions or concerns, please don't hesitate to contact me at [phone].     Aby MCKEON LPN Care Coordination   [facility]  Phone [phone] ext [phone],  Fax [phone]

## 2018-04-16 ENCOUNTER — TELEPHONE (OUTPATIENT)
Dept: CARDIOLOGY | Facility: CLINIC | Age: 78
End: 2018-04-16

## 2018-04-16 NOTE — TELEPHONE ENCOUNTER
Called to patient--states would like our office to give a call back in a week to discuss rescheduling Nuclear Stress Test

## 2018-04-19 ENCOUNTER — ANTI-COAG VISIT (OUTPATIENT)
Dept: CARDIOLOGY | Facility: CLINIC | Age: 78
End: 2018-04-19
Payer: MEDICARE

## 2018-04-19 DIAGNOSIS — Z79.01 LONG TERM (CURRENT) USE OF ANTICOAGULANTS: Primary | ICD-10-CM

## 2018-04-19 LAB — INR PPP: 3 (ref 2–3)

## 2018-04-19 PROCEDURE — 85610 PROTHROMBIN TIME: CPT | Mod: QW,S$GLB,, | Performed by: INTERNAL MEDICINE

## 2018-04-19 NOTE — PROGRESS NOTES
Patient's INR is therapeutic at 3.0.  No changes in dose.  Recheck in 1 month.  Please call should you have any questions or concerns at [phone] or [phone].

## 2018-04-20 RX ORDER — METOPROLOL SUCCINATE 100 MG/1
TABLET, EXTENDED RELEASE ORAL
Qty: 90 TABLET | Refills: 1 | Status: SHIPPED | OUTPATIENT
Start: 2018-04-20 | End: 2018-11-12 | Stop reason: SDUPTHER

## 2018-04-23 ENCOUNTER — LAB VISIT (OUTPATIENT)
Dept: LAB | Facility: HOSPITAL | Age: 78
End: 2018-04-23
Attending: FAMILY MEDICINE
Payer: MEDICARE

## 2018-04-23 ENCOUNTER — OFFICE VISIT (OUTPATIENT)
Dept: INTERNAL MEDICINE | Facility: CLINIC | Age: 78
End: 2018-04-23
Payer: MEDICARE

## 2018-04-23 VITALS
DIASTOLIC BLOOD PRESSURE: 66 MMHG | WEIGHT: 225.5 LBS | SYSTOLIC BLOOD PRESSURE: 128 MMHG | HEART RATE: 94 BPM | TEMPERATURE: 98 F | BODY MASS INDEX: 35.39 KG/M2 | OXYGEN SATURATION: 96 % | HEIGHT: 67 IN

## 2018-04-23 DIAGNOSIS — I25.119 CORONARY ARTERY DISEASE INVOLVING NATIVE CORONARY ARTERY OF NATIVE HEART WITH ANGINA PECTORIS: ICD-10-CM

## 2018-04-23 DIAGNOSIS — Z12.11 SCREEN FOR COLON CANCER: ICD-10-CM

## 2018-04-23 DIAGNOSIS — E11.9 TYPE 2 DIABETES MELLITUS WITHOUT COMPLICATION, WITHOUT LONG-TERM CURRENT USE OF INSULIN: ICD-10-CM

## 2018-04-23 DIAGNOSIS — J02.9 SORE THROAT: ICD-10-CM

## 2018-04-23 DIAGNOSIS — I70.0 AORTIC ATHEROSCLEROSIS: ICD-10-CM

## 2018-04-23 DIAGNOSIS — J32.0 MAXILLARY SINUSITIS, UNSPECIFIED CHRONICITY: Primary | ICD-10-CM

## 2018-04-23 DIAGNOSIS — L97.521 SKIN ULCER OF LEFT FOOT, LIMITED TO BREAKDOWN OF SKIN: ICD-10-CM

## 2018-04-23 LAB
ESTIMATED AVG GLUCOSE: 166 MG/DL
HBA1C MFR BLD HPLC: 7.4 %

## 2018-04-23 PROCEDURE — 3074F SYST BP LT 130 MM HG: CPT | Mod: CPTII,S$GLB,, | Performed by: FAMILY MEDICINE

## 2018-04-23 PROCEDURE — 83036 HEMOGLOBIN GLYCOSYLATED A1C: CPT

## 2018-04-23 PROCEDURE — 99999 PR PBB SHADOW E&M-EST. PATIENT-LVL III: CPT | Mod: PBBFAC,,, | Performed by: FAMILY MEDICINE

## 2018-04-23 PROCEDURE — 99214 OFFICE O/P EST MOD 30 MIN: CPT | Mod: S$GLB,,, | Performed by: FAMILY MEDICINE

## 2018-04-23 PROCEDURE — 3078F DIAST BP <80 MM HG: CPT | Mod: CPTII,S$GLB,, | Performed by: FAMILY MEDICINE

## 2018-04-23 PROCEDURE — 3044F HG A1C LEVEL LT 7.0%: CPT | Mod: CPTII,S$GLB,, | Performed by: FAMILY MEDICINE

## 2018-04-23 PROCEDURE — 36415 COLL VENOUS BLD VENIPUNCTURE: CPT

## 2018-04-23 RX ORDER — AMOXICILLIN AND CLAVULANATE POTASSIUM 875; 125 MG/1; MG/1
1 TABLET, FILM COATED ORAL 2 TIMES DAILY
Qty: 20 TABLET | Refills: 0 | Status: SHIPPED | OUTPATIENT
Start: 2018-04-23 | End: 2018-11-12

## 2018-04-23 RX ORDER — ATORVASTATIN CALCIUM 40 MG/1
40 TABLET, FILM COATED ORAL DAILY
Qty: 90 TABLET | Refills: 3 | Status: SHIPPED | OUTPATIENT
Start: 2018-04-23 | End: 2018-11-12 | Stop reason: SDUPTHER

## 2018-04-23 NOTE — PATIENT INSTRUCTIONS
"  Exercise to Manage Your Blood Sugar    Being physically active every day can help you manage your blood sugar. Thats because an active lifestyle can improve your bodys ability to use insulin. Daily activity can also help delay or prevent complications of diabetes. And its a great way to relieve stress. If you arent normally active, be sure to consult your healthcare provider before getting started.  How much activity do you need?  If daily activity is new to you, start slow and steady. Begin with 10 minutes of activity each day. Then work up to at least 150 minutes a week of physical activity. Don't let more than 2 days go by without being active. When sitting for long periods of time, get up for short sessions of light activity every 30 minutes  Just move!  You dont have to join a gym or own pricey sports equipment. Just get out and walk. Walking is an aerobic exercise that makes your heart and lungs work hard. It helps your heart and blood vessels. Walking needs only a sturdy pair of sneakers and your own two feet. The more you walk, the easier it gets:  · Schedule time every day to move your feet.  · Make it part of your daily routine.  · Walk with a friend or a group to keep it interesting and fun.  · Try taking several short walks during the day to meet your daily activity goal.  A pedometer makes every step count  A pedometer is a small device that keeps track of how many steps you take. You can clip it to your belt (or a strap on your arm or leg) and go about your daily routine. "Smartphones" now also have apps to record your walking. At the end of the day, the pedometer shows the total number of steps you took. Use a pedometer to set daily goals for yourself. For instance, if you walk 4,000 steps a day, try adding 200 more steps each day. Aim for a goal of 7,500. With every step, youre doing a little more to help your body use insulin.   Adding resistance exercise  Resistance exercise (also called " strength training), makes muscles stronger. It also helps muscles use insulin better. Ask your healthcare provider whether this type of exercise is right for you. If it is, your healthcare provider can help you work it in to your activity plan.  Staying safe  Being active may cause blood sugar to drop faster than usual. This is especially true if you take medicine to manage your blood sugar. But there are things you can do to help reduce the risk of accidental lows. Keep these tips in mind:  · Always carry identification when you exercise outside your home. Carry a cell phone to use in case of emergency.  · If you can, include friends and family in your activities.  · Wear a medical ID bracelet that says you have diabetes.  · Use the right safety equipment for the activity you do (such as a bicycle helmet when you ride a bicycle outdoors). Wear closed-toed shoes that fit your feet well.  · Drink plenty of water before and during activity.  · Keep a fast-acting sugar (such as glucose tablets) on hand in case of low blood sugar.  · Dress properly for the weather. Wear a hat if its martell, or wait until evening if its too hot.  · Avoid being active for long periods in very hot or very cold weather.  · Skip activity if youre sick.     Notice how activity affects blood sugar  Physical activity is important when you have diabetes. But you need to keep an eye on your blood sugar level. Check often if you have been active for longer than usual, or if the activity was unplanned. Make it a habit to check your blood sugar before being active. And check again a few hours later. Use your log book to write down how activity affects your numbers. If you take insulin, you may be able to adjust your dose before a planned activity. This can help prevent lows. You may also need to take a small carbohydrate snack before the exercise. Talk to your healthcare provider to learn more.    Date Last Reviewed: 6/1/2016  © 5141-5398 [employer]  [employer]. [address] [place] [address], [place] [address]. All rights reserved. This information is not intended as a substitute for professional medical care. Always follow your healthcare professional's instructions.

## 2018-04-23 NOTE — PROGRESS NOTES
"Natalie Greene  04/23/2018  [id number]    Annabella Fenton MD  Patient Care Team:  Annabella Fenton MD as PCP - General (Family Medicine)  Annabella Fenton MD as Consulting Physician (Family Medicine)  Has the patient seen any provider outside of the [facility] network since the last visit? (no). If yes, HIPPA forms completed and records requested.        Visit Type:an urgent visit for a new problem    Chief Complaint:  Chief Complaint   Patient presents with    Sore Throat      about four days ago    Otitis Media     possible been bothering her for awhile       History of Present Illness:  Patient here for urgent visit, sore throat.  She has had chest pain, and she has been having it for some time. She saw Cards and he wanted to do stress test.   She doesn't have mid sternum pain. Dr. Rod wanted her to have the test.     She now have PND and sinus drip. She has associated cough. No SOB. She takes some "stuff" OTC. She reports now her throat is sore and her ears feel full.     SHe is due for 3 year follow up on Colon.She is followed by Dr. Anne at [facility] [place].     She has history of Atrial Fib. She is on Coumadin.     She has history of CAD, and is medically managaged. She is followed by [facility], needs to have nuclear stress test for follow up. Ordered bbut not scheduled due to having this illness she is seeing me today for.  She is currently not on statin, and discussion today regarding use for prevention.    She has DM, but has been diet controlled.   Lab Results   Component Value Date    HGBA1C 6.6 (H) 11/29/2017     Bp control is with Indapamide and Norvasc and Toprol.       History:  Past Medical History:   Diagnosis Date    *Atrial fibrillation     Coronary artery disease involving native coronary artery of native heart with angina pectoris 4/10/2018    Diabetes mellitus     DM (diabetes mellitus) 2002     09/06/2017 no medication    Hyperlipidemia     Hypertension      Past Surgical " History:   Procedure Laterality Date    CHOLECYSTECTOMY      COLON SURGERY      HYSTERECTOMY       Family History   Problem Relation Age of Onset    Diabetes Sister     Hypertension Sister     Hypertension Mother     Diabetes Sister      Social History     Social History    Marital status:      Spouse name: N/A    Number of children: N/A    Years of education: N/A     Occupational History    Not on file.     Social History Main Topics    Smoking status: Never Smoker    Smokeless tobacco: Never Used    Alcohol use No    Drug use: No    Sexual activity: Not on file     Other Topics Concern    Not on file     Social History Narrative    No narrative on file     Patient Active Problem List   Diagnosis    A-fib    HTN (hypertension)    Hyperlipidemia with target LDL less than 100    DM (diabetes mellitus)    Anticoagulated on Coumadin    Obesity (BMI 30-39.9)    Abnormal stress test    Anginal equivalent    History of malignant neoplasm of colon    Shoulder pain, left    Foot ulcer, left    Aortic atherosclerosis    Ethmoid sinusitis    Long term current use of anticoagulant therapy    Coronary artery disease involving native coronary artery of native heart with angina pectoris     Review of patient's allergies indicates:  No Known Allergies    The following were reviewed at this visit: active problem list, medication list, allergies, family history, social history, and health maintenance.    Medications:  Current Outpatient Prescriptions on File Prior to Visit   Medication Sig Dispense Refill    ACCU-CHEK JEN PLUS METER Misc       ACCU-CHEK JEN PLUS TEST STRP Strp       ACCU-CHEK SOFTCLIX LANCETS Misc       amLODIPine (NORVASC) 5 MG tablet TAKE 1 TABLET EVERY DAY 90 tablet 1    indapamide (LOZOL) 1.25 MG Tab TAKE 1 TABLET EVERY DAY 90 tablet 1    losartan (COZAAR) 100 MG tablet TAKE 1 TABLET EVERY DAY 90 tablet 1    metoprolol succinate (TOPROL-XL) 100 MG 24 hr tablet  TAKE 1 TABLET EVERY DAY 90 tablet 1    triamcinolone acetonide 0.1% (KENALOG) 0.1 % cream       warfarin (COUMADIN) 6 MG tablet TAKE 1/2 TABLET ON THURSDAYS AND 1 TABLET ALL OTHER DAYS AS DIRECTED BY THE COUMADIN CLINIC. 84 tablet 0    INCONTINENCE PAD,LINER,DISP (BLADDER CONTROL PADS EX ABSORB MISC)       [DISCONTINUED] FLUZONE HIGH-DOSE 2017-18, PF, 180 mcg/0.5 mL vaccine       [DISCONTINUED] INCONTINENCE PAD,LINER,DISP (BLADDER CONTROL PADS EX ABSORB MISC)        No current facility-administered medications on file prior to visit.        Medications have been reviewed and reconciled with patient at this visit.  Barriers to medications present (no)    Adverse reactions to current medications (no)    Over the counter medications reviewed (Yes ), and if needed added to active Medication list at this visit.     Exam:  Wt Readings from Last 3 Encounters:   04/23/18 102.3 kg (225 lb 8.5 oz)   04/10/18 103.6 kg (228 lb 6.3 oz)   01/30/18 100.6 kg (221 lb 12.5 oz)     Temp Readings from Last 3 Encounters:   04/23/18 97.7 °F (36.5 °C) (Tympanic)   01/30/18 97.6 °F (36.4 °C) (Tympanic)   12/26/17 96.8 °F (36 °C) (Tympanic)     BP Readings from Last 3 Encounters:   04/23/18 128/66   04/10/18 122/64   01/30/18 130/80     Pulse Readings from Last 3 Encounters:   04/23/18 94   04/10/18 64   01/30/18 76     Body mass index is 35.32 kg/m².    Review of Systems   Constitutional: Negative.  Negative for chills and fever.   HENT: Positive for congestion and sore throat. Negative for sinus pain.    Eyes: Negative for blurred vision and double vision.   Respiratory: Positive for cough. Negative for sputum production, shortness of breath and wheezing.    Cardiovascular: Positive for chest pain. Negative for palpitations and leg swelling.   Gastrointestinal: Negative for abdominal pain, constipation, diarrhea, heartburn, nausea and vomiting.   Genitourinary: Negative.    Musculoskeletal: Negative.    Skin: Negative.  Negative for  rash.   Neurological: Negative.  Negative for dizziness.   Endo/Heme/Allergies: Negative.  Negative for polydipsia. Does not bruise/bleed easily.   Psychiatric/Behavioral: Negative for depression and substance abuse.         Physical Exam   Constitutional: She is oriented to person, place, and time. She appears well-developed and well-nourished. No distress.   HENT:   Head: Normocephalic and atraumatic.   Right Ear: External ear normal. A middle ear effusion is present.   Left Ear: External ear normal. A middle ear effusion is present.   Nose: Nose normal.   Mouth/Throat: Posterior oropharyngeal erythema present. No oropharyngeal exudate.   Eyes: Conjunctivae and EOM are normal. Pupils are equal, round, and reactive to light. Right eye exhibits no discharge. Left eye exhibits no discharge.   Neck: Normal range of motion. Neck supple. No thyromegaly present.   Cardiovascular: Normal rate, normal heart sounds and intact distal pulses.  An irregularly irregular rhythm present.   No murmur heard.  Pulmonary/Chest: Effort normal and breath sounds normal. No respiratory distress. She has no wheezes.   Abdominal: Soft. Bowel sounds are normal. She exhibits no distension and no mass. There is no tenderness.   Musculoskeletal: Normal range of motion. She exhibits no edema.        Right foot: There is deformity.        Left foot: There is deformity.   Feet:   Left Foot:   Skin Integrity: Positive for callus.   Lymphadenopathy:     She has no cervical adenopathy.   Neurological: She is alert and oriented to person, place, and time. No cranial nerve deficit.   Skin: Capillary refill takes less than 2 seconds. She is not diaphoretic.   Psychiatric: She has a normal mood and affect. Her behavior is normal. Judgment and thought content normal.   Nursing note and vitals reviewed.      Laboratory Reviewed ({N/A)  Lab Results   Component Value Date    WBC 6.05 08/23/2017    HGB 12.2 08/23/2017    HCT 37.5 08/23/2017      08/23/2017    CHOL 155 08/23/2017    TRIG 150 08/23/2017    HDL 33 (L) 08/23/2017    ALT 23 06/28/2017    AST 26 06/28/2017     11/29/2017    K 3.5 11/29/2017     11/29/2017    CREATININE 0.8 11/29/2017    BUN 13 11/29/2017    CO2 26 11/29/2017    TSH 1.785 07/29/2014    INR 3.0 04/19/2018    HGBA1C 6.6 (H) 11/29/2017       Assessment:  The primary encounter diagnosis was Maxillary sinusitis, unspecified chronicity. Diagnoses of Sore throat, Coronary artery disease involving native coronary artery of native heart with angina pectoris, Aortic atherosclerosis, Screen for colon cancer, Type 2 diabetes mellitus without complication, without long-term current use of insulin, and Skin ulcer of left foot, limited to breakdown of skin were also pertinent to this visit.     Plan       Maxillary sinusitis, unspecified chronicity  -     amoxicillin-clavulanate 875-125mg (AUGMENTIN) 875-125 mg per tablet; Take 1 tablet by mouth 2 (two) times daily.  Dispense: 20 tablet; Refill: 0    Sore throat   Tylenol prn pain    Coronary artery disease involving native coronary artery of native heart with angina pectoris  -     atorvastatin (LIPITOR) 40 MG tablet; Take 1 tablet (40 mg total) by mouth once daily.  Dispense: 90 tablet; Refill: 3   Start for prevention     Aortic atherosclerosis  -     atorvastatin (LIPITOR) 40 MG tablet; Take 1 tablet (40 mg total) by mouth once daily.  Dispense: 90 tablet; Refill: 3   Start for prevention    Screen for colon cancer   With Dr. Anne later this year   Needs to complete stress test first    Type 2 diabetes mellitus without complication, without long-term current use of insulin  -     Hemoglobin A1c; Future; Expected date: 04/23/2018    Skin ulcer of left foot, limited to breakdown of skin   Now with callus.   Followed by Podiatry        Care Plan/Goals: Reviewed  (Yes)  Goals      Take at least one BP reading per week at various times of the day            Hypertension  Goals/Individual Care Plan    Hypertension Goal Care Plan    1. Blood pressure goal is to be below 140/90. Normal Blood pressure is 120/80.  Patient's blood pressure is at goal today. (Yes)    2. Goal for self management is to check Blood Pressure daily. Record on Individual log. Patient is agreeable to self management plan. blood pressure log.   Patient will bring logs at next office visit, or communicate to /Care Management team for review for interval follow up.     3. Moderately intense physical activity, such as brisk walking, is beneficial when done regularly. Aim for a total of 40 minutes of moderate to vigorous activity three to four times a week to help lower blood pressure. Exercise of patients choice was recommended at this office visit. walking    4. Eating Healthy Diet is important in Blood Pressure control. As its name implies, the DASH (Dietary Approaches to Stop Hypertension) eating plan is designed to help you manage blood pressure. Emphasizing healthy food sources, it also limits:  Red meat   Sodium (salt)   Sweets, added sugars and sugar-containing beverages.     5. Barriers to goals reviewed and addressed with patient at visit. (Yes)    6. Adherence and Medication Side effects discussed (Yes)    Referral to on-site Pharmacy for Co-management of hypertension (No)  Patient enrolled in Tele-health Hypertension Management. (No)    Patient is under care of /Care management (No) Referral Sent (No)                  Follow up: No Follow-up on file.    After visit summary was printed and given to patient upon discharge today.  Patient goals and care plan are included in After Visit Summary.

## 2018-04-24 ENCOUNTER — TELEPHONE (OUTPATIENT)
Dept: INTERNAL MEDICINE | Facility: CLINIC | Age: 78
End: 2018-04-24

## 2018-04-24 RX ORDER — METFORMIN HYDROCHLORIDE 500 MG/1
500 TABLET, EXTENDED RELEASE ORAL
Qty: 90 TABLET | Refills: 3 | Status: SHIPPED | OUTPATIENT
Start: 2018-04-24 | End: 2018-11-12 | Stop reason: SDUPTHER

## 2018-04-24 NOTE — PROGRESS NOTES
Notify patient, her hgA1c has increased to 7.4. This means that her BS is not as controlled as I would have liked.  I have send Metformin xR to the pharmacy, she needs to start taking once in am to help with her blood sugars.

## 2018-04-24 NOTE — TELEPHONE ENCOUNTER
----- Message from Annabella Fenton MD sent at 4/24/2018  8:02 AM CDT -----  Notify patient, her hgA1c has increased to 7.4. This means that her BS is not as controlled as I would have liked.  I have send Metformin xR to the pharmacy, she needs to start taking once in am to help with her blood sugars.

## 2018-04-26 ENCOUNTER — TELEPHONE (OUTPATIENT)
Dept: INTERNAL MEDICINE | Facility: CLINIC | Age: 78
End: 2018-04-26

## 2018-04-26 NOTE — TELEPHONE ENCOUNTER
Left a message to inform her of the rx that was sent to her pharmacy so she can start controlling her bs.

## 2018-04-30 ENCOUNTER — CLINICAL SUPPORT (OUTPATIENT)
Dept: CARDIOLOGY | Facility: CLINIC | Age: 78
End: 2018-04-30
Attending: INTERNAL MEDICINE
Payer: MEDICARE

## 2018-04-30 DIAGNOSIS — I48.20 CHRONIC ATRIAL FIBRILLATION: ICD-10-CM

## 2018-04-30 PROCEDURE — 93224 XTRNL ECG REC UP TO 48 HRS: CPT | Mod: S$GLB,,, | Performed by: INTERNAL MEDICINE

## 2018-05-04 RX ORDER — DILTIAZEM HYDROCHLORIDE 180 MG/1
180 CAPSULE, COATED, EXTENDED RELEASE ORAL DAILY
Qty: 90 CAPSULE | Refills: 3 | Status: SHIPPED | OUTPATIENT
Start: 2018-05-04 | End: 2018-11-15 | Stop reason: SDUPTHER

## 2018-05-10 ENCOUNTER — TELEPHONE (OUTPATIENT)
Dept: CARDIOLOGY | Facility: CLINIC | Age: 78
End: 2018-05-10

## 2018-05-10 NOTE — TELEPHONE ENCOUNTER
----- Message from Law Rod MD sent at 5/4/2018  1:21 PM CDT -----  Please call the patient regarding her abnormal result.  Heart rates are elevated will add Cardizem 180 mg to take daily to control HR, scrip sent to pharmacy. Will reassess with Holter again at next visit.

## 2018-05-10 NOTE — TELEPHONE ENCOUNTER
Called to patient to give results of holter and medication dosage--left voice message to call our office back at [phone].

## 2018-05-24 ENCOUNTER — ANTI-COAG VISIT (OUTPATIENT)
Dept: CARDIOLOGY | Facility: CLINIC | Age: 78
End: 2018-05-24
Payer: MEDICARE

## 2018-05-24 DIAGNOSIS — Z79.01 LONG TERM CURRENT USE OF ANTICOAGULANT THERAPY: ICD-10-CM

## 2018-05-24 DIAGNOSIS — Z79.01 LONG TERM (CURRENT) USE OF ANTICOAGULANTS: Primary | ICD-10-CM

## 2018-05-24 LAB — INR PPP: 1.8 (ref 2–3)

## 2018-05-24 PROCEDURE — 85610 PROTHROMBIN TIME: CPT | Mod: QW,S$GLB,, | Performed by: INTERNAL MEDICINE

## 2018-05-24 RX ORDER — WARFARIN 6 MG/1
6 TABLET ORAL DAILY
Qty: 90 TABLET | Refills: 1 | Status: SHIPPED | OUTPATIENT
Start: 2018-05-24 | End: 2018-05-29 | Stop reason: SDUPTHER

## 2018-05-24 NOTE — PROGRESS NOTES
Patient's INR is slightly low at 1.8.  Reports missed x 1 dose last week.  No changes in dose.  Recheck in 1 month.  Please call should you have any questions or concerns at [phone] or [phone].

## 2018-05-29 DIAGNOSIS — Z79.01 LONG TERM CURRENT USE OF ANTICOAGULANT THERAPY: ICD-10-CM

## 2018-05-29 PROBLEM — L97.529 FOOT ULCER, LEFT: Status: RESOLVED | Noted: 2017-08-23 | Resolved: 2018-05-29

## 2018-05-29 PROBLEM — J32.2 ETHMOID SINUSITIS: Status: RESOLVED | Noted: 2017-11-29 | Resolved: 2018-05-29

## 2018-05-29 RX ORDER — WARFARIN 6 MG/1
6 TABLET ORAL DAILY
Qty: 90 TABLET | Refills: 1 | Status: SHIPPED | OUTPATIENT
Start: 2018-05-29 | End: 2018-11-12

## 2018-05-29 NOTE — PROGRESS NOTES
Natalie Greene  05/30/2018  [id number]    Annabella Fenton MD  Patient Care Team:  Annabella Fenton MD as PCP - General (Family Medicine)  Annabella Fenton MD as Consulting Physician (Family Medicine)  Has the patient seen any provider outside of the [facility] network since the last visit? (no). If yes, HIPPA forms completed and records requested.        Visit Type:a scheduled routine follow-up visit    Chief Complaint:  Chief Complaint   Patient presents with    Follow-up     lack of sleep at night. take little naps during the day       History of Present Illness:  Patient here to discuss concerns.    She has had chest pain, and she has been having it for some time. She saw Cards and he wanted to do stress test.   She doesn't have mid sternum pain. Dr. Rod wanted her to have the test. She did a 48 hour holter and it showed her elevated HR. Dr. Rod advised to add Cardizem to her medicaitons. She reports she is afraid to do stress test.     She is due for 3 year follow up on Colon.She is followed [facility].  We were going to wait to do the stress test before I sent her for colon screen.     She has history of Atrial Fib. She is on Coumadin.  She is followed by Coumadin clinic.       She has history of CAD, and is medically managaged. [facility] needs to have nuclear stress test for follow up. Ordered bbut not scheduled due to having this illness she is seeing me today for.  She is currently not on statin, and discussion today regarding use for prevention.     She has DM, but has been diet controlled.         Lab Results   Component Value Date     HGBA1C 6.6 (H) 11/29/2017     Lab Results   Component Value Date    HGBA1C 7.4 (H) 04/23/2018       Bp control is with Indapamide and Norvasc and Toprol, Cardizem 180 daily. Pulse today 95    She is having sleep issues. She reports she wakes up to go to the bathroom. She also reports she snores at night.  She feels sleeping in  am, and does takes Naps during day.      History:  Past Medical History:   Diagnosis Date    *Atrial fibrillation     Coronary artery disease involving native coronary artery of native heart with angina pectoris 4/10/2018    Diabetes mellitus     DM (diabetes mellitus) 2002     09/06/2017 no medication    Hyperlipidemia     Hypertension      Past Surgical History:   Procedure Laterality Date    CHOLECYSTECTOMY      COLON SURGERY      HYSTERECTOMY       Family History   Problem Relation Age of Onset    Diabetes Sister     Hypertension Sister     Hypertension Mother     Diabetes Sister      Social History     Social History    Marital status:      Spouse name: N/A    Number of children: N/A    Years of education: N/A     Occupational History    Not on file.     Social History Main Topics    Smoking status: Never Smoker    Smokeless tobacco: Never Used    Alcohol use No    Drug use: No    Sexual activity: Not on file     Other Topics Concern    Not on file     Social History Narrative    No narrative on file     Patient Active Problem List   Diagnosis    A-fib    HTN (hypertension)    Hyperlipidemia with target LDL less than 100    DM (diabetes mellitus)    Anticoagulated on Coumadin    Obesity (BMI 30-39.9)    Abnormal stress test    Anginal equivalent    History of malignant neoplasm of colon    Shoulder pain, left    Aortic atherosclerosis    Long term current use of anticoagulant therapy    Coronary artery disease involving native coronary artery of native heart with angina pectoris     Review of patient's allergies indicates:  No Known Allergies    The following were reviewed at this visit: active problem list, medication list, allergies, family history, social history, and health maintenance.    Medications:  Current Outpatient Prescriptions on File Prior to Visit   Medication Sig Dispense Refill    ACCU-CHEK JEN PLUS METER Misc       ACCU-CHEK JEN PLUS TEST  STRP Strp       ACCU-CHEK SOFTCLIX LANCETS Misc       amLODIPine (NORVASC) 5 MG tablet TAKE 1 TABLET EVERY DAY 90 tablet 1    amoxicillin-clavulanate 875-125mg (AUGMENTIN) 875-125 mg per tablet Take 1 tablet by mouth 2 (two) times daily. 20 tablet 0    atorvastatin (LIPITOR) 40 MG tablet Take 1 tablet (40 mg total) by mouth once daily. 90 tablet 3    diltiaZEM (CARDIZEM CD) 180 MG 24 hr capsule Take 1 capsule (180 mg total) by mouth once daily. 90 capsule 3    INCONTINENCE PAD,LINER,DISP (BLADDER CONTROL PADS EX ABSORB MISC)       indapamide (LOZOL) 1.25 MG Tab TAKE 1 TABLET EVERY DAY 90 tablet 1    losartan (COZAAR) 100 MG tablet TAKE 1 TABLET EVERY DAY 90 tablet 1    metFORMIN (GLUCOPHAGE-XR) 500 MG 24 hr tablet Take 1 tablet (500 mg total) by mouth daily with breakfast. 90 tablet 3    metoprolol succinate (TOPROL-XL) 100 MG 24 hr tablet TAKE 1 TABLET EVERY DAY 90 tablet 1    triamcinolone acetonide 0.1% (KENALOG) 0.1 % cream       warfarin (COUMADIN) 6 MG tablet Take 1 tablet (6 mg total) by mouth Daily. 90 tablet 1     No current facility-administered medications on file prior to visit.        Medications have been reviewed and reconciled with patient at this visit.  Barriers to medications present (no)    Adverse reactions to current medications (no)    Over the counter medications reviewed (Yes ), and if needed added to active Medication list at this visit.     Exam:  Wt Readings from Last 3 Encounters:   04/23/18 102.3 kg (225 lb 8.5 oz)   04/10/18 103.6 kg (228 lb 6.3 oz)   01/30/18 100.6 kg (221 lb 12.5 oz)     Temp Readings from Last 3 Encounters:   05/30/18 97.8 °F (36.6 °C)   04/23/18 97.7 °F (36.5 °C) (Tympanic)   01/30/18 97.6 °F (36.4 °C) (Tympanic)     BP Readings from Last 3 Encounters:   05/30/18 127/67   04/23/18 128/66   04/10/18 122/64     Pulse Readings from Last 3 Encounters:   05/30/18 96   04/23/18 94   04/10/18 64     There is no height or weight on file to calculate  BMI.    Review of Systems   Constitutional: Negative.  Negative for chills and fever.   HENT: Negative.  Negative for congestion, sinus pain and sore throat.    Eyes: Negative for blurred vision and double vision.   Respiratory: Negative for cough, sputum production, shortness of breath and wheezing.    Cardiovascular: Positive for chest pain. Negative for palpitations and leg swelling.   Gastrointestinal: Negative for abdominal pain, constipation, diarrhea, heartburn, nausea and vomiting.   Genitourinary: Negative.    Musculoskeletal: Negative.    Skin: Negative.  Negative for rash.   Neurological: Negative.    Endo/Heme/Allergies: Negative.  Negative for polydipsia. Does not bruise/bleed easily.   Psychiatric/Behavioral: Negative for depression and substance abuse.         Physical Exam   Constitutional: She is oriented to person, place, and time. She appears well-developed and well-nourished. No distress.   HENT:   Head: Normocephalic and atraumatic.   Right Ear: External ear normal.   Left Ear: External ear normal.   Nose: Nose normal.   Mouth/Throat: Oropharynx is clear and moist. No oropharyngeal exudate.   Eyes: Conjunctivae and EOM are normal. Pupils are equal, round, and reactive to light. Right eye exhibits no discharge. Left eye exhibits no discharge.   Neck: Normal range of motion. Neck supple. No thyromegaly present.   Cardiovascular: Normal rate, normal heart sounds and intact distal pulses.  An irregularly irregular rhythm present.   No murmur heard.  Pulmonary/Chest: Effort normal and breath sounds normal. No respiratory distress. She has no wheezes.   Abdominal: Soft. Bowel sounds are normal. She exhibits no distension and no mass. There is no tenderness.   Musculoskeletal: Normal range of motion. She exhibits no edema.   Lymphadenopathy:     She has no cervical adenopathy.   Neurological: She is alert and oriented to person, place, and time. No cranial nerve deficit.   Skin: Capillary refill takes  less than 2 seconds. She is not diaphoretic.   Psychiatric: She has a normal mood and affect. Her behavior is normal. Judgment and thought content normal.   Nursing note and vitals reviewed.      Laboratory Reviewed ({Yes)  Lab Results   Component Value Date    WBC 6.05 08/23/2017    HGB 12.2 08/23/2017    HCT 37.5 08/23/2017     08/23/2017    CHOL 155 08/23/2017    TRIG 150 08/23/2017    HDL 33 (L) 08/23/2017    ALT 23 06/28/2017    AST 26 06/28/2017     11/29/2017    K 3.5 11/29/2017     11/29/2017    CREATININE 0.8 11/29/2017    BUN 13 11/29/2017    CO2 26 11/29/2017    TSH 1.785 07/29/2014    INR 1.8 (A) 05/24/2018    HGBA1C 7.4 (H) 04/23/2018       Assessment:  The primary encounter diagnosis was Chronic atrial fibrillation. Diagnoses of Essential hypertension, Aortic atherosclerosis, Hyperlipidemia with target LDL less than 100, Coronary artery disease involving native coronary artery of native heart with angina pectoris, Long term current use of anticoagulant therapy, History of malignant neoplasm of colon, and Type 2 diabetes mellitus without complication, without long-term current use of insulin were also pertinent to this visit.     Plan     Chronic atrial fibrillation    INR reviewed   On Cardizem now, pulse imporved      Essential hypertension   BP in goal range in office      Aortic atherosclerosis   LDL at goal   Followed by Cards   On lipitor    Hyperlipidemia with target LDL less than 100   On Statin now.    LDL due in August    Coronary artery disease involving native coronary artery of native heart with angina pectoris   Stress test ordered   Discussed testing with patient   She will be agreeable to schedule    Long term current use of anticoagulant therapy   [facility] follows    History of malignant neoplasm of colon   Await Stress test since she is having anginal sx.   Once stress test completed and if negative, [facility].    Type 2  diabetes mellitus without complication, without long-term current use of insulin   LAst HgA1c was elevated   Given Metfromin, not really taking   Recheck Hga1c in August      Lipid due in August  Hga1c August  Await stress test results      Care Plan/Goals: Reviewed  (N/A)  Goals      Take at least one BP reading per week at various times of the day            Hypertension Goals/Individual Care Plan    Hypertension Goal Care Plan    1. Blood pressure goal is to be below 140/90. Normal Blood pressure is 120/80.  Patient's blood pressure is at goal today. (Yes)    2. Goal for self management is to check Blood Pressure daily. Record on Individual log. Patient is agreeable to self management plan. blood pressure log.   Patient will bring logs at next office visit, or communicate to /Care Management team for review for interval follow up.     3. Moderately intense physical activity, such as brisk walking, is beneficial when done regularly. Aim for a total of 40 minutes of moderate to vigorous activity three to four times a week to help lower blood pressure. Exercise of patients choice was recommended at this office visit. walking    4. Eating Healthy Diet is important in Blood Pressure control. As its name implies, the DASH (Dietary Approaches to Stop Hypertension) eating plan is designed to help you manage blood pressure. Emphasizing healthy food sources, it also limits:  Red meat   Sodium (salt)   Sweets, added sugars and sugar-containing beverages.     5. Barriers to goals reviewed and addressed with patient at visit. (Yes)    6. Adherence and Medication Side effects discussed (Yes)    Referral to on-site Pharmacy for Co-management of hypertension (No)  Patient enrolled in Tele-health Hypertension Management. (No)    Patient is under care of /Care management (No) Referral Sent (No)                  Follow up: No Follow-up on file.    After visit summary was printed and given to patient upon  discharge today.  Patient goals and care plan are included in After Visit Summary.

## 2018-05-30 ENCOUNTER — OFFICE VISIT (OUTPATIENT)
Dept: INTERNAL MEDICINE | Facility: CLINIC | Age: 78
End: 2018-05-30
Payer: MEDICARE

## 2018-05-30 VITALS — HEART RATE: 96 BPM | DIASTOLIC BLOOD PRESSURE: 67 MMHG | TEMPERATURE: 98 F | SYSTOLIC BLOOD PRESSURE: 127 MMHG

## 2018-05-30 DIAGNOSIS — E11.9 TYPE 2 DIABETES MELLITUS WITHOUT COMPLICATION, WITHOUT LONG-TERM CURRENT USE OF INSULIN: ICD-10-CM

## 2018-05-30 DIAGNOSIS — Z79.01 LONG TERM CURRENT USE OF ANTICOAGULANT THERAPY: ICD-10-CM

## 2018-05-30 DIAGNOSIS — E78.5 HYPERLIPIDEMIA WITH TARGET LDL LESS THAN 100: ICD-10-CM

## 2018-05-30 DIAGNOSIS — Z85.038 HISTORY OF MALIGNANT NEOPLASM OF COLON: ICD-10-CM

## 2018-05-30 DIAGNOSIS — I48.20 CHRONIC ATRIAL FIBRILLATION: Primary | ICD-10-CM

## 2018-05-30 DIAGNOSIS — I70.0 AORTIC ATHEROSCLEROSIS: ICD-10-CM

## 2018-05-30 DIAGNOSIS — I25.119 CORONARY ARTERY DISEASE INVOLVING NATIVE CORONARY ARTERY OF NATIVE HEART WITH ANGINA PECTORIS: ICD-10-CM

## 2018-05-30 DIAGNOSIS — I10 ESSENTIAL HYPERTENSION: ICD-10-CM

## 2018-05-30 PROCEDURE — 99214 OFFICE O/P EST MOD 30 MIN: CPT | Mod: S$GLB,,, | Performed by: FAMILY MEDICINE

## 2018-05-30 PROCEDURE — 3078F DIAST BP <80 MM HG: CPT | Mod: CPTII,S$GLB,, | Performed by: FAMILY MEDICINE

## 2018-05-30 PROCEDURE — 3074F SYST BP LT 130 MM HG: CPT | Mod: CPTII,S$GLB,, | Performed by: FAMILY MEDICINE

## 2018-05-30 PROCEDURE — 3045F PR MOST RECENT HEMOGLOBIN A1C LEVEL 7.0-9.0%: CPT | Mod: CPTII,S$GLB,, | Performed by: FAMILY MEDICINE

## 2018-05-30 PROCEDURE — 99999 PR PBB SHADOW E&M-EST. PATIENT-LVL III: CPT | Mod: PBBFAC,,, | Performed by: FAMILY MEDICINE

## 2018-05-30 NOTE — PATIENT INSTRUCTIONS
Discharge Instructions for Atrial Fibrillation  You have been diagnosed with an abnormal heart rhythm called atrial fibrillation. With this condition, your hearts 2 upper chambers quiver rather than squeeze the blood out in a normal pattern. This leads to an irregular and sometimes rapid heartbeat. Some people will develop associated symptoms such as a flip-flopping heartbeat, chest pain, lightheadedness, or shortness of breath. Other people may have no symptoms at all. Atrial fibrillation is serious because it affects the hearts ability to fill with blood as it should. Blood clots may form. This increases the risk for stroke. Untreated atrial fibrillation can also lead to heart failure. Atrial fibrillation can be controlled. With treatment, most people with atrial fibrillation lead normal lives.  Treatment options  Recommended treatment for atrial fibrillation depends on your age, symptoms, how long you have had atrial fibrillation, and other factors. You will have a complete evaluation to find out if you have any abnormalities that caused your heart to go into atrial fibrillation. This might be blocked heart arteries or a thyroid problem. Your doctor will assess your particular case and discuss choices with you.  Treatment choices may include:  · Treating an underlying disorder that puts you at risk for atrial fibrillation. For example, correcting an abnormal thyroid or electrolyte problem, or treating a blocked heart artery.  · Restoring a normal heart rhythm with an electrical shock (cardioversion) or with an antiarrhythmic medicine (chemical cardioversion)  · Using medicine to control your heart rate in atrial fibrillation.  · Preventing the risk for blood clot and stroke using blood-thinning medicines. Your doctor will tell you what he or she recommends. Choices may include aspirin, clopidogrel, warfarin, dabigatran, rivaroxaban, apixaban, and edoxaban.  · Doing catheter ablation or a surgical maze  procedure. These use different methods to destroy certain areas of heart tissue. This interrupts the electrical signals causing atrial fibrillation. One of these procedures may be a choice when medicines do not work, or as an alternative to long-term medicine.  · Other treatment choices may be recommended for you by your doctor.  Managing risk factors for stroke and preventing heart failure are important parts of any treatment plan for atrial fibrillation.  Home care  · Take your medicines exactly as directed. Dont skip doses.  · Work with your doctor to find the right medicines and doses for you.  · Learn to take your own pulse. Keep a record of your results. Ask your doctor which pulse rates mean that you need medical attention. Slowing your pulse is often the goal of treatment. Ask your doctor if its OK for you to use an automatic machine to check your pulse at home. Sometimes these machines dont count the pulse correctly when you have atrial fibrillation.  · Limit your intake of coffee, tea, cola, and other beverages with caffeine. Talk with your doctor about whether you should eliminate caffeine.  · Avoid over-the-counter medicines that have caffeine in them.  · Let your doctor know what medicines you take, including prescription and over-the-counter medicines, as well as any supplements. They interfere with some medicines given for atrial fibrillation.  · Ask your doctor about whether you can drink alcohol. Some people need to avoid alcohol to better treat atrial fibrillation. If you are taking blood-thinner medicines, alcohol may interfere with them by increasing their effect.  · Never take stimulants such as amphetamines or cocaine. These drugs can speed up your heart rate and trigger atrial fibrillation.  Follow-up care  Follow up with your doctor, or as advised.     When should I call my healthcare provider  Call your healthcare provider right away if you have any of the  following:  · Weakness  · Dizziness  · Fainting  · Fatigue  · Shortness of breath  · Chest pain with increased activity  · A change in the usual regularity of your heartbeat, or an unusually fast heartbeat   Date Last Reviewed: 4/23/2016  © 1207-8354 [employer]. [address] [place] [address], [place] [address]. All rights reserved. This information is not intended as a substitute for professional medical care. Always follow your healthcare professional's instructions.

## 2018-06-12 ENCOUNTER — CLINICAL SUPPORT (OUTPATIENT)
Dept: CARDIOLOGY | Facility: CLINIC | Age: 78
End: 2018-06-12
Attending: INTERNAL MEDICINE
Payer: MEDICARE

## 2018-06-12 ENCOUNTER — HOSPITAL ENCOUNTER (OUTPATIENT)
Dept: RADIOLOGY | Facility: HOSPITAL | Age: 78
Discharge: HOME | End: 2018-06-12
Attending: INTERNAL MEDICINE
Payer: MEDICARE

## 2018-06-12 DIAGNOSIS — R06.00 DYSPNEA, UNSPECIFIED TYPE: ICD-10-CM

## 2018-06-12 DIAGNOSIS — I25.119 CORONARY ARTERY DISEASE INVOLVING NATIVE CORONARY ARTERY OF NATIVE HEART WITH ANGINA PECTORIS: ICD-10-CM

## 2018-06-12 DIAGNOSIS — R07.9 CHEST PAIN, UNSPECIFIED TYPE: ICD-10-CM

## 2018-06-12 LAB — DIASTOLIC DYSFUNCTION: NO

## 2018-06-12 PROCEDURE — A9502 TC99M TETROFOSMIN: HCPCS | Mod: PO

## 2018-06-12 PROCEDURE — 93015 CV STRESS TEST SUPVJ I&R: CPT | Mod: S$GLB,,, | Performed by: INTERNAL MEDICINE

## 2018-06-12 PROCEDURE — 78452 HT MUSCLE IMAGE SPECT MULT: CPT | Mod: 26,,, | Performed by: INTERNAL MEDICINE

## 2018-06-18 ENCOUNTER — TELEPHONE (OUTPATIENT)
Dept: CARDIOLOGY | Facility: CLINIC | Age: 78
End: 2018-06-18

## 2018-06-18 NOTE — TELEPHONE ENCOUNTER
----- Message from Law Rod MD sent at 6/13/2018  9:06 AM CDT -----  Please call patient regarding normal result of nuclear stress test. If he/she has any questions please let me know or have him/her schedule an appt to see me soon to discuss. Thank you

## 2018-06-22 ENCOUNTER — ANTI-COAG VISIT (OUTPATIENT)
Dept: CARDIOLOGY | Facility: CLINIC | Age: 78
End: 2018-06-22
Payer: MEDICARE

## 2018-06-22 ENCOUNTER — OFFICE VISIT (OUTPATIENT)
Dept: URGENT CARE | Facility: CLINIC | Age: 78
End: 2018-06-22
Payer: MEDICARE

## 2018-06-22 VITALS
RESPIRATION RATE: 20 BRPM | OXYGEN SATURATION: 98 % | HEIGHT: 67 IN | SYSTOLIC BLOOD PRESSURE: 148 MMHG | BODY MASS INDEX: 36.4 KG/M2 | HEART RATE: 106 BPM | DIASTOLIC BLOOD PRESSURE: 85 MMHG | WEIGHT: 231.94 LBS

## 2018-06-22 DIAGNOSIS — R73.9 HYPERGLYCEMIA: Primary | ICD-10-CM

## 2018-06-22 DIAGNOSIS — R03.0 ELEVATED BLOOD PRESSURE READING: ICD-10-CM

## 2018-06-22 DIAGNOSIS — Z79.01 LONG TERM (CURRENT) USE OF ANTICOAGULANTS: Primary | ICD-10-CM

## 2018-06-22 LAB — INR PPP: 2.9 (ref 2–3)

## 2018-06-22 PROCEDURE — 85610 PROTHROMBIN TIME: CPT | Mod: QW,S$GLB,, | Performed by: INTERNAL MEDICINE

## 2018-06-22 PROCEDURE — 99999 PR PBB SHADOW E&M-EST. PATIENT-LVL III: CPT | Mod: PBBFAC,,, | Performed by: PHYSICIAN ASSISTANT

## 2018-06-22 PROCEDURE — 3079F DIAST BP 80-89 MM HG: CPT | Mod: CPTII,S$GLB,, | Performed by: PHYSICIAN ASSISTANT

## 2018-06-22 PROCEDURE — 99213 OFFICE O/P EST LOW 20 MIN: CPT | Mod: S$GLB,,, | Performed by: PHYSICIAN ASSISTANT

## 2018-06-22 PROCEDURE — 3077F SYST BP >= 140 MM HG: CPT | Mod: CPTII,S$GLB,, | Performed by: PHYSICIAN ASSISTANT

## 2018-06-22 NOTE — PROGRESS NOTES
Patient's INR is therapeutic at 2.9.  No changes in dose.  Recheck in 1 month.  Please call should you have any questions or concerns at [phone] or [phone].

## 2018-06-22 NOTE — PROGRESS NOTES
"Subjective:       Patient ID: Natalie Greene is a 77 y.o. female.    Chief Complaint: Hypertension    Hypertension   This is a chronic problem. The current episode started today (at home had BP reading of 157/102 ). The problem has been gradually improving (in clinic is 148/85) since onset. The problem is controlled (per chart review her HTN appears well controlled on most recent visits). Associated symptoms include anxiety (had just had high blood glucose reading and was very upset about it). Pertinent negatives include no blurred vision, chest pain, headaches, orthopnea, palpitations, peripheral edema, PND or shortness of breath. There are no associated agents to hypertension. Risk factors for coronary artery disease include diabetes mellitus. Past treatments include diuretics, beta blockers, calcium channel blockers and angiotensin blockers. The current treatment provides significant improvement. There are no compliance problems.      Review of Systems   Constitutional: Negative for chills, fatigue and fever.   HENT: Negative for congestion, ear discharge, ear pain, postnasal drip, rhinorrhea, sinus pressure, sneezing and sore throat.    Eyes: Negative for blurred vision.   Respiratory: Negative for cough, shortness of breath and wheezing.    Cardiovascular: Negative for chest pain, palpitations, orthopnea, leg swelling and PND.   Gastrointestinal: Negative for abdominal pain, nausea and vomiting.   Skin: Negative for rash.   Neurological: Negative for headaches.       Objective:      BP (!) 148/85   Pulse 106   Resp 20   Ht 5' 7" (1.702 m)   Wt 105.2 kg (231 lb 14.8 oz)   SpO2 98%   BMI 36.32 kg/m²   Physical Exam   Constitutional: She is oriented to person, place, and time. She appears well-developed and well-nourished. No distress.   HENT:   Head: Normocephalic and atraumatic.   Right Ear: External ear normal.   Left Ear: External ear normal.   Nose: Nose normal.   Eyes: Conjunctivae and EOM are normal. " Right eye exhibits no discharge. Left eye exhibits no discharge.   Neck: Normal range of motion. Neck supple.   Cardiovascular: Normal rate, regular rhythm, normal heart sounds and intact distal pulses.  Exam reveals no gallop and no friction rub.    No murmur heard.  Pulmonary/Chest: Effort normal and breath sounds normal. No respiratory distress. She has no wheezes. She has no rales.   Abdominal: Soft. Bowel sounds are normal. She exhibits no distension. There is no tenderness. There is no rebound, no guarding and no CVA tenderness.   Neurological: She is alert and oriented to person, place, and time.   Skin: Skin is warm and dry. No rash noted. She is not diaphoretic. No erythema.   Vitals reviewed.      Assessment:       1. Hyperglycemia    2. Elevated blood pressure reading        Plan:       Hyperglycemia  -     POCT GLUCOSE    Elevated blood pressure reading    Most likely glucose reading of 198 was prematurely taken after snack of potato chips. In clinic POC glucose 96. Her AM fasting glucose today was in the 130's.  Patient also states she needs a new meter and was supposed to get a new one from insurance, encouraged to follow through with this plan and discuss results with her PCP.  Of note not been taking the metformin.    Her blood pressure was taken after her elevated glucose reading and she admits to be highly stressed about the result. Also her BP cuff may not be functioning properly. Advised to obtain new machine and reviewed correct techniques with her. Record numbers and follow up with Cardiology vs PCP with theses. BP in clinic 148/85, no symptoms of urgency, and she is compliant with all meds.    Get a new blood pressure cuff and glucose meter.  Monitor your blood pressures and blood sugars and record these numbers as you have been doing.  Follow up with your Cardiologist and Primary Care doctor about these numbers in 2-3 weeks.    Heather Trant PA-C [facility]

## 2018-06-22 NOTE — PATIENT INSTRUCTIONS
Low-Salt Diet  This diet removes foods that are high in salt. It also limits the amount of salt you use when cooking. It is most often used for people with high blood pressure, edema (fluid retention), and kidney, liver, or heart disease.  Table salt contains the mineral sodium. Your body needs sodium to work normally. But too much sodium can make your health problems worse. Your healthcare provider is recommending a low-salt (also called low-sodium) diet for you. Your total daily allowance of salt is 1,500 to 2,300 milligrams (mg). It is less than 1 teaspoon of table salt. This means you can have only about 500 to 700 mg of sodium at each meal. People with certain health problems should limit salt intake to the lower end of the recommended range.    When you cook, dont add much salt. If you can cook without using salt, even better. Dont add salt to your food at the table.  When shopping, read food labels. Salt is often called sodium on the label. Choose foods that are salt-free, low salt, or very low salt. Note that foods with reduced salt may not lower your salt intake enough.    Beans, potatoes, and pasta  Ok: Dry beans, split peas, lentils, potatoes, rice, macaroni, pasta, spaghetti without added salt  Avoid: Potato chips, tortilla chips, and similar products  Breads and cereals  Ok: Low-sodium breads, rolls, cereals, and cakes; low-salt crackers, matzo crackers  Avoid: Salted crackers, pretzels, popcorn, Greenlandic toast, pancakes, muffins  Dairy  Ok: Milk, chocolate milk, hot chocolate mix, low-salt cheeses, and yogurt  Avoid: Processed cheese and cheese spreads; Roquefort, Camembert, and cottage cheese; buttermilk, instant breakfast drink  Desserts  Ok: Ice cream, frozen yogurt, juice bars, gelatin, cookies and pies, sugar, honey, jelly, hard candy  Avoid: Most pies, cakes and cookies prepared or processed with salt; instant pudding  Drinks  Ok: Tea, coffee, fizzy (carbonated) drinks, juices  Avoid: Flavored  coffees, electrolyte replacement drinks, sports drinks  Meats  Ok: All fresh meat, fish, poultry, low-salt tuna, eggs, egg substitute  Avoid: Smoked, pickled, brine-cured, or salted meats and fish. This includes waldron, chipped beef, corned beef, hot dogs, deli meats, ham, kosher meats, salt pork, sausage, canned tuna, salted codfish, smoked salmon, herring, sardines, or anchovies.  Seasonings and spices  Ok: Most seasonings are okay. Good substitutes for salt include: fresh herb blends, hot sauce, lemon, garlic, bolanos, vinegar, dry mustard, parsley, cilantro, horseradish, tomato paste, regular margarine, mayonnaise, unsalted butter, cream cheese, vegetable oil, cream, low-salt salad dressing and gravy.  Avoid: Regular ketchup, relishes, pickles, soy sauce, teriyaki sauce, Worcestershire sauce, BBQ sauce, tartar sauce, meat tenderizer, chili sauce, regular gravy, regular salad dressing, salted butter  Soups  Ok: Low-salt soups and broths made with allowed foods  Avoid: Bouillon cubes, soups with smoked or salted meats, regular soup and broth  Vegetables  Ok: Most vegetables are okay; also low-salt tomato and vegetable juices  Avoid: Sauerkraut and other brine-soaked vegetables; pickles and other pickled vegetables; tomato juice, olives  Date Last Reviewed: 8/1/2016 © 2000-2017 [employer]. [address] [place] [address]. All rights reserved. This information is not intended as a substitute for professional medical care. Always follow your healthcare professional's instructions.    Get a new blood pressure cuff and glucose meter.  Monitor your blood pressures and blood sugars and record these numbers as you have been doing.  Follow up with your Cardiologist and Primary Care doctor about these numbers in 2-3 weeks.

## 2018-06-26 ENCOUNTER — TELEPHONE (OUTPATIENT)
Dept: CARDIOLOGY | Facility: CLINIC | Age: 78
End: 2018-06-26

## 2018-06-26 NOTE — TELEPHONE ENCOUNTER
Spoke with pt with normal MPI.      ----- Message from Law Rod MD sent at 6/13/2018  9:06 AM CDT -----  Please call patient regarding normal result of nuclear stress test. If he/she has any questions please let me know or have him/her schedule an appt to see me soon to discuss. Thank you

## 2018-06-26 NOTE — TELEPHONE ENCOUNTER
Attempted without success to contact pt with test results.  Left vm to return call.    ----- Message from Law Rod MD sent at 6/13/2018  9:06 AM CDT -----  Please call patient regarding normal result of nuclear stress test. If he/she has any questions please let me know or have him/her schedule an appt to see me soon to discuss. Thank you

## 2018-07-27 RX ORDER — AMLODIPINE BESYLATE 5 MG/1
TABLET ORAL
Qty: 90 TABLET | Refills: 1 | OUTPATIENT
Start: 2018-07-27

## 2018-07-27 RX ORDER — LOSARTAN POTASSIUM 100 MG/1
TABLET ORAL
Qty: 90 TABLET | Refills: 1 | Status: SHIPPED | OUTPATIENT
Start: 2018-07-27 | End: 2018-11-12 | Stop reason: SDUPTHER

## 2018-07-27 RX ORDER — INDAPAMIDE 1.25 MG/1
TABLET ORAL
Qty: 90 TABLET | Refills: 1 | Status: SHIPPED | OUTPATIENT
Start: 2018-07-27 | End: 2018-11-12

## 2018-07-27 NOTE — TELEPHONE ENCOUNTER
Reviewed med list prior to refill.  Dr. Rod started Cardizem on Patient in April/May After Holter.  We will d/c Norvasc and remain on the Cardizem.  She does not need two calcium channel blockers.   Please notify patient of the change, review her medication list with her to make sure she had the correct medication list.

## 2018-08-03 ENCOUNTER — ANTI-COAG VISIT (OUTPATIENT)
Dept: CARDIOLOGY | Facility: CLINIC | Age: 78
End: 2018-08-03
Payer: MEDICARE

## 2018-08-03 DIAGNOSIS — Z79.01 LONG TERM (CURRENT) USE OF ANTICOAGULANTS: Primary | ICD-10-CM

## 2018-08-03 LAB — INR PPP: 2.4 (ref 2–3)

## 2018-08-03 PROCEDURE — 85610 PROTHROMBIN TIME: CPT | Mod: QW,S$GLB,, | Performed by: INTERNAL MEDICINE

## 2018-09-07 ENCOUNTER — ANTI-COAG VISIT (OUTPATIENT)
Dept: CARDIOLOGY | Facility: CLINIC | Age: 78
End: 2018-09-07
Payer: MEDICARE

## 2018-09-07 DIAGNOSIS — Z79.01 LONG TERM (CURRENT) USE OF ANTICOAGULANTS: Primary | ICD-10-CM

## 2018-09-07 LAB — INR PPP: 2.9 (ref 2–3)

## 2018-09-07 PROCEDURE — 85610 PROTHROMBIN TIME: CPT | Mod: PBBFAC

## 2018-09-07 NOTE — PROGRESS NOTES
Patient's INR is therapeutic at 2.9.  No changes in dose.  Recheck in 1 month.  Please call should you have any questions or concerns at [phone] or [phone].

## 2018-10-05 ENCOUNTER — ANTI-COAG VISIT (OUTPATIENT)
Dept: CARDIOLOGY | Facility: CLINIC | Age: 78
End: 2018-10-05
Payer: MEDICARE

## 2018-10-05 DIAGNOSIS — Z79.01 LONG TERM (CURRENT) USE OF ANTICOAGULANTS: Primary | ICD-10-CM

## 2018-10-05 LAB — INR PPP: 1.9 (ref 2–3)

## 2018-10-05 PROCEDURE — 99211 OFF/OP EST MAY X REQ PHY/QHP: CPT | Mod: PBBFAC

## 2018-10-05 PROCEDURE — 85610 PROTHROMBIN TIME: CPT | Mod: PBBFAC

## 2018-10-05 PROCEDURE — 99999 PR PBB SHADOW E&M-EST. PATIENT-LVL I: CPT | Mod: PBBFAC,,,

## 2018-10-05 NOTE — PROGRESS NOTES
"Patient's INR is slightly low at 1.9.  Reported eating "too much greens".  Instructed to decrease Vitamin K in diet.  No changes in dose.  Recheck in 1 month.  Please call should you have any questions or concerns at [phone] or [phone].  "

## 2018-11-08 ENCOUNTER — ANTI-COAG VISIT (OUTPATIENT)
Dept: CARDIOLOGY | Facility: CLINIC | Age: 78
End: 2018-11-08
Payer: MEDICARE

## 2018-11-08 DIAGNOSIS — Z79.01 LONG TERM (CURRENT) USE OF ANTICOAGULANTS: Primary | ICD-10-CM

## 2018-11-08 LAB — INR PPP: 2.4 (ref 2–3)

## 2018-11-08 PROCEDURE — 85610 PROTHROMBIN TIME: CPT | Mod: QW,HCNC,S$GLB, | Performed by: INTERNAL MEDICINE

## 2018-11-08 NOTE — PROGRESS NOTES
Patient's INR is therapeutic at 2.4.  Instructed to maintain current dose of Warfarin 3 mg on Thursday and 6 mg on all other days per dosing calendar given.  Recheck in 1 month.

## 2018-11-12 ENCOUNTER — CLINICAL SUPPORT (OUTPATIENT)
Dept: CARDIOLOGY | Facility: CLINIC | Age: 78
End: 2018-11-12
Payer: MEDICARE

## 2018-11-12 ENCOUNTER — OFFICE VISIT (OUTPATIENT)
Dept: INTERNAL MEDICINE | Facility: CLINIC | Age: 78
End: 2018-11-12
Payer: MEDICARE

## 2018-11-12 ENCOUNTER — LAB VISIT (OUTPATIENT)
Dept: LAB | Facility: HOSPITAL | Age: 78
End: 2018-11-12
Attending: FAMILY MEDICINE
Payer: MEDICARE

## 2018-11-12 VITALS
DIASTOLIC BLOOD PRESSURE: 88 MMHG | SYSTOLIC BLOOD PRESSURE: 154 MMHG | HEIGHT: 67 IN | TEMPERATURE: 99 F | BODY MASS INDEX: 35.02 KG/M2 | OXYGEN SATURATION: 96 % | HEART RATE: 118 BPM | WEIGHT: 223.13 LBS

## 2018-11-12 DIAGNOSIS — B96.89 ACUTE BACTERIAL SINUSITIS: ICD-10-CM

## 2018-11-12 DIAGNOSIS — R09.81 SINUS CONGESTION: ICD-10-CM

## 2018-11-12 DIAGNOSIS — I70.0 AORTIC ATHEROSCLEROSIS: ICD-10-CM

## 2018-11-12 DIAGNOSIS — I25.119 CORONARY ARTERY DISEASE INVOLVING NATIVE CORONARY ARTERY OF NATIVE HEART WITH ANGINA PECTORIS: ICD-10-CM

## 2018-11-12 DIAGNOSIS — I10 ESSENTIAL HYPERTENSION: ICD-10-CM

## 2018-11-12 DIAGNOSIS — I48.20 CHRONIC ATRIAL FIBRILLATION: ICD-10-CM

## 2018-11-12 DIAGNOSIS — J01.90 ACUTE BACTERIAL SINUSITIS: ICD-10-CM

## 2018-11-12 DIAGNOSIS — I48.20 CHRONIC ATRIAL FIBRILLATION: Primary | ICD-10-CM

## 2018-11-12 DIAGNOSIS — E78.5 HYPERLIPIDEMIA WITH TARGET LDL LESS THAN 100: ICD-10-CM

## 2018-11-12 DIAGNOSIS — E11.9 TYPE 2 DIABETES MELLITUS WITHOUT COMPLICATION, WITHOUT LONG-TERM CURRENT USE OF INSULIN: ICD-10-CM

## 2018-11-12 LAB
ALBUMIN SERPL BCP-MCNC: 3.7 G/DL
ALP SERPL-CCNC: 85 U/L
ALT SERPL W/O P-5'-P-CCNC: 18 U/L
ANION GAP SERPL CALC-SCNC: 10 MMOL/L
AST SERPL-CCNC: 26 U/L
BILIRUB SERPL-MCNC: 0.9 MG/DL
BUN SERPL-MCNC: 13 MG/DL
CALCIUM SERPL-MCNC: 9 MG/DL
CHLORIDE SERPL-SCNC: 101 MMOL/L
CHOLEST SERPL-MCNC: 171 MG/DL
CHOLEST/HDLC SERPL: 4.3 {RATIO}
CO2 SERPL-SCNC: 27 MMOL/L
CREAT SERPL-MCNC: 0.8 MG/DL
EST. GFR  (AFRICAN AMERICAN): >60 ML/MIN/1.73 M^2
EST. GFR  (NON AFRICAN AMERICAN): >60 ML/MIN/1.73 M^2
ESTIMATED AVG GLUCOSE: 140 MG/DL
GLUCOSE SERPL-MCNC: 110 MG/DL
HBA1C MFR BLD HPLC: 6.5 %
HDLC SERPL-MCNC: 40 MG/DL
HDLC SERPL: 23.4 %
LDLC SERPL CALC-MCNC: 108.8 MG/DL
NONHDLC SERPL-MCNC: 131 MG/DL
POTASSIUM SERPL-SCNC: 3.7 MMOL/L
PROT SERPL-MCNC: 7.5 G/DL
SODIUM SERPL-SCNC: 138 MMOL/L
TRIGL SERPL-MCNC: 111 MG/DL

## 2018-11-12 PROCEDURE — 3077F SYST BP >= 140 MM HG: CPT | Mod: CPTII,HCNC,S$GLB, | Performed by: FAMILY MEDICINE

## 2018-11-12 PROCEDURE — 93005 ELECTROCARDIOGRAM TRACING: CPT | Mod: HCNC,S$GLB,, | Performed by: FAMILY MEDICINE

## 2018-11-12 PROCEDURE — 83036 HEMOGLOBIN GLYCOSYLATED A1C: CPT | Mod: HCNC

## 2018-11-12 PROCEDURE — 1101F PT FALLS ASSESS-DOCD LE1/YR: CPT | Mod: CPTII,HCNC,S$GLB, | Performed by: FAMILY MEDICINE

## 2018-11-12 PROCEDURE — 3079F DIAST BP 80-89 MM HG: CPT | Mod: CPTII,HCNC,S$GLB, | Performed by: FAMILY MEDICINE

## 2018-11-12 PROCEDURE — 80061 LIPID PANEL: CPT | Mod: HCNC

## 2018-11-12 PROCEDURE — 80053 COMPREHEN METABOLIC PANEL: CPT | Mod: HCNC

## 2018-11-12 PROCEDURE — 99214 OFFICE O/P EST MOD 30 MIN: CPT | Mod: HCNC,S$GLB,, | Performed by: FAMILY MEDICINE

## 2018-11-12 PROCEDURE — 36415 COLL VENOUS BLD VENIPUNCTURE: CPT | Mod: HCNC

## 2018-11-12 PROCEDURE — 99999 PR PBB SHADOW E&M-EST. PATIENT-LVL IV: CPT | Mod: PBBFAC,HCNC,, | Performed by: FAMILY MEDICINE

## 2018-11-12 PROCEDURE — 93000 ELECTROCARDIOGRAM COMPLETE: CPT | Mod: HCNC,S$GLB,, | Performed by: NUCLEAR MEDICINE

## 2018-11-12 RX ORDER — METOPROLOL SUCCINATE 100 MG/1
100 TABLET, EXTENDED RELEASE ORAL DAILY
Qty: 90 TABLET | Refills: 1 | Status: SHIPPED | OUTPATIENT
Start: 2018-11-12 | End: 2019-02-12 | Stop reason: SDUPTHER

## 2018-11-12 RX ORDER — ATORVASTATIN CALCIUM 40 MG/1
40 TABLET, FILM COATED ORAL DAILY
Qty: 90 TABLET | Refills: 3 | Status: SHIPPED | OUTPATIENT
Start: 2018-11-12 | End: 2019-02-12 | Stop reason: SDUPTHER

## 2018-11-12 RX ORDER — WARFARIN SODIUM 5 MG/1
TABLET ORAL
COMMUNITY
End: 2018-12-06 | Stop reason: CLARIF

## 2018-11-12 RX ORDER — INDAPAMIDE 1.25 MG/1
1.25 TABLET ORAL
COMMUNITY
End: 2019-02-08 | Stop reason: SDUPTHER

## 2018-11-12 RX ORDER — METFORMIN HYDROCHLORIDE 500 MG/1
500 TABLET, EXTENDED RELEASE ORAL
Qty: 90 TABLET | Refills: 3 | Status: SHIPPED | OUTPATIENT
Start: 2018-11-12 | End: 2019-10-23 | Stop reason: SDUPTHER

## 2018-11-12 RX ORDER — METFORMIN HYDROCHLORIDE 500 MG/1
500 TABLET ORAL
COMMUNITY
End: 2018-11-12

## 2018-11-12 RX ORDER — LOSARTAN POTASSIUM 100 MG/1
100 TABLET ORAL DAILY
Qty: 90 TABLET | Refills: 1 | Status: SHIPPED | OUTPATIENT
Start: 2018-11-12 | End: 2019-02-12 | Stop reason: SDUPTHER

## 2018-11-12 RX ORDER — AMOXICILLIN AND CLAVULANATE POTASSIUM 875; 125 MG/1; MG/1
1 TABLET, FILM COATED ORAL 2 TIMES DAILY
Qty: 20 TABLET | Refills: 0 | Status: SHIPPED | OUTPATIENT
Start: 2018-11-12 | End: 2019-01-15

## 2018-11-12 NOTE — PATIENT INSTRUCTIONS
When to Use Antibiotics   Antibiotics are medicines used to treat infections caused by bacteria. They dont work for illnesses caused by viruses or an allergic reaction. In fact, taking antibiotics for reasons other than a bacterial infection can cause problems. For example, you may have side effects from the medicine. And if you really need an antibiotic, it may not work well.                                                                                                                                              When antibiotics wont help  Your healthcare provider wont usually prescribe antibiotics for the following conditions. You can help by not asking for them if you have:   · A cold. This type of illness is caused by a virus. It can cause a runny nose, stuffed-up nose, sneezing, coughing, headache, mild body aches, and low fever. A cold gets better on its own in a few days to a week.  · The flu (influenza). This is a respiratory illness caused by a virus. The flu usually goes away on its own in a week or so. It can cause fever, body aches, sore throat, and fatigue.  · Bronchitis. This is an infection in the lungs most often caused by a virus. You may have coughing, phlegm, body aches, and a low fever. A common type of bronchitis is known as a chest cold (acute bronchitis). This often happens after you have a respiratory infection like a common cold. Bronchitis can take weeks to go away, but antibiotics usually dont help.  · Most sore throats. Sore throats are most often caused by viruses. Your throat may feel scratchy or achy, and it may hurt to swallow. You may also have a low fever and body aches. A sore throat usually gets better in a few days.  · Most ear infections. An ear infection may be caused by a virus or bacteria. It causes pain in the ear. Antibiotics usually dont help, and the infection goes away on its own.  · Most sinus infections (sinusitis). This kind of infection causes sinus pain and  swelling, and a runny nose. In most cases, sinusitis goes away on its own, and antibiotics dont make recovery quicker.  · Allergic rhinitis. This is a set of symptoms caused by an allergic reaction. You may have sneezing, a runny nose, itchy or watery eyes, or a sore throat. Allergies are not treated with antibiotics.  · Low fever. A mild fever thats less than 100.4°F (38°C) most likely doesnt need treatment with antibiotics.   When antibiotics can help   Antibiotics can be used to treat:                                                     · Strep throat. This is a throat infectioncaused by a certain type of bacteria. Symptoms of strep throat include a sore throat, white patches on the tonsils, red spots on the roof of the mouth, fever, body aches, and nausea and vomiting.  · Urinary tract infection (UTI). This is a bacterial infection of the bladder and the tube that takes urine out of the body. It can cause burning pain and urine thats cloudy or tinted with blood. UTIs are very common. Antibiotics usually help treat these infections.  · Some ear infections. In some cases, a healthcare provider may prescribe antibiotics for an ear infection. You may need a test to show whats causing the ear infection.  · Some sinus infections. In some cases, yourhealthcare provider may give you antibiotics. He or she may first need to make sure your symptoms arent caused by a virus, fungus, allergies, or air pollutants such as smoke.   Your doctor may also recommend antibiotics if you have a condition that can affect your immune system, such as diabetes or cancer.   Self-care at home   If your infection cant be treated with antibiotics, you can take other steps to feel better. Try the remedies below. In general:   · Rest and sleep as much as needed.  · Drink water and other clear fluids.  · Dont smoke, and avoid smoke from other people.  · Use over-the-counter medicine such as acetaminophen to ease pain or fever, as  directed by your healthcare provider.   To treat sinus pain or nasal congestion:   · Put a warm, moist washcloth on your face where you feel sinus pain or pressure.  · Use a nasal spray with medicine or saline, as directed by your healthcare provider.  · Breathe in steam from a hot shower.  · Use a humidifier or cool mist vaporizer.   To quiet a cough:   · Use a humidifier or cool mist vaporizer.  · Breathe in steam from a hot shower.  · Use cough lozenges.   To sooth a sore throat:   · Suck on ice chips, popsicles, or lozenges.  · Use a sore throat spray.  · Use a humidifier or cool mist vaporizer.  · Gargle with saltwater.  · Drink warm liquids.   To ease ear pain:   · Hold a warm, moist washcloth on the ear for 10 minutes at a time.  Date Last Reviewed: 9/1/2016  © 7950-6668 [employer]. [address] [place] [address], [place] [address]. All rights reserved. This information is not intended as a substitute for professional medical care. Always follow your healthcare professional's instructions.

## 2018-11-12 NOTE — PROGRESS NOTES
Natalie Greene  11/12/2018  [id number]    Annabella Fenton MD  Patient Care Team:  Annabella Fenton MD as PCP - General (Family Medicine)  Annabella Fenton MD as Consulting Physician (Family Medicine)  Lisbet Lopez LPN as Care Coordinator (Internal Medicine)  Has the patient seen any provider outside of the [facility] network since the last visit? (no). If yes, HIPPA forms completed and records requested.        Visit Type:a scheduled routine follow-up visit    Chief Complaint:  Chief Complaint   Patient presents with    Sinus Problem    Cough    Nasal Congestion       History of Present Illness:  Patient is here for follow up. She also c/o cough and congestion and sinus congestion.  She denies fever. Sinus drip makes her cough.    She hasn't been checking BS. She is due for labs.      She has history of Atrial Fib. She is on Coumadin.  She is followed by Coumadin clinic. INR in range.        She has history of CAD, and is medically managaged. She is followed by [facility], She had a negative Nuclear stress test in June at [facility].   She is currently not sure if she is taking statin, and discussion today regarding use for prevention.  She however isnt' sure what medication she is taking, and reports probably not taking the medication she needs to take.  She has a HR in office around 130 and irregular. Known atrial fib.     She has DM, but has been diet controlled. Due for FOOT exam .Needs to have eye exam.    She was to complete the colon screen once she completed her Nuclear stress test. She has to go back to work, and finances are an issue.                   History:  Past Medical History:   Diagnosis Date    *Atrial fibrillation     Coronary artery disease involving native coronary artery of native heart with angina pectoris 4/10/2018    Diabetes mellitus     DM (diabetes mellitus) 2002     09/06/2017 no medication    Hyperlipidemia     Hypertension      Past Surgical History:   Procedure  Laterality Date    CHOLECYSTECTOMY      COLON SURGERY      HYSTERECTOMY       Family History   Problem Relation Age of Onset    Diabetes Sister     Hypertension Sister     Hypertension Mother     Diabetes Sister      Social History     Socioeconomic History    Marital status:      Spouse name: Not on file    Number of children: Not on file    Years of education: Not on file    Highest education level: Not on file   Social Needs    Financial resource strain: Not on file    Food insecurity - worry: Not on file    Food insecurity - inability: Not on file    Transportation needs - medical: Not on file    Transportation needs - non-medical: Not on file   Occupational History    Not on file   Tobacco Use    Smoking status: Never Smoker    Smokeless tobacco: Never Used   Substance and Sexual Activity    Alcohol use: No    Drug use: No    Sexual activity: Not on file   Other Topics Concern    Not on file   Social History Narrative    Not on file     Patient Active Problem List   Diagnosis    A-fib    HTN (hypertension)    Hyperlipidemia with target LDL less than 100    DM (diabetes mellitus)    Anticoagulated on Coumadin    Obesity (BMI 30-39.9)    Abnormal stress test    Anginal equivalent    History of malignant neoplasm of colon    Shoulder pain, left    Aortic atherosclerosis    Long term current use of anticoagulant therapy    Coronary artery disease involving native coronary artery of native heart with angina pectoris     Review of patient's allergies indicates:  No Known Allergies    The following were reviewed at this visit: active problem list, medication list, allergies, family history, social history, and health maintenance.    Medications:  Current Outpatient Medications on File Prior to Visit   Medication Sig Dispense Refill    ACCU-CHEK JEN PLUS METER Misc       ACCU-CHEK JEN PLUS TEST STRP Strp       ACCU-CHEK SOFTCLIX LANCETS Misc       amLODIPine (NORVASC)  5 MG tablet TAKE 1 TABLET EVERY DAY 90 tablet 1    atorvastatin (LIPITOR) 40 MG tablet Take 1 tablet (40 mg total) by mouth once daily. 90 tablet 3    indapamide (LOZOL) 1.25 MG Tab TAKE 1 TABLET EVERY DAY 90 tablet 1    losartan (COZAAR) 100 MG tablet TAKE 1 TABLET EVERY DAY 90 tablet 1    metFORMIN (GLUCOPHAGE-XR) 500 MG 24 hr tablet Take 1 tablet (500 mg total) by mouth daily with breakfast. 90 tablet 3    metoprolol succinate (TOPROL-XL) 100 MG 24 hr tablet TAKE 1 TABLET EVERY DAY 90 tablet 1    triamcinolone acetonide 0.1% (KENALOG) 0.1 % cream       warfarin (COUMADIN) 6 MG tablet Take 1 tablet (6 mg total) by mouth Daily. (Patient taking differently: Take 6 mg by mouth Daily. Take 1/2 tablet on Thursdays and 1 tablet on all other days every evening as directed by the Coumadin Clinic.) 90 tablet 1    diltiaZEM (CARDIZEM CD) 180 MG 24 hr capsule Take 1 capsule (180 mg total) by mouth once daily. 90 capsule 3    INCONTINENCE PAD,LINER,DISP (BLADDER CONTROL PADS EX ABSORB MISC)       [DISCONTINUED] amoxicillin-clavulanate 875-125mg (AUGMENTIN) 875-125 mg per tablet Take 1 tablet by mouth 2 (two) times daily. 20 tablet 0     No current facility-administered medications on file prior to visit.        Medications have been reviewed and reconciled with patient at this visit.  Barriers to medications present (no)    Adverse reactions to current medications (no)    Over the counter medications reviewed (Yes ), and if needed added to active Medication list at this visit.     Exam:  Wt Readings from Last 3 Encounters:   11/12/18 101.2 kg (223 lb 1.7 oz)   06/22/18 105.2 kg (231 lb 14.8 oz)   04/23/18 102.3 kg (225 lb 8.5 oz)     Temp Readings from Last 3 Encounters:   11/12/18 98.8 °F (37.1 °C) (Tympanic)   05/30/18 97.8 °F (36.6 °C)   04/23/18 97.7 °F (36.5 °C) (Tympanic)     BP Readings from Last 3 Encounters:   06/22/18 (!) 148/85   05/30/18 127/67   04/23/18 128/66     Pulse Readings from Last 3 Encounters:    11/12/18 (!) 125   06/22/18 106   05/30/18 96     Body mass index is 34.94 kg/m².      Review of Systems   Constitutional: Negative.  Negative for chills and fever.   HENT: Positive for congestion and sinus pain. Negative for sore throat.    Eyes: Negative for blurred vision and double vision.   Respiratory: Positive for cough. Negative for sputum production and wheezing.    Cardiovascular: Negative for chest pain, palpitations and leg swelling.   Gastrointestinal: Negative for abdominal pain, constipation, diarrhea, heartburn, nausea and vomiting.   Genitourinary: Negative.    Musculoskeletal: Negative.    Skin: Negative.  Negative for rash.   Neurological: Negative.    Endo/Heme/Allergies: Negative.  Negative for polydipsia. Does not bruise/bleed easily.   Psychiatric/Behavioral: Negative for depression and substance abuse.     Physical Exam   Constitutional: She is oriented to person, place, and time. She appears well-developed and well-nourished. No distress.   HENT:   Head: Normocephalic and atraumatic.   Right Ear: External ear normal.   Left Ear: External ear normal.   Nose: Rhinorrhea present. Right sinus exhibits frontal sinus tenderness. Left sinus exhibits frontal sinus tenderness.   Mouth/Throat: Posterior oropharyngeal erythema present. No oropharyngeal exudate.   Eyes: Conjunctivae and EOM are normal. Pupils are equal, round, and reactive to light. Right eye exhibits no discharge. Left eye exhibits no discharge.   Neck: Normal range of motion. Neck supple. No thyromegaly present.   Cardiovascular: Intact distal pulses. An irregularly irregular rhythm present. Tachycardia present.   No murmur heard.  Rapid A fib   Pulmonary/Chest: Effort normal and breath sounds normal. No respiratory distress. She has no wheezes.   Abdominal: Soft. Bowel sounds are normal. She exhibits no distension and no mass. There is no tenderness.   Musculoskeletal: Normal range of motion. She exhibits no edema.    Lymphadenopathy:     She has no cervical adenopathy.   Neurological: She is alert and oriented to person, place, and time. No cranial nerve deficit.   Skin: Capillary refill takes less than 2 seconds. She is not diaphoretic.   Psychiatric: She has a normal mood and affect. Her behavior is normal. Judgment and thought content normal.   Nursing note and vitals reviewed.  Protective Sensation (w/ 10 gram monofilament):  Right: Decreased  Left: Decreased    Visual Inspection:  Skin Breakdown -  Bilateral and Callus -  Bilateral    Pedal Pulses:   Right: Present  Left: Present    Posterior tibialis:   Right:Present  Left: Present      Lab Results   Component Value Date    WBC 6.05 08/23/2017    HGB 12.2 08/23/2017    HCT 37.5 08/23/2017     08/23/2017    CHOL 155 08/23/2017    TRIG 150 08/23/2017    HDL 33 (L) 08/23/2017    ALT 23 06/28/2017    AST 26 06/28/2017     11/29/2017    K 3.5 11/29/2017     11/29/2017    CREATININE 0.8 11/29/2017    BUN 13 11/29/2017    CO2 26 11/29/2017    TSH 1.785 07/29/2014    INR 2.4 11/08/2018    HGBA1C 7.4 (H) 04/23/2018       Natalie was seen today for sinus problem, cough and nasal congestion.    Diagnoses and all orders for this visit:    Type 2 diabetes mellitus without complication, without long-term current use of insulin  -     Hemoglobin A1c; Future  -     Comprehensive metabolic panel; Future  -     Microalbumin/creatinine urine ratio; Future    Chronic atrial fibrillation  -     Lipid panel; Future    Essential hypertension  -     Lipid panel; Future    Hyperlipidemia with target LDL less than 100  -     Lipid panel; Future  -     Comprehensive metabolic panel; Future    Sinus Infection   Mucinex   Augmentin    Avoid decongestants.    I scheduled her with Cards today EKG, Afib with rate 116  Rapid afib due to not being on medication  Needs follow up with Cardiology  INR 2.4 4 days ago.  Patient rate usually controlled with Beta Blocker and  Cardizem.                    Care Plan/Goals: Reviewed  (Yes)  Goals      Take at least one BP reading per week at various times of the day      Hypertension Goals/Individual Care Plan    Hypertension Goal Care Plan    1. Blood pressure goal is to be below 140/90. Normal Blood pressure is 120/80.  Patient's blood pressure is at goal today. (Yes)    2. Goal for self management is to check Blood Pressure daily. Record on Individual log. Patient is agreeable to self management plan. blood pressure log.   Patient will bring logs at next office visit, or communicate to /Care Management team for review for interval follow up.     3. Moderately intense physical activity, such as brisk walking, is beneficial when done regularly. Aim for a total of 40 minutes of moderate to vigorous activity three to four times a week to help lower blood pressure. Exercise of patients choice was recommended at this office visit. walking    4. Eating Healthy Diet is important in Blood Pressure control. As its name implies, the DASH (Dietary Approaches to Stop Hypertension) eating plan is designed to help you manage blood pressure. Emphasizing healthy food sources, it also limits:  Red meat   Sodium (salt)   Sweets, added sugars and sugar-containing beverages.     5. Barriers to goals reviewed and addressed with patient at visit. (Yes)    6. Adherence and Medication Side effects discussed (Yes)    Referral to on-site Pharmacy for Co-management of hypertension (No)  Patient enrolled in Tele-health Hypertension Management. (No)    Patient is under care of /Care management (No) Referral Sent (No)                  Follow up: No Follow-up on file.    After visit summary was printed and given to patient upon discharge today.  Patient goals and care plan are included in After Visit Summary.

## 2018-11-13 ENCOUNTER — TELEPHONE (OUTPATIENT)
Dept: CARDIOLOGY | Facility: CLINIC | Age: 78
End: 2018-11-13

## 2018-11-13 ENCOUNTER — TELEPHONE (OUTPATIENT)
Dept: INTERNAL MEDICINE | Facility: CLINIC | Age: 78
End: 2018-11-13

## 2018-11-13 NOTE — TELEPHONE ENCOUNTER
----- Message from Mayda Arredondo PA-C sent at 11/13/2018  3:28 PM CST -----  She was in afib with RVR yesterday at PCP's office, she needs clinic appt    Please arrange    Thanks

## 2018-11-13 NOTE — TELEPHONE ENCOUNTER
Call patient  Cards also tried to call her    Review her medication  I sent refills, please make sure she has all meds    Needs appt with Cards this week, 2 weeks BP recheck    Pulse rate should be better with medication

## 2018-11-15 ENCOUNTER — HOSPITAL ENCOUNTER (OUTPATIENT)
Dept: RADIOLOGY | Facility: HOSPITAL | Age: 78
Discharge: HOME | End: 2018-11-15
Attending: FAMILY MEDICINE
Payer: MEDICARE

## 2018-11-15 ENCOUNTER — OFFICE VISIT (OUTPATIENT)
Dept: INTERNAL MEDICINE | Facility: CLINIC | Age: 78
End: 2018-11-15
Payer: MEDICARE

## 2018-11-15 ENCOUNTER — TELEPHONE (OUTPATIENT)
Dept: INTERNAL MEDICINE | Facility: CLINIC | Age: 78
End: 2018-11-15

## 2018-11-15 VITALS
OXYGEN SATURATION: 96 % | BODY MASS INDEX: 34.78 KG/M2 | TEMPERATURE: 98 F | DIASTOLIC BLOOD PRESSURE: 70 MMHG | HEIGHT: 67 IN | SYSTOLIC BLOOD PRESSURE: 140 MMHG | WEIGHT: 221.56 LBS | HEART RATE: 115 BPM

## 2018-11-15 DIAGNOSIS — R05.9 COUGH: ICD-10-CM

## 2018-11-15 DIAGNOSIS — I48.20 CHRONIC ATRIAL FIBRILLATION: ICD-10-CM

## 2018-11-15 DIAGNOSIS — J40 BRONCHITIS: Primary | ICD-10-CM

## 2018-11-15 DIAGNOSIS — J40 BRONCHITIS: ICD-10-CM

## 2018-11-15 PROCEDURE — 71046 X-RAY EXAM CHEST 2 VIEWS: CPT | Mod: 26,HCNC,, | Performed by: RADIOLOGY

## 2018-11-15 PROCEDURE — 99214 OFFICE O/P EST MOD 30 MIN: CPT | Mod: HCNC,S$GLB,, | Performed by: FAMILY MEDICINE

## 2018-11-15 PROCEDURE — 3078F DIAST BP <80 MM HG: CPT | Mod: CPTII,HCNC,S$GLB, | Performed by: FAMILY MEDICINE

## 2018-11-15 PROCEDURE — 1101F PT FALLS ASSESS-DOCD LE1/YR: CPT | Mod: CPTII,HCNC,S$GLB, | Performed by: FAMILY MEDICINE

## 2018-11-15 PROCEDURE — 3077F SYST BP >= 140 MM HG: CPT | Mod: CPTII,HCNC,S$GLB, | Performed by: FAMILY MEDICINE

## 2018-11-15 PROCEDURE — 71046 X-RAY EXAM CHEST 2 VIEWS: CPT | Mod: TC,HCNC

## 2018-11-15 PROCEDURE — 99999 PR PBB SHADOW E&M-EST. PATIENT-LVL IV: CPT | Mod: PBBFAC,HCNC,, | Performed by: FAMILY MEDICINE

## 2018-11-15 RX ORDER — METHYLPREDNISOLONE 4 MG/1
TABLET ORAL
Qty: 1 PACKAGE | Refills: 0 | Status: SHIPPED | OUTPATIENT
Start: 2018-11-15 | End: 2018-12-06

## 2018-11-15 RX ORDER — DILTIAZEM HYDROCHLORIDE 180 MG/1
180 CAPSULE, COATED, EXTENDED RELEASE ORAL DAILY
Qty: 90 CAPSULE | Refills: 3 | Status: SHIPPED | OUTPATIENT
Start: 2018-11-15 | End: 2019-02-12 | Stop reason: SDUPTHER

## 2018-11-15 NOTE — TELEPHONE ENCOUNTER
Patient was seen today and f/u cards appoint,emt scheduled. Advised patient to start bacl taking meds. Will call patient tomorrow to get pulse.

## 2018-11-15 NOTE — TELEPHONE ENCOUNTER
----- Message from Iris Ceron sent at 11/15/2018  2:58 PM CST -----  Patient needs call back rg today's visit..[phone] (home)

## 2018-11-15 NOTE — PROGRESS NOTES
Natalie Greene  11/15/2018  [id number]    Annabella Fenton MD  Patient Care Team:  Annabella Fenton MD as PCP - General (Family Medicine)  Annabella Fenton MD as Consulting Physician (Family Medicine)  Lisbet Lopez LPN as Care Coordinator (Internal Medicine)  Has the patient seen any provider outside of the [facility] network since the last visit? (no). If yes, HIPPA forms completed and records requested.        Visit Type:an urgent visit for an existing problem     Chief Complaint:  Chief Complaint   Patient presents with    Follow-up       History of Present Illness:  Patient was see 2 days ago.  She was c/o sinus congestion.  She is back today because she didn't feel well. She continues to cough.  She does not have fever.  She is on Augmentin.    Her exam 2 days ago, her heart rate was elevated, she has known A fib and is on Coumadin. She admitted not taking her medication, and did not refill. Since then she has filled and did take her medication this am for her A fib and rate control. Cards did try and contact her, but she did not answer her phone to see them for follow up.  She did a recent stress test with them, which was negative for ischemia.    She reports cough kept her up all night.  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    HgA1C WAS 6.5, AND HER CMP was stable  Last week INR therapeutic at 2.4.    Pulse in office ranges  dependending on coughing.     History:  Past Medical History:   Diagnosis Date    *Atrial fibrillation     Coronary artery disease involving native coronary artery of native heart with angina pectoris 4/10/2018    Diabetes mellitus     DM (diabetes mellitus) 2002     09/06/2017 no medication    Hyperlipidemia     Hypertension      Past Surgical History:   Procedure Laterality Date    CHOLECYSTECTOMY      COLON SURGERY      HYSTERECTOMY       Family History   Problem Relation Age of Onset    Diabetes Sister      Hypertension Sister     Hypertension Mother     Diabetes Sister      Social History     Socioeconomic History    Marital status:      Spouse name: Not on file    Number of children: Not on file    Years of education: Not on file    Highest education level: Not on file   Social Needs    Financial resource strain: Not on file    Food insecurity - worry: Not on file    Food insecurity - inability: Not on file    Transportation needs - medical: Not on file    Transportation needs - non-medical: Not on file   Occupational History    Not on file   Tobacco Use    Smoking status: Never Smoker    Smokeless tobacco: Never Used   Substance and Sexual Activity    Alcohol use: No    Drug use: No    Sexual activity: Not on file   Other Topics Concern    Not on file   Social History Narrative    Not on file     Patient Active Problem List   Diagnosis    A-fib    HTN (hypertension)    Hyperlipidemia with target LDL less than 100    DM (diabetes mellitus)    Anticoagulated on Coumadin    Obesity (BMI 30-39.9)    Abnormal stress test    Anginal equivalent    History of malignant neoplasm of colon    Shoulder pain, left    Aortic atherosclerosis    Long term current use of anticoagulant therapy    Coronary artery disease involving native coronary artery of native heart with angina pectoris     Review of patient's allergies indicates:  No Known Allergies    The following were reviewed at this visit: active problem list, medication list, allergies, family history, social history, and health maintenance.    Medications:  Current Outpatient Medications on File Prior to Visit   Medication Sig Dispense Refill    ACCU-CHEK JEN PLUS METER Misc       ACCU-CHEK JEN PLUS TEST STRP Strp       ACCU-CHEK SOFTCLIX LANCETS Misc       amLODIPine (NORVASC) 5 MG tablet TAKE 1 TABLET EVERY DAY 90 tablet 1    amoxicillin-clavulanate 875-125mg (AUGMENTIN) 875-125 mg per tablet Take 1 tablet by mouth 2 (two)  times daily. 20 tablet 0    atorvastatin (LIPITOR) 40 MG tablet Take 1 tablet (40 mg total) by mouth once daily. 90 tablet 3    indapamide (LOZOL) 1.25 MG Tab Take 1.25 mg by mouth.      losartan (COZAAR) 100 MG tablet Take 1 tablet (100 mg total) by mouth once daily. 90 tablet 1    metFORMIN (GLUCOPHAGE-XR) 500 MG 24 hr tablet Take 1 tablet (500 mg total) by mouth daily with breakfast. 90 tablet 3    metoprolol succinate (TOPROL-XL) 100 MG 24 hr tablet Take 1 tablet (100 mg total) by mouth once daily. 90 tablet 1    warfarin (COUMADIN) 5 MG tablet Take 5 mg by mouth.      diltiaZEM (CARDIZEM CD) 180 MG 24 hr capsule Take 1 capsule (180 mg total) by mouth once daily. 90 capsule 3    INCONTINENCE PAD,LINER,DISP (BLADDER CONTROL PADS EX ABSORB MISC)       triamcinolone acetonide 0.1% (KENALOG) 0.1 % cream        No current facility-administered medications on file prior to visit.        Medications have been reviewed and reconciled with patient at this visit.  Barriers to medications present (no)    Adverse reactions to current medications (no)    Over the counter medications reviewed (Yes ), and if needed added to active Medication list at this visit.     Exam:  Wt Readings from Last 3 Encounters:   11/15/18 100.5 kg (221 lb 9 oz)   11/12/18 101.2 kg (223 lb 1.7 oz)   06/22/18 105.2 kg (231 lb 14.8 oz)     Temp Readings from Last 3 Encounters:   11/12/18 98.8 °F (37.1 °C) (Tympanic)   05/30/18 97.8 °F (36.6 °C)   04/23/18 97.7 °F (36.5 °C) (Tympanic)     BP Readings from Last 3 Encounters:   11/12/18 (!) 154/88   06/22/18 (!) 148/85   05/30/18 127/67     Pulse Readings from Last 3 Encounters:   11/12/18 (!) 118   06/22/18 106   05/30/18 96     Body mass index is 34.7 kg/m².      Review of Systems   Constitutional: Negative.  Negative for chills and fever.   HENT: Positive for congestion. Negative for sinus pain and sore throat.    Eyes: Negative for blurred vision and double vision.   Respiratory: Positive  for cough and sputum production. Negative for shortness of breath and wheezing.    Cardiovascular: Positive for chest pain. Negative for palpitations and leg swelling.   Gastrointestinal: Negative for abdominal pain, constipation, diarrhea, heartburn, nausea and vomiting.   Genitourinary: Negative.    Musculoskeletal: Negative.    Skin: Negative.  Negative for rash.   Neurological: Negative.    Endo/Heme/Allergies: Negative.  Negative for polydipsia. Does not bruise/bleed easily.   Psychiatric/Behavioral: Negative for depression and substance abuse.     Physical Exam   Constitutional: She is oriented to person, place, and time. She appears well-developed and well-nourished. No distress.   HENT:   Head: Normocephalic and atraumatic.   Right Ear: External ear normal.   Left Ear: External ear normal.   Nose: Nose normal.   Mouth/Throat: Oropharynx is clear and moist. No oropharyngeal exudate.   Eyes: Conjunctivae and EOM are normal. Pupils are equal, round, and reactive to light. Right eye exhibits no discharge. Left eye exhibits no discharge.   Neck: Normal range of motion. Neck supple. No thyromegaly present.   Cardiovascular: Normal rate, normal heart sounds and intact distal pulses. An irregularly irregular rhythm present.   No murmur heard.  Pulmonary/Chest: Effort normal. No respiratory distress. She has wheezes in the right upper field and the left upper field.   Abdominal: Soft. Bowel sounds are normal. She exhibits no distension and no mass. There is no tenderness.   Musculoskeletal: Normal range of motion. She exhibits no edema.   Lymphadenopathy:     She has no cervical adenopathy.   Neurological: She is alert and oriented to person, place, and time. No cranial nerve deficit.   Skin: Capillary refill takes less than 2 seconds. She is not diaphoretic.   Psychiatric: She has a normal mood and affect. Her behavior is normal. Judgment and thought content normal.   Nursing note and vitals  reviewed.      Laboratory Reviewed ({N/A)  Lab Results   Component Value Date    WBC 6.05 08/23/2017    HGB 12.2 08/23/2017    HCT 37.5 08/23/2017     08/23/2017    CHOL 171 11/12/2018    TRIG 111 11/12/2018    HDL 40 11/12/2018    ALT 18 11/12/2018    AST 26 11/12/2018     11/12/2018    K 3.7 11/12/2018     11/12/2018    CREATININE 0.8 11/12/2018    BUN 13 11/12/2018    CO2 27 11/12/2018    TSH 1.785 07/29/2014    INR 2.4 11/08/2018    HGBA1C 6.5 (H) 11/12/2018       Natalie was seen today for follow-up.    Diagnoses and all orders for this visit:    Bronchitis  -     methylPREDNISolone (MEDROL DOSEPACK) 4 mg tablet; use as directed  -     X-Ray Chest PA And Lateral; Future    Cough  -     methylPREDNISolone (MEDROL DOSEPACK) 4 mg tablet; use as directed  -     X-Ray Chest PA And Lateral; Future    Chronic atrial fibrillation     I spoke with patient Card on Epic post visit   Patient unable to get to appt today   She denies any Dizziness, CP from her A fib.    Suspect her illness has played role in variable pulse, but when at rest, rate does drop.   Patient will go and  her Cardizem after visit. She has ability to monitor pulse rate.     She will call tomorrow with Pulse rate on Metoprolol and Cardizem   ER precautions if feel dizziness, palpitations.   No sign of HF. Chest xray clear.    She is anticoagulated.   Recheck Tomorrow with phone call.               Care Plan/Goals: Reviewed  (Yes)  Goals      Take at least one BP reading per week at various times of the day      Hypertension Goals/Individual Care Plan    Hypertension Goal Care Plan    1. Blood pressure goal is to be below 140/90. Normal Blood pressure is 120/80.  Patient's blood pressure is at goal today. (Yes)    2. Goal for self management is to check Blood Pressure daily. Record on Individual log. Patient is agreeable to self management plan. blood pressure log.   Patient will bring logs at next office visit, or communicate  to /Care Management team for review for interval follow up.     3. Moderately intense physical activity, such as brisk walking, is beneficial when done regularly. Aim for a total of 40 minutes of moderate to vigorous activity three to four times a week to help lower blood pressure. Exercise of patients choice was recommended at this office visit. walking    4. Eating Healthy Diet is important in Blood Pressure control. As its name implies, the DASH (Dietary Approaches to Stop Hypertension) eating plan is designed to help you manage blood pressure. Emphasizing healthy food sources, it also limits:  Red meat   Sodium (salt)   Sweets, added sugars and sugar-containing beverages.     5. Barriers to goals reviewed and addressed with patient at visit. (Yes)    6. Adherence and Medication Side effects discussed (Yes)    Referral to on-site Pharmacy for Co-management of hypertension (No)  Patient enrolled in Tele-health Hypertension Management. (No)    Patient is under care of /Care management (No) Referral Sent (No)                  Follow up: No Follow-up on file.    After visit summary was printed and given to patient upon discharge today.  Patient goals and care plan are included in After Visit Summary.

## 2018-11-15 NOTE — TELEPHONE ENCOUNTER
----- Message from Valeri Figueroa LPN sent at 11/15/2018  3:09 PM CST -----  Spoke with patient's spouse--states patient is unable to come to scheduled appointment today with Dr. Rod--states needs to speak with someone in Dr. Annabella Fenton' office again in reference to patient's chest xray.

## 2018-11-15 NOTE — TELEPHONE ENCOUNTER
Called and spoke with patient. Let them know xray didn't show any pneumonia. Also I said I would try to reschedule the appointment with Dr. Rod

## 2018-11-16 NOTE — PROGRESS NOTES
Call patient today  Please have her report her Pulse rate and BP to me, now that she has Cardizem and metoprolol.    Chest xray was clear.

## 2018-11-20 NOTE — PROGRESS NOTES
She has appt next week with me to recheck EKG and pulse in office. She also has follow up with Cards.

## 2018-11-27 ENCOUNTER — OFFICE VISIT (OUTPATIENT)
Dept: CARDIOLOGY | Facility: CLINIC | Age: 78
End: 2018-11-27
Payer: MEDICARE

## 2018-11-27 VITALS
HEART RATE: 72 BPM | SYSTOLIC BLOOD PRESSURE: 118 MMHG | BODY MASS INDEX: 34.08 KG/M2 | DIASTOLIC BLOOD PRESSURE: 56 MMHG | WEIGHT: 217.13 LBS | HEIGHT: 67 IN

## 2018-11-27 DIAGNOSIS — I70.0 AORTIC ATHEROSCLEROSIS: ICD-10-CM

## 2018-11-27 DIAGNOSIS — I10 ESSENTIAL HYPERTENSION: ICD-10-CM

## 2018-11-27 DIAGNOSIS — E78.5 HYPERLIPIDEMIA WITH TARGET LDL LESS THAN 100: ICD-10-CM

## 2018-11-27 DIAGNOSIS — Z79.01 ANTICOAGULATED ON COUMADIN: ICD-10-CM

## 2018-11-27 DIAGNOSIS — I48.20 CHRONIC ATRIAL FIBRILLATION: Primary | ICD-10-CM

## 2018-11-27 DIAGNOSIS — G47.30 SLEEP APNEA, UNSPECIFIED TYPE: ICD-10-CM

## 2018-11-27 DIAGNOSIS — I25.119 CORONARY ARTERY DISEASE INVOLVING NATIVE CORONARY ARTERY OF NATIVE HEART WITH ANGINA PECTORIS: ICD-10-CM

## 2018-11-27 PROCEDURE — 1101F PT FALLS ASSESS-DOCD LE1/YR: CPT | Mod: CPTII,HCNC,S$GLB, | Performed by: INTERNAL MEDICINE

## 2018-11-27 PROCEDURE — 3074F SYST BP LT 130 MM HG: CPT | Mod: CPTII,HCNC,S$GLB, | Performed by: INTERNAL MEDICINE

## 2018-11-27 PROCEDURE — 3078F DIAST BP <80 MM HG: CPT | Mod: CPTII,HCNC,S$GLB, | Performed by: INTERNAL MEDICINE

## 2018-11-27 PROCEDURE — 99214 OFFICE O/P EST MOD 30 MIN: CPT | Mod: HCNC,S$GLB,, | Performed by: INTERNAL MEDICINE

## 2018-11-27 PROCEDURE — 99999 PR PBB SHADOW E&M-EST. PATIENT-LVL III: CPT | Mod: PBBFAC,HCNC,, | Performed by: INTERNAL MEDICINE

## 2018-11-27 NOTE — PROGRESS NOTES
Subjective:   Patient ID:  Natalie Greene is a 78 y.o. female who presents for cardiac consult of Hypertension (f/u)      Hypertension   Associated symptoms include shortness of breath. Pertinent negatives include no chest pain or palpitations.     The patient came in today for cardiac consult of Hypertension (f/u)    This is a 77 year old female pt with HTN, non-obs CAD, atrial fibrillation (on Coumadin), hyperlipidemia, obesity, and DM presents for follow up.    4/10/18  Pt has left sided chest pain described as ache. Feels like it goes to left shoulder,arm,fingers. Feels like it goes straight through. The pain is intermittent, occurred the night before yesterday, feels more tired now due to it. Pain can occur at rest but not really exertional. Pt also has mild SOB but not necessary with ache, no diaphoresis but does have mild nausea. No dizziness/lightheaded. Does feel palpitations with Afib. Pt also has significant fatigue, thinks she snores a lot, has a dry mouth always and has not been tested for sleep apnea. Prior echo and cath results below - negative for signif CAD.     11/27/18  Overall has been doing well. Occ palpitations. She had neg pharm stress test 6/18. Has not gotten sleep study yet but still symptomatic with snoring, dry mouth, fatigue.  present who was diagnosed with DORA but has not gotten CPAP yet. No CP or SOB now. Has varicose veins, has not gotten compression stockings yet.     Patient feels no leg swelling, no PND, no dizziness, no syncope, no CNS symptoms.    Patient is compliant with medications.    Nuclear stress  Nuclear Quantitative Functional Analysis:   LVEF: >= 70 %    Impression: NORMAL MYOCARDIAL PERFUSION  1. The perfusion scan is free of evidence for myocardial ischemia or injury.   2. There is a mild intensity fixed defect in the anterior wall of the left ventricle, secondary to soft tissue attenuation.   3. Resting wall motion is physiologic.   4. Resting LV function is  normal.   5. The ventricular volumes are normal at rest and stress.   6. The extracardiac distribution of radioactivity is normal.   This document has been electronically    SIGNED BY: Oscar Taveras MD On: 06/12/2018 16:15      2D ECHO 7/17  CONCLUSIONS     1 - Biatrial enlargement.     2 - Concentric hypertrophy.     3 - No wall motion abnormalities.     4 - Normal left ventricular systolic function (EF 60-65%).     5 - Indeterminate LV diastolic function.     6 - Normal right ventricular systolic function .     7 - Moderate mitral regurgitation.     8 - Mild tricuspid regurgitation.       4/11/2016 [facility]    Patient has a right dominant coronary artery.      - Left Main Coronary Artery:             The LM is normal. There is NICOLE 3 flow.     - Left Anterior Descending Artery:             The LAD has luminal irregularities. There is NICOLE 3 flow.     - Left Circumflex Artery:             The LCX is normal. There is NICOLE 3 flow.     - Right Coronary Artery:             The RCA has luminal irregularities. There is NICOLE 3 flow.    D. SUMMARY/POST-OPERATIVE DIAGNOSIS:  1. Non-obstructive CAD.  2. Normal LVEF.    Past Medical History:   Diagnosis Date    *Atrial fibrillation     Coronary artery disease involving native coronary artery of native heart with angina pectoris 4/10/2018    Diabetes mellitus     DM (diabetes mellitus) 2002     09/06/2017 no medication    Hyperlipidemia     Hypertension        Past Surgical History:   Procedure Laterality Date    CHOLECYSTECTOMY      COLON SURGERY      HYSTERECTOMY         Social History     Tobacco Use    Smoking status: Never Smoker    Smokeless tobacco: Never Used   Substance Use Topics    Alcohol use: No    Drug use: No       Family History   Problem Relation Age of Onset    Diabetes Sister     Hypertension Sister     Hypertension Mother     Diabetes Sister           Medication List           Accurate as of 11/27/18  2:41 PM. If you have any questions, ask  your nurse or doctor.               CONTINUE taking these medications    ACCU-CHEK JEN PLUS METER Misc  Generic drug:  blood-glucose meter     ACCU-CHEK EJN PLUS TEST STRP Strp  Generic drug:  blood sugar diagnostic     ACCU-CHEK SOFTCLIX LANCETS Misc  Generic drug:  lancets     amLODIPine 5 MG tablet  Commonly known as:  NORVASC  TAKE 1 TABLET EVERY DAY     amoxicillin-clavulanate 875-125mg 875-125 mg per tablet  Commonly known as:  AUGMENTIN  Take 1 tablet by mouth 2 (two) times daily.     atorvastatin 40 MG tablet  Commonly known as:  LIPITOR  Take 1 tablet (40 mg total) by mouth once daily.     BLADDER CONTROL PADS EX ABSORB MISC     diltiaZEM 180 MG 24 hr capsule  Commonly known as:  CARDIZEM CD  Take 1 capsule (180 mg total) by mouth once daily.     indapamide 1.25 MG Tab  Commonly known as:  LOZOL     losartan 100 MG tablet  Commonly known as:  COZAAR  Take 1 tablet (100 mg total) by mouth once daily.     metFORMIN 500 MG 24 hr tablet  Commonly known as:  GLUCOPHAGE-XR  Take 1 tablet (500 mg total) by mouth daily with breakfast.     methylPREDNISolone 4 mg tablet  Commonly known as:  MEDROL DOSEPACK  use as directed     metoprolol succinate 100 MG 24 hr tablet  Commonly known as:  TOPROL-XL  Take 1 tablet (100 mg total) by mouth once daily.     triamcinolone acetonide 0.1% 0.1 % cream  Commonly known as:  KENALOG     warfarin 5 MG tablet  Commonly known as:  COUMADIN            Review of Systems   Constitutional: Negative.    HENT: Negative.    Eyes: Negative.    Respiratory: Positive for shortness of breath.    Cardiovascular: Positive for leg swelling. Negative for chest pain and palpitations.   Gastrointestinal: Negative.    Genitourinary: Negative.    Musculoskeletal: Negative.    Skin: Negative.    Neurological: Negative.  Negative for dizziness.   Endo/Heme/Allergies: Negative.    Psychiatric/Behavioral: Negative.    All 12 systems otherwise negative.      Wt Readings from Last 3 Encounters:  "  11/27/18 98.5 kg (217 lb 2.5 oz)   11/15/18 100.5 kg (221 lb 9 oz)   11/12/18 101.2 kg (223 lb 1.7 oz)     Temp Readings from Last 3 Encounters:   11/15/18 98.1 °F (36.7 °C) (Tympanic)   11/12/18 98.8 °F (37.1 °C) (Tympanic)   05/30/18 97.8 °F (36.6 °C)     BP Readings from Last 3 Encounters:   11/27/18 (!) 118/56   11/15/18 (!) 140/70   11/12/18 (!) 154/88     Pulse Readings from Last 3 Encounters:   11/27/18 72   11/15/18 (!) 115   11/12/18 (!) 118       BP (!) 118/56 (Patient Position: Sitting, BP Method: Small (Manual))   Pulse 72   Ht 5' 7" (1.702 m)   Wt 98.5 kg (217 lb 2.5 oz)   BMI 34.01 kg/m²     Objective:   Physical Exam   Constitutional: She is oriented to person, place, and time. She appears well-developed and well-nourished. No distress.   HENT:   Head: Normocephalic and atraumatic.   Nose: Nose normal.   Mouth/Throat: Oropharynx is clear and moist.   Eyes: Conjunctivae and EOM are normal. No scleral icterus.   Neck: Normal range of motion. Neck supple. No JVD present. No thyromegaly present.   Cardiovascular: Normal rate, S1 normal and S2 normal. An irregularly irregular rhythm present. Exam reveals no gallop, no S3, no S4 and no friction rub.   No murmur heard.  Pulmonary/Chest: Effort normal and breath sounds normal. No stridor. No respiratory distress. She has no wheezes. She has no rales. She exhibits no tenderness.   Abdominal: Soft. Bowel sounds are normal. She exhibits no distension and no mass. There is no tenderness. There is no rebound.   Genitourinary:   Genitourinary Comments: Deferred   Musculoskeletal: Normal range of motion. She exhibits edema (with varicosities, 1+ b/l). She exhibits no tenderness or deformity.   Lymphadenopathy:     She has no cervical adenopathy.   Neurological: She is alert and oriented to person, place, and time. She exhibits normal muscle tone. Coordination normal.   Skin: Skin is warm and dry. No rash noted. She is not diaphoretic. No erythema. No pallor. "   Psychiatric: She has a normal mood and affect. Her behavior is normal. Judgment and thought content normal.   Nursing note and vitals reviewed.      Lab Results   Component Value Date     11/12/2018    K 3.7 11/12/2018     11/12/2018    CO2 27 11/12/2018    BUN 13 11/12/2018    CREATININE 0.8 11/12/2018     11/12/2018    HGBA1C 6.5 (H) 11/12/2018    MG 1.7 04/21/2016    AST 26 11/12/2018    ALT 18 11/12/2018    ALBUMIN 3.7 11/12/2018    PROT 7.5 11/12/2018    BILITOT 0.9 11/12/2018    WBC 6.05 08/23/2017    HGB 12.2 08/23/2017    HCT 37.5 08/23/2017    MCV 90 08/23/2017     08/23/2017    INR 2.4 11/08/2018    INR 1.9 (H) 06/28/2017    TSH 1.785 07/29/2014    CHOL 171 11/12/2018    HDL 40 11/12/2018    LDLCALC 108.8 11/12/2018    TRIG 111 11/12/2018    BNP 81 04/21/2016     Assessment:      1. Chronic atrial fibrillation    2. Essential hypertension    3. Hyperlipidemia with target LDL less than 100    4. Aortic atherosclerosis    5. Coronary artery disease involving native coronary artery of native heart with angina pectoris    6. Anticoagulated on Coumadin    7. Sleep apnea, unspecified type        Plan:   1. Chest pain, non-obs CAD  - stress test negative   - discussed symptoms atypical can also be due to reflux, MSK/OA, Pulm etiology/anxiety    2. AF  - cont meds  - cont coumadin    3. LE edema  - rec stockings, elevate legs    4. Sleep apnea symptoms  - referred for sleep study    5. Obesity  - rec weight loss with diet and exercise    6. HTN  - cont meds    Thank you for allowing me to participate in this patient's care. Please do not hesitate to contact me with any questions or concerns. Consult note has been forwarded to the referral physician.

## 2018-12-06 ENCOUNTER — ANTI-COAG VISIT (OUTPATIENT)
Dept: CARDIOLOGY | Facility: CLINIC | Age: 78
End: 2018-12-06
Payer: MEDICARE

## 2018-12-06 DIAGNOSIS — Z79.01 LONG TERM (CURRENT) USE OF ANTICOAGULANTS: Primary | ICD-10-CM

## 2018-12-06 LAB — INR PPP: 2.3 (ref 2–3)

## 2018-12-06 PROCEDURE — 85610 PROTHROMBIN TIME: CPT | Mod: QW,HCNC,S$GLB, | Performed by: INTERNAL MEDICINE

## 2018-12-06 RX ORDER — WARFARIN 6 MG/1
TABLET ORAL DAILY
COMMUNITY
End: 2019-02-08 | Stop reason: SDUPTHER

## 2018-12-06 NOTE — PROGRESS NOTES
Patient is therapeutic at 2.3.  Reports taking 3 mg every Thursday and 6 mg on all other days.  Instructed to maintain current dose of Warfarin.  Recheck in 1 month.

## 2019-01-03 ENCOUNTER — ANTI-COAG VISIT (OUTPATIENT)
Dept: CARDIOLOGY | Facility: CLINIC | Age: 79
End: 2019-01-03
Payer: MEDICARE

## 2019-01-03 DIAGNOSIS — Z79.01 LONG TERM (CURRENT) USE OF ANTICOAGULANTS: Primary | ICD-10-CM

## 2019-01-03 LAB — INR PPP: 2.2 (ref 2–3)

## 2019-01-03 PROCEDURE — 85610 POCT INR: ICD-10-PCS | Mod: QW,HCNC,S$GLB, | Performed by: INTERNAL MEDICINE

## 2019-01-03 PROCEDURE — 85610 PROTHROMBIN TIME: CPT | Mod: QW,HCNC,S$GLB, | Performed by: INTERNAL MEDICINE

## 2019-01-03 NOTE — PROGRESS NOTES
Patient's INR is therapeutic at 2.2.  Reports no changes.  Instructed to maintain current dose of Warfarin 3 mg every Thursday and 6 mg on all other days per dosing calendar given.  Recheck in 1 month.

## 2019-01-07 NOTE — TELEPHONE ENCOUNTER
----- Message from Viv Quinn sent at 4/14/2018 10:21 AM CDT -----  Contact: self  Pt called and stated that she has three appts for nuclear testing scheduled on Monday 4/16 that she will be unable to make due to being sick with a bad cough and sinus infection. Pt did request that those appts be canceled, however she would like for the nurse to give her a call back to get them rescheduled on a later date. Please advise.    Pt can be reached at [phone]   no

## 2019-01-15 ENCOUNTER — HOSPITAL ENCOUNTER (OUTPATIENT)
Facility: HOSPITAL | Age: 79
LOS: 1 days | Discharge: HOME | End: 2019-01-17
Attending: EMERGENCY MEDICINE | Admitting: HOSPITALIST
Payer: MEDICARE

## 2019-01-15 DIAGNOSIS — S30.1XXA ABDOMINAL WALL SEROMA, INITIAL ENCOUNTER: ICD-10-CM

## 2019-01-15 DIAGNOSIS — S30.1XXA ABDOMINAL HEMATOMA: ICD-10-CM

## 2019-01-15 DIAGNOSIS — R10.84 GENERALIZED ABDOMINAL PAIN: Primary | ICD-10-CM

## 2019-01-15 DIAGNOSIS — I48.91 A-FIB: ICD-10-CM

## 2019-01-15 DIAGNOSIS — R07.9 CHEST PAIN: ICD-10-CM

## 2019-01-15 LAB
ALBUMIN SERPL BCP-MCNC: 4.2 G/DL
ALP SERPL-CCNC: 82 U/L
ALT SERPL W/O P-5'-P-CCNC: 24 U/L
AMYLASE SERPL-CCNC: 39 U/L
ANION GAP SERPL CALC-SCNC: 16 MMOL/L
AST SERPL-CCNC: 33 U/L
BASOPHILS # BLD AUTO: 0.01 K/UL
BASOPHILS NFR BLD: 0.1 %
BILIRUB SERPL-MCNC: 2 MG/DL
BILIRUB UR QL STRIP: NEGATIVE
BUN SERPL-MCNC: 14 MG/DL
CALCIUM SERPL-MCNC: 9.8 MG/DL
CHLORIDE SERPL-SCNC: 95 MMOL/L
CLARITY UR: CLEAR
CO2 SERPL-SCNC: 25 MMOL/L
COLOR UR: YELLOW
CREAT SERPL-MCNC: 0.8 MG/DL
DIFFERENTIAL METHOD: ABNORMAL
EOSINOPHIL # BLD AUTO: 0.1 K/UL
EOSINOPHIL NFR BLD: 0.5 %
ERYTHROCYTE [DISTWIDTH] IN BLOOD BY AUTOMATED COUNT: 16.2 %
EST. GFR  (AFRICAN AMERICAN): >60 ML/MIN/1.73 M^2
EST. GFR  (NON AFRICAN AMERICAN): >60 ML/MIN/1.73 M^2
GLUCOSE SERPL-MCNC: 135 MG/DL
GLUCOSE UR QL STRIP: NEGATIVE
HCT VFR BLD AUTO: 42.5 %
HGB BLD-MCNC: 14.5 G/DL
HGB UR QL STRIP: NEGATIVE
KETONES UR QL STRIP: ABNORMAL
LEUKOCYTE ESTERASE UR QL STRIP: NEGATIVE
LIPASE SERPL-CCNC: 34 U/L
LYMPHOCYTES # BLD AUTO: 1.1 K/UL
LYMPHOCYTES NFR BLD: 8.4 %
MCH RBC QN AUTO: 31 PG
MCHC RBC AUTO-ENTMCNC: 34.1 G/DL
MCV RBC AUTO: 91 FL
MONOCYTES # BLD AUTO: 0.7 K/UL
MONOCYTES NFR BLD: 5.2 %
NEUTROPHILS # BLD AUTO: 11.1 K/UL
NEUTROPHILS NFR BLD: 85.8 %
NITRITE UR QL STRIP: NEGATIVE
PH UR STRIP: 7 [PH] (ref 5–8)
PLATELET # BLD AUTO: 235 K/UL
PMV BLD AUTO: 10.8 FL
POTASSIUM SERPL-SCNC: 3.9 MMOL/L
PROT SERPL-MCNC: 8.3 G/DL
PROT UR QL STRIP: NEGATIVE
RBC # BLD AUTO: 4.67 M/UL
SODIUM SERPL-SCNC: 136 MMOL/L
SP GR UR STRIP: 1.01 (ref 1–1.03)
TROPONIN I SERPL DL<=0.01 NG/ML-MCNC: <0.006 NG/ML
URN SPEC COLLECT METH UR: ABNORMAL
UROBILINOGEN UR STRIP-ACNC: NEGATIVE EU/DL
WBC # BLD AUTO: 12.9 K/UL

## 2019-01-15 PROCEDURE — 96374 THER/PROPH/DIAG INJ IV PUSH: CPT | Mod: HCNC

## 2019-01-15 PROCEDURE — 82150 ASSAY OF AMYLASE: CPT | Mod: HCNC

## 2019-01-15 PROCEDURE — 80053 COMPREHEN METABOLIC PANEL: CPT | Mod: HCNC

## 2019-01-15 PROCEDURE — 25500020 PHARM REV CODE 255: Mod: HCNC | Performed by: EMERGENCY MEDICINE

## 2019-01-15 PROCEDURE — 25000003 PHARM REV CODE 250: Mod: HCNC | Performed by: REGISTERED NURSE

## 2019-01-15 PROCEDURE — 81003 URINALYSIS AUTO W/O SCOPE: CPT | Mod: HCNC

## 2019-01-15 PROCEDURE — 83690 ASSAY OF LIPASE: CPT | Mod: HCNC

## 2019-01-15 PROCEDURE — 93010 ELECTROCARDIOGRAM REPORT: CPT | Mod: HCNC,,, | Performed by: INTERNAL MEDICINE

## 2019-01-15 PROCEDURE — 96361 HYDRATE IV INFUSION ADD-ON: CPT | Mod: HCNC

## 2019-01-15 PROCEDURE — 99285 EMERGENCY DEPT VISIT HI MDM: CPT | Mod: 25,HCNC

## 2019-01-15 PROCEDURE — 93010 EKG 12-LEAD: ICD-10-PCS | Mod: HCNC,,, | Performed by: INTERNAL MEDICINE

## 2019-01-15 PROCEDURE — 85025 COMPLETE CBC W/AUTO DIFF WBC: CPT | Mod: HCNC

## 2019-01-15 PROCEDURE — 84484 ASSAY OF TROPONIN QUANT: CPT | Mod: HCNC

## 2019-01-15 PROCEDURE — 63600175 PHARM REV CODE 636 W HCPCS: Mod: HCNC | Performed by: REGISTERED NURSE

## 2019-01-15 PROCEDURE — 96375 TX/PRO/DX INJ NEW DRUG ADDON: CPT | Mod: HCNC

## 2019-01-15 RX ORDER — ONDANSETRON 2 MG/ML
4 INJECTION INTRAMUSCULAR; INTRAVENOUS
Status: COMPLETED | OUTPATIENT
Start: 2019-01-15 | End: 2019-01-15

## 2019-01-15 RX ORDER — MORPHINE SULFATE 10 MG/ML
4 INJECTION INTRAMUSCULAR; INTRAVENOUS; SUBCUTANEOUS
Status: COMPLETED | OUTPATIENT
Start: 2019-01-15 | End: 2019-01-15

## 2019-01-15 RX ADMIN — ONDANSETRON 4 MG: 2 INJECTION INTRAMUSCULAR; INTRAVENOUS at 08:01

## 2019-01-15 RX ADMIN — IOHEXOL 75 ML: 350 INJECTION, SOLUTION INTRAVENOUS at 09:01

## 2019-01-15 RX ADMIN — SODIUM CHLORIDE 1000 ML: 0.9 INJECTION, SOLUTION INTRAVENOUS at 08:01

## 2019-01-15 RX ADMIN — MORPHINE SULFATE 4 MG: 10 INJECTION INTRAVENOUS at 08:01

## 2019-01-16 PROBLEM — R11.14 BILIOUS VOMITING WITH NAUSEA: Status: ACTIVE | Noted: 2019-01-16

## 2019-01-16 PROBLEM — R10.84 GENERALIZED ABDOMINAL PAIN: Status: ACTIVE | Noted: 2019-01-16

## 2019-01-16 PROBLEM — S30.1XXA HEMATOMA OF ABDOMINAL WALL: Status: ACTIVE | Noted: 2019-01-16

## 2019-01-16 PROBLEM — S30.1XXA ABDOMINAL WALL SEROMA: Status: ACTIVE | Noted: 2019-01-16

## 2019-01-16 LAB
ABO + RH BLD: NORMAL
ALBUMIN SERPL BCP-MCNC: 3.4 G/DL
ALP SERPL-CCNC: 66 U/L
ALT SERPL W/O P-5'-P-CCNC: 22 U/L
ANION GAP SERPL CALC-SCNC: 7 MMOL/L
APTT BLDCRRT: 32.6 SEC
AST SERPL-CCNC: 27 U/L
BASOPHILS # BLD AUTO: 0.01 K/UL
BASOPHILS NFR BLD: 0.1 %
BILIRUB SERPL-MCNC: 1.3 MG/DL
BLD GP AB SCN CELLS X3 SERPL QL: NORMAL
BLOOD GROUP ANTIBODIES SERPL: NORMAL
BUN SERPL-MCNC: 12 MG/DL
CALCIUM SERPL-MCNC: 8.6 MG/DL
CHLORIDE SERPL-SCNC: 100 MMOL/L
CO2 SERPL-SCNC: 28 MMOL/L
CREAT SERPL-MCNC: 0.8 MG/DL
CRP SERPL-MCNC: 21.46 MG/L
DIFFERENTIAL METHOD: ABNORMAL
EOSINOPHIL # BLD AUTO: 0.1 K/UL
EOSINOPHIL NFR BLD: 1.3 %
ERYTHROCYTE [DISTWIDTH] IN BLOOD BY AUTOMATED COUNT: 16.5 %
ERYTHROCYTE [SEDIMENTATION RATE] IN BLOOD BY WESTERGREN METHOD: 34 MM/HR
EST. GFR  (AFRICAN AMERICAN): >60 ML/MIN/1.73 M^2
EST. GFR  (NON AFRICAN AMERICAN): >60 ML/MIN/1.73 M^2
GLUCOSE SERPL-MCNC: 109 MG/DL
HCT VFR BLD AUTO: 39.1 %
HGB BLD-MCNC: 12.8 G/DL
INR PPP: 1.9
LYMPHOCYTES # BLD AUTO: 1.6 K/UL
LYMPHOCYTES NFR BLD: 18.8 %
MAGNESIUM SERPL-MCNC: 2 MG/DL
MCH RBC QN AUTO: 30.2 PG
MCHC RBC AUTO-ENTMCNC: 32.7 G/DL
MCV RBC AUTO: 92 FL
MONOCYTES # BLD AUTO: 0.5 K/UL
MONOCYTES NFR BLD: 6.2 %
NEUTROPHILS # BLD AUTO: 6.1 K/UL
NEUTROPHILS NFR BLD: 73.6 %
PHOSPHATE SERPL-MCNC: 4.2 MG/DL
PLATELET # BLD AUTO: 184 K/UL
PMV BLD AUTO: 10.8 FL
POCT GLUCOSE: 105 MG/DL (ref 70–110)
POCT GLUCOSE: 107 MG/DL (ref 70–110)
POCT GLUCOSE: 112 MG/DL (ref 70–110)
POTASSIUM SERPL-SCNC: 3.9 MMOL/L
PROT SERPL-MCNC: 6.7 G/DL
PROTHROMBIN TIME: 19.8 SEC
RBC # BLD AUTO: 4.24 M/UL
SODIUM SERPL-SCNC: 135 MMOL/L
TROPONIN I SERPL DL<=0.01 NG/ML-MCNC: <0.006 NG/ML
WBC # BLD AUTO: 8.24 K/UL

## 2019-01-16 PROCEDURE — 80053 COMPREHEN METABOLIC PANEL: CPT | Mod: HCNC

## 2019-01-16 PROCEDURE — 25000003 PHARM REV CODE 250: Mod: HCNC | Performed by: INTERNAL MEDICINE

## 2019-01-16 PROCEDURE — 87040 BLOOD CULTURE FOR BACTERIA: CPT | Mod: HCNC

## 2019-01-16 PROCEDURE — 83735 ASSAY OF MAGNESIUM: CPT | Mod: HCNC

## 2019-01-16 PROCEDURE — G0378 HOSPITAL OBSERVATION PER HR: HCPCS | Mod: HCNC

## 2019-01-16 PROCEDURE — 36415 COLL VENOUS BLD VENIPUNCTURE: CPT | Mod: HCNC

## 2019-01-16 PROCEDURE — 86901 BLOOD TYPING SEROLOGIC RH(D): CPT | Mod: HCNC

## 2019-01-16 PROCEDURE — C9113 INJ PANTOPRAZOLE SODIUM, VIA: HCPCS | Mod: HCNC | Performed by: HOSPITALIST

## 2019-01-16 PROCEDURE — 99220 PR INITIAL OBSERVATION CARE,LEVL III: ICD-10-PCS | Mod: HCNC,,, | Performed by: INTERNAL MEDICINE

## 2019-01-16 PROCEDURE — 86141 C-REACTIVE PROTEIN HS: CPT | Mod: HCNC

## 2019-01-16 PROCEDURE — 63600175 PHARM REV CODE 636 W HCPCS: Mod: HCNC | Performed by: INTERNAL MEDICINE

## 2019-01-16 PROCEDURE — 86870 RBC ANTIBODY IDENTIFICATION: CPT | Mod: HCNC

## 2019-01-16 PROCEDURE — 99220 PR INITIAL OBSERVATION CARE,LEVL III: CPT | Mod: HCNC,,, | Performed by: INTERNAL MEDICINE

## 2019-01-16 PROCEDURE — 85730 THROMBOPLASTIN TIME PARTIAL: CPT | Mod: HCNC

## 2019-01-16 PROCEDURE — 25000003 PHARM REV CODE 250: Mod: HCNC | Performed by: HOSPITALIST

## 2019-01-16 PROCEDURE — 85651 RBC SED RATE NONAUTOMATED: CPT | Mod: HCNC

## 2019-01-16 PROCEDURE — 85025 COMPLETE CBC W/AUTO DIFF WBC: CPT | Mod: HCNC

## 2019-01-16 PROCEDURE — 84484 ASSAY OF TROPONIN QUANT: CPT | Mod: HCNC

## 2019-01-16 PROCEDURE — 63600175 PHARM REV CODE 636 W HCPCS: Mod: HCNC | Performed by: HOSPITALIST

## 2019-01-16 PROCEDURE — 84100 ASSAY OF PHOSPHORUS: CPT | Mod: HCNC

## 2019-01-16 PROCEDURE — S0030 INJECTION, METRONIDAZOLE: HCPCS | Mod: HCNC | Performed by: INTERNAL MEDICINE

## 2019-01-16 PROCEDURE — 99233 SBSQ HOSP IP/OBS HIGH 50: CPT | Mod: HCNC,,, | Performed by: SURGERY

## 2019-01-16 PROCEDURE — 99233 PR SUBSEQUENT HOSPITAL CARE,LEVL III: ICD-10-PCS | Mod: HCNC,,, | Performed by: SURGERY

## 2019-01-16 PROCEDURE — 85610 PROTHROMBIN TIME: CPT | Mod: HCNC

## 2019-01-16 RX ORDER — GLUCAGON 1 MG
1 KIT INJECTION
Status: DISCONTINUED | OUTPATIENT
Start: 2019-01-16 | End: 2019-01-17 | Stop reason: HOSPADM

## 2019-01-16 RX ORDER — ONDANSETRON 2 MG/ML
4 INJECTION INTRAMUSCULAR; INTRAVENOUS EVERY 8 HOURS PRN
Status: DISCONTINUED | OUTPATIENT
Start: 2019-01-16 | End: 2019-01-17 | Stop reason: HOSPADM

## 2019-01-16 RX ORDER — METRONIDAZOLE 500 MG/100ML
500 INJECTION, SOLUTION INTRAVENOUS
Status: DISCONTINUED | OUTPATIENT
Start: 2019-01-16 | End: 2019-01-17 | Stop reason: HOSPADM

## 2019-01-16 RX ORDER — DILTIAZEM HYDROCHLORIDE 180 MG/1
180 CAPSULE, COATED, EXTENDED RELEASE ORAL DAILY
Status: DISCONTINUED | OUTPATIENT
Start: 2019-01-16 | End: 2019-01-16

## 2019-01-16 RX ORDER — ATORVASTATIN CALCIUM 40 MG/1
40 TABLET, FILM COATED ORAL NIGHTLY
Status: DISCONTINUED | OUTPATIENT
Start: 2019-01-16 | End: 2019-01-17 | Stop reason: HOSPADM

## 2019-01-16 RX ORDER — ACETAMINOPHEN 325 MG/1
650 TABLET ORAL EVERY 8 HOURS PRN
Status: DISCONTINUED | OUTPATIENT
Start: 2019-01-16 | End: 2019-01-17 | Stop reason: HOSPADM

## 2019-01-16 RX ORDER — CIPROFLOXACIN 2 MG/ML
400 INJECTION, SOLUTION INTRAVENOUS
Status: DISCONTINUED | OUTPATIENT
Start: 2019-01-16 | End: 2019-01-17 | Stop reason: HOSPADM

## 2019-01-16 RX ORDER — INSULIN ASPART 100 [IU]/ML
0-5 INJECTION, SOLUTION INTRAVENOUS; SUBCUTANEOUS EVERY 6 HOURS PRN
Status: DISCONTINUED | OUTPATIENT
Start: 2019-01-16 | End: 2019-01-17 | Stop reason: HOSPADM

## 2019-01-16 RX ORDER — HYDROMORPHONE HYDROCHLORIDE 2 MG/ML
1 INJECTION, SOLUTION INTRAMUSCULAR; INTRAVENOUS; SUBCUTANEOUS EVERY 4 HOURS PRN
Status: DISCONTINUED | OUTPATIENT
Start: 2019-01-16 | End: 2019-01-16

## 2019-01-16 RX ORDER — MORPHINE SULFATE 4 MG/ML
2 INJECTION, SOLUTION INTRAMUSCULAR; INTRAVENOUS EVERY 4 HOURS PRN
Status: DISCONTINUED | OUTPATIENT
Start: 2019-01-16 | End: 2019-01-17 | Stop reason: HOSPADM

## 2019-01-16 RX ORDER — SODIUM CHLORIDE 0.9 % (FLUSH) 0.9 %
5 SYRINGE (ML) INJECTION
Status: DISCONTINUED | OUTPATIENT
Start: 2019-01-16 | End: 2019-01-17 | Stop reason: HOSPADM

## 2019-01-16 RX ORDER — PANTOPRAZOLE SODIUM 40 MG/10ML
40 INJECTION, POWDER, LYOPHILIZED, FOR SOLUTION INTRAVENOUS DAILY
Status: DISCONTINUED | OUTPATIENT
Start: 2019-01-16 | End: 2019-01-17 | Stop reason: HOSPADM

## 2019-01-16 RX ORDER — METOPROLOL SUCCINATE 50 MG/1
100 TABLET, EXTENDED RELEASE ORAL DAILY
Status: DISCONTINUED | OUTPATIENT
Start: 2019-01-16 | End: 2019-01-17 | Stop reason: HOSPADM

## 2019-01-16 RX ORDER — MORPHINE SULFATE 10 MG/ML
2 INJECTION INTRAMUSCULAR; INTRAVENOUS; SUBCUTANEOUS EVERY 4 HOURS PRN
Status: DISCONTINUED | OUTPATIENT
Start: 2019-01-16 | End: 2019-01-16

## 2019-01-16 RX ADMIN — METRONIDAZOLE 500 MG: 500 INJECTION, SOLUTION INTRAVENOUS at 06:01

## 2019-01-16 RX ADMIN — METOPROLOL SUCCINATE 100 MG: 50 TABLET, EXTENDED RELEASE ORAL at 08:01

## 2019-01-16 RX ADMIN — CIPROFLOXACIN 400 MG: 2 INJECTION, SOLUTION INTRAVENOUS at 05:01

## 2019-01-16 RX ADMIN — PANTOPRAZOLE SODIUM 40 MG: 40 INJECTION, POWDER, LYOPHILIZED, FOR SOLUTION INTRAVENOUS at 08:01

## 2019-01-16 RX ADMIN — ATORVASTATIN CALCIUM 40 MG: 40 TABLET, FILM COATED ORAL at 09:01

## 2019-01-16 NOTE — H&P
[facility]  History & Physical    Patient Name: Natalie Greene  MRN: [id number]  Admission Date: 1/15/2019  Attending Physician: Ruben Ortez MD   Primary Care Provider: Annabella Fenton MD         Patient information was obtained from patient and ER records.     Subjective:     Principal Problem:Hematoma of abdominal wall    Chief Complaint:   Chief Complaint   Patient presents with    Emesis     abdominal pain and vomiting since yesterday     Abdominal Pain        HPI: 78F h/o Afib, T2DM and HTN presents with n/v x 1.  Emesis is yellowish.  Associated with diffuse abdominal pain.  Patient denies any CP, SOB, HA, fever, chills, cough, diarrhea, constipation, dysuria, hematuria, frequency, flank pain, and all other sxs at this time. No further complaints or concerns at this time. In ER, CT abdomen show fluid collection around hernia mesh larger than prior CT in 2016 (from 15.5 x 8.4 x 17.7cm to 17.5 x 13.4 x 19.2 cm).   H/H 14.5/42.5 .  GI consulted in ER and plans to see patient in AM.          Past Medical History:   Diagnosis Date    *Atrial fibrillation     Coronary artery disease involving native coronary artery of native heart with angina pectoris 4/10/2018    Diabetes mellitus     DM (diabetes mellitus) 2002     09/06/2017 no medication    Hyperlipidemia     Hypertension        Past Surgical History:   Procedure Laterality Date    CHOLECYSTECTOMY      COLON SURGERY      HYSTERECTOMY         Review of patient's allergies indicates:  No Known Allergies    No current facility-administered medications on file prior to encounter.      Current Outpatient Medications on File Prior to Encounter   Medication Sig    amLODIPine (NORVASC) 5 MG tablet TAKE 1 TABLET EVERY DAY    atorvastatin (LIPITOR) 40 MG tablet Take 1 tablet (40 mg total) by mouth once daily.    losartan (COZAAR) 100 MG tablet Take 1 tablet (100 mg total) by mouth once daily.    metFORMIN  (GLUCOPHAGE-XR) 500 MG 24 hr tablet Take 1 tablet (500 mg total) by mouth daily with breakfast.    metoprolol succinate (TOPROL-XL) 100 MG 24 hr tablet Take 1 tablet (100 mg total) by mouth once daily.    warfarin (COUMADIN) 6 MG tablet Take by mouth once daily. Take 0.5 mg every Thursday and 1 tablet on all other days as directed by the [facility].    ACCU-CHEK JEN PLUS METER Misc     ACCU-CHEK JEN PLUS TEST STRP Strp     ACCU-CHEK SOFTCLIX LANCETS Misc     diltiaZEM (CARDIZEM CD) 180 MG 24 hr capsule Take 1 capsule (180 mg total) by mouth once daily.    INCONTINENCE PAD,LINER,DISP (BLADDER CONTROL PADS EX ABSORB MISC)     indapamide (LOZOL) 1.25 MG Tab Take 1.25 mg by mouth.    triamcinolone acetonide 0.1% (KENALOG) 0.1 % cream      Family History     Problem Relation (Age of Onset)    Diabetes Sister, Sister    Hypertension Sister, Mother        Tobacco Use    Smoking status: Never Smoker    Smokeless tobacco: Never Used   Substance and Sexual Activity    Alcohol use: No    Drug use: No    Sexual activity: Not on file     Review of Systems   Constitutional: Negative for chills, diaphoresis, fatigue, fever and unexpected weight change.   HENT: Negative for congestion, facial swelling, sore throat and trouble swallowing.    Eyes: Negative for photophobia, redness and visual disturbance.   Respiratory: Negative for apnea, cough, chest tightness, shortness of breath and wheezing.    Cardiovascular: Negative for chest pain, palpitations and leg swelling.   Gastrointestinal: Positive for abdominal pain, nausea and vomiting. Negative for abdominal distention, blood in stool, constipation and diarrhea.   Endocrine: Negative for polydipsia, polyphagia and polyuria.   Genitourinary: Negative for difficulty urinating, dysuria, flank pain, frequency, hematuria and urgency.   Musculoskeletal: Negative for arthralgias, back pain, joint swelling, myalgias and neck stiffness.   Skin: Negative for pallor  and rash.   Allergic/Immunologic: Negative for immunocompromised state.   Neurological: Negative for dizziness, tremors, syncope, weakness, light-headedness and headaches.   Psychiatric/Behavioral: Negative for agitation, confusion and suicidal ideas.   All other systems reviewed and are negative.    Objective:     Vital Signs (Most Recent):  Temp: 97.4 °F (36.3 °C) (01/16/19 0102)  Pulse: 81 (01/16/19 0331)  Resp: 18 (01/16/19 0102)  BP: (!) 142/70 (01/16/19 0102)  SpO2: 98 % (01/16/19 0102) Vital Signs (24h Range):  Temp:  [97.4 °F (36.3 °C)-98.6 °F (37 °C)] 97.4 °F (36.3 °C)  Pulse:  [] 81  Resp:  [17-20] 18  SpO2:  [97 %-100 %] 98 %  BP: (142-155)/(70-89) 142/70     Weight: 98.7 kg (217 lb 9.5 oz)  Body mass index is 34.08 kg/m².    Physical Exam   Constitutional: She is oriented to person, place, and time. She appears well-developed and well-nourished. No distress.   HENT:   Head: Normocephalic and atraumatic.   Mouth/Throat: Oropharynx is clear and moist.   Eyes: Conjunctivae and EOM are normal. Pupils are equal, round, and reactive to light. No scleral icterus.   Neck: Normal range of motion. Neck supple. No JVD present. No thyromegaly present.   Cardiovascular: Normal rate, regular rhythm and intact distal pulses. Exam reveals no gallop and no friction rub.   No murmur heard.  Pulmonary/Chest: Effort normal and breath sounds normal. No respiratory distress. She has no wheezes. She has no rales. She exhibits no tenderness.   Abdominal: Soft. Bowel sounds are normal. She exhibits distension (mild). She exhibits no mass. There is no tenderness. There is no guarding.   Musculoskeletal: Normal range of motion. She exhibits no tenderness.   Neurological: She is alert and oriented to person, place, and time. No cranial nerve deficit.   Skin: Skin is warm and dry. Capillary refill takes less than 2 seconds. No rash noted. She is not diaphoretic. No erythema.   Psychiatric: She has a normal mood and affect.    Nursing note and vitals reviewed.        CRANIAL NERVES     CN III, IV, VI   Pupils are equal, round, and reactive to light.  Extraocular motions are normal.        Significant Labs:   CBC:   Recent Labs   Lab 01/15/19  2000   WBC 12.90*   HGB 14.5   HCT 42.5        CMP:   Recent Labs   Lab 01/15/19  2000      K 3.9   CL 95   CO2 25   *   BUN 14   CREATININE 0.8   CALCIUM 9.8   PROT 8.3   ALBUMIN 4.2   BILITOT 2.0*   ALKPHOS 82   AST 33   ALT 24   ANIONGAP 16   EGFRNONAA >60     Coagulation:   Recent Labs   Lab 01/16/19  0025   INR 1.9*   APTT 32.6*     All pertinent labs within the past 24 hours have been reviewed.    Significant Imaging: I have reviewed all pertinent imaging results/findings within the past 24 hours.   Imaging Results          X-Ray Chest 1 View (Final result)  Result time 01/15/19 21:50:47    Final result by Jone Dior Jr., MD (01/15/19 21:50:47)                 Impression:      No acute abnormality.  Cardiomegaly.      Electronically signed by: Jone Dior Jr., MD  Date:    01/15/2019  Time:    21:50             Narrative:    EXAMINATION:  XR CHEST 1 VIEW    CLINICAL HISTORY:  Chest pain, unspecified    TECHNIQUE:  Single frontal view of the chest was performed.    COMPARISON:  None    FINDINGS:  The lungs are clear, with normal appearance of pulmonary vasculature and no pleural effusion or pneumothorax.    The cardiac silhouette is enlarged.  Calcific atheromatous change of the aortic knob.  The hilar and mediastinal contours are unremarkable.    Bones are intact.                               CT Abdomen Pelvis With Contrast (Final result)  Result time 01/15/19 21:35:44    Final result by Duane Betancourt MD (01/15/19 21:35:44)                 Impression:      The organized fluid collections surrounding the ventral abdominal wall mesh from previous hernia repair has significantly increased in size and has become more hyperdense suggesting internal hemorrhage.   Superimposed infection is not excluded.    Nonspecific scattered air-fluid levels throughout the small bowel.  No CT evidence of bowel obstruction.    Colonic diverticulosis.    All CT scans at this facility are performed  using dose modulation techniques as appropriate to performed exam including the following:  automated exposure control; adjustment of mA and/or kV according to the patients size (this includes techniques or standardized protocols for targeted exams where dose is matched to indication/reason for exam: i.e. extremities or head);  iterative reconstruction technique.      Electronically signed by: Duane Betancourt MD  Date:    01/15/2019  Time:    21:35             Narrative:    EXAMINATION:  CT ABDOMEN PELVIS WITH CONTRAST    CLINICAL HISTORY:  abdominal pain;    TECHNIQUE:  Axial CT imaging was performed through the abdomen and pelvis with  75cc  of intravenous contrast. Multiplanar reformats were performed and interpreted.    COMPARISON:  04/21/2016    FINDINGS:  No acute basilar infiltrate detected.    Probable cysts in the left liver measuring less than a cm.  Benign left upper pole renal cyst measuring up to 1.7 cm.  The right kidney is within normal limits.    The spleen, adrenal glands, pancreas are within normal limits.    Status post cholecystectomy.    There has been marked interval large amount of the fluid collection on both sides of the hernia mesh that measures 17.5 x 13.4 x 19.2 compared to 15.5 x 8.4 x 17.7 cm in the previous examination.  The fluid within the collection is heterogeneously hyperdense.  No free air or intra-abdominal abscess identified.  There are scattered air-fluid levels throughout the small bowel.  No definitive transition point identified.  The patient is status post right hemicolectomy with an ileocolic anastomosis.  Extensive colonic diverticulosis.    Aortic atherosclerosis without evidence of aneurysm.    The uterus has been removed.  No free pelvic fluid or  adenopathy.    The urinary bladder is unremarkable.    L2 vertebral body hemangioma.  Chronic moderate central compression fracture of the L4 vertebral body.  Diffuse idiopathic skeletal hyperostosis.                                  Assessment/Plan:     * Hematoma of abdominal wall    -  CT abdomen show fluid collection around hernia mesh larger than prior CT in 2016 (from 15.5 x 8.4 x 17.7cm to 17.5 x 13.4 x 19.2 cm).     - H/H stable 14.5/42.5   - type and screen  - hold warfarin  - Gen surgery plans to see in AM  - zofran prn nausea, morphine IV prn ab pain  - keep NPO     DM (diabetes mellitus)    - hold home meds  - start POC glucose q6h. SSI PRN       HTN (hypertension)    BP low normal likely from pain medication  - restart metoprolol for rate and BP control  - hold diltiazem and amlodipine, restart per day team if BP tolerates       A-fib    - rate controlled  - hold warfarin due to abdominal hematoma  - INR 1.9  - recommend consult cardiology for anticoagulation management and cardiology risk assessment but will defer decision per day team if needed         VTE Risk Mitigation (From admission, onward)        Ordered     IP VTE HIGH RISK PATIENT  Once      01/16/19 0458     Place JACK hose  Until discontinued      01/16/19 0458     Place sequential compression device  Until discontinued      01/16/19 0458     Reason for No Pharmacological VTE Prophylaxis  Once      01/16/19 0458             Ruben Ortez MD  Department of Hospital Medicine   [facility]

## 2019-01-16 NOTE — SUBJECTIVE & OBJECTIVE
Past Medical History:   Diagnosis Date    *Atrial fibrillation     Coronary artery disease involving native coronary artery of native heart with angina pectoris 4/10/2018    Diabetes mellitus     DM (diabetes mellitus) 2002     09/06/2017 no medication    Hyperlipidemia     Hypertension        Past Surgical History:   Procedure Laterality Date    CHOLECYSTECTOMY      COLON SURGERY      HYSTERECTOMY         Review of patient's allergies indicates:  No Known Allergies    No current facility-administered medications on file prior to encounter.      Current Outpatient Medications on File Prior to Encounter   Medication Sig    amLODIPine (NORVASC) 5 MG tablet TAKE 1 TABLET EVERY DAY    atorvastatin (LIPITOR) 40 MG tablet Take 1 tablet (40 mg total) by mouth once daily.    losartan (COZAAR) 100 MG tablet Take 1 tablet (100 mg total) by mouth once daily.    metFORMIN (GLUCOPHAGE-XR) 500 MG 24 hr tablet Take 1 tablet (500 mg total) by mouth daily with breakfast.    metoprolol succinate (TOPROL-XL) 100 MG 24 hr tablet Take 1 tablet (100 mg total) by mouth once daily.    warfarin (COUMADIN) 6 MG tablet Take by mouth once daily. Take 0.5 mg every Thursday and 1 tablet on all other days as directed by the [facility].    ACCU-CHEK JEN PLUS METER Misc     ACCU-CHEK JEN PLUS TEST STRP Strp     ACCU-CHEK SOFTCLIX LANCETS Misc     diltiaZEM (CARDIZEM CD) 180 MG 24 hr capsule Take 1 capsule (180 mg total) by mouth once daily.    INCONTINENCE PAD,LINER,DISP (BLADDER CONTROL PADS EX ABSORB MISC)     indapamide (LOZOL) 1.25 MG Tab Take 1.25 mg by mouth.    triamcinolone acetonide 0.1% (KENALOG) 0.1 % cream      Family History     Problem Relation (Age of Onset)    Diabetes Sister, Sister    Hypertension Sister, Mother        Tobacco Use    Smoking status: Never Smoker    Smokeless tobacco: Never Used   Substance and Sexual Activity    Alcohol use: No    Drug use: No    Sexual activity: Not on  file     Review of Systems   Constitutional: Negative for chills, diaphoresis, fatigue, fever and unexpected weight change.   HENT: Negative for congestion, facial swelling, sore throat and trouble swallowing.    Eyes: Negative for photophobia, redness and visual disturbance.   Respiratory: Negative for apnea, cough, chest tightness, shortness of breath and wheezing.    Cardiovascular: Negative for chest pain, palpitations and leg swelling.   Gastrointestinal: Positive for abdominal pain, nausea and vomiting. Negative for abdominal distention, blood in stool, constipation and diarrhea.   Endocrine: Negative for polydipsia, polyphagia and polyuria.   Genitourinary: Negative for difficulty urinating, dysuria, flank pain, frequency, hematuria and urgency.   Musculoskeletal: Negative for arthralgias, back pain, joint swelling, myalgias and neck stiffness.   Skin: Negative for pallor and rash.   Allergic/Immunologic: Negative for immunocompromised state.   Neurological: Negative for dizziness, tremors, syncope, weakness, light-headedness and headaches.   Psychiatric/Behavioral: Negative for agitation, confusion and suicidal ideas.   All other systems reviewed and are negative.    Objective:     Vital Signs (Most Recent):  Temp: 97.4 °F (36.3 °C) (01/16/19 0102)  Pulse: 81 (01/16/19 0331)  Resp: 18 (01/16/19 0102)  BP: (!) 142/70 (01/16/19 0102)  SpO2: 98 % (01/16/19 0102) Vital Signs (24h Range):  Temp:  [97.4 °F (36.3 °C)-98.6 °F (37 °C)] 97.4 °F (36.3 °C)  Pulse:  [] 81  Resp:  [17-20] 18  SpO2:  [97 %-100 %] 98 %  BP: (142-155)/(70-89) 142/70     Weight: 98.7 kg (217 lb 9.5 oz)  Body mass index is 34.08 kg/m².    Physical Exam   Constitutional: She is oriented to person, place, and time. She appears well-developed and well-nourished. No distress.   HENT:   Head: Normocephalic and atraumatic.   Mouth/Throat: Oropharynx is clear and moist.   Eyes: Conjunctivae and EOM are normal. Pupils are equal, round, and  reactive to light. No scleral icterus.   Neck: Normal range of motion. Neck supple. No JVD present. No thyromegaly present.   Cardiovascular: Normal rate, regular rhythm and intact distal pulses. Exam reveals no gallop and no friction rub.   No murmur heard.  Pulmonary/Chest: Effort normal and breath sounds normal. No respiratory distress. She has no wheezes. She has no rales. She exhibits no tenderness.   Abdominal: Soft. Bowel sounds are normal. She exhibits distension (mild). She exhibits no mass. There is no tenderness. There is no guarding.   Musculoskeletal: Normal range of motion. She exhibits no tenderness.   Neurological: She is alert and oriented to person, place, and time. No cranial nerve deficit.   Skin: Skin is warm and dry. Capillary refill takes less than 2 seconds. No rash noted. She is not diaphoretic. No erythema.   Psychiatric: She has a normal mood and affect.   Nursing note and vitals reviewed.        CRANIAL NERVES     CN III, IV, VI   Pupils are equal, round, and reactive to light.  Extraocular motions are normal.        Significant Labs:   CBC:   Recent Labs   Lab 01/15/19  2000   WBC 12.90*   HGB 14.5   HCT 42.5        CMP:   Recent Labs   Lab 01/15/19  2000      K 3.9   CL 95   CO2 25   *   BUN 14   CREATININE 0.8   CALCIUM 9.8   PROT 8.3   ALBUMIN 4.2   BILITOT 2.0*   ALKPHOS 82   AST 33   ALT 24   ANIONGAP 16   EGFRNONAA >60     Coagulation:   Recent Labs   Lab 01/16/19  0025   INR 1.9*   APTT 32.6*     All pertinent labs within the past 24 hours have been reviewed.    Significant Imaging: I have reviewed all pertinent imaging results/findings within the past 24 hours.   Imaging Results          X-Ray Chest 1 View (Final result)  Result time 01/15/19 21:50:47    Final result by Jone Dior Jr., MD (01/15/19 21:50:47)                 Impression:      No acute abnormality.  Cardiomegaly.      Electronically signed by: Jone Dior Jr.,  MD  Date:    01/15/2019  Time:    21:50             Narrative:    EXAMINATION:  XR CHEST 1 VIEW    CLINICAL HISTORY:  Chest pain, unspecified    TECHNIQUE:  Single frontal view of the chest was performed.    COMPARISON:  None    FINDINGS:  The lungs are clear, with normal appearance of pulmonary vasculature and no pleural effusion or pneumothorax.    The cardiac silhouette is enlarged.  Calcific atheromatous change of the aortic knob.  The hilar and mediastinal contours are unremarkable.    Bones are intact.                               CT Abdomen Pelvis With Contrast (Final result)  Result time 01/15/19 21:35:44    Final result by Duane Betancourt MD (01/15/19 21:35:44)                 Impression:      The organized fluid collections surrounding the ventral abdominal wall mesh from previous hernia repair has significantly increased in size and has become more hyperdense suggesting internal hemorrhage.  Superimposed infection is not excluded.    Nonspecific scattered air-fluid levels throughout the small bowel.  No CT evidence of bowel obstruction.    Colonic diverticulosis.    All CT scans at this facility are performed  using dose modulation techniques as appropriate to performed exam including the following:  automated exposure control; adjustment of mA and/or kV according to the patients size (this includes techniques or standardized protocols for targeted exams where dose is matched to indication/reason for exam: i.e. extremities or head);  iterative reconstruction technique.      Electronically signed by: Duane Betancourt MD  Date:    01/15/2019  Time:    21:35             Narrative:    EXAMINATION:  CT ABDOMEN PELVIS WITH CONTRAST    CLINICAL HISTORY:  abdominal pain;    TECHNIQUE:  Axial CT imaging was performed through the abdomen and pelvis with  75cc  of intravenous contrast. Multiplanar reformats were performed and interpreted.    COMPARISON:  04/21/2016    FINDINGS:  No acute basilar infiltrate  detected.    Probable cysts in the left liver measuring less than a cm.  Benign left upper pole renal cyst measuring up to 1.7 cm.  The right kidney is within normal limits.    The spleen, adrenal glands, pancreas are within normal limits.    Status post cholecystectomy.    There has been marked interval large amount of the fluid collection on both sides of the hernia mesh that measures 17.5 x 13.4 x 19.2 compared to 15.5 x 8.4 x 17.7 cm in the previous examination.  The fluid within the collection is heterogeneously hyperdense.  No free air or intra-abdominal abscess identified.  There are scattered air-fluid levels throughout the small bowel.  No definitive transition point identified.  The patient is status post right hemicolectomy with an ileocolic anastomosis.  Extensive colonic diverticulosis.    Aortic atherosclerosis without evidence of aneurysm.    The uterus has been removed.  No free pelvic fluid or adenopathy.    The urinary bladder is unremarkable.    L2 vertebral body hemangioma.  Chronic moderate central compression fracture of the L4 vertebral body.  Diffuse idiopathic skeletal hyperostosis.

## 2019-01-16 NOTE — CONSULTS
[facility]  Cardiology  Consult Note    Patient Name: Natalie Greene  MRN: [id number]  Admission Date: 1/15/2019  Hospital Length of Stay: 1 days  Code Status: Full Code   Attending Provider: Ethan Lara, Opal   Consulting Provider: SOFIA Long  Primary Care Physician: Annabella Fenton MD  Principal Problem:Hematoma of abdominal wall    Patient information was obtained from patient and ER records.     Inpatient consult to Cardiology  Consult performed by: SOFIA Long  Consult ordered by: Ethan Lara MD  Reason for consult: abdominal hematoma         Subjective:     Chief Complaint:  Abdominal pain     HPI:   Ms. Greene is a 77 y/o female who has PMH of A-fib,non-obs CAD,  HLP, HTN, DM. Patient anticoagulated with coumadin. Last INR  1.9.    Patient presented to the ED with diffuse cramping abdominal pain and 1 episode of nausea and vomiting.  She underwent a CT scan that showed a fluid collection around piece of mesh that was placed in 2012. Surgery consulted and has no plans for surgical intervention. Suggestion made to continue Coumadin for now. There is mention that patient may have developed a seroma after hernia repair surgery in 2012. Given that there is no evidence of infection at this time, surgery recommends checking ESR and C reactive protein and if elevated,  then elective aspiration can be done to look for signs of infection/culture. Patient currently hemodynamically stable.     Had negative stress test in June 2018. Echo in July 2017 showed EF 60% with moderate MR.     Past Medical History:   Diagnosis Date    *Atrial fibrillation     Coronary artery disease involving native coronary artery of native heart with angina pectoris 4/10/2018    Diabetes mellitus     DM (diabetes mellitus) 2002     09/06/2017 no medication    Hyperlipidemia     Hypertension        Past Surgical History:   Procedure Laterality Date    CHOLECYSTECTOMY       COLON SURGERY      HYSTERECTOMY         Review of patient's allergies indicates:  No Known Allergies    No current facility-administered medications on file prior to encounter.      Current Outpatient Medications on File Prior to Encounter   Medication Sig    amLODIPine (NORVASC) 5 MG tablet TAKE 1 TABLET EVERY DAY    atorvastatin (LIPITOR) 40 MG tablet Take 1 tablet (40 mg total) by mouth once daily.    losartan (COZAAR) 100 MG tablet Take 1 tablet (100 mg total) by mouth once daily.    metFORMIN (GLUCOPHAGE-XR) 500 MG 24 hr tablet Take 1 tablet (500 mg total) by mouth daily with breakfast.    metoprolol succinate (TOPROL-XL) 100 MG 24 hr tablet Take 1 tablet (100 mg total) by mouth once daily.    warfarin (COUMADIN) 6 MG tablet Take by mouth once daily. Take 0.5 mg every Thursday and 1 tablet on all other days as directed by the [facility].    ACCU-CHEK JEN PLUS METER Misc     ACCU-CHEK JEN PLUS TEST STRP Strp     ACCU-CHEK SOFTCLIX LANCETS Misc     diltiaZEM (CARDIZEM CD) 180 MG 24 hr capsule Take 1 capsule (180 mg total) by mouth once daily.    INCONTINENCE PAD,LINER,DISP (BLADDER CONTROL PADS EX ABSORB MISC)     indapamide (LOZOL) 1.25 MG Tab Take 1.25 mg by mouth.    triamcinolone acetonide 0.1% (KENALOG) 0.1 % cream      Family History     Problem Relation (Age of Onset)    Diabetes Sister, Sister    Hypertension Sister, Mother        Tobacco Use    Smoking status: Never Smoker    Smokeless tobacco: Never Used   Substance and Sexual Activity    Alcohol use: No    Drug use: No    Sexual activity: Not on file     Review of Systems   Constitution: Negative for diaphoresis, weakness, malaise/fatigue, weight gain and weight loss.   HENT: Negative for congestion and nosebleeds.    Cardiovascular: Negative for chest pain, claudication, cyanosis, dyspnea on exertion, irregular heartbeat, leg swelling, near-syncope, orthopnea, palpitations, paroxysmal nocturnal dyspnea and syncope.    Respiratory: Negative for cough, hemoptysis, shortness of breath, sleep disturbances due to breathing, snoring, sputum production and wheezing.    Hematologic/Lymphatic: Negative for bleeding problem. Does not bruise/bleed easily.   Skin: Negative for rash.   Musculoskeletal: Negative for arthritis, back pain, falls, joint pain, muscle cramps and muscle weakness.   Gastrointestinal: Negative for abdominal pain, constipation, diarrhea, heartburn, hematemesis, hematochezia, melena and nausea.        Abdominal pain present on admit has resolved    Genitourinary: Negative for dysuria, hematuria and nocturia.   Neurological: Negative for excessive daytime sleepiness, dizziness, headaches, light-headedness, loss of balance, numbness and vertigo.     Objective:     Vital Signs (Most Recent):  Temp: 97.8 °F (36.6 °C) (01/16/19 1125)  Pulse: 86 (01/16/19 1538)  Resp: 17 (01/16/19 1125)  BP: 119/60 (01/16/19 1125)  SpO2: 98 % (01/16/19 1125) Vital Signs (24h Range):  Temp:  [97.4 °F (36.3 °C)-98.6 °F (37 °C)] 97.8 °F (36.6 °C)  Pulse:  [] 86  Resp:  [17-20] 17  SpO2:  [96 %-100 %] 98 %  BP: (113-155)/(56-89) 119/60     Weight: 98.7 kg (217 lb 9.5 oz)  Body mass index is 34.08 kg/m².    SpO2: 98 %  O2 Device (Oxygen Therapy): room air      Intake/Output Summary (Last 24 hours) at 1/16/2019 1602  Last data filed at 1/16/2019 1300  Gross per 24 hour   Intake 1716 ml   Output --   Net 1716 ml       Lines/Drains/Airways     Peripheral Intravenous Line                 Peripheral IV - Single Lumen 01/15/19 2000 Left Antecubital less than 1 day                Physical Exam   Constitutional: She is oriented to person, place, and time. She appears well-developed and well-nourished.   Neck: Neck supple. No JVD present.   Cardiovascular: Normal rate, regular rhythm, normal heart sounds and normal pulses. Exam reveals no friction rub.   No murmur heard.  Pulmonary/Chest: Effort normal and breath sounds normal. No respiratory  distress. She has no wheezes. She has no rales.   Abdominal: Soft. Bowel sounds are normal. She exhibits no distension.   Palpable mass to    Musculoskeletal: She exhibits no edema or tenderness.   Neurological: She is alert and oriented to person, place, and time.   Skin: Skin is warm and dry. No rash noted.   Palpable mass to umbilicus      Psychiatric: She has a normal mood and affect. Her behavior is normal.   Nursing note and vitals reviewed.      Significant Labs:   All pertinent lab results from the last 24 hours have been reviewed. and   Recent Lab Results       01/16/19  1127   01/16/19  1051   01/16/19  0545   01/16/19  0442   01/16/19  0025        Albumin       3.4       Alkaline Phosphatase       66       ALT       22       Amylase               Anion Gap       7       Antibody Identification               Appearance, UA               aPTT         32.6  Comment:  aPTT therapeutic range = 39-69 seconds     AST       27       Baso #       0.01       Basophil%       0.1       Bilirubin (UA)               Total Bilirubin       1.3  Comment:  For infants and newborns, interpretation of results should be based  on gestational age, weight and in agreement with clinical  observations.  Premature Infant recommended reference ranges:  Up to 24 hours.............<8.0 mg/dL  Up to 48 hours............<12.0 mg/dL  3-5 days..................<15.0 mg/dL  6-29 days.................<15.0 mg/dL         BUN, Bld       12       Calcium       8.6       Chloride       100       CO2       28       Color, UA               Creatinine       0.8       Differential Method       Automated       eGFR if        >60       eGFR if non        >60  Comment:  Calculation used to obtain the estimated glomerular filtration  rate (eGFR) is the CKD-EPI equation.          Eos #       0.1       Eosinophil%       1.3       Glucose       109       Glucose, UA               Gran # (ANC)       6.1       Gran%        73.6       Group & Rh               Hematocrit       39.1       Hemoglobin       12.8       INDIRECT REYNALDO               Coumadin Monitoring INR         1.9  Comment:  Coumadin Therapy:  2.0 - 3.0 for INR for all indicators except mechanical heart valves  and antiphospholipid syndromes which should use 2.5 - 3.5.       Ketones, UA               Leukocytes, UA               Lipase               Lymph #       1.6       Lymph%       18.8       Magnesium       2.0       MCH       30.2       MCHC       32.7       MCV       92       Mono #       0.5       Mono%       6.2       MPV       10.8       Nitrite, UA               Occult Blood UA               pH, UA               Phosphorus       4.2       Platelets       184       POCT Glucose   107 112         Potassium       3.9       Total Protein       6.7       Protein, UA               Protime         19.8     RBC       4.24       RDW       16.5       Sed Rate 34             Sodium       135       Specific [place], UA               Specimen UA               Troponin I               Urobilinogen, UA               WBC       8.24                        01/16/19  0023   01/15/19  2145   01/15/19  2000        Albumin     4.2     Alkaline Phosphatase     82     ALT     24     Amylase     39     Anion Gap     16     Antibody Identification POS  Comment:  Anti-D  Undetermined Specificity  [C]         Appearance, UA   Clear       aPTT           AST     33     Baso #     0.01     Basophil%     0.1     Bilirubin (UA)   Negative       Total Bilirubin     2.0  Comment:  For infants and newborns, interpretation of results should be based  on gestational age, weight and in agreement with clinical  observations.  Premature Infant recommended reference ranges:  Up to 24 hours.............<8.0 mg/dL  Up to 48 hours............<12.0 mg/dL  3-5 days..................<15.0 mg/dL  6-29 days.................<15.0 mg/dL       BUN, Bld     14     Calcium     9.8     Chloride     95     CO2      25     Color, UA   Yellow       Creatinine     0.8     Differential Method     Automated     eGFR if      >60     eGFR if non      >60  Comment:  Calculation used to obtain the estimated glomerular filtration  rate (eGFR) is the CKD-EPI equation.        Eos #     0.1     Eosinophil%     0.5     Glucose     135     Glucose, UA   Negative       Gran # (ANC)     11.1     Gran%     85.8     Group & Rh A NEG         Hematocrit     42.5     Hemoglobin     14.5     INDIRECT REYNALDO POS         Coumadin Monitoring INR           Ketones, UA   Trace       Leukocytes, UA   Negative       Lipase     34     Lymph #     1.1     Lymph%     8.4     Magnesium           MCH     31.0     MCHC     34.1     MCV     91     Mono #     0.7     Mono%     5.2     MPV     10.8     Nitrite, UA   Negative       Occult Blood UA   Negative       pH, UA   7.0       Phosphorus           Platelets     235     POCT Glucose           Potassium     3.9     Total Protein     8.3     Protein, UA   Negative  Comment:  Recommend a 24 hour urine protein or a urine   protein/creatinine ratio if globulin induced proteinuria is  clinically suspected.         Protime           RBC     4.67     RDW     16.2     Sed Rate           Sodium     136     Specific [place], UA   1.010       Specimen UA   Urine, Clean Catch       Troponin I <0.006  Comment:  The reference interval for Troponin I represents the 99th percentile   cutoff   for our facility and is consistent with 3rd generation assay   performance.     <0.006  Comment:  The reference interval for Troponin I represents the 99th percentile   cutoff   for our facility and is consistent with 3rd generation assay   performance.       Urobilinogen, UA   Negative       WBC     12.90           Significant Imaging: Echocardiogram:   2D echo with color flow doppler:   Results for orders placed or performed in visit on 06/28/17   2D echo with color flow doppler   Result Value Ref Range     QEF 60 55 - 65    Mitral Valve Regurgitation MODERATE (A)     Est. PA Systolic Pressure 38.44     Mitral Valve Mobility NORMAL     Tricuspid Valve Regurgitation MILD      Assessment and Plan:     Abdominal wall seroma    -CT scan showed a fluid collection around piece of mesh that was placed in 2012. Surgery consulted and no plans for surgical intervention at this time.   -Appears that seroma developed following her hernia repair surgery in 2012  -Given that there is no evidence of infection at this time, surgery recommends checking ESR and C reactive protein and if elevated,  then elective aspiration can be done to look for signs of infection/culture. Otherwise doesn't require intervention per surgery   -Will await decision for intervention before holding OAC     HTN (hypertension)    -BP controlled on current medical management        A-fib    -Chronic A-fib. Rate controlled on current medical management  -Chronically anticoagulated with coumadin for CVA prophylaxis   -Last INR 1.9   -Had CT scan that showed a fluid collection around piece of mesh that was placed in 2012. Surgery consulted and no plans for surgical intervention at this time. Suggestion made to continue Coumadin for now.  -continue metoprolol 100mg daily   -Had negative stress test in June 2018  - Echo in July 2017 showed EF 60% with moderate MR.   -Consider switching to NOAC, but patient not interested at this time   -Repeat 2D echo          VTE Risk Mitigation (From admission, onward)        Ordered     IP VTE HIGH RISK PATIENT  Once      01/16/19 0458     Place JACK hose  Until discontinued      01/16/19 0458     Place sequential compression device  Until discontinued      01/16/19 0458     Reason for No Pharmacological VTE Prophylaxis  Once      01/16/19 0458        Chart reviewed. Patient examined by Dr. Taveras and agrees with plan that has been outlined.       Thank you for your consult. I will follow-up with patient. Please contact us if you  have any additional questions.    SOFIA Long  Cardiology   [facility]

## 2019-01-16 NOTE — ASSESSMENT & PLAN NOTE
BP low normal likely from pain medication  - restart metoprolol for rate and BP control  - hold diltiazem and amlodipine, restart per day team if BP tolerates

## 2019-01-16 NOTE — SUBJECTIVE & OBJECTIVE
No current facility-administered medications on file prior to encounter.      Current Outpatient Medications on File Prior to Encounter   Medication Sig    amLODIPine (NORVASC) 5 MG tablet TAKE 1 TABLET EVERY DAY    atorvastatin (LIPITOR) 40 MG tablet Take 1 tablet (40 mg total) by mouth once daily.    losartan (COZAAR) 100 MG tablet Take 1 tablet (100 mg total) by mouth once daily.    metFORMIN (GLUCOPHAGE-XR) 500 MG 24 hr tablet Take 1 tablet (500 mg total) by mouth daily with breakfast.    metoprolol succinate (TOPROL-XL) 100 MG 24 hr tablet Take 1 tablet (100 mg total) by mouth once daily.    warfarin (COUMADIN) 6 MG tablet Take by mouth once daily. Take 0.5 mg every Thursday and 1 tablet on all other days as directed by the [facility].    ACCU-CHEK JEN PLUS METER Misc     ACCU-CHEK JEN PLUS TEST STRP Strp     ACCU-CHEK SOFTCLIX LANCETS Misc     diltiaZEM (CARDIZEM CD) 180 MG 24 hr capsule Take 1 capsule (180 mg total) by mouth once daily.    INCONTINENCE PAD,LINER,DISP (BLADDER CONTROL PADS EX ABSORB MISC)     indapamide (LOZOL) 1.25 MG Tab Take 1.25 mg by mouth.    triamcinolone acetonide 0.1% (KENALOG) 0.1 % cream        Review of patient's allergies indicates:  No Known Allergies    Past Medical History:   Diagnosis Date    *Atrial fibrillation     Coronary artery disease involving native coronary artery of native heart with angina pectoris 4/10/2018    Diabetes mellitus     DM (diabetes mellitus) 2002     09/06/2017 no medication    Hyperlipidemia     Hypertension      Past Surgical History:   Procedure Laterality Date    CHOLECYSTECTOMY      COLON SURGERY      HYSTERECTOMY       Family History     Problem Relation (Age of Onset)    Diabetes Sister, Sister    Hypertension Sister, Mother        Tobacco Use    Smoking status: Never Smoker    Smokeless tobacco: Never Used   Substance and Sexual Activity    Alcohol use: No    Drug use: No    Sexual activity: Not on file      Review of Systems   Constitutional: Negative for appetite change, chills, fatigue, fever and unexpected weight change.   HENT: Negative for hearing loss and rhinorrhea.    Eyes: Negative for visual disturbance.   Respiratory: Negative for apnea, cough, shortness of breath and wheezing.    Cardiovascular: Negative for chest pain and palpitations.   Gastrointestinal: Positive for abdominal pain, nausea and vomiting. Negative for abdominal distention, blood in stool, constipation and diarrhea.   Genitourinary: Negative for dysuria, frequency and urgency.   Musculoskeletal: Negative for arthralgias and neck pain.   Skin: Negative for rash.   Neurological: Negative for seizures, weakness, numbness and headaches.   Hematological: Negative for adenopathy. Does not bruise/bleed easily.   Psychiatric/Behavioral: Negative for hallucinations. The patient is not nervous/anxious.      Objective:     Vital Signs (Most Recent):  Temp: 98.6 °F (37 °C) (01/16/19 0732)  Pulse: 88 (01/16/19 0925)  Resp: 18 (01/16/19 0732)  BP: (!) 135/59 (01/16/19 0732)  SpO2: 98 % (01/16/19 0732) Vital Signs (24h Range):  Temp:  [97.4 °F (36.3 °C)-98.6 °F (37 °C)] 98.6 °F (37 °C)  Pulse:  [] 88  Resp:  [17-20] 18  SpO2:  [96 %-100 %] 98 %  BP: (113-155)/(56-89) 135/59     Weight: 98.7 kg (217 lb 9.5 oz)  Body mass index is 34.08 kg/m².    Physical Exam   Constitutional: She is oriented to person, place, and time. No distress.   Obese   HENT:   Head: Normocephalic.   Eyes: EOM are normal. Pupils are equal, round, and reactive to light.   Neck: No JVD present. No tracheal deviation present. No thyromegaly present.   Cardiovascular: Normal rate, regular rhythm and normal heart sounds.   Pulmonary/Chest: Breath sounds normal. She has no wheezes.   Abdominal: Soft. Bowel sounds are normal. She exhibits no distension and no mass. There is no hepatosplenomegaly. There is no tenderness. There is no rigidity, no rebound and no guarding. No hernia.    There is a fullness of the abdomen centered around the umbilicus   Musculoskeletal: Normal range of motion. She exhibits no edema.   Lymphadenopathy:     She has no cervical adenopathy.   Neurological: She is alert and oriented to person, place, and time.   Skin: Skin is warm and dry. Capillary refill takes less than 2 seconds. No rash noted. No erythema.   Psychiatric: She has a normal mood and affect.       Significant Labs:  CBC:   Recent Labs   Lab 01/16/19 0442   WBC 8.24   RBC 4.24   HGB 12.8   HCT 39.1      MCV 92   MCH 30.2   MCHC 32.7     BMP:   Recent Labs   Lab 01/16/19 0442      *   K 3.9      CO2 28   BUN 12   CREATININE 0.8   CALCIUM 8.6*   MG 2.0     CMP:   Recent Labs   Lab 01/16/19 0442      CALCIUM 8.6*   ALBUMIN 3.4*   PROT 6.7   *   K 3.9   CO2 28      BUN 12   CREATININE 0.8   ALKPHOS 66   ALT 22   AST 27   BILITOT 1.3*       Significant Diagnostics:  CT: I have reviewed all pertinent results/findings within the past 24 hours and my personal findings are:      Narrative     EXAMINATION:  CT ABDOMEN PELVIS WITH CONTRAST    CLINICAL HISTORY:  abdominal pain;    TECHNIQUE:  Axial CT imaging was performed through the abdomen and pelvis with  75cc  of intravenous contrast. Multiplanar reformats were performed and interpreted.    COMPARISON:  04/21/2016    FINDINGS:  No acute basilar infiltrate detected.    Probable cysts in the left liver measuring less than a cm.  Benign left upper pole renal cyst measuring up to 1.7 cm.  The right kidney is within normal limits.    The spleen, adrenal glands, pancreas are within normal limits.    Status post cholecystectomy.    There has been marked interval large amount of the fluid collection on both sides of the hernia mesh that measures 17.5 x 13.4 x 19.2 compared to 15.5 x 8.4 x 17.7 cm in the previous examination.  The fluid within the collection is heterogeneously hyperdense.  No free air or intra-abdominal  abscess identified.  There are scattered air-fluid levels throughout the small bowel.  No definitive transition point identified.  The patient is status post right hemicolectomy with an ileocolic anastomosis.  Extensive colonic diverticulosis.    Aortic atherosclerosis without evidence of aneurysm.    The uterus has been removed.  No free pelvic fluid or adenopathy.    The urinary bladder is unremarkable.    L2 vertebral body hemangioma.  Chronic moderate central compression fracture of the L4 vertebral body.  Diffuse idiopathic skeletal hyperostosis.      Impression       The organized fluid collections surrounding the ventral abdominal wall mesh from previous hernia repair has significantly increased in size and has become more hyperdense suggesting internal hemorrhage.  Superimposed infection is not excluded.    Nonspecific scattered air-fluid levels throughout the small bowel.  No CT evidence of bowel obstruction.    Colonic diverticulosis.    All CT scans at this facility are performed  using dose modulation techniques as appropriate to performed exam including the following:  automated exposure control; adjustment of mA and/or kV according to the patients size (this includes techniques or standardized protocols for targeted exams where dose is matched to indication/reason for exam: i.e. extremities or head);  iterative reconstruction technique.      Electronically signed by: Duane Betancourt MD  Date: 01/15/2019  Time: 21:35     CT scan from 04/21/2016 was reviewed and is essentially the same

## 2019-01-16 NOTE — HOSPITAL COURSE
Currently the patient denies any abdominal pain. She reports no chest pain.  She does not report any episodes of fever or chills    No abdominal pain, no fever, tolerated a diet, no interested in aspiration fluid at this time as no clinic signs of mesh infection  Juvencio and CRP elevated, but non specific, like seroma with secondary hematoma/blood in the seroma

## 2019-01-16 NOTE — HPI
Ms. Greene is a 77 y/o female who has PMH of A-fib,non-obs CAD,  HLP, HTN, DM. Patient anticoagulated with coumadin. Last INR  1.9.    Patient presented to the ED with diffuse cramping abdominal pain and 1 episode of nausea and vomiting.  She underwent a CT scan that showed a fluid collection around piece of mesh that was placed in 2012. Surgery consulted and has no plans for surgical intervention. Suggestion made to continue Coumadin for now. There is mention that patient may have developed a seroma after hernia repair surgery in 2012. Given that there is no evidence of infection at this time, surgery recommends checking ESR and C reactive protein and if elevated,  then elective aspiration can be done to look for signs of infection/culture. Patient currently hemodynamically stable.     Had negative stress test in June 2018. Echo in July 2017 showed EF 60% with moderate MR.

## 2019-01-16 NOTE — PLAN OF CARE
Problem: Adult Inpatient Plan of Care  Goal: Plan of Care Review  Outcome: Ongoing (interventions implemented as appropriate)  POC reviewed, verbalized understanding. Pt remained free from falls, fall precautions in place. Pt is NSR on monitor, 70-90s. VSS. No other c/o at this time. PIV intact, saline locked. Call bell and personal belongings within reach. Hourly rounding complete. Reminded to call for assistance. Will continue to monitor.

## 2019-01-16 NOTE — PLAN OF CARE
Sw met with patient at bedside to complete assessment. No family was present at bedside at time of visit. Pt reports she is independent and denies any post hospital needs or services at this time. Transitional Care Folder, Discharge Planning Begins on Admission pamphlet, [facility] Bedside Delivery pamphlet, Advance Directive information given to patient along with the contact information given. Sw instructed patient or family to call with any questions or concerns.    Pt's pcp is Annabella Fenton MD. Pt uses   [facility] [address] [facility] [address] [place] [address]  [address] [address] [address]  Phone: [phone] Fax: [phone]    [facility] Mail Delivery - [facility] [address] [facility]  [address] [facility] [address]  Phone: [phone] Fax: [phone]    [facility] [address] [place]  [facility] [address]  Phone: [phone] Fax: [phone]    [facility] [address] [facility] [address] [facility]  [address] [facility] [address]  Phone: [phone] Fax: [phone]    D/c Plan: Home  D/C Trans: Spouse       01/16/19 1445   Discharge Assessment   Assessment Type Discharge Planning Assessment   Confirmed/corrected address and phone number on facesheet? Yes   Assessment information obtained from? Patient   Communicated expected length of stay with patient/caregiver yes   Prior to hospitilization cognitive status: Alert/Oriented   Prior to hospitalization functional status: Independent   Current cognitive status: Alert/Oriented   Current Functional Status: Independent   Facility Arrived From: Home   Lives With spouse   Able to Return to Prior Arrangements yes   Is patient able to care for self after discharge? Yes   Who are your caregiver(s) and their phone number(s)? spouse   Patient's perception of discharge disposition home or selfcare   Readmission Within the Last 30 Days no previous admission  in last 30 days   Patient currently being followed by outpatient case management? No   Patient currently receives any other outside agency services? No   Equipment Currently Used at Home none   Do you have any problems affording any of your prescribed medications? TBD   Is the patient taking medications as prescribed? yes   Does the patient have transportation home? Yes   Transportation Anticipated family or friend will provide   Does the patient receive services at the Coumadin Clinic? No   Discharge Plan A Home with family   Discharge Plan B Home with family   DME Needed Upon Discharge  none   Patient/Family in Agreement with Plan yes

## 2019-01-16 NOTE — PLAN OF CARE
Pt was seen and examined at beside . She was admitted with a dx of Lower abdominal pain  And abnormal CT abdomen . The ct abdomen show The organized fluid collections surrounding the ventral abdominal wall mesh from previous hernia repair has significantly increased in size and has become more hyperdense suggesting internal hemorrhage. Surgery was consulted and  Recommend  Possible seroma vs infection , no surgical intervention at this time .  Pt will be started on IVAB . Cardiology consulted to help with  OAC recommendation for afib . Infectious etiology low probability  .

## 2019-01-16 NOTE — ASSESSMENT & PLAN NOTE
-Chronic A-fib. Rate controlled on current medical management  -Chronically anticoagulated with coumadin for CVA prophylaxis   -Last INR 1.9   -Had CT scan that showed a fluid collection around piece of mesh that was placed in 2012. Surgery consulted and no plans for surgical intervention at this time. Suggestion made to continue Coumadin for now.  -continue metoprolol 100mg daily   -Had negative stress test in June 2018  - Echo in July 2017 showed EF 60% with moderate MR.   -Consider switching to NOAC, but patient not interested at this time

## 2019-01-16 NOTE — ED NOTES
Per radiology tech, pt reported new onset chest pain during transport for CT.  Provider made aware.

## 2019-01-16 NOTE — ASSESSMENT & PLAN NOTE
- rate controlled  - hold warfarin due to abdominal hematoma  - INR 1.9  - recommend consult cardiology for anticoagulation management and cardiology risk assessment but will defer decision per day team if needed

## 2019-01-16 NOTE — CONSULTS
[facility] -   General Surgery  Consult Note    Patient Name: Natalie Greene  MRN: [id number]  Code Status: Full Code  Admission Date: 1/15/2019  Hospital Length of Stay: 0 days  Attending Physician: Ethan Lara, *  Primary Care Provider: Annabella Fenton MD    Patient information was obtained from patient, spouse/SO, past medical records and ER records.     Inpatient consult to General Surgery  Consult performed by: Shawn Baca MD  Consult ordered by: Artemio Best Jr., FNP  Reason for consult: Hematoma  Assessment/Recommendations: Patient likely blood into a chronic seroma.  As long as is no active bleeding this can be observed.        Subjective:     Principal Problem: Hematoma of abdominal wall    History of Present Illness: Patient presented to the emergency room with diffuse cramping abdominal pain and 1 episode of nausea and vomiting.  She underwent a CT scan that showed a fluid collection around piece of mesh that was placed in 2012.  The after the CT scans complained of chest pain.  The patient was admitted by the medical service.        No current facility-administered medications on file prior to encounter.      Current Outpatient Medications on File Prior to Encounter   Medication Sig    amLODIPine (NORVASC) 5 MG tablet TAKE 1 TABLET EVERY DAY    atorvastatin (LIPITOR) 40 MG tablet Take 1 tablet (40 mg total) by mouth once daily.    losartan (COZAAR) 100 MG tablet Take 1 tablet (100 mg total) by mouth once daily.    metFORMIN (GLUCOPHAGE-XR) 500 MG 24 hr tablet Take 1 tablet (500 mg total) by mouth daily with breakfast.    metoprolol succinate (TOPROL-XL) 100 MG 24 hr tablet Take 1 tablet (100 mg total) by mouth once daily.    warfarin (COUMADIN) 6 MG tablet Take by mouth once daily. Take 0.5 mg every Thursday and 1 tablet on all other days as directed by the Coumadin Clinic.    ACCU-CHEK JEN PLUS METER Misc     ACCU-CHEK JEN PLUS TEST STRP Strp      ACCU-CHEK SOFTCLIX LANCETS Misc     diltiaZEM (CARDIZEM CD) 180 MG 24 hr capsule Take 1 capsule (180 mg total) by mouth once daily.    INCONTINENCE PAD,LINER,DISP (BLADDER CONTROL PADS EX ABSORB MISC)     indapamide (LOZOL) 1.25 MG Tab Take 1.25 mg by mouth.    triamcinolone acetonide 0.1% (KENALOG) 0.1 % cream        Review of patient's allergies indicates:  No Known Allergies    Past Medical History:   Diagnosis Date    *Atrial fibrillation     Coronary artery disease involving native coronary artery of native heart with angina pectoris 4/10/2018    Diabetes mellitus     DM (diabetes mellitus) 2002     09/06/2017 no medication    Hyperlipidemia     Hypertension      Past Surgical History:   Procedure Laterality Date    CHOLECYSTECTOMY      COLON SURGERY      HYSTERECTOMY       Family History     Problem Relation (Age of Onset)    Diabetes Sister, Sister    Hypertension Sister, Mother        Tobacco Use    Smoking status: Never Smoker    Smokeless tobacco: Never Used   Substance and Sexual Activity    Alcohol use: No    Drug use: No    Sexual activity: Not on file     Review of Systems   Constitutional: Negative for appetite change, chills, fatigue, fever and unexpected weight change.   HENT: Negative for hearing loss and rhinorrhea.    Eyes: Negative for visual disturbance.   Respiratory: Negative for apnea, cough, shortness of breath and wheezing.    Cardiovascular: Negative for chest pain and palpitations.   Gastrointestinal: Positive for abdominal pain, nausea and vomiting. Negative for abdominal distention, blood in stool, constipation and diarrhea.   Genitourinary: Negative for dysuria, frequency and urgency.   Musculoskeletal: Negative for arthralgias and neck pain.   Skin: Negative for rash.   Neurological: Negative for seizures, weakness, numbness and headaches.   Hematological: Negative for adenopathy. Does not bruise/bleed easily.   Psychiatric/Behavioral: Negative for  hallucinations. The patient is not nervous/anxious.      Objective:     Vital Signs (Most Recent):  Temp: 98.6 °F (37 °C) (01/16/19 0732)  Pulse: 88 (01/16/19 0925)  Resp: 18 (01/16/19 0732)  BP: (!) 135/59 (01/16/19 0732)  SpO2: 98 % (01/16/19 0732) Vital Signs (24h Range):  Temp:  [97.4 °F (36.3 °C)-98.6 °F (37 °C)] 98.6 °F (37 °C)  Pulse:  [] 88  Resp:  [17-20] 18  SpO2:  [96 %-100 %] 98 %  BP: (113-155)/(56-89) 135/59     Weight: 98.7 kg (217 lb 9.5 oz)  Body mass index is 34.08 kg/m².    Physical Exam   Constitutional: She is oriented to person, place, and time. No distress.   Obese   HENT:   Head: Normocephalic.   Eyes: EOM are normal. Pupils are equal, round, and reactive to light.   Neck: No JVD present. No tracheal deviation present. No thyromegaly present.   Cardiovascular: Normal rate, regular rhythm and normal heart sounds.   Pulmonary/Chest: Breath sounds normal. She has no wheezes.   Abdominal: Soft. Bowel sounds are normal. She exhibits no distension and no mass. There is no hepatosplenomegaly. There is no tenderness. There is no rigidity, no rebound and no guarding. No hernia.   There is a fullness of the abdomen centered around the umbilicus   Musculoskeletal: Normal range of motion. She exhibits no edema.   Lymphadenopathy:     She has no cervical adenopathy.   Neurological: She is alert and oriented to person, place, and time.   Skin: Skin is warm and dry. Capillary refill takes less than 2 seconds. No rash noted. No erythema.   Psychiatric: She has a normal mood and affect.       Significant Labs:  CBC:   Recent Labs   Lab 01/16/19  0442   WBC 8.24   RBC 4.24   HGB 12.8   HCT 39.1      MCV 92   MCH 30.2   MCHC 32.7     BMP:   Recent Labs   Lab 01/16/19  0442      *   K 3.9      CO2 28   BUN 12   CREATININE 0.8   CALCIUM 8.6*   MG 2.0     CMP:   Recent Labs   Lab 01/16/19  0442      CALCIUM 8.6*   ALBUMIN 3.4*   PROT 6.7   *   K 3.9   CO2 28       BUN 12   CREATININE 0.8   ALKPHOS 66   ALT 22   AST 27   BILITOT 1.3*       Significant Diagnostics:  CT: I have reviewed all pertinent results/findings within the past 24 hours and my personal findings are:      Narrative     EXAMINATION:  CT ABDOMEN PELVIS WITH CONTRAST    CLINICAL HISTORY:  abdominal pain;    TECHNIQUE:  Axial CT imaging was performed through the abdomen and pelvis with  75cc  of intravenous contrast. Multiplanar reformats were performed and interpreted.    COMPARISON:  04/21/2016    FINDINGS:  No acute basilar infiltrate detected.    Probable cysts in the left liver measuring less than a cm.  Benign left upper pole renal cyst measuring up to 1.7 cm.  The right kidney is within normal limits.    The spleen, adrenal glands, pancreas are within normal limits.    Status post cholecystectomy.    There has been marked interval large amount of the fluid collection on both sides of the hernia mesh that measures 17.5 x 13.4 x 19.2 compared to 15.5 x 8.4 x 17.7 cm in the previous examination.  The fluid within the collection is heterogeneously hyperdense.  No free air or intra-abdominal abscess identified.  There are scattered air-fluid levels throughout the small bowel.  No definitive transition point identified.  The patient is status post right hemicolectomy with an ileocolic anastomosis.  Extensive colonic diverticulosis.    Aortic atherosclerosis without evidence of aneurysm.    The uterus has been removed.  No free pelvic fluid or adenopathy.    The urinary bladder is unremarkable.    L2 vertebral body hemangioma.  Chronic moderate central compression fracture of the L4 vertebral body.  Diffuse idiopathic skeletal hyperostosis.      Impression       The organized fluid collections surrounding the ventral abdominal wall mesh from previous hernia repair has significantly increased in size and has become more hyperdense suggesting internal hemorrhage.  Superimposed infection is not  excluded.    Nonspecific scattered air-fluid levels throughout the small bowel.  No CT evidence of bowel obstruction.    Colonic diverticulosis.    All CT scans at this facility are performed  using dose modulation techniques as appropriate to performed exam including the following:  automated exposure control; adjustment of mA and/or kV according to the patients size (this includes techniques or standardized protocols for targeted exams where dose is matched to indication/reason for exam: i.e. extremities or head);  iterative reconstruction technique.      Electronically signed by: Duane Betancourt MD  Date: 01/15/2019  Time: 21:35     CT scan from 04/21/2016 was reviewed and is essentially the same    Assessment/Plan:     * Hematoma of abdominal wall    Do not feel that this is an acute hematoma based on her previous CT scans .  CT findings likely represent a chronic seroma.  There is no need for surgical intervention.  There is no need to hold the Coumadin.    We will check an ESR and C reactive protein if these are elevated then elective aspiration can be done to look for signs of infection/culture     Abdominal wall seroma    Seroma developed after her hernia repair in 2012.  Please review the notes in the Care everywhere section of the chart.  There is no luis evidence of infection at this time.    I would recommend checking a erythrocyte sedimentation rate and C reactive protein.  These are significant elevated then aspiration of the seroma fluid would be warranted.  If not the seroma does not require any intervention       HTN (hypertension)    Management per Medicine     A-fib    Continue anticoagulation at this time as there is no immediate plans for surgery       VTE Risk Mitigation (From admission, onward)        Ordered     IP VTE HIGH RISK PATIENT  Once      01/16/19 0458     Place JACK hose  Until discontinued      01/16/19 0458     Place sequential compression device  Until discontinued      01/16/19 0458      Reason for No Pharmacological VTE Prophylaxis  Once      01/16/19 0458        In review of the patient's chart, including the postoperative notes after her hernia repair in 2012, she developed a seroma almost immediately after surgery. The seroma is essentially unchanged from a CT scan in 2016.  There is no need for surgical intervention at this time.    I would check a CRP and ESR if these are significantly elevated an aspiration for culture can be arranged.    The patient was started on a clear liquid diet.  This can be increased as tolerated per the Medicine Service.  Disposition per Medicine    Thank you for your consult. I will follow-up with patient. Please contact us if you have any additional questions.    Shawn Baca MD  General Surgery  [facility] -

## 2019-01-16 NOTE — PROVIDER PROGRESS NOTES - EMERGENCY DEPT.
Encounter Date: 1/15/2019    ED Physician Progress Notes         I have seen and evaluated the patient independently and with Mr. meléndez.  I agree with the assessment and plan of care.

## 2019-01-16 NOTE — CONSULTS
"  [facility]  Adult Nutrition  Consult Note    SUMMARY     Recommendations    Recommendation/Intervention: 1. Continue to advance diet as medically feasible to CCD    2. Monitor PO intake/tolerance    3.  RD to follow   Goals: Patient to meet >85% of EEN/EPN   Nutrition Goal Status: new  Communication of RD Recs: reviewed with RN    Reason for Assessment    Reason For Assessment: consult  Diagnosis: (Hematoma abdominal wall )  Relevant Medical History: DM2, HTN, HLD, AFib, Emesis  Interdisciplinary Rounds: did not attend  General Information Comments: Patient recently moved to room from ED and diet just advanced to Clear Liquid. No other information available at time of assessment   Nutrition Discharge Planning: Home on PO diet with adequate intake     Nutrition Risk Screen    Nutrition Risk Screen: no indicators present    Nutrition/Diet History    Patient Reported Diet/Restrictions/Preferences: general  Typical Food/Fluid Intake: Adequate prior to admit.  Several days of severe N/V per patient   Food Preferences: Reviewed   Spiritual, Cultural Beliefs, Uatsdin Practices, Values that Affect Care: no  Factors Affecting Nutritional Intake: nausea/vomiting, abdominal pain, clear liquid diet    Anthropometrics    Temp: 98.6 °F (37 °C)  Height: 5' 7" (170.2 cm)  Height (inches): 67 in  Weight Method: Bed Scale  Weight: 98.7 kg (217 lb 9.5 oz)  Weight (lb): 217.6 lb  Ideal Body Weight (IBW), Female: 135 lb  % Ideal Body Weight, Female (lb): 161.19 lb  BMI (Calculated): 34.2  BMI Grade: 30 - 34.9- obesity - grade I       Lab/Procedures/Meds    Pertinent Labs Reviewed: reviewed  Pertinent Medications Reviewed: reviewed    Physical Findings/Assessment         Estimated/Assessed Needs    Weight Used For Calorie Calculations: 61.4 kg (135 lb 5.8 oz)  Energy Calorie Requirements (kcal): 6053-6190  Energy Need Method: Kcal/kg(25-30kcals/kg/IBW )  Protein Requirements: 60-75(1.0-1.2gmsProtein/kg/IBW )  Weight " Used For Protein Calculations: 61.4 kg (135 lb 5.8 oz)  Fluid Requirements (mL): (1ml/1kcal or MD Rx )  Estimated Fluid Requirement Method: RDA Method  RDA Method (mL): 1500         Nutrition Prescription Ordered    Current Diet Order: Clear Liquid   Nutrition Order Comments: Recently advanced since admit to room from ED.  Monitor for continued advancement     Evaluation of Received Nutrient/Fluid Intake    Energy Calories Required: not meeting needs  Protein Required: not meeting needs  Fluid Required: meeting needs  Tolerance: tolerating  % Intake of Estimated Energy Needs: 25 - 50 %  % Meal Intake: 25 - 50 %    Nutrition Risk    Level of Risk/Frequency of Follow-up: moderate     Assessment and Plan    P: Potential for suboptimal nutrient intake  E:  Related to recent admission; clear liquid diet  S:  As evidenced by RD assessment     Monitor and Evaluation    Food and Nutrient Intake: food and beverage intake  Food and Nutrient Adminstration: diet order  Physical Activity and Function: nutrition-related ADLs and IADLs  Anthropometric Measurements: weight, weight change  Biochemical Data, Medical Tests and Procedures: electrolyte and renal panel, gastrointestinal profile, glucose/endocrine profile, inflammatory profile  Nutrition-Focused Physical Findings: overall appearance     Malnutrition Assessment    NFPE to be performed upon follow up     Nutrition Follow-Up    RD Follow-up?: Yes

## 2019-01-16 NOTE — PLAN OF CARE
Problem: Adult Inpatient Plan of Care  Goal: Patient-Specific Goal (Individualization)  Outcome: Ongoing (interventions implemented as appropriate)  Pt AAO  afib on tele   Cards consulted  Pt tolerating ada diet  BG AC and HS with SSI in place.   gen sx following the pt  Blood cultures pending   cipro Q 12 hrs  Flagyl Q 8 hrs   Pt can ambulate independently  Fall precautions in place

## 2019-01-16 NOTE — ASSESSMENT & PLAN NOTE
Do not feel that this is an acute hematoma based on her previous CT scans .  CT findings likely represent a chronic seroma.  There is no need for surgical intervention.  There is no need to hold the Coumadin.    We will check an ESR and C reactive protein if these are elevated then elective aspiration can be done to look for signs of infection/culture

## 2019-01-16 NOTE — ASSESSMENT & PLAN NOTE
-CT scan showed a fluid collection around piece of mesh that was placed in 2012. Surgery consulted and no plans for surgical intervention at this time.   -Appears that seroma developed following her hernia repair surgery in 2012  -Given that there is no evidence of infection at this time, surgery recommends checking ESR and C reactive protein and if elevated,  then elective aspiration can be done to look for signs of infection/culture. Otherwise doesn't require intervention per surgery   -Will await decision for intervention before holding OAC

## 2019-01-16 NOTE — ASSESSMENT & PLAN NOTE
-  CT abdomen show fluid collection around hernia mesh larger than prior CT in 2016 (from 15.5 x 8.4 x 17.7cm to 17.5 x 13.4 x 19.2 cm).     - H/H stable 14.5/42.5   - type and screen  - hold warfarin  - Gen surgery plans to see in AM  - zofran prn nausea, morphine IV prn ab pain  - keep NPO

## 2019-01-16 NOTE — NURSING
Patient arrived to room  from ED accompanied by nurse Rodriguez. Transferred to bed. Heart monitor in place, vital signs taken.   Patient is aaox4 with no distress noted.  Assessment as charted.   Patient has been orientated to room, call light, fall precautions, rounding sheet, and board. No questions or concerns at this time.

## 2019-01-16 NOTE — HPI
78F h/o Afib, T2DM and HTN presents with n/v x 1.  Emesis is yellowish.  Associated with diffuse abdominal pain.  Patient denies any CP, SOB, HA, fever, chills, cough, diarrhea, constipation, dysuria, hematuria, frequency, flank pain, and all other sxs at this time. No further complaints or concerns at this time. In ER, CT abdomen show fluid collection around hernia mesh larger than prior CT in 2016 (from 15.5 x 8.4 x 17.7cm to 17.5 x 13.4 x 19.2 cm).   H/H 14.5/42.5 .  GI consulted in ER and plans to see patient in AM.

## 2019-01-16 NOTE — SUBJECTIVE & OBJECTIVE
Past Medical History:   Diagnosis Date    *Atrial fibrillation     Coronary artery disease involving native coronary artery of native heart with angina pectoris 4/10/2018    Diabetes mellitus     DM (diabetes mellitus) 2002     09/06/2017 no medication    Hyperlipidemia     Hypertension        Past Surgical History:   Procedure Laterality Date    CHOLECYSTECTOMY      COLON SURGERY      HYSTERECTOMY         Review of patient's allergies indicates:  No Known Allergies    No current facility-administered medications on file prior to encounter.      Current Outpatient Medications on File Prior to Encounter   Medication Sig    amLODIPine (NORVASC) 5 MG tablet TAKE 1 TABLET EVERY DAY    atorvastatin (LIPITOR) 40 MG tablet Take 1 tablet (40 mg total) by mouth once daily.    losartan (COZAAR) 100 MG tablet Take 1 tablet (100 mg total) by mouth once daily.    metFORMIN (GLUCOPHAGE-XR) 500 MG 24 hr tablet Take 1 tablet (500 mg total) by mouth daily with breakfast.    metoprolol succinate (TOPROL-XL) 100 MG 24 hr tablet Take 1 tablet (100 mg total) by mouth once daily.    warfarin (COUMADIN) 6 MG tablet Take by mouth once daily. Take 0.5 mg every Thursday and 1 tablet on all other days as directed by the [facility].    ACCU-CHEK JEN PLUS METER Misc     ACCU-CHEK JEN PLUS TEST STRP Strp     ACCU-CHEK SOFTCLIX LANCETS Misc     diltiaZEM (CARDIZEM CD) 180 MG 24 hr capsule Take 1 capsule (180 mg total) by mouth once daily.    INCONTINENCE PAD,LINER,DISP (BLADDER CONTROL PADS EX ABSORB MISC)     indapamide (LOZOL) 1.25 MG Tab Take 1.25 mg by mouth.    triamcinolone acetonide 0.1% (KENALOG) 0.1 % cream      Family History     Problem Relation (Age of Onset)    Diabetes Sister, Sister    Hypertension Sister, Mother        Tobacco Use    Smoking status: Never Smoker    Smokeless tobacco: Never Used   Substance and Sexual Activity    Alcohol use: No    Drug use: No    Sexual activity: Not on  file     Review of Systems   Constitution: Negative for diaphoresis, weakness, malaise/fatigue, weight gain and weight loss.   HENT: Negative for congestion and nosebleeds.    Cardiovascular: Negative for chest pain, claudication, cyanosis, dyspnea on exertion, irregular heartbeat, leg swelling, near-syncope, orthopnea, palpitations, paroxysmal nocturnal dyspnea and syncope.   Respiratory: Negative for cough, hemoptysis, shortness of breath, sleep disturbances due to breathing, snoring, sputum production and wheezing.    Hematologic/Lymphatic: Negative for bleeding problem. Does not bruise/bleed easily.   Skin: Negative for rash.   Musculoskeletal: Negative for arthritis, back pain, falls, joint pain, muscle cramps and muscle weakness.   Gastrointestinal: Negative for abdominal pain, constipation, diarrhea, heartburn, hematemesis, hematochezia, melena and nausea.        Abdominal pain present on admit has resolved    Genitourinary: Negative for dysuria, hematuria and nocturia.   Neurological: Negative for excessive daytime sleepiness, dizziness, headaches, light-headedness, loss of balance, numbness and vertigo.     Objective:     Vital Signs (Most Recent):  Temp: 97.8 °F (36.6 °C) (01/16/19 1125)  Pulse: 86 (01/16/19 1538)  Resp: 17 (01/16/19 1125)  BP: 119/60 (01/16/19 1125)  SpO2: 98 % (01/16/19 1125) Vital Signs (24h Range):  Temp:  [97.4 °F (36.3 °C)-98.6 °F (37 °C)] 97.8 °F (36.6 °C)  Pulse:  [] 86  Resp:  [17-20] 17  SpO2:  [96 %-100 %] 98 %  BP: (113-155)/(56-89) 119/60     Weight: 98.7 kg (217 lb 9.5 oz)  Body mass index is 34.08 kg/m².    SpO2: 98 %  O2 Device (Oxygen Therapy): room air      Intake/Output Summary (Last 24 hours) at 1/16/2019 1602  Last data filed at 1/16/2019 1300  Gross per 24 hour   Intake 1716 ml   Output --   Net 1716 ml       Lines/Drains/Airways     Peripheral Intravenous Line                 Peripheral IV - Single Lumen 01/15/19 2000 Left Antecubital less than 1 day                 Physical Exam   Constitutional: She is oriented to person, place, and time. She appears well-developed and well-nourished.   Neck: Neck supple. No JVD present.   Cardiovascular: Normal rate, regular rhythm, normal heart sounds and normal pulses. Exam reveals no friction rub.   No murmur heard.  Pulmonary/Chest: Effort normal and breath sounds normal. No respiratory distress. She has no wheezes. She has no rales.   Abdominal: Soft. Bowel sounds are normal. She exhibits no distension.   Palpable mass to    Musculoskeletal: She exhibits no edema or tenderness.   Neurological: She is alert and oriented to person, place, and time.   Skin: Skin is warm and dry. No rash noted.   Palpable mass to umbilicus      Psychiatric: She has a normal mood and affect. Her behavior is normal.   Nursing note and vitals reviewed.      Significant Labs:   All pertinent lab results from the last 24 hours have been reviewed. and   Recent Lab Results       01/16/19  1127   01/16/19  1051   01/16/19  0545   01/16/19  0442   01/16/19  0025        Albumin       3.4       Alkaline Phosphatase       66       ALT       22       Amylase               Anion Gap       7       Antibody Identification               Appearance, UA               aPTT         32.6  Comment:  aPTT therapeutic range = 39-69 seconds     AST       27       Baso #       0.01       Basophil%       0.1       Bilirubin (UA)               Total Bilirubin       1.3  Comment:  For infants and newborns, interpretation of results should be based  on gestational age, weight and in agreement with clinical  observations.  Premature Infant recommended reference ranges:  Up to 24 hours.............<8.0 mg/dL  Up to 48 hours............<12.0 mg/dL  3-5 days..................<15.0 mg/dL  6-29 days.................<15.0 mg/dL         BUN, Bld       12       Calcium       8.6       Chloride       100       CO2       28       Color, UA               Creatinine       0.8       Differential  Method       Automated       eGFR if        >60       eGFR if non        >60  Comment:  Calculation used to obtain the estimated glomerular filtration  rate (eGFR) is the CKD-EPI equation.          Eos #       0.1       Eosinophil%       1.3       Glucose       109       Glucose, UA               Gran # (ANC)       6.1       Gran%       73.6       Group & Rh               Hematocrit       39.1       Hemoglobin       12.8       INDIRECT REYNALDO               Coumadin Monitoring INR         1.9  Comment:  Coumadin Therapy:  2.0 - 3.0 for INR for all indicators except mechanical heart valves  and antiphospholipid syndromes which should use 2.5 - 3.5.       Ketones, UA               Leukocytes, UA               Lipase               Lymph #       1.6       Lymph%       18.8       Magnesium       2.0       MCH       30.2       MCHC       32.7       MCV       92       Mono #       0.5       Mono%       6.2       MPV       10.8       Nitrite, UA               Occult Blood UA               pH, UA               Phosphorus       4.2       Platelets       184       POCT Glucose   107 112         Potassium       3.9       Total Protein       6.7       Protein, UA               Protime         19.8     RBC       4.24       RDW       16.5       Sed Rate 34             Sodium       135       Specific [place], UA               Specimen UA               Troponin I               Urobilinogen, UA               WBC       8.24                        01/16/19  0023   01/15/19  2145   01/15/19  2000        Albumin     4.2     Alkaline Phosphatase     82     ALT     24     Amylase     39     Anion Gap     16     Antibody Identification POS  Comment:  Anti-D  Undetermined Specificity  [C]         Appearance, UA   Clear       aPTT           AST     33     Baso #     0.01     Basophil%     0.1     Bilirubin (UA)   Negative       Total Bilirubin     2.0  Comment:  For infants and newborns, interpretation of  results should be based  on gestational age, weight and in agreement with clinical  observations.  Premature Infant recommended reference ranges:  Up to 24 hours.............<8.0 mg/dL  Up to 48 hours............<12.0 mg/dL  3-5 days..................<15.0 mg/dL  6-29 days.................<15.0 mg/dL       BUN, Bld     14     Calcium     9.8     Chloride     95     CO2     25     Color, UA   Yellow       Creatinine     0.8     Differential Method     Automated     eGFR if      >60     eGFR if non      >60  Comment:  Calculation used to obtain the estimated glomerular filtration  rate (eGFR) is the CKD-EPI equation.        Eos #     0.1     Eosinophil%     0.5     Glucose     135     Glucose, UA   Negative       Gran # (ANC)     11.1     Gran%     85.8     Group & Rh A NEG         Hematocrit     42.5     Hemoglobin     14.5     INDIRECT REYNALDO POS         Coumadin Monitoring INR           Ketones, UA   Trace       Leukocytes, UA   Negative       Lipase     34     Lymph #     1.1     Lymph%     8.4     Magnesium           MCH     31.0     MCHC     34.1     MCV     91     Mono #     0.7     Mono%     5.2     MPV     10.8     Nitrite, UA   Negative       Occult Blood UA   Negative       pH, UA   7.0       Phosphorus           Platelets     235     POCT Glucose           Potassium     3.9     Total Protein     8.3     Protein, UA   Negative  Comment:  Recommend a 24 hour urine protein or a urine   protein/creatinine ratio if globulin induced proteinuria is  clinically suspected.         Protime           RBC     4.67     RDW     16.2     Sed Rate           Sodium     136     Specific [place], UA   1.010       Specimen UA   Urine, Clean Catch       Troponin I <0.006  Comment:  The reference interval for Troponin I represents the 99th percentile   cutoff   for our facility and is consistent with 3rd generation assay   performance.     <0.006  Comment:  The reference interval for Troponin I  represents the 99th percentile   cutoff   for our facility and is consistent with 3rd generation assay   performance.       Urobilinogen, UA   Negative       WBC     12.90           Significant Imaging: Echocardiogram:   2D echo with color flow doppler:   Results for orders placed or performed in visit on 06/28/17   2D echo with color flow doppler   Result Value Ref Range    QEF 60 55 - 65    Mitral Valve Regurgitation MODERATE (A)     Est. PA Systolic Pressure 38.44     Mitral Valve Mobility NORMAL     Tricuspid Valve Regurgitation MILD

## 2019-01-16 NOTE — ASSESSMENT & PLAN NOTE
Seroma developed after her hernia repair in 2012.  Please review the notes in the Care everywhere section of the chart.  There is no luis evidence of infection at this time.    I would recommend checking a erythrocyte sedimentation rate and C reactive protein.  These are significant elevated then aspiration of the seroma fluid would be warranted.  If not the seroma does not require any intervention

## 2019-01-16 NOTE — HPI
Patient presented to the emergency room with diffuse cramping abdominal pain and 1 episode of nausea and vomiting.  She underwent a CT scan that showed a fluid collection around piece of mesh that was placed in 2012.  The after the CT scans complained of chest pain.  The patient was admitted by the medical service.

## 2019-01-17 VITALS
OXYGEN SATURATION: 96 % | SYSTOLIC BLOOD PRESSURE: 149 MMHG | RESPIRATION RATE: 18 BRPM | WEIGHT: 217.63 LBS | HEIGHT: 67 IN | TEMPERATURE: 98 F | HEART RATE: 86 BPM | BODY MASS INDEX: 34.16 KG/M2 | DIASTOLIC BLOOD PRESSURE: 70 MMHG

## 2019-01-17 PROBLEM — K52.9 GASTROENTERITIS: Status: ACTIVE | Noted: 2019-01-16

## 2019-01-17 LAB
ALBUMIN SERPL BCP-MCNC: 3.2 G/DL
ALP SERPL-CCNC: 63 U/L
ALT SERPL W/O P-5'-P-CCNC: 20 U/L
ANION GAP SERPL CALC-SCNC: 11 MMOL/L
AST SERPL-CCNC: 23 U/L
BASOPHILS # BLD AUTO: 0.01 K/UL
BASOPHILS NFR BLD: 0.2 %
BILIRUB SERPL-MCNC: 1 MG/DL
BUN SERPL-MCNC: 12 MG/DL
CALCIUM SERPL-MCNC: 8.6 MG/DL
CHLORIDE SERPL-SCNC: 103 MMOL/L
CO2 SERPL-SCNC: 21 MMOL/L
CREAT SERPL-MCNC: 0.8 MG/DL
DIASTOLIC DYSFUNCTION: NO
DIFFERENTIAL METHOD: ABNORMAL
EOSINOPHIL # BLD AUTO: 0.1 K/UL
EOSINOPHIL NFR BLD: 2.3 %
ERYTHROCYTE [DISTWIDTH] IN BLOOD BY AUTOMATED COUNT: 16.9 %
EST. GFR  (AFRICAN AMERICAN): >60 ML/MIN/1.73 M^2
EST. GFR  (NON AFRICAN AMERICAN): >60 ML/MIN/1.73 M^2
ESTIMATED PA SYSTOLIC PRESSURE: 41.69
GLUCOSE SERPL-MCNC: 118 MG/DL
HCT VFR BLD AUTO: 37.3 %
HGB BLD-MCNC: 12.1 G/DL
LYMPHOCYTES # BLD AUTO: 1.2 K/UL
LYMPHOCYTES NFR BLD: 22.3 %
MAGNESIUM SERPL-MCNC: 1.8 MG/DL
MCH RBC QN AUTO: 30 PG
MCHC RBC AUTO-ENTMCNC: 32.4 G/DL
MCV RBC AUTO: 93 FL
MITRAL VALVE REGURGITATION: ABNORMAL
MONOCYTES # BLD AUTO: 0.5 K/UL
MONOCYTES NFR BLD: 8.8 %
NEUTROPHILS # BLD AUTO: 3.5 K/UL
NEUTROPHILS NFR BLD: 66.4 %
PHOSPHATE SERPL-MCNC: 3.3 MG/DL
PLATELET # BLD AUTO: 161 K/UL
PMV BLD AUTO: 10.4 FL
POCT GLUCOSE: 105 MG/DL (ref 70–110)
POCT GLUCOSE: 122 MG/DL (ref 70–110)
POTASSIUM SERPL-SCNC: 3.4 MMOL/L
PROT SERPL-MCNC: 6.3 G/DL
RBC # BLD AUTO: 4.03 M/UL
RETIRED EF AND QEF - SEE NOTES: 60 (ref 55–65)
SODIUM SERPL-SCNC: 135 MMOL/L
TRICUSPID VALVE REGURGITATION: ABNORMAL
WBC # BLD AUTO: 5.21 K/UL

## 2019-01-17 PROCEDURE — 36415 COLL VENOUS BLD VENIPUNCTURE: CPT | Mod: HCNC

## 2019-01-17 PROCEDURE — C9113 INJ PANTOPRAZOLE SODIUM, VIA: HCPCS | Mod: HCNC | Performed by: HOSPITALIST

## 2019-01-17 PROCEDURE — 85025 COMPLETE CBC W/AUTO DIFF WBC: CPT | Mod: HCNC

## 2019-01-17 PROCEDURE — 99214 OFFICE O/P EST MOD 30 MIN: CPT | Mod: HCNC,,, | Performed by: SURGERY

## 2019-01-17 PROCEDURE — 93306 2D ECHO WITH COLOR FLOW DOPPLER: ICD-10-PCS | Mod: 26,HCNC,, | Performed by: INTERNAL MEDICINE

## 2019-01-17 PROCEDURE — 99214 PR OFFICE/OUTPT VISIT, EST, LEVL IV, 30-39 MIN: ICD-10-PCS | Mod: HCNC,,, | Performed by: SURGERY

## 2019-01-17 PROCEDURE — 63600175 PHARM REV CODE 636 W HCPCS: Mod: HCNC | Performed by: INTERNAL MEDICINE

## 2019-01-17 PROCEDURE — 93306 TTE W/DOPPLER COMPLETE: CPT | Mod: 26,HCNC,, | Performed by: INTERNAL MEDICINE

## 2019-01-17 PROCEDURE — 25000003 PHARM REV CODE 250: Mod: HCNC | Performed by: NURSE PRACTITIONER

## 2019-01-17 PROCEDURE — 80053 COMPREHEN METABOLIC PANEL: CPT | Mod: HCNC

## 2019-01-17 PROCEDURE — 84100 ASSAY OF PHOSPHORUS: CPT | Mod: HCNC

## 2019-01-17 PROCEDURE — 83735 ASSAY OF MAGNESIUM: CPT | Mod: HCNC

## 2019-01-17 PROCEDURE — 93306 TTE W/DOPPLER COMPLETE: CPT | Mod: HCNC

## 2019-01-17 PROCEDURE — 25000003 PHARM REV CODE 250: Mod: HCNC | Performed by: INTERNAL MEDICINE

## 2019-01-17 PROCEDURE — 25000003 PHARM REV CODE 250: Mod: HCNC | Performed by: HOSPITALIST

## 2019-01-17 PROCEDURE — 63600175 PHARM REV CODE 636 W HCPCS: Mod: HCNC | Performed by: HOSPITALIST

## 2019-01-17 PROCEDURE — G0378 HOSPITAL OBSERVATION PER HR: HCPCS | Mod: HCNC

## 2019-01-17 PROCEDURE — S0030 INJECTION, METRONIDAZOLE: HCPCS | Mod: HCNC | Performed by: INTERNAL MEDICINE

## 2019-01-17 RX ORDER — POTASSIUM CHLORIDE 20 MEQ/1
20 TABLET, EXTENDED RELEASE ORAL ONCE
Status: COMPLETED | OUTPATIENT
Start: 2019-01-17 | End: 2019-01-17

## 2019-01-17 RX ADMIN — PANTOPRAZOLE SODIUM 40 MG: 40 INJECTION, POWDER, LYOPHILIZED, FOR SOLUTION INTRAVENOUS at 09:01

## 2019-01-17 RX ADMIN — POTASSIUM CHLORIDE 20 MEQ: 1500 TABLET, EXTENDED RELEASE ORAL at 09:01

## 2019-01-17 RX ADMIN — METOPROLOL SUCCINATE 100 MG: 50 TABLET, EXTENDED RELEASE ORAL at 09:01

## 2019-01-17 RX ADMIN — METRONIDAZOLE 500 MG: 500 INJECTION, SOLUTION INTRAVENOUS at 12:01

## 2019-01-17 RX ADMIN — METRONIDAZOLE 500 MG: 500 INJECTION, SOLUTION INTRAVENOUS at 10:01

## 2019-01-17 RX ADMIN — CIPROFLOXACIN 400 MG: 2 INJECTION, SOLUTION INTRAVENOUS at 05:01

## 2019-01-17 NOTE — SUBJECTIVE & OBJECTIVE
Interval History: tolerated a diet, no interested in fluid aspiration at this time    Medications:  Continuous Infusions:  Scheduled Meds:   atorvastatin  40 mg Oral QHS    ciprofloxacin (CIPRO)400mg/200ml D5W IVPB  400 mg Intravenous Q12H    metoprolol succinate  100 mg Oral Daily    metronidazole  500 mg Intravenous Q8H    pantoprazole  40 mg Intravenous Daily     PRN Meds:acetaminophen, dextrose 50%, glucagon (human recombinant), insulin aspart U-100, morphine, ondansetron, sodium chloride 0.9%     Review of patient's allergies indicates:  No Known Allergies  Objective:     Vital Signs (Most Recent):  Temp: 97.7 °F (36.5 °C) (01/17/19 0726)  Pulse: 77 (01/17/19 0726)  Resp: 18 (01/17/19 0354)  BP: (!) 149/70 (01/17/19 0726)  SpO2: 96 % (01/17/19 0726) Vital Signs (24h Range):  Temp:  [97.7 °F (36.5 °C)-98.3 °F (36.8 °C)] 97.7 °F (36.5 °C)  Pulse:  [] 77  Resp:  [17-18] 18  SpO2:  [95 %-98 %] 96 %  BP: (106-149)/(60-70) 149/70     Weight: 98.7 kg (217 lb 9.5 oz)  Body mass index is 34.08 kg/m².    Intake/Output - Last 3 Shifts       01/15 0700 - 01/16 0659 01/16 0700 - 01/17 0659 01/17 0700 - 01/18 0659    P.O. 120 1426     IV Piggyback 1000 600     Total Intake(mL/kg) 1120 (11.3) 2026 (20.5)     Urine (mL/kg/hr)  350 (0.1)     Total Output  350     Net +1120 +1676            Urine Occurrence 1 x 5 x     Stool Occurrence  0 x           Physical Exam   Constitutional: No distress.   obese   HENT:   Head: Normocephalic and atraumatic.   Neck: Normal range of motion. Neck supple.   Cardiovascular: Normal rate and regular rhythm.   Pulmonary/Chest: Effort normal and breath sounds normal.   Abdominal: Soft. Bowel sounds are normal. She exhibits no distension. There is no tenderness.   palpable seroma   Skin: Skin is warm. Capillary refill takes less than 2 seconds.   Psychiatric: She has a normal mood and affect. Her behavior is normal. Judgment and thought content normal.   Vitals reviewed.      Significant  Labs:  CBC:   Recent Labs   Lab 01/17/19  0506   WBC 5.21   RBC 4.03   HGB 12.1   HCT 37.3      MCV 93   MCH 30.0   MCHC 32.4     BMP:   Recent Labs   Lab 01/17/19  0506   *   *   K 3.4*      CO2 21*   BUN 12   CREATININE 0.8   CALCIUM 8.6*   MG 1.8     CMP:   Recent Labs   Lab 01/17/19  0506   *   CALCIUM 8.6*   ALBUMIN 3.2*   PROT 6.3   *   K 3.4*   CO2 21*      BUN 12   CREATININE 0.8   ALKPHOS 63   ALT 20   AST 23   BILITOT 1.0     Esr and crp reviewed      Significant Diagnostics:  no new

## 2019-01-17 NOTE — PROGRESS NOTES
[facility] -   General Surgery  Progress Note    Subjective:     History of Present Illness:  Patient presented to the emergency room with diffuse cramping abdominal pain and 1 episode of nausea and vomiting.  She underwent a CT scan that showed a fluid collection around piece of mesh that was placed in 2012.  The after the CT scans complained of chest pain.  The patient was admitted by the medical service.        Post-Op Info:  * No surgery found *         Interval History: tolerated a diet, no interested in fluid aspiration at this time    Medications:  Continuous Infusions:  Scheduled Meds:   atorvastatin  40 mg Oral QHS    ciprofloxacin (CIPRO)400mg/200ml D5W IVPB  400 mg Intravenous Q12H    metoprolol succinate  100 mg Oral Daily    metronidazole  500 mg Intravenous Q8H    pantoprazole  40 mg Intravenous Daily     PRN Meds:acetaminophen, dextrose 50%, glucagon (human recombinant), insulin aspart U-100, morphine, ondansetron, sodium chloride 0.9%     Review of patient's allergies indicates:  No Known Allergies  Objective:     Vital Signs (Most Recent):  Temp: 97.7 °F (36.5 °C) (01/17/19 0726)  Pulse: 77 (01/17/19 0726)  Resp: 18 (01/17/19 0354)  BP: (!) 149/70 (01/17/19 0726)  SpO2: 96 % (01/17/19 0726) Vital Signs (24h Range):  Temp:  [97.7 °F (36.5 °C)-98.3 °F (36.8 °C)] 97.7 °F (36.5 °C)  Pulse:  [] 77  Resp:  [17-18] 18  SpO2:  [95 %-98 %] 96 %  BP: (106-149)/(60-70) 149/70     Weight: 98.7 kg (217 lb 9.5 oz)  Body mass index is 34.08 kg/m².    Intake/Output - Last 3 Shifts       01/15 0700 - 01/16 0659 01/16 0700 - 01/17 0659 01/17 0700 - 01/18 0659    P.O. 120 1426     IV Piggyback 1000 600     Total Intake(mL/kg) 1120 (11.3) 2026 (20.5)     Urine (mL/kg/hr)  350 (0.1)     Total Output  350     Net +1120 +1676            Urine Occurrence 1 x 5 x     Stool Occurrence  0 x           Physical Exam   Constitutional: No distress.   obese   HENT:   Head: Normocephalic and atraumatic.    Neck: Normal range of motion. Neck supple.   Cardiovascular: Normal rate and regular rhythm.   Pulmonary/Chest: Effort normal and breath sounds normal.   Abdominal: Soft. Bowel sounds are normal. She exhibits no distension. There is no tenderness.   palpable seroma   Skin: Skin is warm. Capillary refill takes less than 2 seconds.   Psychiatric: She has a normal mood and affect. Her behavior is normal. Judgment and thought content normal.   Vitals reviewed.      Significant Labs:  CBC:   Recent Labs   Lab 01/17/19  0506   WBC 5.21   RBC 4.03   HGB 12.1   HCT 37.3      MCV 93   MCH 30.0   MCHC 32.4     BMP:   Recent Labs   Lab 01/17/19  0506   *   *   K 3.4*      CO2 21*   BUN 12   CREATININE 0.8   CALCIUM 8.6*   MG 1.8     CMP:   Recent Labs   Lab 01/17/19  0506   *   CALCIUM 8.6*   ALBUMIN 3.2*   PROT 6.3   *   K 3.4*   CO2 21*      BUN 12   CREATININE 0.8   ALKPHOS 63   ALT 20   AST 23   BILITOT 1.0     Esr and crp reviewed      Significant Diagnostics:  no new    Assessment/Plan:     * Hematoma of abdominal wall      May have bleed into the seroma, based on CT findings likely representing a chronic seroma with blood in it.  No luis signs of mesh infection (skin erythema, tenderness over mesh area )  There is no need for surgical intervention.  There is no need to hold the Coumadin.     ESR and C reactive protein are elevated, non specific.  Not interested in fluid aspiration at this time as she does not want to pursue mesh remove with luis signs for mesh infection, see above      Can be discharge per medicine and follow up in several weeks     Abdominal wall seroma    Seroma developed after her hernia repair in 2012.  Please review the notes in the Care everywhere section of the chart.  There is no luis evidence of infection at this time.    Se above         HTN (hypertension)    Management per Medicine     A-fib    Continue anticoagulation at this time as there is  no immediate plans for surgery         Shawn Baca MD  General Surgery  [facility] [place]

## 2019-01-17 NOTE — HOSPITAL COURSE
The pt is a 79 yo female with Afib- on coumadin, T2DM and HTN who presented to ED with N/V, diarrhea, and abdominal cramping and placed in observation for abnormal CT abd showing a fluid collection around hernia mesh that was larger than prior CT in 2016. General surgery evaluated pt who felt pt has chronic seroma- no need for surgical intervention. H/H stable. Coumadin resumed. N/V and diarrhea resolved overnight. Abdominal cramping improved. Pt will f/u with PCP and general surgery.

## 2019-01-17 NOTE — ASSESSMENT & PLAN NOTE
Seroma developed after her hernia repair in 2012.  Please review the notes in the Care everywhere section of the chart.  There is no luis evidence of infection at this time.    Se above

## 2019-01-17 NOTE — DISCHARGE SUMMARY
[facility] Hospital Medicine  Discharge Summary      Patient Name: Natalie Greene  MRN: [id number]  Admission Date: 1/15/2019  Hospital Length of Stay: 1 days  Discharge Date and Time:  01/17/2019 4:29 PM  Attending Physician:Dr. Cunningham   Discharging Provider: Eva Palacios NP  Primary Care Provider: Annabella Fenton MD      HPI:   78F h/o Afib, T2DM and HTN presents with n/v x 1.  Emesis is yellowish.  Associated with diffuse abdominal pain.  Patient denies any CP, SOB, HA, fever, chills, cough, diarrhea, constipation, dysuria, hematuria, frequency, flank pain, and all other sxs at this time. No further complaints or concerns at this time. In ER, CT abdomen show fluid collection around hernia mesh larger than prior CT in 2016 (from 15.5 x 8.4 x 17.7cm to 17.5 x 13.4 x 19.2 cm).   H/H 14.5/42.5 .  GI consulted in ER and plans to see patient in AM.          * No surgery found *      Hospital Course:   The pt is a 79 yo female with Afib- on coumadin, T2DM and HTN who presented to ED with N/V, diarrhea, and abdominal cramping and placed in observation for abnormal CT abd showing a fluid collection around hernia mesh that was larger than prior CT in 2016. General surgery evaluated pt and felt pt has chronic seroma- no need for surgical intervention. H/H stable. Coumadin resumed. N/V and diarrhea resolved overnight. Abdominal cramping improved. Pt will f/u with PCP and general surgery.          Consults:   Consults (From admission, onward)        Status Ordering Provider     Inpatient consult to Cardiology  Once     Provider:  Oscar Taveras MD    Completed SHY SINGER     Inpatient consult to General Surgery  Once     Provider:  Jacqueline Henson MD    Completed GRACE SANTILLAN JR     Nutrition Services Referral  Once     Provider:  (Not yet assigned)    Completed RAYO FUNES JR                Final Active Diagnoses:    Diagnosis Date Noted POA    PRINCIPAL PROBLEM:   Hematoma of abdominal wall [S30.1XXA] 01/16/2019 Yes    Abdominal wall seroma [T88.8XXA, T79.2XXA] 01/16/2019 Yes    Gastroenteritis [K52.9] 01/16/2019 Unknown    Generalized abdominal pain [R10.84] 01/16/2019 Yes    A-fib [I48.91] 07/23/2013 Yes    HTN (hypertension) [I10] 07/23/2013 Yes    DM (diabetes mellitus) [E11.9] 07/23/2013 Yes      Problems Resolved During this Admission:       Discharged Condition: stable    Disposition: Home or Self Care    Follow Up:  Follow-up Information     Shawn Baca MD In 3 weeks.    Specialty:  General Surgery  Why:  check chronic seroma, present since 2012  Contact information:  [address] [facility] [address] [phone]             Annabella Fenton MD In 5 days.    Specialty:  Family Medicine  Contact information:  [address] [facility] [address] [phone]             Please follow up.    Why:  INR 1/18/19                Patient Instructions:      Ambulatory consult to Anticoagulation Monitoring   Referral Priority: Routine Referral Type: Consultation   Referral Reason: Specialty Services Required   Requested Specialty: Cardiology   Number of Visits Requested: 1     Diet Cardiac     Activity as tolerated       Significant Diagnostic Studies:  Imaging Results          X-Ray Chest 1 View (Final result)  Result time 01/15/19 21:50:47    Final result by Jone Dior Jr., MD (01/15/19 21:50:47)                 Impression:      No acute abnormality.  Cardiomegaly.      Electronically signed by: Jone Dior Jr., MD  Date:    01/15/2019  Time:    21:50             Narrative:    EXAMINATION:  XR CHEST 1 VIEW    CLINICAL HISTORY:  Chest pain, unspecified    TECHNIQUE:  Single frontal view of the chest was performed.    COMPARISON:  None    FINDINGS:  The lungs are clear, with normal appearance of pulmonary vasculature and no pleural effusion or pneumothorax.    The cardiac silhouette is enlarged.  Calcific atheromatous change of the  aortic knob.  The hilar and mediastinal contours are unremarkable.    Bones are intact.                               CT Abdomen Pelvis With Contrast (Final result)  Result time 01/15/19 21:35:44    Final result by Duane Betancourt MD (01/15/19 21:35:44)                 Impression:      The organized fluid collections surrounding the ventral abdominal wall mesh from previous hernia repair has significantly increased in size and has become more hyperdense suggesting internal hemorrhage.  Superimposed infection is not excluded.    Nonspecific scattered air-fluid levels throughout the small bowel.  No CT evidence of bowel obstruction.    Colonic diverticulosis.    All CT scans at this facility are performed  using dose modulation techniques as appropriate to performed exam including the following:  automated exposure control; adjustment of mA and/or kV according to the patients size (this includes techniques or standardized protocols for targeted exams where dose is matched to indication/reason for exam: i.e. extremities or head);  iterative reconstruction technique.      Electronically signed by: Duane Betancourt MD  Date:    01/15/2019  Time:    21:35             Narrative:    EXAMINATION:  CT ABDOMEN PELVIS WITH CONTRAST    CLINICAL HISTORY:  abdominal pain;    TECHNIQUE:  Axial CT imaging was performed through the abdomen and pelvis with  75cc  of intravenous contrast. Multiplanar reformats were performed and interpreted.    COMPARISON:  04/21/2016    FINDINGS:  No acute basilar infiltrate detected.    Probable cysts in the left liver measuring less than a cm.  Benign left upper pole renal cyst measuring up to 1.7 cm.  The right kidney is within normal limits.    The spleen, adrenal glands, pancreas are within normal limits.    Status post cholecystectomy.    There has been marked interval large amount of the fluid collection on both sides of the hernia mesh that measures 17.5 x 13.4 x 19.2 compared to 15.5 x 8.4 x 17.7 cm  in the previous examination.  The fluid within the collection is heterogeneously hyperdense.  No free air or intra-abdominal abscess identified.  There are scattered air-fluid levels throughout the small bowel.  No definitive transition point identified.  The patient is status post right hemicolectomy with an ileocolic anastomosis.  Extensive colonic diverticulosis.    Aortic atherosclerosis without evidence of aneurysm.    The uterus has been removed.  No free pelvic fluid or adenopathy.    The urinary bladder is unremarkable.    L2 vertebral body hemangioma.  Chronic moderate central compression fracture of the L4 vertebral body.  Diffuse idiopathic skeletal hyperostosis.                                Pending Diagnostic Studies:     None         Medications:  Reconciled Home Medications:      Medication List      CONTINUE taking these medications    amLODIPine 5 MG tablet  Commonly known as:  NORVASC  TAKE 1 TABLET EVERY DAY     atorvastatin 40 MG tablet  Commonly known as:  LIPITOR  Take 1 tablet (40 mg total) by mouth once daily.     BLADDER CONTROL PADS EX ABSORB MISC     diltiaZEM 180 MG 24 hr capsule  Commonly known as:  CARDIZEM CD  Take 1 capsule (180 mg total) by mouth once daily.     indapamide 1.25 MG Tab  Commonly known as:  LOZOL  Take 1.25 mg by mouth.     losartan 100 MG tablet  Commonly known as:  COZAAR  Take 1 tablet (100 mg total) by mouth once daily.     metFORMIN 500 MG 24 hr tablet  Commonly known as:  GLUCOPHAGE-XR  Take 1 tablet (500 mg total) by mouth daily with breakfast.     metoprolol succinate 100 MG 24 hr tablet  Commonly known as:  TOPROL-XL  Take 1 tablet (100 mg total) by mouth once daily.     triamcinolone acetonide 0.1% 0.1 % cream  Commonly known as:  KENALOG     warfarin 6 MG tablet  Commonly known as:  COUMADIN  Take by mouth once daily. Take 0.5 mg every Thursday and 1 tablet on all other days as directed by the [facility].        STOP taking these medications     ACCU-CHEK JEN PLUS METER Misc  Generic drug:  blood-glucose meter     ACCU-CHEK JEN PLUS TEST STRP Strp  Generic drug:  blood sugar diagnostic     amoxicillin-clavulanate 875-125mg 875-125 mg per tablet  Commonly known as:  AUGMENTIN        ASK your doctor about these medications    ACCU-CHEK SOFTCLIX LANCETS Misc  Generic drug:  lancets            Indwelling Lines/Drains at time of discharge:   Lines/Drains/Airways          None          Time spent on the discharge of patient: 42 minutes  Patient was seen and examined on the date of discharge and determined to be suitable for discharge.         Eva Palacios NP  Department of Hospital Medicine  [facility]

## 2019-01-17 NOTE — PLAN OF CARE
Problem: Adult Inpatient Plan of Care  Goal: Plan of Care Review  Outcome: Ongoing (interventions implemented as appropriate)  POC reviewed, verbalized understanding. Pt remained free from falls, fall precautions in place. Pt is a fib on monitor, 70-80s. VSS. No other c/o at this time. PIV intact, saline locked. Blood glucose monitored. ABX given per orders. Call bell and personal belongings within reach. Hourly rounding complete. Reminded to call for assistance. Will continue to monitor.

## 2019-01-17 NOTE — ASSESSMENT & PLAN NOTE
May have bleed into the seroma, based on CT findings likely representing a chronic seroma with blood in it.  No luis signs of mesh infection (skin erythema, tenderness over mesh area )  There is no need for surgical intervention.  There is no need to hold the Coumadin.     ESR and C reactive protein are elevated, non specific.  Not interested in fluid aspiration at this time as she does not want to pursue mesh remove with luis signs for mesh infection, see above      Can be discharge per medicine and follow up in several weeks

## 2019-01-18 ENCOUNTER — ANTI-COAG VISIT (OUTPATIENT)
Dept: CARDIOLOGY | Facility: CLINIC | Age: 79
End: 2019-01-18
Payer: MEDICARE

## 2019-01-18 DIAGNOSIS — Z79.01 LONG TERM (CURRENT) USE OF ANTICOAGULANTS: Primary | ICD-10-CM

## 2019-01-18 LAB — INR PPP: 1.5 (ref 2–3)

## 2019-01-18 PROCEDURE — 85610 PROTHROMBIN TIME: CPT | Mod: QW,HCNC,S$GLB, | Performed by: INTERNAL MEDICINE

## 2019-01-18 PROCEDURE — 85610 POCT INR: ICD-10-PCS | Mod: QW,HCNC,S$GLB, | Performed by: INTERNAL MEDICINE

## 2019-01-18 NOTE — PROGRESS NOTES
Patient's INR trended down to 1.5.  Walk-in patient:  Post hospital visit//c/o N/V (mild) this morning - has subsided.  Will increase today's (Friday) dose to 9 mg - only, then patient will resume her regular scheduled dose of Warfarin 3 mg every Thursday and 6 mg on all other days per dosing calendar given.  Recheck in 4 days on Tuesday, 1/22/2019.  Patient voiced understanding.

## 2019-01-22 ENCOUNTER — OFFICE VISIT (OUTPATIENT)
Dept: INTERNAL MEDICINE | Facility: CLINIC | Age: 79
End: 2019-01-22
Payer: MEDICARE

## 2019-01-22 ENCOUNTER — ANTI-COAG VISIT (OUTPATIENT)
Dept: CARDIOLOGY | Facility: CLINIC | Age: 79
End: 2019-01-22
Payer: MEDICARE

## 2019-01-22 VITALS
HEIGHT: 67 IN | OXYGEN SATURATION: 96 % | HEART RATE: 92 BPM | SYSTOLIC BLOOD PRESSURE: 130 MMHG | BODY MASS INDEX: 33.71 KG/M2 | WEIGHT: 214.75 LBS | DIASTOLIC BLOOD PRESSURE: 74 MMHG | TEMPERATURE: 97 F

## 2019-01-22 DIAGNOSIS — I70.0 AORTIC ATHEROSCLEROSIS: ICD-10-CM

## 2019-01-22 DIAGNOSIS — I10 ESSENTIAL HYPERTENSION: ICD-10-CM

## 2019-01-22 DIAGNOSIS — S30.1XXD ABDOMINAL WALL SEROMA, SUBSEQUENT ENCOUNTER: Primary | ICD-10-CM

## 2019-01-22 DIAGNOSIS — E11.9 TYPE 2 DIABETES MELLITUS WITHOUT COMPLICATION, WITHOUT LONG-TERM CURRENT USE OF INSULIN: ICD-10-CM

## 2019-01-22 DIAGNOSIS — Z79.01 LONG TERM (CURRENT) USE OF ANTICOAGULANTS: Primary | ICD-10-CM

## 2019-01-22 PROBLEM — S30.1XXA HEMATOMA OF ABDOMINAL WALL: Status: RESOLVED | Noted: 2019-01-16 | Resolved: 2019-01-22

## 2019-01-22 PROBLEM — I25.119 CORONARY ARTERY DISEASE INVOLVING NATIVE CORONARY ARTERY OF NATIVE HEART WITH ANGINA PECTORIS: Status: RESOLVED | Noted: 2018-04-10 | Resolved: 2019-01-22

## 2019-01-22 PROBLEM — K52.9 GASTROENTERITIS: Status: RESOLVED | Noted: 2019-01-16 | Resolved: 2019-01-22

## 2019-01-22 PROBLEM — R10.84 GENERALIZED ABDOMINAL PAIN: Status: RESOLVED | Noted: 2019-01-16 | Resolved: 2019-01-22

## 2019-01-22 LAB
BACTERIA BLD CULT: NORMAL
INR PPP: 2.2 (ref 2–3)

## 2019-01-22 PROCEDURE — 99999 PR PBB SHADOW E&M-EST. PATIENT-LVL III: CPT | Mod: PBBFAC,HCNC,, | Performed by: FAMILY MEDICINE

## 2019-01-22 PROCEDURE — 99999 PR PBB SHADOW E&M-EST. PATIENT-LVL III: ICD-10-PCS | Mod: PBBFAC,HCNC,, | Performed by: FAMILY MEDICINE

## 2019-01-22 PROCEDURE — 85610 PROTHROMBIN TIME: CPT | Mod: QW,S$GLB,, | Performed by: NUCLEAR MEDICINE

## 2019-01-22 PROCEDURE — 99214 PR OFFICE/OUTPT VISIT, EST, LEVL IV, 30-39 MIN: ICD-10-PCS | Mod: S$GLB,,, | Performed by: FAMILY MEDICINE

## 2019-01-22 PROCEDURE — 85610 POCT INR: ICD-10-PCS | Mod: QW,S$GLB,, | Performed by: NUCLEAR MEDICINE

## 2019-01-22 PROCEDURE — 99214 OFFICE O/P EST MOD 30 MIN: CPT | Mod: S$GLB,,, | Performed by: FAMILY MEDICINE

## 2019-01-22 NOTE — PROGRESS NOTES
Patient's INR is therapeutic at 2.2.  Will resume previous dose of Warfarin 3 mg every Thursday and 6 mg on all other days per dosing calendar given.  Recheck in 2 weeks.

## 2019-02-04 ENCOUNTER — OFFICE VISIT (OUTPATIENT)
Dept: SURGERY | Facility: CLINIC | Age: 79
End: 2019-02-04
Payer: MEDICARE

## 2019-02-04 VITALS
SYSTOLIC BLOOD PRESSURE: 133 MMHG | BODY MASS INDEX: 34.05 KG/M2 | TEMPERATURE: 98 F | DIASTOLIC BLOOD PRESSURE: 66 MMHG | WEIGHT: 217.38 LBS | HEART RATE: 78 BPM

## 2019-02-04 DIAGNOSIS — S30.1XXD ABDOMINAL WALL SEROMA, SUBSEQUENT ENCOUNTER: Primary | ICD-10-CM

## 2019-02-04 PROCEDURE — 99213 OFFICE O/P EST LOW 20 MIN: CPT | Mod: HCNC,S$GLB,, | Performed by: SURGERY

## 2019-02-04 PROCEDURE — 99213 PR OFFICE/OUTPT VISIT, EST, LEVL III, 20-29 MIN: ICD-10-PCS | Mod: HCNC,S$GLB,, | Performed by: SURGERY

## 2019-02-04 PROCEDURE — 1101F PR PT FALLS ASSESS DOC 0-1 FALLS W/OUT INJ PAST YR: ICD-10-PCS | Mod: HCNC,CPTII,S$GLB, | Performed by: SURGERY

## 2019-02-04 PROCEDURE — 99999 PR PBB SHADOW E&M-EST. PATIENT-LVL III: ICD-10-PCS | Mod: PBBFAC,HCNC,, | Performed by: SURGERY

## 2019-02-04 PROCEDURE — 3075F SYST BP GE 130 - 139MM HG: CPT | Mod: HCNC,CPTII,S$GLB, | Performed by: SURGERY

## 2019-02-04 PROCEDURE — 3078F DIAST BP <80 MM HG: CPT | Mod: HCNC,CPTII,S$GLB, | Performed by: SURGERY

## 2019-02-04 PROCEDURE — 3075F PR MOST RECENT SYSTOLIC BLOOD PRESS GE 130-139MM HG: ICD-10-PCS | Mod: HCNC,CPTII,S$GLB, | Performed by: SURGERY

## 2019-02-04 PROCEDURE — 99999 PR PBB SHADOW E&M-EST. PATIENT-LVL III: CPT | Mod: PBBFAC,HCNC,, | Performed by: SURGERY

## 2019-02-04 PROCEDURE — 3078F PR MOST RECENT DIASTOLIC BLOOD PRESSURE < 80 MM HG: ICD-10-PCS | Mod: HCNC,CPTII,S$GLB, | Performed by: SURGERY

## 2019-02-04 PROCEDURE — 1101F PT FALLS ASSESS-DOCD LE1/YR: CPT | Mod: HCNC,CPTII,S$GLB, | Performed by: SURGERY

## 2019-02-04 NOTE — PATIENT INSTRUCTIONS
Not recommend any surgery regarding the fluid collections of the mesh.    If the central area of your abdominal wall ever turned red are became painful then we would need to look into the possibility of a mesh infection.    We will see you back as needed

## 2019-02-04 NOTE — PROGRESS NOTES
Subjective:       Patient ID: Natalie Greene is a 78 y.o. female.    Seroma after hernia repair    Chief Complaint: Follow-up (seroma)    Patient underwent repair of an incisional hernia many years ago.  She has developed a seroma that is seen above and below the mesh.  She presented to the emergency room with abdominal pain and she was found to have some bleeding into the seroma.    Her abdominal pain has now resolved.  She reports no erythema or tenderness in the area the mesh.      Review of Systems   Constitutional: Negative for fever.   Respiratory: Negative.    Cardiovascular: Negative.    Gastrointestinal: Negative.    Genitourinary: Negative.    Musculoskeletal: Positive for arthralgias.       Objective:      Physical Exam   Constitutional: She is oriented to person, place, and time. No distress.   Slightly chronically ill   Neck: Normal range of motion. Neck supple.   Cardiovascular: Normal rate, regular rhythm, normal heart sounds and intact distal pulses.   Pulmonary/Chest: Effort normal.   Abdominal: Soft. Bowel sounds are normal.   There are well-healed incisions.  There is a fullness that is most prominent above the umbilicus   Musculoskeletal: She exhibits no edema.   Neurological: She is alert and oriented to person, place, and time.   Skin: Capillary refill takes less than 2 seconds.   Psychiatric: She has a normal mood and affect. Her behavior is normal. Judgment and thought content normal.   Vitals reviewed.      CT scan was reviewed with the patient  Assessment:      chronic seroma after hernia repair many years ago at [facility]     Plan:       No luis evidence of infection.  I would not recommend surgical intervention unless the patient develops abdominal wall erythema and a mass infection was felt to be the source.    We will see her back as needed

## 2019-02-05 ENCOUNTER — ANTI-COAG VISIT (OUTPATIENT)
Dept: CARDIOLOGY | Facility: CLINIC | Age: 79
End: 2019-02-05
Payer: MEDICARE

## 2019-02-05 DIAGNOSIS — Z79.01 LONG TERM (CURRENT) USE OF ANTICOAGULANTS: Primary | ICD-10-CM

## 2019-02-05 LAB — INR PPP: 2.3 (ref 2–3)

## 2019-02-05 PROCEDURE — 85610 PROTHROMBIN TIME: CPT | Mod: QW,HCNC,S$GLB, | Performed by: INTERNAL MEDICINE

## 2019-02-05 PROCEDURE — 85610 POCT INR: ICD-10-PCS | Mod: QW,HCNC,S$GLB, | Performed by: INTERNAL MEDICINE

## 2019-02-05 NOTE — PROGRESS NOTES
Patient's INR is therapeutic at 2.3.  Reports no recent changes.  Instructed to maintain current dose of Warfarin 3 mg every Thursday and 6 mg on all other days per week.  Declines AVS.  Recheck on 3/05/2019 at 11:15a.

## 2019-02-08 RX ORDER — INDAPAMIDE 1.25 MG/1
TABLET ORAL
Qty: 90 TABLET | Refills: 1 | Status: SHIPPED | OUTPATIENT
Start: 2019-02-08 | End: 2019-09-06 | Stop reason: SDUPTHER

## 2019-02-08 RX ORDER — WARFARIN 6 MG/1
TABLET ORAL
Qty: 90 TABLET | Refills: 1 | Status: SHIPPED | OUTPATIENT
Start: 2019-02-08 | End: 2019-09-06 | Stop reason: SDUPTHER

## 2019-02-09 ENCOUNTER — HOSPITAL ENCOUNTER (OUTPATIENT)
Dept: RADIOLOGY | Facility: HOSPITAL | Age: 79
Discharge: HOME | End: 2019-02-09
Attending: NURSE PRACTITIONER
Payer: MEDICARE

## 2019-02-09 ENCOUNTER — OFFICE VISIT (OUTPATIENT)
Dept: URGENT CARE | Facility: CLINIC | Age: 79
End: 2019-02-09
Payer: MEDICARE

## 2019-02-09 ENCOUNTER — TELEPHONE (OUTPATIENT)
Dept: URGENT CARE | Facility: CLINIC | Age: 79
End: 2019-02-09

## 2019-02-09 VITALS
DIASTOLIC BLOOD PRESSURE: 78 MMHG | SYSTOLIC BLOOD PRESSURE: 146 MMHG | HEIGHT: 67 IN | BODY MASS INDEX: 33.1 KG/M2 | RESPIRATION RATE: 12 BRPM | TEMPERATURE: 98 F | HEART RATE: 116 BPM | OXYGEN SATURATION: 98 % | WEIGHT: 210.88 LBS

## 2019-02-09 DIAGNOSIS — J01.90 ACUTE BACTERIAL SINUSITIS: ICD-10-CM

## 2019-02-09 DIAGNOSIS — H91.93 DECREASED HEARING OF BOTH EARS: ICD-10-CM

## 2019-02-09 DIAGNOSIS — E11.9 TYPE 2 DIABETES MELLITUS WITHOUT COMPLICATION, WITHOUT LONG-TERM CURRENT USE OF INSULIN: ICD-10-CM

## 2019-02-09 DIAGNOSIS — R05.9 COUGH: Primary | ICD-10-CM

## 2019-02-09 DIAGNOSIS — R53.83 OTHER FATIGUE: ICD-10-CM

## 2019-02-09 DIAGNOSIS — B96.89 ACUTE BACTERIAL SINUSITIS: ICD-10-CM

## 2019-02-09 DIAGNOSIS — I10 HYPERTENSION, UNSPECIFIED TYPE: ICD-10-CM

## 2019-02-09 DIAGNOSIS — R05.9 COUGH: ICD-10-CM

## 2019-02-09 PROCEDURE — 1101F PR PT FALLS ASSESS DOC 0-1 FALLS W/OUT INJ PAST YR: ICD-10-PCS | Mod: HCNC,CPTII,S$GLB, | Performed by: NURSE PRACTITIONER

## 2019-02-09 PROCEDURE — 3077F PR MOST RECENT SYSTOLIC BLOOD PRESSURE >= 140 MM HG: ICD-10-PCS | Mod: HCNC,CPTII,S$GLB, | Performed by: NURSE PRACTITIONER

## 2019-02-09 PROCEDURE — 1101F PT FALLS ASSESS-DOCD LE1/YR: CPT | Mod: HCNC,CPTII,S$GLB, | Performed by: NURSE PRACTITIONER

## 2019-02-09 PROCEDURE — 99214 PR OFFICE/OUTPT VISIT, EST, LEVL IV, 30-39 MIN: ICD-10-PCS | Mod: HCNC,S$GLB,, | Performed by: NURSE PRACTITIONER

## 2019-02-09 PROCEDURE — 3077F SYST BP >= 140 MM HG: CPT | Mod: HCNC,CPTII,S$GLB, | Performed by: NURSE PRACTITIONER

## 2019-02-09 PROCEDURE — 99214 OFFICE O/P EST MOD 30 MIN: CPT | Mod: HCNC,S$GLB,, | Performed by: NURSE PRACTITIONER

## 2019-02-09 PROCEDURE — 99999 PR PBB SHADOW E&M-EST. PATIENT-LVL V: ICD-10-PCS | Mod: PBBFAC,HCNC,, | Performed by: NURSE PRACTITIONER

## 2019-02-09 PROCEDURE — 3078F DIAST BP <80 MM HG: CPT | Mod: HCNC,CPTII,S$GLB, | Performed by: NURSE PRACTITIONER

## 2019-02-09 PROCEDURE — 99999 PR PBB SHADOW E&M-EST. PATIENT-LVL V: CPT | Mod: PBBFAC,HCNC,, | Performed by: NURSE PRACTITIONER

## 2019-02-09 PROCEDURE — 71046 XR CHEST PA AND LATERAL: ICD-10-PCS | Mod: 26,HCNC,, | Performed by: RADIOLOGY

## 2019-02-09 PROCEDURE — 3078F PR MOST RECENT DIASTOLIC BLOOD PRESSURE < 80 MM HG: ICD-10-PCS | Mod: HCNC,CPTII,S$GLB, | Performed by: NURSE PRACTITIONER

## 2019-02-09 PROCEDURE — 71046 X-RAY EXAM CHEST 2 VIEWS: CPT | Mod: TC,HCNC,PO

## 2019-02-09 PROCEDURE — 71046 X-RAY EXAM CHEST 2 VIEWS: CPT | Mod: 26,HCNC,, | Performed by: RADIOLOGY

## 2019-02-09 RX ORDER — FLUTICASONE PROPIONATE 50 MCG
2 SPRAY, SUSPENSION (ML) NASAL DAILY
Qty: 16 G | Refills: 0 | Status: SHIPPED | OUTPATIENT
Start: 2019-02-09 | End: 2020-12-08

## 2019-02-09 RX ORDER — AMOXICILLIN AND CLAVULANATE POTASSIUM 875; 125 MG/1; MG/1
1 TABLET, FILM COATED ORAL EVERY 12 HOURS
Qty: 14 TABLET | Refills: 0 | Status: SHIPPED | OUTPATIENT
Start: 2019-02-09 | End: 2019-02-16

## 2019-02-09 RX ORDER — PROMETHAZINE HYDROCHLORIDE AND DEXTROMETHORPHAN HYDROBROMIDE 6.25; 15 MG/5ML; MG/5ML
5 SYRUP ORAL
Qty: 120 ML | Refills: 0 | Status: SHIPPED | OUTPATIENT
Start: 2019-02-09 | End: 2019-03-21 | Stop reason: SDUPTHER

## 2019-02-09 NOTE — PATIENT INSTRUCTIONS
PLAN: CXR,   Consult ENT  Drink plenty of clear fluids--at least 64 ounces of water/juice & rest  Simply saline nasal wash or flonase to irrigate sinuses and for congestion/runny nose.  Cool mist humidifier/vaporizer.  Meds:  Augmentin, Flonase & phenergan dm / no refills  Practice good handwashing..  Mucinex for cough and chest congestion.  Tylenol  for fever, headache and body aches.  Warm salt water gargles for throat comfort.  Chloraseptic spray or lozenges for throat comfort.  Advise follow up with PCP  Advise go to ER if symptoms worsen or fail to improve with treatment.  AVS provided and reviewed with patient including supportive care, follow up, and red flag symptoms.   Patient verbalizes understanding and agrees with treatment plan. Discharged from Urgent Care in stable condition.

## 2019-02-09 NOTE — PROGRESS NOTES
Chief complaint/reason for visit: Nasal congestion, postnasal drip, cough     HISTORY OF PRESENT ILLNESS:   77 y/o female with  complains of nasal congestion, postnasal drip blood tinge mucus, headache, hard of hearing, shortness of breath, productive cough with slight wheezing and chills onset 1 week.  Patient complains cough worse at night. Complains of having recurrent episodes of sinus infection, with out treatment.  Admits did not try any medication. Denies chest pain, nausea, vomiting, diarrhea, fever with body aches.  Discussed the need for further evaluation with chest x-ray.  Patient agrees with plan of therapy discussed with patient the need for further evaluation for decreased hearing.     Past Medical History:   Diagnosis Date    *Atrial fibrillation     Coronary artery disease involving native coronary artery of native heart with angina pectoris 4/10/2018    Diabetes mellitus     DM (diabetes mellitus) 2002     09/06/2017 no medication    Hyperlipidemia     Hypertension        Past Surgical History:   Procedure Laterality Date    CHOLECYSTECTOMY      COLON SURGERY      HYSTERECTOMY              Family History   Problem Relation Age of Onset    Diabetes Sister     Hypertension Sister     Hypertension Mother     Diabetes Sister             Social History     Socioeconomic History    Marital status:      Spouse name: Not on file    Number of children: Not on file    Years of education: Not on file    Highest education level: Not on file   Social Needs    Financial resource strain: Not on file    Food insecurity - worry: Not on file    Food insecurity - inability: Not on file    Transportation needs - medical: Not on file    Transportation needs - non-medical: Not on file   Occupational History    Not on file   Tobacco Use    Smoking status: Never Smoker    Smokeless tobacco: Never Used   Substance and Sexual Activity    Alcohol use: No    Drug use: No     Sexual activity: Not on file   Other Topics Concern    Not on file   Social History Narrative    Not on file       ROS:  GENERAL: Reports chills and fatigue.   SKIN: No rashes, itching or changes in color or texture of skin.  HEENT: Reports nasal congestion, postnasal drip blood tinge mucus, headache, hard of hearing, hoarseness.   NODES: No masses or lesions. Denies swollen glands.  CHEST: Reports productive cough. & wheezing  CARDIOVASCULAR: Denies chest pain, shortness of breath  ABDOMEN: Appetite fair, No weight loss.  MUSCULOSKELETAL: reports no back pain.  NEUROLOGIC: No history of seizures, paralysis, alteration of gait or coordination.  PSYCHIATRIC: Micah mood swings, depression.    PE:   APPEARANCE: Well nourished, well developed, in moderate distress, pulse ox: 98%   V/S: Reviewed.  SKIN: Normal skin turgor, no lesions.  HEENT: Turbinates red, Minimal red pharynx, TM's poor light reflex bilateral.  CHEST: minimal expiratory wheezing with rhonchi on auscultation.  CARDIOVASCULAR: Regular rate and rhythm.   MUSCULOSKELETAL: URRUTIA without difficulty  NEUROLOGIC: No sensory deficits. Gait & Posture: normal, No cerebellar signs.  MENTAL STATUS: Patient alert, oriented x 3 & conversant.    PLAN: CXR,   Consult ENT  Drink plenty of clear fluids--at least 64 ounces of water/juice & rest  Simply saline nasal wash or Flonase to irrigate sinuses and for congestion/runny nose.  Cool mist humidifier/vaporizer.  Med's:  Augmentin, Flonase & phenergan dm / no refills  Practice good handwashing..  Mucinex for cough and chest congestion.  Tylenol  for fever, headache and body aches.  Warm salt water gargles for throat comfort.  Chloraseptic spray or lozenges for throat comfort.  Advise follow up with PCP  Advise go to ER if symptoms worsen or fail to improve with treatment.  AVS provided and reviewed with patient including supportive care, follow up, and red flag symptoms.   Patient verbalizes understanding and agrees with  treatment plan. Discharged from Urgent Care in stable condition.      DIAGNOSIS:  Fatigue  Hypertension  Cough vs pneumonia  Decreased hearing  Acute bacterial sinusitis  Type 2 diabetes mellitus without complications

## 2019-02-09 NOTE — TELEPHONE ENCOUNTER
Patient contacted to have appointment scheduled with ENT, for hearing. No answer, voicemail left for a return phone call and reason for needing the appointment.

## 2019-02-11 ENCOUNTER — OFFICE VISIT (OUTPATIENT)
Dept: INTERNAL MEDICINE | Facility: CLINIC | Age: 79
End: 2019-02-11
Payer: MEDICARE

## 2019-02-11 VITALS
SYSTOLIC BLOOD PRESSURE: 134 MMHG | DIASTOLIC BLOOD PRESSURE: 70 MMHG | OXYGEN SATURATION: 98 % | WEIGHT: 215.19 LBS | HEART RATE: 96 BPM | HEIGHT: 67 IN | BODY MASS INDEX: 33.78 KG/M2 | TEMPERATURE: 98 F

## 2019-02-11 DIAGNOSIS — I10 HYPERTENSION, UNSPECIFIED TYPE: ICD-10-CM

## 2019-02-11 DIAGNOSIS — E11.9 TYPE 2 DIABETES MELLITUS WITHOUT COMPLICATION, WITHOUT LONG-TERM CURRENT USE OF INSULIN: ICD-10-CM

## 2019-02-11 DIAGNOSIS — I48.20 CHRONIC ATRIAL FIBRILLATION: ICD-10-CM

## 2019-02-11 DIAGNOSIS — E66.01 MORBID (SEVERE) OBESITY DUE TO EXCESS CALORIES: ICD-10-CM

## 2019-02-11 DIAGNOSIS — Z79.01 ANTICOAGULATED ON COUMADIN: ICD-10-CM

## 2019-02-11 DIAGNOSIS — J32.9 CHRONIC SINUSITIS, UNSPECIFIED LOCATION: Primary | ICD-10-CM

## 2019-02-11 PROCEDURE — 3075F PR MOST RECENT SYSTOLIC BLOOD PRESS GE 130-139MM HG: ICD-10-PCS | Mod: HCNC,CPTII,S$GLB, | Performed by: FAMILY MEDICINE

## 2019-02-11 PROCEDURE — 3078F DIAST BP <80 MM HG: CPT | Mod: HCNC,CPTII,S$GLB, | Performed by: FAMILY MEDICINE

## 2019-02-11 PROCEDURE — 99999 PR PBB SHADOW E&M-EST. PATIENT-LVL IV: ICD-10-PCS | Mod: PBBFAC,HCNC,, | Performed by: FAMILY MEDICINE

## 2019-02-11 PROCEDURE — 99214 PR OFFICE/OUTPT VISIT, EST, LEVL IV, 30-39 MIN: ICD-10-PCS | Mod: HCNC,S$GLB,, | Performed by: FAMILY MEDICINE

## 2019-02-11 PROCEDURE — 1101F PT FALLS ASSESS-DOCD LE1/YR: CPT | Mod: HCNC,CPTII,S$GLB, | Performed by: FAMILY MEDICINE

## 2019-02-11 PROCEDURE — 3075F SYST BP GE 130 - 139MM HG: CPT | Mod: HCNC,CPTII,S$GLB, | Performed by: FAMILY MEDICINE

## 2019-02-11 PROCEDURE — 99214 OFFICE O/P EST MOD 30 MIN: CPT | Mod: HCNC,S$GLB,, | Performed by: FAMILY MEDICINE

## 2019-02-11 PROCEDURE — 99999 PR PBB SHADOW E&M-EST. PATIENT-LVL IV: CPT | Mod: PBBFAC,HCNC,, | Performed by: FAMILY MEDICINE

## 2019-02-11 PROCEDURE — 3078F PR MOST RECENT DIASTOLIC BLOOD PRESSURE < 80 MM HG: ICD-10-PCS | Mod: HCNC,CPTII,S$GLB, | Performed by: FAMILY MEDICINE

## 2019-02-11 PROCEDURE — 1101F PR PT FALLS ASSESS DOC 0-1 FALLS W/OUT INJ PAST YR: ICD-10-PCS | Mod: HCNC,CPTII,S$GLB, | Performed by: FAMILY MEDICINE

## 2019-02-11 NOTE — PROGRESS NOTES
Natalie Greene  02/11/2019  [id number]    Annabella Fenton MD  Patient Care Team:  Annabella Fenton MD as PCP - General (Family Medicine)  Annabella Fenton MD as Consulting Physician (Family Medicine)  Lisbet Lopez LPN as Care Coordinator (Internal Medicine)  Has the patient seen any provider outside of the [facility] network since the last visit? (yes). If yes, HIPPA forms completed and records requested.        Visit Type:an urgent visit for an existing problem     Chief Complaint:  Chief Complaint   Patient presents with    Follow-up     on urgent care appointment       History of Present Illness:  78 year old here for sinus infection.  She was seen at Urgent Care on 2/9 for cough/congestion. C/o that symptoms there longer than 1week. Reports some wheezing.  She didn't have any infiltrate on exam. No effusion. She has chronic increase peribronchial markings. She was given Flonase and irrigation for nose.  She was tx with Augmentin and Phenergan DM.   She was ordered ENT referral.     She declined to schedule her colonoscopy with GI. We sent the referral and received notice when they called to scehdule she declined. History of neoplasm of colon with rescection and recommend repeating a colon screen.    She is set up for her DM eye end of month.  Hemoglobin A1C   Date Value Ref Range Status   11/12/2018 6.5 (H) 4.0 - 5.6 % Final     Comment:     ADA Screening Guidelines:  5.7-6.4%  Consistent with prediabetes  >or=6.5%  Consistent with diabetes  High levels of fetal hemoglobin interfere with the HbA1C  assay. Heterozygous hemoglobin variants (HbS, HgC, etc)do  not significantly interfere with this assay.   However, presence of multiple variants may affect accuracy.     04/23/2018 7.4 (H) 4.0 - 5.6 % Final     Comment:     According to ADA guidelines, hemoglobin A1c <7.0% represents  optimal control in non-pregnant diabetic patients. Different  metrics may apply to specific patient populations.   Standards of  Medical Care in Diabetes-2016.  For the purpose of screening for the presence of diabetes:  <5.7%     Consistent with the absence of diabetes  5.7-6.4%  Consistent with increasing risk for diabetes   (prediabetes)  >or=6.5%  Consistent with diabetes  Currently, no consensus exists for use of hemoglobin A1c  for diagnosis of diabetes for children.  This Hemoglobin A1c assay has significant interference with fetal   hemoglobin   (HbF). The results are invalid for patients with abnormal amounts of   HbF,   including those with known Hereditary Persistence   of Fetal Hemoglobin. Heterozygous hemoglobin variants (HbAS, HbAC,   HbAD, HbAE, HbA2) do not significantly interfere with this assay;   however, presence of multiple variants in a sample may impact the %   interference.     11/29/2017 6.6 (H) 4.0 - 5.6 % Final     Comment:     According to ADA guidelines, hemoglobin A1c <7.0% represents  optimal control in non-pregnant diabetic patients. Different  metrics may apply to specific patient populations.   Standards of Medical Care in Diabetes-2016.  For the purpose of screening for the presence of diabetes:  <5.7%     Consistent with the absence of diabetes  5.7-6.4%  Consistent with increasing risk for diabetes   (prediabetes)  >or=6.5%  Consistent with diabetes  Currently, no consensus exists for use of hemoglobin A1c  for diagnosis of diabetes for children.  This Hemoglobin A1c assay has significant interference with fetal   hemoglobin   (HbF). The results are invalid for patients with abnormal amounts of   HbF,   including those with known Hereditary Persistence   of Fetal Hemoglobin. Heterozygous hemoglobin variants (HbAS, HbAC,   HbAD, HbAE, HbA2) do not significantly interfere with this assay;   however, presence of multiple variants in a sample may impact the %   interference.       She is followed by Cards for her Afib and CAD. She is on coumadin. Bp remains at goal range.  She is on Lipitor for LDL  control.        History:  Past Medical History:   Diagnosis Date    *Atrial fibrillation     Coronary artery disease involving native coronary artery of native heart with angina pectoris 4/10/2018    Diabetes mellitus     DM (diabetes mellitus) 2002     09/06/2017 no medication    Hyperlipidemia     Hypertension      Past Surgical History:   Procedure Laterality Date    CHOLECYSTECTOMY      COLON SURGERY      HYSTERECTOMY       Family History   Problem Relation Age of Onset    Diabetes Sister     Hypertension Sister     Hypertension Mother     Diabetes Sister      Social History     Socioeconomic History    Marital status:      Spouse name: Not on file    Number of children: Not on file    Years of education: Not on file    Highest education level: Not on file   Social Needs    Financial resource strain: Not on file    Food insecurity - worry: Not on file    Food insecurity - inability: Not on file    Transportation needs - medical: Not on file    Transportation needs - non-medical: Not on file   Occupational History    Not on file   Tobacco Use    Smoking status: Never Smoker    Smokeless tobacco: Never Used   Substance and Sexual Activity    Alcohol use: No    Drug use: No    Sexual activity: Not on file   Other Topics Concern    Not on file   Social History Narrative    Not on file     Patient Active Problem List   Diagnosis    A-fib    HTN (hypertension)    Hyperlipidemia with target LDL less than 100    DM (diabetes mellitus)    Morbid (severe) obesity due to excess calories    Anticoagulated on Coumadin    Obesity (BMI 30-39.9)    Abnormal stress test    History of malignant neoplasm of colon    Shoulder pain, left    Aortic atherosclerosis    Long term current use of anticoagulant therapy    Abdominal wall seroma     Review of patient's allergies indicates:  No Known Allergies    The following were reviewed at this visit: active problem list, medication  list, allergies, family history, social history, and health maintenance.    Medications:  Current Outpatient Medications on File Prior to Visit   Medication Sig Dispense Refill    ACCU-CHEK SOFTCLIX LANCETS Misc       amLODIPine (NORVASC) 5 MG tablet TAKE 1 TABLET EVERY DAY 90 tablet 1    amoxicillin-clavulanate 875-125mg (AUGMENTIN) 875-125 mg per tablet Take 1 tablet by mouth every 12 (twelve) hours. for 7 days 14 tablet 0    diltiaZEM (CARDIZEM CD) 180 MG 24 hr capsule Take 1 capsule (180 mg total) by mouth once daily. 90 capsule 3    fluticasone (FLONASE) 50 mcg/actuation nasal spray 2 sprays (100 mcg total) by Each Nare route once daily. 16 g 0    indapamide (LOZOL) 1.25 MG Tab TAKE 1 TABLET EVERY DAY 90 tablet 1    losartan (COZAAR) 100 MG tablet Take 1 tablet (100 mg total) by mouth once daily. 90 tablet 1    metFORMIN (GLUCOPHAGE-XR) 500 MG 24 hr tablet Take 1 tablet (500 mg total) by mouth daily with breakfast. 90 tablet 3    metoprolol succinate (TOPROL-XL) 100 MG 24 hr tablet Take 1 tablet (100 mg total) by mouth once daily. 90 tablet 1    promethazine-dextromethorphan (PROMETHAZINE-DM) 6.25-15 mg/5 mL Syrp Take 5 mLs by mouth every 6 to 8 hours as needed. 120 mL 0    warfarin (COUMADIN) 6 MG tablet TAKE 1 TABLET DAILY 90 tablet 1    atorvastatin (LIPITOR) 40 MG tablet Take 1 tablet (40 mg total) by mouth once daily. 90 tablet 3    INCONTINENCE PAD,LINER,DISP (BLADDER CONTROL PADS EX ABSORB MISC)        No current facility-administered medications on file prior to visit.        Medications have been reviewed and reconciled with patient at this visit.  Barriers to medications present (no)    Adverse reactions to current medications (no)    Over the counter medications reviewed (Yes ), and if needed added to active Medication list at this visit.     Exam:  Wt Readings from Last 3 Encounters:   02/11/19 97.6 kg (215 lb 2.7 oz)   02/09/19 95.7 kg (210 lb 13.9 oz)   02/04/19 98.6 kg (217 lb 6.4  oz)     Temp Readings from Last 3 Encounters:   02/11/19 97.7 °F (36.5 °C) (Tympanic)   02/09/19 97.9 °F (36.6 °C) (Tympanic)   02/04/19 97.9 °F (36.6 °C)     BP Readings from Last 3 Encounters:   02/09/19 (!) 146/78   02/04/19 133/66   01/22/19 130/74     Pulse Readings from Last 3 Encounters:   02/11/19 96   02/09/19 (!) 116   02/04/19 78     Body mass index is 33.7 kg/m².      Review of Systems   Constitutional: Positive for chills. Negative for fever.   HENT: Positive for congestion, ear pain and sinus pain. Negative for sore throat.    Eyes: Negative for blurred vision and double vision.   Respiratory: Positive for cough and sputum production. Negative for shortness of breath and wheezing.    Cardiovascular: Negative for chest pain, palpitations and leg swelling.   Gastrointestinal: Negative for abdominal pain, constipation, diarrhea, heartburn, nausea and vomiting.   Genitourinary: Negative.    Musculoskeletal: Negative.    Skin: Negative.  Negative for rash.   Neurological: Negative.    Endo/Heme/Allergies: Negative.  Negative for polydipsia. Does not bruise/bleed easily.   Psychiatric/Behavioral: Negative for depression and substance abuse.     Physical Exam   Constitutional: She appears well-developed and well-nourished.   HENT:   Head: Normocephalic and atraumatic.   Eyes: Pupils are equal, round, and reactive to light.   Neck: Normal range of motion.   Cardiovascular: An irregularly irregular rhythm present.   Pulmonary/Chest: Effort normal and breath sounds normal.   Musculoskeletal: Normal range of motion.   Psychiatric: She has a normal mood and affect. Her behavior is normal. Judgment and thought content normal.   Nursing note and vitals reviewed.      Laboratory Reviewed ({N/A)  Lab Results   Component Value Date    WBC 5.21 01/17/2019    HGB 12.1 01/17/2019    HCT 37.3 01/17/2019     01/17/2019    CHOL 171 11/12/2018    TRIG 111 11/12/2018    HDL 40 11/12/2018    ALT 20 01/17/2019    AST 23  01/17/2019     (L) 01/17/2019    K 3.4 (L) 01/17/2019     01/17/2019    CREATININE 0.8 01/17/2019    BUN 12 01/17/2019    CO2 21 (L) 01/17/2019    TSH 1.785 07/29/2014    INR 2.3 02/05/2019    HGBA1C 6.5 (H) 11/12/2018       Natalie was seen today for follow-up.    Diagnoses and all orders for this visit:    Chronic sinusitis, unspecified location    Chronic atrial fibrillation    Anticoagulated on Coumadin    Type 2 diabetes mellitus without complication, without long-term current use of insulin    Hypertension, unspecified type    Morbid (severe) obesity due to excess calories    Other orders  -     Cancel: NURSING COMMUNICATION: Create [facility] Account  -     Cancel: Hypertension Digital Medicine (HDMP) Enrollment Order  -     Cancel: Hypertension Digital Medicine (HDMP): Assign Onboarding Questionnaires      Urgent Care Visit reviewed.  Chest xray reviewed from 2 days ago.     ENT referral- will schedule when ready. Not ready now.   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   Just started Augmentin, not completed course yet  Will need to complete course before stating she failed antibotics.    Cough meds    DM- diet controlled. Last A1c fine    Cards following A fib- on coumadin. BP in goal range    Diet discussed.  The patient's BMI has been recorded in the chart. The patient has been provided educational materials regarding the benefits of attaining and maintaining a normal weight. We will continue to address and follow this issue during follow up visits.    She has follow up with me in April for Hga1c.            Care Plan/Goals: Reviewed  (Yes)  Goals      Take at least one BP reading per week at various times of the day      Hypertension Goals/Individual Care Plan    Hypertension Goal Care Plan    1. Blood pressure goal is to be below 140/90. Normal Blood pressure is 120/80.  Patient's blood pressure is at goal today. (Yes)    2. Goal for self management is to check Blood Pressure daily.  Record on Individual log. Patient is agreeable to self management plan. blood pressure log.   Patient will bring logs at next office visit, or communicate to /Care Management team for review for interval follow up.     3. Moderately intense physical activity, such as brisk walking, is beneficial when done regularly. Aim for a total of 40 minutes of moderate to vigorous activity three to four times a week to help lower blood pressure. Exercise of patients choice was recommended at this office visit. walking    4. Eating Healthy Diet is important in Blood Pressure control. As its name implies, the DASH (Dietary Approaches to Stop Hypertension) eating plan is designed to help you manage blood pressure. Emphasizing healthy food sources, it also limits:  Red meat   Sodium (salt)   Sweets, added sugars and sugar-containing beverages.     5. Barriers to goals reviewed and addressed with patient at visit. (Yes)    6. Adherence and Medication Side effects discussed (Yes)    Referral to on-site Pharmacy for Co-management of hypertension (No)  Patient enrolled in Tele-health Hypertension Management. (No)    Patient is under care of /Care management (No) Referral Sent (No)                  Follow up: No Follow-up on file.    After visit summary was printed and given to patient upon discharge today.  Patient goals and care plan are included in After Visit Summary.

## 2019-02-12 DIAGNOSIS — I25.119 CORONARY ARTERY DISEASE INVOLVING NATIVE CORONARY ARTERY OF NATIVE HEART WITH ANGINA PECTORIS: ICD-10-CM

## 2019-02-12 DIAGNOSIS — I70.0 AORTIC ATHEROSCLEROSIS: ICD-10-CM

## 2019-02-12 RX ORDER — METOPROLOL SUCCINATE 100 MG/1
100 TABLET, EXTENDED RELEASE ORAL DAILY
Qty: 90 TABLET | Refills: 1 | Status: SHIPPED | OUTPATIENT
Start: 2019-02-12 | End: 2019-08-24 | Stop reason: SDUPTHER

## 2019-02-12 RX ORDER — ATORVASTATIN CALCIUM 40 MG/1
40 TABLET, FILM COATED ORAL DAILY
Qty: 90 TABLET | Refills: 3 | Status: SHIPPED | OUTPATIENT
Start: 2019-02-12 | End: 2019-10-23 | Stop reason: SDUPTHER

## 2019-02-12 RX ORDER — LOSARTAN POTASSIUM 100 MG/1
100 TABLET ORAL DAILY
Qty: 90 TABLET | Refills: 1 | Status: SHIPPED | OUTPATIENT
Start: 2019-02-12 | End: 2019-08-24 | Stop reason: SDUPTHER

## 2019-02-12 RX ORDER — DILTIAZEM HYDROCHLORIDE 180 MG/1
180 CAPSULE, COATED, EXTENDED RELEASE ORAL DAILY
Qty: 90 CAPSULE | Refills: 3 | Status: SHIPPED | OUTPATIENT
Start: 2019-02-12 | End: 2019-10-23 | Stop reason: SDUPTHER

## 2019-02-19 ENCOUNTER — OFFICE VISIT (OUTPATIENT)
Dept: OPHTHALMOLOGY | Facility: CLINIC | Age: 79
End: 2019-02-19
Payer: MEDICARE

## 2019-02-19 DIAGNOSIS — H25.13 CATARACT, NUCLEAR SCLEROTIC SENILE, BILATERAL: ICD-10-CM

## 2019-02-19 DIAGNOSIS — E11.9 DIABETES MELLITUS TYPE 2 WITHOUT RETINOPATHY: Primary | ICD-10-CM

## 2019-02-19 DIAGNOSIS — H52.7 REFRACTIVE ERROR: ICD-10-CM

## 2019-02-19 PROCEDURE — 92015 DETERMINE REFRACTIVE STATE: CPT | Mod: HCNC,S$GLB,, | Performed by: OPTOMETRIST

## 2019-02-19 PROCEDURE — 92015 PR REFRACTION: ICD-10-PCS | Mod: HCNC,S$GLB,, | Performed by: OPTOMETRIST

## 2019-02-19 PROCEDURE — 99999 PR PBB SHADOW E&M-EST. PATIENT-LVL I: CPT | Mod: PBBFAC,HCNC,, | Performed by: OPTOMETRIST

## 2019-02-19 PROCEDURE — 99999 PR PBB SHADOW E&M-EST. PATIENT-LVL I: ICD-10-PCS | Mod: PBBFAC,HCNC,, | Performed by: OPTOMETRIST

## 2019-02-19 PROCEDURE — 92014 PR EYE EXAM, EST PATIENT,COMPREHESV: ICD-10-PCS | Mod: HCNC,S$GLB,, | Performed by: OPTOMETRIST

## 2019-02-19 PROCEDURE — 92014 COMPRE OPH EXAM EST PT 1/>: CPT | Mod: HCNC,S$GLB,, | Performed by: OPTOMETRIST

## 2019-02-19 NOTE — PROGRESS NOTES
HPI     Annual DM Exam  Pt states BS is ok states she is having problems with OS have a spot in   the center of her va that doesn't move  X 1 month  Pt using OTC readers only     Last edited by Henry Rojas, OD on 2/19/2019  1:53 PM. (History)            Assessment /Plan     For exam results, see Encounter Report.    Diabetes mellitus type 2 without retinopathy    Cataract, nuclear sclerotic senile, bilateral    Refractive error      No Background Diabetic Retinopathy    Significant cataracts OU, pt defers the cataract evaluation consult.  Myopic shift OS    Dispense Final Rx for glasses.  RTC 1 year  Discussed above and answered questions.

## 2019-03-05 ENCOUNTER — ANTI-COAG VISIT (OUTPATIENT)
Dept: CARDIOLOGY | Facility: CLINIC | Age: 79
End: 2019-03-05
Payer: MEDICARE

## 2019-03-05 DIAGNOSIS — Z79.01 LONG TERM (CURRENT) USE OF ANTICOAGULANTS: Primary | ICD-10-CM

## 2019-03-05 LAB — INR PPP: 2.7 (ref 2–3)

## 2019-03-05 PROCEDURE — 85610 POCT INR: ICD-10-PCS | Mod: QW,HCNC,S$GLB, | Performed by: INTERNAL MEDICINE

## 2019-03-05 PROCEDURE — 85610 PROTHROMBIN TIME: CPT | Mod: QW,HCNC,S$GLB, | Performed by: INTERNAL MEDICINE

## 2019-03-05 NOTE — PROGRESS NOTES
Patient's INR is therapeutic at 2.7.  Reports no recent changes.  Instructed to maintain current dose of Warfarin 3 mg every Thursday and 6 mg on all other days per week.  Dose calendar - given.  Recheck in 1 month.

## 2019-03-20 ENCOUNTER — PATIENT OUTREACH (OUTPATIENT)
Dept: ADMINISTRATIVE | Facility: HOSPITAL | Age: 79
End: 2019-03-20

## 2019-03-20 NOTE — PROGRESS NOTES
Chart audit completed.  Offered to schedule dexa while in clinic. She declined saying she would rather schedule another time. Will assist to schedule when she comes in.

## 2019-03-21 ENCOUNTER — OFFICE VISIT (OUTPATIENT)
Dept: INTERNAL MEDICINE | Facility: CLINIC | Age: 79
End: 2019-03-21
Payer: MEDICARE

## 2019-03-21 VITALS
HEIGHT: 67 IN | HEART RATE: 80 BPM | BODY MASS INDEX: 33.39 KG/M2 | OXYGEN SATURATION: 96 % | TEMPERATURE: 97 F | SYSTOLIC BLOOD PRESSURE: 110 MMHG | WEIGHT: 212.75 LBS | DIASTOLIC BLOOD PRESSURE: 68 MMHG

## 2019-03-21 DIAGNOSIS — J01.90 ACUTE BACTERIAL SINUSITIS: ICD-10-CM

## 2019-03-21 DIAGNOSIS — Z79.01 ANTICOAGULATED ON COUMADIN: ICD-10-CM

## 2019-03-21 DIAGNOSIS — R06.83 SNORING: ICD-10-CM

## 2019-03-21 DIAGNOSIS — R05.9 COUGH: Primary | ICD-10-CM

## 2019-03-21 DIAGNOSIS — I48.20 CHRONIC ATRIAL FIBRILLATION: ICD-10-CM

## 2019-03-21 DIAGNOSIS — G47.10 HYPERSOMNIA: ICD-10-CM

## 2019-03-21 DIAGNOSIS — R06.02 SHORTNESS OF BREATH: ICD-10-CM

## 2019-03-21 DIAGNOSIS — B96.89 ACUTE BACTERIAL SINUSITIS: ICD-10-CM

## 2019-03-21 DIAGNOSIS — R53.83 OTHER FATIGUE: ICD-10-CM

## 2019-03-21 DIAGNOSIS — R40.0 DAYTIME SLEEPINESS: ICD-10-CM

## 2019-03-21 DIAGNOSIS — H91.93 DECREASED HEARING OF BOTH EARS: ICD-10-CM

## 2019-03-21 DIAGNOSIS — I10 HYPERTENSION, UNSPECIFIED TYPE: ICD-10-CM

## 2019-03-21 DIAGNOSIS — E66.9 OBESITY (BMI 30-39.9): ICD-10-CM

## 2019-03-21 DIAGNOSIS — R06.2 WHEEZE: ICD-10-CM

## 2019-03-21 PROCEDURE — 99999 PR PBB SHADOW E&M-EST. PATIENT-LVL IV: ICD-10-PCS | Mod: PBBFAC,HCNC,, | Performed by: FAMILY MEDICINE

## 2019-03-21 PROCEDURE — 3074F PR MOST RECENT SYSTOLIC BLOOD PRESSURE < 130 MM HG: ICD-10-PCS | Mod: HCNC,CPTII,S$GLB, | Performed by: FAMILY MEDICINE

## 2019-03-21 PROCEDURE — 1101F PT FALLS ASSESS-DOCD LE1/YR: CPT | Mod: HCNC,CPTII,S$GLB, | Performed by: FAMILY MEDICINE

## 2019-03-21 PROCEDURE — 99999 PR PBB SHADOW E&M-EST. PATIENT-LVL IV: CPT | Mod: PBBFAC,HCNC,, | Performed by: FAMILY MEDICINE

## 2019-03-21 PROCEDURE — 3078F DIAST BP <80 MM HG: CPT | Mod: HCNC,CPTII,S$GLB, | Performed by: FAMILY MEDICINE

## 2019-03-21 PROCEDURE — 3078F PR MOST RECENT DIASTOLIC BLOOD PRESSURE < 80 MM HG: ICD-10-PCS | Mod: HCNC,CPTII,S$GLB, | Performed by: FAMILY MEDICINE

## 2019-03-21 PROCEDURE — 1101F PR PT FALLS ASSESS DOC 0-1 FALLS W/OUT INJ PAST YR: ICD-10-PCS | Mod: HCNC,CPTII,S$GLB, | Performed by: FAMILY MEDICINE

## 2019-03-21 PROCEDURE — 99214 OFFICE O/P EST MOD 30 MIN: CPT | Mod: HCNC,S$GLB,, | Performed by: FAMILY MEDICINE

## 2019-03-21 PROCEDURE — 3074F SYST BP LT 130 MM HG: CPT | Mod: HCNC,CPTII,S$GLB, | Performed by: FAMILY MEDICINE

## 2019-03-21 PROCEDURE — 99214 PR OFFICE/OUTPT VISIT, EST, LEVL IV, 30-39 MIN: ICD-10-PCS | Mod: HCNC,S$GLB,, | Performed by: FAMILY MEDICINE

## 2019-03-21 RX ORDER — METHYLPREDNISOLONE 4 MG/1
TABLET ORAL
Qty: 1 PACKAGE | Refills: 0 | Status: SHIPPED | OUTPATIENT
Start: 2019-03-21 | End: 2019-04-11

## 2019-03-21 RX ORDER — AZITHROMYCIN 250 MG/1
TABLET, FILM COATED ORAL
Qty: 6 TABLET | Refills: 0 | Status: SHIPPED | OUTPATIENT
Start: 2019-03-21 | End: 2019-03-26

## 2019-03-21 RX ORDER — PROMETHAZINE HYDROCHLORIDE AND DEXTROMETHORPHAN HYDROBROMIDE 6.25; 15 MG/5ML; MG/5ML
5 SYRUP ORAL
Qty: 120 ML | Refills: 0 | Status: SHIPPED | OUTPATIENT
Start: 2019-03-21 | End: 2019-10-23

## 2019-03-21 NOTE — PROGRESS NOTES
Natalie Greene  03/22/2019  [id number]    Annabella Fenton MD  Patient Care Team:  Annabella Fenton MD as PCP - General (Family Medicine)  Annabella Fenton MD as Consulting Physician (Family Medicine)  Lisbet Lopez LPN as Care Coordinator (Internal Medicine)  Has the patient seen any provider outside of the [facility] network since the last visit? (no). If yes, HIPPA forms completed and records requested.        Visit Type:a scheduled routine follow-up visit    Chief Complaint:  Chief Complaint   Patient presents with    Cough    Sinus Problem    Wheezing     at night       History of Present Illness:  77 y/o female with  complains of nasal congestion, postnasal drip blood tinge mucus, headache, hard of hearing, mild shortness of breath, productive cough with slight wheezing and chills onset 6 weeks.  Patient complains cough worse at night. Complains of having recurrent episodes of sinus infection  She has seasonal allergies. She has been tx with antibotics, las was in Feb, Augmentin.   She is again with cough and wheeze at night.    History of A fib, followed by [facility] cards.   On coumadin.  She has nighttime wheeze. She has sputum. Cards said good idea for sleep study. Mouth breather, and does admit this makes her tired at night.    She has DM, but has been good control.  Lab Results   Component Value Date    HGBA1C 6.5 (H) 11/12/2018     Due for HgA1c in April/May    Did not want to schedule colon, needs to. Referred back to her external GI, and then she declined to schedule when called.  She has history of neoplasm of colon with resection. I have counseled her on needed repeat screen. 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      History:  Past Medical History:   Diagnosis Date    *Atrial fibrillation     Coronary artery disease involving native coronary artery of native heart with angina pectoris 4/10/2018    Diabetes mellitus     DM (diabetes mellitus) 2002      09/06/2017 no medication    Hyperlipidemia     Hypertension      Past Surgical History:   Procedure Laterality Date    CHOLECYSTECTOMY      COLON SURGERY      HYSTERECTOMY       Family History   Problem Relation Age of Onset    Diabetes Sister     Hypertension Sister     Hypertension Mother     Diabetes Sister      Social History     Socioeconomic History    Marital status:      Spouse name: Not on file    Number of children: Not on file    Years of education: Not on file    Highest education level: Not on file   Social Needs    Financial resource strain: Not on file    Food insecurity - worry: Not on file    Food insecurity - inability: Not on file    Transportation needs - medical: Not on file    Transportation needs - non-medical: Not on file   Occupational History    Not on file   Tobacco Use    Smoking status: Never Smoker    Smokeless tobacco: Never Used   Substance and Sexual Activity    Alcohol use: No    Drug use: No    Sexual activity: Not on file   Other Topics Concern    Not on file   Social History Narrative    Not on file     Patient Active Problem List   Diagnosis    A-fib    HTN (hypertension)    Hyperlipidemia with target LDL less than 100    DM (diabetes mellitus)    Morbid (severe) obesity due to excess calories    Anticoagulated on Coumadin    Obesity (BMI 30-39.9)    Abnormal stress test    History of malignant neoplasm of colon    Shoulder pain, left    Aortic atherosclerosis    Long term current use of anticoagulant therapy    Abdominal wall seroma     Review of patient's allergies indicates:  No Known Allergies    The following were reviewed at this visit: active problem list, medication list, allergies, family history, social history, and health maintenance.    Medications:  Current Outpatient Medications on File Prior to Visit   Medication Sig Dispense Refill    ACCU-CHEK SOFTCLIX LANCETS Misc       amLODIPine (NORVASC) 5 MG tablet TAKE 1  TABLET EVERY DAY 90 tablet 1    atorvastatin (LIPITOR) 40 MG tablet Take 1 tablet (40 mg total) by mouth once daily. 90 tablet 3    diltiaZEM (CARDIZEM CD) 180 MG 24 hr capsule Take 1 capsule (180 mg total) by mouth once daily. 90 capsule 3    fluticasone (FLONASE) 50 mcg/actuation nasal spray 2 sprays (100 mcg total) by Each Nare route once daily. 16 g 0    indapamide (LOZOL) 1.25 MG Tab TAKE 1 TABLET EVERY DAY 90 tablet 1    losartan (COZAAR) 100 MG tablet Take 1 tablet (100 mg total) by mouth once daily. 90 tablet 1    metFORMIN (GLUCOPHAGE-XR) 500 MG 24 hr tablet Take 1 tablet (500 mg total) by mouth daily with breakfast. 90 tablet 3    metoprolol succinate (TOPROL-XL) 100 MG 24 hr tablet Take 1 tablet (100 mg total) by mouth once daily. 90 tablet 1    warfarin (COUMADIN) 6 MG tablet TAKE 1 TABLET DAILY 90 tablet 1    INCONTINENCE PAD,LINER,DISP (BLADDER CONTROL PADS EX ABSORB MISC)        No current facility-administered medications on file prior to visit.        Medications have been reviewed and reconciled with patient at this visit.  Barriers to medications present (no)    Adverse reactions to current medications (no)    Over the counter medications reviewed (Yes ), and if needed added to active Medication list at this visit.     Exam:  Wt Readings from Last 3 Encounters:   03/21/19 96.5 kg (212 lb 11.9 oz)   02/11/19 97.6 kg (215 lb 2.7 oz)   02/09/19 95.7 kg (210 lb 13.9 oz)     Temp Readings from Last 3 Encounters:   03/21/19 96.8 °F (36 °C) (Tympanic)   02/11/19 97.7 °F (36.5 °C) (Tympanic)   02/09/19 97.9 °F (36.6 °C) (Tympanic)     BP Readings from Last 3 Encounters:   03/21/19 110/68   02/11/19 134/70   02/09/19 (!) 146/78     Pulse Readings from Last 3 Encounters:   03/21/19 80   02/11/19 96   02/09/19 (!) 116     Body mass index is 33.32 kg/m².      Review of Systems   Constitutional: Negative.  Negative for chills and fever.   HENT: Positive for sinus pain. Negative for congestion and sore  throat.    Eyes: Negative for blurred vision and double vision.   Respiratory: Positive for cough, sputum production, shortness of breath and wheezing.    Cardiovascular: Negative for chest pain, palpitations and leg swelling.   Gastrointestinal: Negative for abdominal pain, constipation, diarrhea, heartburn, nausea and vomiting.   Genitourinary: Negative.    Musculoskeletal: Negative.    Skin: Negative.  Negative for rash.   Neurological: Negative.    Endo/Heme/Allergies: Negative.  Negative for polydipsia. Does not bruise/bleed easily.   Psychiatric/Behavioral: Negative for depression and substance abuse.     Physical Exam   Constitutional: She is oriented to person, place, and time. She appears well-developed and well-nourished. No distress.   HENT:   Head: Normocephalic and atraumatic.   Right Ear: External ear normal. A middle ear effusion is present.   Left Ear: External ear normal. A middle ear effusion is present.   Nose: Rhinorrhea present. Right sinus exhibits frontal sinus tenderness. Right sinus exhibits no maxillary sinus tenderness. Left sinus exhibits maxillary sinus tenderness and frontal sinus tenderness.   Mouth/Throat: Oropharynx is clear and moist. No oropharyngeal exudate.   Eyes: Conjunctivae and EOM are normal. Pupils are equal, round, and reactive to light. Right eye exhibits no discharge. Left eye exhibits no discharge.   Neck: Normal range of motion. Neck supple. No thyromegaly present.   Cardiovascular: Normal rate, normal heart sounds and intact distal pulses. An irregularly irregular rhythm present.   No murmur heard.  Pulmonary/Chest: Effort normal and breath sounds normal. No respiratory distress. She has no wheezes.   Abdominal: Soft. Bowel sounds are normal. She exhibits no distension and no mass. There is no tenderness.   Musculoskeletal: Normal range of motion. She exhibits no edema.   Lymphadenopathy:     She has no cervical adenopathy.   Neurological: She is alert and oriented to  person, place, and time. No cranial nerve deficit.   Skin: Capillary refill takes less than 2 seconds. She is not diaphoretic.   Psychiatric: She has a normal mood and affect. Her behavior is normal. Judgment and thought content normal.   Nursing note and vitals reviewed.    [place] Sleeping Scale, score 19      Laboratory Reviewed ({Yes)  Lab Results   Component Value Date    WBC 5.21 01/17/2019    HGB 12.1 01/17/2019    HCT 37.3 01/17/2019     01/17/2019    CHOL 171 11/12/2018    TRIG 111 11/12/2018    HDL 40 11/12/2018    ALT 20 01/17/2019    AST 23 01/17/2019     (L) 01/17/2019    K 3.4 (L) 01/17/2019     01/17/2019    CREATININE 0.8 01/17/2019    BUN 12 01/17/2019    CO2 21 (L) 01/17/2019    TSH 1.785 07/29/2014    INR 2.7 03/05/2019    HGBA1C 6.5 (H) 11/12/2018       Natalie was seen today for cough, sinus problem and wheezing.    Diagnoses and all orders for this visit:    Cough  -     methylPREDNISolone (MEDROL DOSEPACK) 4 mg tablet; use as directed  -     azithromycin (Z-LV) 250 MG tablet; Take 2 tablets by mouth on day 1; Take 1 tablet by mouth on days 2-5  -     promethazine-dextromethorphan (PROMETHAZINE-DM) 6.25-15 mg/5 mL Syrp; Take 5 mLs by mouth every 6 to 8 hours as needed.    Anticoagulated on Coumadin  This chronic illness directly impacted my medical decision making today.  This chronic problem was reviewed/addressed while generating the current assessment and plan.     Hypertension, unspecified type  BP in goal range and rate controlled  Chronic atrial fibrillation    This chronic illness directly impacted my medical decision making today.  This chronic problem was reviewed/addressed while generating the current assessment and plan.     Rate controlled    Snoring  -     Polysomnography 4 or more parameters; Future    Daytime sleepiness  -     Polysomnography 4 or more parameters; Future    Obesity (BMI 30-39.9)  -     Polysomnography 4 or more parameters; Future    Shortness  of breath  -     Polysomnography 4 or more parameters; Future    Wheeze  -     Polysomnography 4 or more parameters; Future    Hypersomnia   -     Polysomnography 4 or more parameters; Future    Other fatigue  -     promethazine-dextromethorphan (PROMETHAZINE-DM) 6.25-15 mg/5 mL Syrp; Take 5 mLs by mouth every 6 to 8 hours as needed.    Decreased hearing of both ears  -     promethazine-dextromethorphan (PROMETHAZINE-DM) 6.25-15 mg/5 mL Syrp; Take 5 mLs by mouth every 6 to 8 hours as needed.    Acute bacterial sinusitis  -     promethazine-dextromethorphan (PROMETHAZINE-DM) 6.25-15 mg/5 mL Syrp; Take 5 mLs by mouth every 6 to 8 hours as needed.    Other orders  -     Cancel: (In Office Administered) Pneumococcal Polysaccharide Vaccine (23 Valent) (SQ/IM)  -     Cancel: DXA Bone Density Spine And Hip; Future                Care Plan/Goals: Reviewed  (N/A)  Goals      Take at least one BP reading per week at various times of the day      Hypertension Goals/Individual Care Plan    Hypertension Goal Care Plan    1. Blood pressure goal is to be below 140/90. Normal Blood pressure is 120/80.  Patient's blood pressure is at goal today. (Yes)    2. Goal for self management is to check Blood Pressure daily. Record on Individual log. Patient is agreeable to self management plan. blood pressure log.   Patient will bring logs at next office visit, or communicate to /Care Management team for review for interval follow up.     3. Moderately intense physical activity, such as brisk walking, is beneficial when done regularly. Aim for a total of 40 minutes of moderate to vigorous activity three to four times a week to help lower blood pressure. Exercise of patients choice was recommended at this office visit. walking    4. Eating Healthy Diet is important in Blood Pressure control. As its name implies, the DASH (Dietary Approaches to Stop Hypertension) eating plan is designed to help you manage blood pressure.  Emphasizing healthy food sources, it also limits:  Red meat   Sodium (salt)   Sweets, added sugars and sugar-containing beverages.     5. Barriers to goals reviewed and addressed with patient at visit. (Yes)    6. Adherence and Medication Side effects discussed (Yes)    Referral to on-site Pharmacy for Co-management of hypertension (No)  Patient enrolled in Tele-health Hypertension Management. (No)    Patient is under care of /Care management (No) Referral Sent (No)                  Follow up: No Follow-up on file.    After visit summary was printed and given to patient upon discharge today.  Patient goals and care plan are included in After Visit Summary.

## 2019-03-27 ENCOUNTER — HOSPITAL ENCOUNTER (OUTPATIENT)
Dept: SLEEP MEDICINE | Facility: HOSPITAL | Age: 79
Discharge: HOME | End: 2019-03-27
Attending: FAMILY MEDICINE
Payer: MEDICARE

## 2019-03-27 DIAGNOSIS — Z72.821 INADEQUATE SLEEP HYGIENE: ICD-10-CM

## 2019-03-27 DIAGNOSIS — G47.61 PERIODIC LIMB MOVEMENT DISORDER (PLMD): ICD-10-CM

## 2019-03-27 DIAGNOSIS — E66.9 OBESITY (BMI 30-39.9): ICD-10-CM

## 2019-03-27 DIAGNOSIS — R06.2 WHEEZE: ICD-10-CM

## 2019-03-27 DIAGNOSIS — G47.10 HYPERSOMNIA: ICD-10-CM

## 2019-03-27 DIAGNOSIS — F51.04 PSYCHOPHYSIOLOGICAL INSOMNIA: ICD-10-CM

## 2019-03-27 DIAGNOSIS — G47.33 OBSTRUCTIVE SLEEP APNEA: Primary | ICD-10-CM

## 2019-03-27 DIAGNOSIS — R06.02 SHORTNESS OF BREATH: ICD-10-CM

## 2019-03-27 DIAGNOSIS — R40.0 DAYTIME SLEEPINESS: ICD-10-CM

## 2019-03-27 PROCEDURE — 95810 PR POLYSOMNOGRAPHY, 4 OR MORE: ICD-10-PCS | Mod: 26,HCNC,, | Performed by: PSYCHOLOGIST

## 2019-03-27 PROCEDURE — 95810 POLYSOM 6/> YRS 4/> PARAM: CPT | Mod: HCNC

## 2019-03-27 PROCEDURE — 95810 POLYSOM 6/> YRS 4/> PARAM: CPT | Mod: 26,HCNC,, | Performed by: PSYCHOLOGIST

## 2019-04-01 DIAGNOSIS — G47.33 OSA (OBSTRUCTIVE SLEEP APNEA): Primary | ICD-10-CM

## 2019-04-01 NOTE — PROCEDURES
Patient Name: Natalie Greene   Date of Report: 19  Date of PSG:  3/27/2019   [facility] No.: [id number]   : 1940                      Time of PS:28:15 PM - 4:51:34 AM  Sex:  Female   Age:  78   Weight:  212.0 lbs Height:  5  7          Type of PSG:  Diagnostic     REASONS FOR REFERRAL: Ms Greene is a 78 year old female, referred for diagnostic polysomnography by Dr. Annabella Fenton, for evaluation of possible obstructive sleep apnea / hypopnea syndrome (OSAHS), based on the patients reported snoring and daytime hypersomnolence.  Her [place] Sleepiness Scale score was 19, clinically significant.  Dr. Fenton is the patients primary care physician.     STUDY PARAMETERS: This diagnostic study involved analysis of the patient's sleep pattern while breathing unassisted. The study was performed with a sleep technologist in attendance for the entire test period, with video monitoring throughout the study, and routine laboratory clinical parameters recorded:  NOTE: The polysomnography electrophysiological record for the patient has been reviewed in its entirety by Dr. Todd.    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  1. The diagnostic polysomnography revealed a mild to moderate obstructive sleep apnea / hypopnea syndrome (A + H Index = 18.7 events / hr asleep with 5.4 respiratory event - related arousals / hr asleep for the study, and no RERAs (respiratory effort -  related arousals).  The mean SpO2 value was 93.0 %, moderate,  minimum oxygen saturation during sleep was 77 %, and waking baseline SpO2 was 99 %.  Sporadic, moderately loud snoring was noted.  A  CPAP titration polysomnography is recommended.      2. Weight loss to the normal range is recommended as it can decrease respiratory events and snoring in overweight patients.  3. The following changes in sleep hygiene / sleep - related behavior are recommended after medical treatments are successful   Regular bedtimes and wake times, including weekends: Total sleep time / night should not be more than one hour more             than usual, and bedtime or wake time should not be more than one hour earlier or later than usual.     Do not attempt to make up lost sleep by extending sleep periods.      SECONDARY TREATMENT RECOMMENDATIONS  Treat, or refer to [facility]C if problems are not satisfactorily resolved by the above.  1. A severe PLM disorder was observed (PLMS Index = 101.0 / hr asleep, but treatment might not be optimal because the PLMS were not very disruptive of sleep (7.0 arousals / hr asleep); however, as there was SDI evidence of restless legs syndrome (which can be treated with the same medications as PLMS), consider treatment of restless legs syndrome if RLS is confirmed, and / or  PLM disorder if PLMS symptoms are sufficiently bothersome to the patient.  Note that benzodiazepine medications sometimes used to treat PLM disorder (e.g., clonazepam) may exacerbate some sleep - related respiratory disorders, and that dopaminergic medications such as Mirapex and Requip can be used in such instances.    2. If  insomnia persists after treatments for medical sleep disorders have proven effective, and RLS has been ruled out or effectively treated, recommend  follow - up inquiry regarding frequency, duration and nature of reported sleep loss, delayed sleep onset, poor sleep maintenance, and involuntary early awakening (stress - related and / or conditioned   psychophysiological insomnia)  and referral for behavioral and cognitive / behavioral treatment of insomnia, as indicated.     3. Follow - up inquiry regarding sleep disruption secondary to pain or other physical discomfort (please see SDI).    See below for a complete interpretation of data from the polysomnography and Sleep Disorders Inventory.     Thank you for referring this patient to the [facility].      Kleber Todd, Ph.D., ABPP; Diplomate, American Board of Sleep Medicine

## 2019-04-04 ENCOUNTER — ANTI-COAG VISIT (OUTPATIENT)
Dept: CARDIOLOGY | Facility: CLINIC | Age: 79
End: 2019-04-04
Payer: MEDICARE

## 2019-04-04 DIAGNOSIS — Z79.01 LONG TERM (CURRENT) USE OF ANTICOAGULANTS: Primary | ICD-10-CM

## 2019-04-04 LAB — INR PPP: 3.8 (ref 2–3)

## 2019-04-04 PROCEDURE — 99211 PR OFFICE/OUTPT VISIT, EST, LEVL I: ICD-10-PCS | Mod: 25,HCNC,S$GLB, | Performed by: INTERNAL MEDICINE

## 2019-04-04 PROCEDURE — 85610 PROTHROMBIN TIME: CPT | Mod: QW,HCNC,S$GLB, | Performed by: INTERNAL MEDICINE

## 2019-04-04 PROCEDURE — 85610 POCT INR: ICD-10-PCS | Mod: QW,HCNC,S$GLB, | Performed by: INTERNAL MEDICINE

## 2019-04-04 PROCEDURE — 99211 OFF/OP EST MAY X REQ PHY/QHP: CPT | Mod: 25,HCNC,S$GLB, | Performed by: INTERNAL MEDICINE

## 2019-04-04 NOTE — PROGRESS NOTES
Patient's INR is supra-therapeutic at 3.8.  Zpak x 5 days, medrol dose chris x 6 days and promethazine DM completed last week.  Patient also reports drinking cranberry juice the past couple of days.  Educated patient on potential interactions; Mrs. Greene agrees to refrain from drinking cranberry juice.  No signs of bleeding noted; advised patient to seek immediate medical attention if she notices any abnormal bleeding.  Instructions given for patient to hold dose of coumadin on today; then resume current dose of 3mg on Thursdays and 6mg on all other days of the week. Patient voiced understanding.  Follow-up in 2 weeks.

## 2019-04-08 ENCOUNTER — TELEPHONE (OUTPATIENT)
Dept: PULMONOLOGY | Facility: CLINIC | Age: 79
End: 2019-04-08

## 2019-04-08 DIAGNOSIS — G47.33 OSA (OBSTRUCTIVE SLEEP APNEA): Primary | ICD-10-CM

## 2019-04-08 NOTE — TELEPHONE ENCOUNTER
----- Message from Margo Henson NP sent at 4/8/2019  3:42 PM CDT -----  I see referrals have been made by Dr. Rod and Dr. Fenton, for some reason not scheduled.   I will have my nurse schedule her for NP  Evaluation with review of PSG and CPAP titration studies.   I will send Jerrell a message to contact patient to rodrigo her CPAP titration ordered by Dr. Fenton. Thank you for letting us know there was a problem.     Andressa please arrange NP sleep evaluation review PSG/CPAP titration, allow for CPAP titration to be done.    Jerrell, can you determine hold up on scheduling CPAP titration?     Thank you    ----- Message -----  From: Artur Dennison MA  Sent: 4/8/2019   1:55 PM  To: Margo Henson NP, Natividad GONZALEZ Staff    Helmick,    Dr. Fenton put the orders in but I am unsure of how  To scheduled.    Ricardo AYALA  ----- Message -----  From: Annabella Fenton MD  Sent: 4/8/2019   1:27 PM  To: Artur Dennison MA    Order placed  Schedule please    ----- Message -----  From: Artur Dennison MA  Sent: 4/8/2019   1:14 PM  To: Annabella Fenton MD        ----- Message -----  From: Margo Henson NP  Sent: 4/8/2019  12:53 PM  To: Artur Dennison MA    She needs referral to pulmonary for DORA to review PSG and CPAP titration studies and we will begin treatment . Thank you  ----- Message -----  From: Artur Dennison MA  Sent: 4/4/2019   3:53 PM  To: Margo Henson NP, Nativid[facility] GONZALEZ Staff    Helmick,    I have a question regarding this patient. Dr. Fenton says She has sleep apnea and needs a CPAP titration. Dr. Fenton wanted me to ask  how this can be done?     Ricardo AYALA/ Annabella Fenton MD

## 2019-04-17 ENCOUNTER — HOSPITAL ENCOUNTER (OUTPATIENT)
Dept: SLEEP MEDICINE | Facility: HOSPITAL | Age: 79
Discharge: HOME | End: 2019-04-17
Attending: FAMILY MEDICINE
Payer: MEDICARE

## 2019-04-17 DIAGNOSIS — G47.61 PLMD (PERIODIC LIMB MOVEMENT DISORDER): ICD-10-CM

## 2019-04-17 DIAGNOSIS — G47.33 OSA (OBSTRUCTIVE SLEEP APNEA): ICD-10-CM

## 2019-04-17 PROCEDURE — 95811 POLYSOM 6/>YRS CPAP 4/> PARM: CPT | Mod: 26,HCNC,, | Performed by: INTERNAL MEDICINE

## 2019-04-17 PROCEDURE — 95811 POLYSOM 6/>YRS CPAP 4/> PARM: CPT | Mod: HCNC

## 2019-04-17 PROCEDURE — 95811 PR POLYSOMNOGRAPHY W/CPAP: ICD-10-PCS | Mod: 26,HCNC,, | Performed by: INTERNAL MEDICINE

## 2019-04-17 NOTE — Clinical Note
Impressions:l Sleep architecture revealed a marked reduced sleep efficiency, long primary sleep latency, longREM latency, and long slow wave latency.l Obstructive sleep apnea (DORA), CPAP 12.5 cm was optimal pressure titration.l Significant periodic limb movements during sleep.l Supplemental oxygen was not administered during the study.Diagnosis:l Obstructive Sleep Apnea (G47.33)l Periodic Limb Movement During Sleep (G47.61)l Poor sleep Cohesiveness and high arousal.Recommendations:l Recommend a trial of Auto-PAP 6-14 cm H2O or CPAP 12.5 cm. A small Full face mask, C flexand heated humidifiaction.l REFER TO SLEEP DISORDERS FOR EVALUATION AND MANAGEMENT OF DORA,  inEpic.l Sleep hygiene should be reviewed to assess factors that may improve sleep quality.l Weight management and regular exercise should be initiated or continued.l Return to Sleep Center for re-evaluation after 4 -6 weeks of therapy

## 2019-04-18 ENCOUNTER — ANTI-COAG VISIT (OUTPATIENT)
Dept: CARDIOLOGY | Facility: CLINIC | Age: 79
End: 2019-04-18
Payer: MEDICARE

## 2019-04-18 DIAGNOSIS — Z79.01 LONG TERM (CURRENT) USE OF ANTICOAGULANTS: Primary | ICD-10-CM

## 2019-04-18 DIAGNOSIS — I48.91 ATRIAL FIBRILLATION, UNSPECIFIED TYPE: ICD-10-CM

## 2019-04-18 LAB — INR PPP: 1.8 (ref 2–3)

## 2019-04-18 PROCEDURE — 85610 PROTHROMBIN TIME: CPT | Mod: QW,HCNC,S$GLB, | Performed by: INTERNAL MEDICINE

## 2019-04-18 PROCEDURE — 85610 POCT INR: ICD-10-PCS | Mod: QW,HCNC,S$GLB, | Performed by: INTERNAL MEDICINE

## 2019-04-18 PROCEDURE — 93793 ANTICOAG MGMT PT WARFARIN: CPT | Mod: HCNC,S$GLB,,

## 2019-04-18 PROCEDURE — 93793 PR ANTICOAGULANT MGMT FOR PT TAKING WARFARIN: ICD-10-PCS | Mod: HCNC,S$GLB,,

## 2019-04-18 NOTE — PROGRESS NOTES
Patient's INR is sub-therapeutic at 1.8.  Reports eating broccoli and coleslaw this week.  Educated patient on importance of maintaining a diet consistent in Vitamin K intake.  Mrs. Greene agrees to avoid all foods in the future.  No abnormal numbness or weakness noted.  Instructions given for patient to take coumadin 6mg on today; then resume current dose of 3mg on Thursdays and 6mg on all other days of the week.  Patient voiced understanding.  Follow-up in 2 weeks.

## 2019-04-22 ENCOUNTER — OFFICE VISIT (OUTPATIENT)
Dept: INTERNAL MEDICINE | Facility: CLINIC | Age: 79
End: 2019-04-22
Payer: MEDICARE

## 2019-04-22 ENCOUNTER — LAB VISIT (OUTPATIENT)
Dept: LAB | Facility: HOSPITAL | Age: 79
End: 2019-04-22
Attending: FAMILY MEDICINE
Payer: MEDICARE

## 2019-04-22 VITALS
WEIGHT: 212.94 LBS | HEIGHT: 67 IN | HEART RATE: 104 BPM | SYSTOLIC BLOOD PRESSURE: 92 MMHG | OXYGEN SATURATION: 95 % | DIASTOLIC BLOOD PRESSURE: 60 MMHG | TEMPERATURE: 97 F | BODY MASS INDEX: 33.42 KG/M2

## 2019-04-22 DIAGNOSIS — Z23 NEED FOR PNEUMOCOCCAL VACCINATION: Primary | ICD-10-CM

## 2019-04-22 DIAGNOSIS — Z79.01 ANTICOAGULATED ON COUMADIN: ICD-10-CM

## 2019-04-22 DIAGNOSIS — R40.0 DAYTIME SLEEPINESS: ICD-10-CM

## 2019-04-22 DIAGNOSIS — E11.9 TYPE 2 DIABETES MELLITUS WITHOUT COMPLICATION, WITHOUT LONG-TERM CURRENT USE OF INSULIN: ICD-10-CM

## 2019-04-22 DIAGNOSIS — I48.20 CHRONIC ATRIAL FIBRILLATION: ICD-10-CM

## 2019-04-22 LAB
ESTIMATED AVG GLUCOSE: 148 MG/DL (ref 68–131)
HBA1C MFR BLD HPLC: 6.8 % (ref 4–5.6)

## 2019-04-22 PROCEDURE — 99999 PR PBB SHADOW E&M-EST. PATIENT-LVL III: ICD-10-PCS | Mod: PBBFAC,HCNC,, | Performed by: FAMILY MEDICINE

## 2019-04-22 PROCEDURE — 99214 PR OFFICE/OUTPT VISIT, EST, LEVL IV, 30-39 MIN: ICD-10-PCS | Mod: HCNC,S$GLB,, | Performed by: FAMILY MEDICINE

## 2019-04-22 PROCEDURE — 99999 PR PBB SHADOW E&M-EST. PATIENT-LVL III: CPT | Mod: PBBFAC,HCNC,, | Performed by: FAMILY MEDICINE

## 2019-04-22 PROCEDURE — 3078F DIAST BP <80 MM HG: CPT | Mod: HCNC,CPTII,S$GLB, | Performed by: FAMILY MEDICINE

## 2019-04-22 PROCEDURE — 36415 COLL VENOUS BLD VENIPUNCTURE: CPT | Mod: HCNC

## 2019-04-22 PROCEDURE — 3074F SYST BP LT 130 MM HG: CPT | Mod: HCNC,CPTII,S$GLB, | Performed by: FAMILY MEDICINE

## 2019-04-22 PROCEDURE — 1101F PT FALLS ASSESS-DOCD LE1/YR: CPT | Mod: HCNC,CPTII,S$GLB, | Performed by: FAMILY MEDICINE

## 2019-04-22 PROCEDURE — 1101F PR PT FALLS ASSESS DOC 0-1 FALLS W/OUT INJ PAST YR: ICD-10-PCS | Mod: HCNC,CPTII,S$GLB, | Performed by: FAMILY MEDICINE

## 2019-04-22 PROCEDURE — 83036 HEMOGLOBIN GLYCOSYLATED A1C: CPT | Mod: HCNC

## 2019-04-22 PROCEDURE — 3078F PR MOST RECENT DIASTOLIC BLOOD PRESSURE < 80 MM HG: ICD-10-PCS | Mod: HCNC,CPTII,S$GLB, | Performed by: FAMILY MEDICINE

## 2019-04-22 PROCEDURE — 99214 OFFICE O/P EST MOD 30 MIN: CPT | Mod: HCNC,S$GLB,, | Performed by: FAMILY MEDICINE

## 2019-04-22 PROCEDURE — 3074F PR MOST RECENT SYSTOLIC BLOOD PRESSURE < 130 MM HG: ICD-10-PCS | Mod: HCNC,CPTII,S$GLB, | Performed by: FAMILY MEDICINE

## 2019-04-22 RX ORDER — ALBUTEROL SULFATE 90 UG/1
2 AEROSOL, METERED RESPIRATORY (INHALATION) EVERY 6 HOURS PRN
Qty: 18 G | Refills: 1 | Status: SHIPPED | OUTPATIENT
Start: 2019-04-22 | End: 2020-03-16 | Stop reason: SDUPTHER

## 2019-04-22 NOTE — PROCEDURES
"Impressions:  l Sleep architecture revealed a marked reduced sleep efficiency, long primary sleep latency, long  REM latency, and long slow wave latency.  l Obstructive sleep apnea (DORA), CPAP 12.5 cm was optimal pressure titration.  l Significant periodic limb movements during sleep.  l Supplemental oxygen was not administered during the study.  Diagnosis:  l Obstructive Sleep Apnea (G47.33)  l Periodic Limb Movement During Sleep (G47.61)  l Poor sleep Cohesiveness and high arousal.  Recommendations:  l Recommend a trial of Auto-PAP 6-14 cm H2O or CPAP 12.5 cm. A small Full face mask, C flex  and heated humidifiaction.  l REFER TO SLEEP DISORDERS FOR EVALUATION AND MANAGEMENT OF DORA,  in  Epic.  l Sleep hygiene should be reviewed to assess factors that may improve sleep quality.  l Weight management and regular exercise should be initiated or continued.  l Return to Sleep Center for re-evaluation after 4 -6 weeks of therapy    See imported Sleep Study result in "Chart Review" under the   "Media tab".      (This Sleep Study was interpreted by a Board Certified Sleep   Specialist who conducted an epoch-by-epoch review of the entire   raw data recording.)     (The indication for this sleep study was reviewed and deemed   appropriate by [facility] Practice Parameters or other reasons by a   Board Certified Sleep Specialist.)    Ar Marcano MD      "

## 2019-04-22 NOTE — PROGRESS NOTES
Natalie Greene  04/22/2019  [id number]    Annabella Fenton MD  Patient Care Team:  Annabella Fenton MD as PCP - General (Family Medicine)  Annabella Fenton MD as Consulting Physician (Family Medicine)  Lisbet Lopez LPN as Care Coordinator (Internal Medicine)  Has the patient seen any provider outside of the [facility] network since the last visit? (yes). If yes, HIPPA forms completed and records requested.        Visit Type:a scheduled routine follow-up visit    Chief Complaint:  Chief Complaint   Patient presents with    Follow-up     patient said that she went to the sleep study and they put the cold air she had to breathe in and she had sore throat and a cough       History of Present Illness:  78 year old here for follow up.  Seen on 3.21 for cough, allergies.    However on that visit, noted she has not had CPAP titration. This was arranged with Pulm. She have this done on 4.17. She has follow up with Pulm next month to discuss.    History of A fib, followed by [facility].   On coumadin.    She has DM, but has been good control.        Lab Results   Component Value Date     HGBA1C 6.5 (H) 11/12/2018      Due for HgA1c in April/May     Did not want to schedule colon, needs to. Referred back to her external GI, and then she declined to schedule when called.  She has history of neoplasm of colon with resection. I have counseled her on needed repeat screen. 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                        History:  Past Medical History:   Diagnosis Date    *Atrial fibrillation     Coronary artery disease involving native coronary artery of native heart with angina pectoris 4/10/2018    Diabetes mellitus     DM (diabetes mellitus) 2002     09/06/2017 no medication    Hyperlipidemia     Hypertension     Obstructive sleep apnea      Past Surgical History:   Procedure Laterality Date    CHOLECYSTECTOMY      COLON SURGERY      HYSTERECTOMY       Family History    Problem Relation Age of Onset    Diabetes Sister     Hypertension Sister     Hypertension Mother     Diabetes Sister      Social History     Socioeconomic History    Marital status:      Spouse name: Not on file    Number of children: Not on file    Years of education: Not on file    Highest education level: Not on file   Occupational History    Not on file   Social Needs    Financial resource strain: Not on file    Food insecurity:     Worry: Not on file     Inability: Not on file    Transportation needs:     Medical: Not on file     Non-medical: Not on file   Tobacco Use    Smoking status: Never Smoker    Smokeless tobacco: Never Used   Substance and Sexual Activity    Alcohol use: No    Drug use: No    Sexual activity: Not on file   Lifestyle    Physical activity:     Days per week: Not on file     Minutes per session: Not on file    Stress: Not on file   Relationships    Social connections:     Talks on phone: Not on file     Gets together: Not on file     Attends [employer] service: Not on file     Active member of club or organization: Not on file     Attends meetings of clubs or organizations: Not on file     Relationship status: Not on file   Other Topics Concern    Not on file   Social History Narrative    Not on file     Patient Active Problem List   Diagnosis    A-fib    HTN (hypertension)    Hyperlipidemia with target LDL less than 100    DM (diabetes mellitus)    Morbid (severe) obesity due to excess calories    Anticoagulated on Coumadin    Obesity (BMI 30-39.9)    Abnormal stress test    History of malignant neoplasm of colon    Shoulder pain, left    Aortic atherosclerosis    Long term current use of anticoagulant therapy    Abdominal wall seroma    Hypersomnia    DORA (obstructive sleep apnea)    Psychophysiological insomnia    Periodic limb movement disorder (PLMD)    Inadequate sleep hygiene     Review of patient's allergies indicates:  No Known  Allergies    The following were reviewed at this visit: active problem list, medication list, allergies, family history, social history, and health maintenance.    Medications:  Current Outpatient Medications on File Prior to Visit   Medication Sig Dispense Refill    ACCU-CHEK SOFTCLIX LANCETS Misc       amLODIPine (NORVASC) 5 MG tablet TAKE 1 TABLET EVERY DAY 90 tablet 1    atorvastatin (LIPITOR) 40 MG tablet Take 1 tablet (40 mg total) by mouth once daily. 90 tablet 3    diltiaZEM (CARDIZEM CD) 180 MG 24 hr capsule Take 1 capsule (180 mg total) by mouth once daily. 90 capsule 3    indapamide (LOZOL) 1.25 MG Tab TAKE 1 TABLET EVERY DAY 90 tablet 1    losartan (COZAAR) 100 MG tablet Take 1 tablet (100 mg total) by mouth once daily. 90 tablet 1    metFORMIN (GLUCOPHAGE-XR) 500 MG 24 hr tablet Take 1 tablet (500 mg total) by mouth daily with breakfast. 90 tablet 3    metoprolol succinate (TOPROL-XL) 100 MG 24 hr tablet Take 1 tablet (100 mg total) by mouth once daily. 90 tablet 1    warfarin (COUMADIN) 6 MG tablet TAKE 1 TABLET DAILY (Patient taking differently: Take 0.5 tablet by mouth on Thursdays and 1 tablet on all other days of the week.) 90 tablet 1    fluticasone (FLONASE) 50 mcg/actuation nasal spray 2 sprays (100 mcg total) by Each Nare route once daily. 16 g 0    INCONTINENCE PAD,LINER,DISP (BLADDER CONTROL PADS EX ABSORB MISC)       promethazine-dextromethorphan (PROMETHAZINE-DM) 6.25-15 mg/5 mL Syrp Take 5 mLs by mouth every 6 to 8 hours as needed. 120 mL 0     No current facility-administered medications on file prior to visit.        Medications have been reviewed and reconciled with patient at this visit.  Barriers to medications present (no)    Adverse reactions to current medications (yes)    Over the counter medications reviewed (No ), and if needed added to active Medication list at this visit.     Exam:  Wt Readings from Last 3 Encounters:   04/22/19 96.6 kg (212 lb 15.4 oz)   03/21/19  96.5 kg (212 lb 11.9 oz)   02/11/19 97.6 kg (215 lb 2.7 oz)     Temp Readings from Last 3 Encounters:   04/22/19 97.2 °F (36.2 °C) (Tympanic)   03/21/19 96.8 °F (36 °C) (Tympanic)   02/11/19 97.7 °F (36.5 °C) (Tympanic)     BP Readings from Last 3 Encounters:   04/22/19 92/60   03/21/19 110/68   02/11/19 134/70     Pulse Readings from Last 3 Encounters:   04/22/19 104   03/21/19 80   02/11/19 96     Body mass index is 33.35 kg/m².      Review of Systems   Constitutional: Negative.  Negative for chills and fever.   HENT: Negative.  Negative for congestion, sinus pain and sore throat.    Eyes: Negative for blurred vision and double vision.   Respiratory: Negative for cough, sputum production, shortness of breath and wheezing.    Cardiovascular: Negative for chest pain, palpitations and leg swelling.   Gastrointestinal: Negative for abdominal pain, constipation, diarrhea, heartburn, nausea and vomiting.   Genitourinary: Negative.    Musculoskeletal: Negative.    Skin: Negative.  Negative for rash.   Neurological: Negative.    Endo/Heme/Allergies: Negative.  Negative for polydipsia. Does not bruise/bleed easily.   Psychiatric/Behavioral: Negative for depression and substance abuse.     Physical Exam   Constitutional: She is oriented to person, place, and time. She appears well-developed and well-nourished. No distress.   HENT:   Head: Normocephalic and atraumatic.   Right Ear: External ear normal.   Left Ear: External ear normal.   Nose: Nose normal.   Mouth/Throat: Oropharynx is clear and moist. No oropharyngeal exudate.   Eyes: Pupils are equal, round, and reactive to light. Conjunctivae and EOM are normal. Right eye exhibits no discharge. Left eye exhibits no discharge.   Neck: Normal range of motion. Neck supple. No thyromegaly present.   Cardiovascular: Normal rate, normal heart sounds and intact distal pulses. An irregularly irregular rhythm present.   No murmur heard.  Pulmonary/Chest: Effort normal. No  respiratory distress. She has wheezes.   Abdominal: Soft. Bowel sounds are normal. She exhibits no distension and no mass. There is no tenderness.   Musculoskeletal: Normal range of motion. She exhibits no edema.   Lymphadenopathy:     She has no cervical adenopathy.   Neurological: She is alert and oriented to person, place, and time. No cranial nerve deficit.   Skin: Capillary refill takes less than 2 seconds. She is not diaphoretic.   Psychiatric: She has a normal mood and affect. Her behavior is normal. Judgment and thought content normal.   Nursing note and vitals reviewed.      Laboratory Reviewed ({Yes)  Lab Results   Component Value Date    WBC 5.21 01/17/2019    HGB 12.1 01/17/2019    HCT 37.3 01/17/2019     01/17/2019    CHOL 171 11/12/2018    TRIG 111 11/12/2018    HDL 40 11/12/2018    ALT 20 01/17/2019    AST 23 01/17/2019     (L) 01/17/2019    K 3.4 (L) 01/17/2019     01/17/2019    CREATININE 0.8 01/17/2019    BUN 12 01/17/2019    CO2 21 (L) 01/17/2019    TSH 1.785 07/29/2014    INR 1.8 (A) 04/18/2019    HGBA1C 6.5 (H) 11/12/2018       Natalie was seen today for follow-up.    Diagnoses and all orders for this visit:    Need for pneumococcal vaccination    Daytime sleepiness    Chronic atrial fibrillation    Anticoagulated on Coumadin    Type 2 diabetes mellitus without complication, without long-term current use of insulin  -     Hemoglobin A1c; Future    Other orders  -     Cancel: (In Office Administered) Pneumococcal Polysaccharide Vaccine (23 Valent) (SQ/IM)  -     albuterol (VENTOLIN HFA) 90 mcg/actuation inhaler; Inhale 2 puffs into the lungs every 6 (six) hours as needed for Wheezing. Rescue      Pulm follow up for CPAP.  She had her INR check, on for Afib. Rate controlled    Needs colon screen, again advised to complete.    Check HGA1c to review 6 month follow up on DM.  Remains on Metformin              Care Plan/Goals: Reviewed  (N/A)  Goals      Take at least one BP reading  per week at various times of the day      Hypertension Goals/Individual Care Plan    Hypertension Goal Care Plan    1. Blood pressure goal is to be below 140/90. Normal Blood pressure is 120/80.  Patient's blood pressure is at goal today. (Yes)    2. Goal for self management is to check Blood Pressure daily. Record on Individual log. Patient is agreeable to self management plan. blood pressure log.   Patient will bring logs at next office visit, or communicate to /Care Management team for review for interval follow up.     3. Moderately intense physical activity, such as brisk walking, is beneficial when done regularly. Aim for a total of 40 minutes of moderate to vigorous activity three to four times a week to help lower blood pressure. Exercise of patients choice was recommended at this office visit. walking    4. Eating Healthy Diet is important in Blood Pressure control. As its name implies, the DASH (Dietary Approaches to Stop Hypertension) eating plan is designed to help you manage blood pressure. Emphasizing healthy food sources, it also limits:  Red meat   Sodium (salt)   Sweets, added sugars and sugar-containing beverages.     5. Barriers to goals reviewed and addressed with patient at visit. (Yes)    6. Adherence and Medication Side effects discussed (Yes)    Referral to on-site Pharmacy for Co-management of hypertension (No)  Patient enrolled in Tele-health Hypertension Management. (No)    Patient is under care of /Care management (No) Referral Sent (No)                  Follow up: No follow-ups on file.    After visit summary was printed and given to patient upon discharge today.  Patient goals and care plan are included in After Visit Summary.

## 2019-04-22 NOTE — PROGRESS NOTES
Natalie Greene  04/22/2019  [id number]    Annabella Fenton MD  Patient Care Team:  Annabella Fenton MD as PCP - General (Family Medicine)  Annabella Fenton MD as Consulting Physician (Family Medicine)  Lisbet Lopez LPN as Care Coordinator (Internal Medicine)  Has the patient seen any provider outside of the [facility] network since the last visit? (no). If yes, HIPPA forms completed and records requested.        Visit Type:a scheduled routine follow-up visit    Chief Complaint:  Chief Complaint   Patient presents with    Follow-up     patient said that she went to the sleep study and they put the cold air she had to breathe in and she had sore throat and a cough       History of Present Illness:    78 year old here for follow up.  Seen on 3.21 for cough, allergies.  She reports that the sleep study, she didn't tolerate the cold air that went down her throat, which made her cough.    However on that visit, noted she has not had CPAP titration. This was arranged with Pulm. She have this done on 4.17. She has follow up with Pulm next month to discuss.    History of A fib, followed by [facility].   On coumadin.    She has DM, but has been good control.        Lab Results   Component Value Date     HGBA1C 6.5 (H) 11/12/2018      Due for HgA1c in April/May     Did not want to schedule colon, needs to. Referred back to her external GI, and then she declined to schedule when called.  She has history of neoplasm of colon with resection. I have counseled her on needed repeat screen. 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      History:  Past Medical History:   Diagnosis Date    *Atrial fibrillation     Coronary artery disease involving native coronary artery of native heart with angina pectoris 4/10/2018    Diabetes mellitus     DM (diabetes mellitus) 2002     09/06/2017 no medication    Hyperlipidemia     Hypertension     Obstructive sleep apnea      Past Surgical History:   Procedure  Laterality Date    CHOLECYSTECTOMY      COLON SURGERY      HYSTERECTOMY       Family History   Problem Relation Age of Onset    Diabetes Sister     Hypertension Sister     Hypertension Mother     Diabetes Sister      Social History     Socioeconomic History    Marital status:      Spouse name: Not on file    Number of children: Not on file    Years of education: Not on file    Highest education level: Not on file   Occupational History    Not on file   Social Needs    Financial resource strain: Not on file    Food insecurity:     Worry: Not on file     Inability: Not on file    Transportation needs:     Medical: Not on file     Non-medical: Not on file   Tobacco Use    Smoking status: Never Smoker    Smokeless tobacco: Never Used   Substance and Sexual Activity    Alcohol use: No    Drug use: No    Sexual activity: Not on file   Lifestyle    Physical activity:     Days per week: Not on file     Minutes per session: Not on file    Stress: Not on file   Relationships    Social connections:     Talks on phone: Not on file     Gets together: Not on file     Attends Yarsani service: Not on file     Active member of club or organization: Not on file     Attends meetings of clubs or organizations: Not on file     Relationship status: Not on file   Other Topics Concern    Not on file   Social History Narrative    Not on file     Patient Active Problem List   Diagnosis    A-fib    HTN (hypertension)    Hyperlipidemia with target LDL less than 100    DM (diabetes mellitus)    Morbid (severe) obesity due to excess calories    Anticoagulated on Coumadin    Obesity (BMI 30-39.9)    Abnormal stress test    History of malignant neoplasm of colon    Shoulder pain, left    Aortic atherosclerosis    Long term current use of anticoagulant therapy    Abdominal wall seroma    Hypersomnia    DORA (obstructive sleep apnea)    Psychophysiological insomnia    Periodic limb movement disorder  (PLMD)    Inadequate sleep hygiene     Review of patient's allergies indicates:  No Known Allergies    The following were reviewed at this visit: active problem list, medication list, allergies, family history, social history, and health maintenance.    Medications:  Current Outpatient Medications on File Prior to Visit   Medication Sig Dispense Refill    ACCU-CHEK SOFTCLIX LANCETS Misc       amLODIPine (NORVASC) 5 MG tablet TAKE 1 TABLET EVERY DAY 90 tablet 1    atorvastatin (LIPITOR) 40 MG tablet Take 1 tablet (40 mg total) by mouth once daily. 90 tablet 3    diltiaZEM (CARDIZEM CD) 180 MG 24 hr capsule Take 1 capsule (180 mg total) by mouth once daily. 90 capsule 3    indapamide (LOZOL) 1.25 MG Tab TAKE 1 TABLET EVERY DAY 90 tablet 1    losartan (COZAAR) 100 MG tablet Take 1 tablet (100 mg total) by mouth once daily. 90 tablet 1    metFORMIN (GLUCOPHAGE-XR) 500 MG 24 hr tablet Take 1 tablet (500 mg total) by mouth daily with breakfast. 90 tablet 3    metoprolol succinate (TOPROL-XL) 100 MG 24 hr tablet Take 1 tablet (100 mg total) by mouth once daily. 90 tablet 1    warfarin (COUMADIN) 6 MG tablet TAKE 1 TABLET DAILY (Patient taking differently: Take 0.5 tablet by mouth on Thursdays and 1 tablet on all other days of the week.) 90 tablet 1    fluticasone (FLONASE) 50 mcg/actuation nasal spray 2 sprays (100 mcg total) by Each Nare route once daily. 16 g 0    INCONTINENCE PAD,LINER,DISP (BLADDER CONTROL PADS EX ABSORB MISC)       promethazine-dextromethorphan (PROMETHAZINE-DM) 6.25-15 mg/5 mL Syrp Take 5 mLs by mouth every 6 to 8 hours as needed. 120 mL 0     No current facility-administered medications on file prior to visit.        Medications have been reviewed and reconciled with patient at this visit.  Barriers to medications present (no)    Adverse reactions to current medications (no)    Over the counter medications reviewed (Yes ), and if needed added to active Medication list at this visit.      Exam:  Wt Readings from Last 3 Encounters:   04/22/19 96.6 kg (212 lb 15.4 oz)   03/21/19 96.5 kg (212 lb 11.9 oz)   02/11/19 97.6 kg (215 lb 2.7 oz)     Temp Readings from Last 3 Encounters:   04/22/19 97.2 °F (36.2 °C) (Tympanic)   03/21/19 96.8 °F (36 °C) (Tympanic)   02/11/19 97.7 °F (36.5 °C) (Tympanic)     BP Readings from Last 3 Encounters:   04/22/19 92/60   03/21/19 110/68   02/11/19 134/70     Pulse Readings from Last 3 Encounters:   04/22/19 104   03/21/19 80   02/11/19 96     Body mass index is 33.35 kg/m².      Review of Systems   Constitutional: Negative.  Negative for chills and fever.   HENT: Positive for congestion and sore throat. Negative for sinus pain.    Eyes: Negative for blurred vision and double vision.   Respiratory: Positive for cough and wheezing. Negative for sputum production and shortness of breath.    Cardiovascular: Negative for chest pain, palpitations and leg swelling.   Gastrointestinal: Negative for abdominal pain, constipation, diarrhea, heartburn, nausea and vomiting.   Genitourinary: Negative.    Musculoskeletal: Negative.    Skin: Negative.  Negative for rash.   Neurological: Negative.    Endo/Heme/Allergies: Negative.  Negative for polydipsia. Does not bruise/bleed easily.   Psychiatric/Behavioral: Negative for depression and substance abuse.     Physical Exam   Constitutional: She is oriented to person, place, and time. She appears well-developed and well-nourished. No distress.   HENT:   Head: Normocephalic and atraumatic.   Right Ear: External ear normal.   Left Ear: External ear normal.   Nose: Nose normal.   Mouth/Throat: Oropharynx is clear and moist. No oropharyngeal exudate.   Eyes: Pupils are equal, round, and reactive to light. Conjunctivae and EOM are normal. Right eye exhibits no discharge. Left eye exhibits no discharge.   Neck: Normal range of motion. Neck supple. No thyromegaly present.   Cardiovascular: Normal rate, regular rhythm, normal heart sounds and  intact distal pulses.   No murmur heard.  Pulmonary/Chest: Effort normal. No respiratory distress. She has wheezes.   Abdominal: Soft. Bowel sounds are normal. She exhibits no distension and no mass. There is no tenderness.   Musculoskeletal: Normal range of motion. She exhibits no edema.   Lymphadenopathy:     She has no cervical adenopathy.   Neurological: She is alert and oriented to person, place, and time. No cranial nerve deficit.   Skin: Capillary refill takes less than 2 seconds. She is not diaphoretic.   Psychiatric: She has a normal mood and affect. Her behavior is normal. Judgment and thought content normal.   Nursing note and vitals reviewed.      Laboratory Reviewed ({Yes)  Lab Results   Component Value Date    WBC 5.21 01/17/2019    HGB 12.1 01/17/2019    HCT 37.3 01/17/2019     01/17/2019    CHOL 171 11/12/2018    TRIG 111 11/12/2018    HDL 40 11/12/2018    ALT 20 01/17/2019    AST 23 01/17/2019     (L) 01/17/2019    K 3.4 (L) 01/17/2019     01/17/2019    CREATININE 0.8 01/17/2019    BUN 12 01/17/2019    CO2 21 (L) 01/17/2019    TSH 1.785 07/29/2014    INR 1.8 (A) 04/18/2019    HGBA1C 6.5 (H) 11/12/2018       Natalie was seen today for follow-up.    Diagnoses and all orders for this visit:    Need for pneumococcal vaccination    Daytime sleepiness    Chronic atrial fibrillation    Anticoagulated on Coumadin    Type 2 diabetes mellitus without complication, without long-term current use of insulin  -     Hemoglobin A1c; Future    Other orders  -     Cancel: (In Office Administered) Pneumococcal Polysaccharide Vaccine (23 Valent) (SQ/IM)  -     albuterol (VENTOLIN HFA) 90 mcg/actuation inhaler; Inhale 2 puffs into the lungs every 6 (six) hours as needed for Wheezing. Rescue    Patient will do trial of Albuterol for cough.  Follow up with Pulm next month  Reports didn't tolerate titration, but I do not see report of titration from 4.17 in chart. I will ask pulm for help with report  status.     Check HgA1c.             Care Plan/Goals: Reviewed  (Yes)  Goals      Take at least one BP reading per week at various times of the day      Hypertension Goals/Individual Care Plan    Hypertension Goal Care Plan    1. Blood pressure goal is to be below 140/90. Normal Blood pressure is 120/80.  Patient's blood pressure is at goal today. (Yes)    2. Goal for self management is to check Blood Pressure daily. Record on Individual log. Patient is agreeable to self management plan. blood pressure log.   Patient will bring logs at next office visit, or communicate to /Care Management team for review for interval follow up.     3. Moderately intense physical activity, such as brisk walking, is beneficial when done regularly. Aim for a total of 40 minutes of moderate to vigorous activity three to four times a week to help lower blood pressure. Exercise of patients choice was recommended at this office visit. walking    4. Eating Healthy Diet is important in Blood Pressure control. As its name implies, the DASH (Dietary Approaches to Stop Hypertension) eating plan is designed to help you manage blood pressure. Emphasizing healthy food sources, it also limits:  Red meat   Sodium (salt)   Sweets, added sugars and sugar-containing beverages.     5. Barriers to goals reviewed and addressed with patient at visit. (Yes)    6. Adherence and Medication Side effects discussed (Yes)    Referral to on-site Pharmacy for Co-management of hypertension (No)  Patient enrolled in Tele-health Hypertension Management. (No)    Patient is under care of /Care management (No) Referral Sent (No)                  Follow up: Follow up in about 1 month (around 5/20/2019).    After visit summary was printed and given to patient upon discharge today.  Patient goals and care plan are included in After Visit Summary.

## 2019-04-23 NOTE — PROGRESS NOTES
Patient has appt next month with Pulm to discuss CPAP.  She did not like the cool air in titration study. I discussed humidified air with her, and hope that Pulm can also discuss.

## 2019-05-01 ENCOUNTER — TELEPHONE (OUTPATIENT)
Dept: PULMONOLOGY | Facility: CLINIC | Age: 79
End: 2019-05-01

## 2019-05-01 NOTE — TELEPHONE ENCOUNTER
----- Message from Margo Henson NP sent at 4/22/2019  4:38 PM CDT -----  Regarding: sam fu  Dr. Fenton,   I see on 4/17/2019 patient went to sleep lab and signed consents for CPAP titration, no report. I will follow up with Jerrell in sleep lab and determine if patient can come in sooner than May for new patient sleep evaluation to determine next course for diagnosis of obstructive sleep apnea. Thank you.     Andressa AYALA,  Please rodrigo NP sleep evaluation sooner than present May appointment. Thank you      Jerrell,   Appears patient came in on 4/17/2019, for CPAP titration, is the report still pending?     ----- Message -----  From: Annabella Fenton MD  Sent: 4/22/2019   1:52 PM  To: Margo Henson NP

## 2019-05-01 NOTE — TELEPHONE ENCOUNTER
Rescheduled patient's appointment for sleep eval. Called patient to notify, no answer, lvm for patient to return my call.

## 2019-05-17 ENCOUNTER — ANTI-COAG VISIT (OUTPATIENT)
Dept: CARDIOLOGY | Facility: CLINIC | Age: 79
End: 2019-05-17
Payer: MEDICARE

## 2019-05-17 DIAGNOSIS — Z79.01 LONG TERM (CURRENT) USE OF ANTICOAGULANTS: Primary | ICD-10-CM

## 2019-05-17 DIAGNOSIS — I48.91 ATRIAL FIBRILLATION, UNSPECIFIED TYPE: ICD-10-CM

## 2019-05-17 LAB — INR PPP: 2 (ref 2–3)

## 2019-05-17 PROCEDURE — 93793 PR ANTICOAGULANT MGMT FOR PT TAKING WARFARIN: ICD-10-PCS | Mod: HCNC,S$GLB,,

## 2019-05-17 PROCEDURE — 85610 POCT INR: ICD-10-PCS | Mod: QW,HCNC,S$GLB, | Performed by: INTERNAL MEDICINE

## 2019-05-17 PROCEDURE — 85610 PROTHROMBIN TIME: CPT | Mod: QW,HCNC,S$GLB, | Performed by: INTERNAL MEDICINE

## 2019-05-17 PROCEDURE — 93793 ANTICOAG MGMT PT WARFARIN: CPT | Mod: HCNC,S$GLB,,

## 2019-05-17 NOTE — PROGRESS NOTES
Patient's INR is therapeutic at 2.0.  Reports no recent changes.  Instructed to maintain current dose of Warfarin 3 mg every Thursday and 6 mg on all other days per week.  Dose calendar - given.  Recheck in 3 weeks.

## 2019-06-07 ENCOUNTER — ANTI-COAG VISIT (OUTPATIENT)
Dept: CARDIOLOGY | Facility: CLINIC | Age: 79
End: 2019-06-07
Payer: MEDICARE

## 2019-06-07 DIAGNOSIS — I48.91 ATRIAL FIBRILLATION, UNSPECIFIED TYPE: ICD-10-CM

## 2019-06-07 DIAGNOSIS — Z79.01 LONG TERM (CURRENT) USE OF ANTICOAGULANTS: Primary | ICD-10-CM

## 2019-06-07 LAB — INR PPP: 1.6 (ref 2–3)

## 2019-06-07 PROCEDURE — 85610 PROTHROMBIN TIME: CPT | Mod: QW,HCNC,S$GLB, | Performed by: INTERNAL MEDICINE

## 2019-06-07 PROCEDURE — 93793 ANTICOAG MGMT PT WARFARIN: CPT | Mod: HCNC,S$GLB,,

## 2019-06-07 PROCEDURE — 93793 PR ANTICOAGULANT MGMT FOR PT TAKING WARFARIN: ICD-10-PCS | Mod: HCNC,S$GLB,,

## 2019-06-07 PROCEDURE — 85610 POCT INR: ICD-10-PCS | Mod: QW,HCNC,S$GLB, | Performed by: INTERNAL MEDICINE

## 2019-06-07 NOTE — PROGRESS NOTES
Patient's INR is subtherapeutic at 1.6.   Reports x 1 dose missed on Tuesday, 6/04/2019.  Also, patient reports taking medication in the morning around 0900a, but hasn't taking medication as of yet.  No signs or symptoms reported.  Will boost today's (Friday) dose to 9 mg - only, then have patient to resume on regular scheduled dose of Warfarin 3 mg every Thursday and 6 mg on all other days per week.  Advised patient to take medication around the same time as possible to avoid missing doses and having medical complications - patient voiced understanding.  Recheck in 2 weeks.  Dose calendar - given.

## 2019-06-21 ENCOUNTER — ANTI-COAG VISIT (OUTPATIENT)
Dept: CARDIOLOGY | Facility: CLINIC | Age: 79
End: 2019-06-21
Payer: MEDICARE

## 2019-06-21 DIAGNOSIS — I48.91 ATRIAL FIBRILLATION, UNSPECIFIED TYPE: ICD-10-CM

## 2019-06-21 DIAGNOSIS — Z79.01 LONG TERM (CURRENT) USE OF ANTICOAGULANTS: Primary | ICD-10-CM

## 2019-06-21 LAB — INR PPP: 1.9 (ref 2–3)

## 2019-06-21 PROCEDURE — 93793 PR ANTICOAGULANT MGMT FOR PT TAKING WARFARIN: ICD-10-PCS | Mod: HCNC,S$GLB,,

## 2019-06-21 PROCEDURE — 85610 PROTHROMBIN TIME: CPT | Mod: QW,HCNC,S$GLB, | Performed by: INTERNAL MEDICINE

## 2019-06-21 PROCEDURE — 93793 ANTICOAG MGMT PT WARFARIN: CPT | Mod: HCNC,S$GLB,,

## 2019-06-21 PROCEDURE — 85610 POCT INR: ICD-10-PCS | Mod: QW,HCNC,S$GLB, | Performed by: INTERNAL MEDICINE

## 2019-06-21 NOTE — PROGRESS NOTES
Patient's INR remains sub-therapeutic at 1.9.  No missed doses or significant changes reported. No abnormal numbness or weakness noted.  Instructions given for patient to increase dose of warfarin to 6mg every evening. Patient voiced understanding.  Follow-up in 2 weeks. AVS provided.

## 2019-07-05 ENCOUNTER — ANTI-COAG VISIT (OUTPATIENT)
Dept: CARDIOLOGY | Facility: CLINIC | Age: 79
End: 2019-07-05
Payer: MEDICARE

## 2019-07-05 DIAGNOSIS — Z79.01 LONG TERM (CURRENT) USE OF ANTICOAGULANTS: Primary | ICD-10-CM

## 2019-07-05 DIAGNOSIS — I48.91 ATRIAL FIBRILLATION, UNSPECIFIED TYPE: ICD-10-CM

## 2019-07-05 LAB — INR PPP: 2.4 (ref 2–3)

## 2019-07-05 PROCEDURE — 93793 PR ANTICOAGULANT MGMT FOR PT TAKING WARFARIN: ICD-10-PCS | Mod: HCNC,S$GLB,,

## 2019-07-05 PROCEDURE — 85610 POCT INR: ICD-10-PCS | Mod: QW,HCNC,S$GLB, | Performed by: INTERNAL MEDICINE

## 2019-07-05 PROCEDURE — 93793 ANTICOAG MGMT PT WARFARIN: CPT | Mod: HCNC,S$GLB,,

## 2019-07-05 PROCEDURE — 85610 PROTHROMBIN TIME: CPT | Mod: QW,HCNC,S$GLB, | Performed by: INTERNAL MEDICINE

## 2019-07-05 NOTE — PROGRESS NOTES
Patient's INR is therapeutic at 2.4.  No upcoming procedures or changes reported.  No changes in dose.  Continue current dose of 6mg every evening.  Recheck in 3 weeks.  Please call should you have any questions or concerns at [phone] or [phone].

## 2019-07-26 ENCOUNTER — ANTI-COAG VISIT (OUTPATIENT)
Dept: CARDIOLOGY | Facility: CLINIC | Age: 79
End: 2019-07-26
Payer: MEDICARE

## 2019-07-26 DIAGNOSIS — Z79.01 LONG TERM (CURRENT) USE OF ANTICOAGULANTS: Primary | ICD-10-CM

## 2019-07-26 DIAGNOSIS — I48.91 ATRIAL FIBRILLATION, UNSPECIFIED TYPE: ICD-10-CM

## 2019-07-26 LAB — INR PPP: 2.6 (ref 2–3)

## 2019-07-26 PROCEDURE — 93793 PR ANTICOAGULANT MGMT FOR PT TAKING WARFARIN: ICD-10-PCS | Mod: HCNC,S$GLB,,

## 2019-07-26 PROCEDURE — 85610 PROTHROMBIN TIME: CPT | Mod: QW,HCNC,S$GLB, | Performed by: INTERNAL MEDICINE

## 2019-07-26 PROCEDURE — 85610 POCT INR: ICD-10-PCS | Mod: QW,HCNC,S$GLB, | Performed by: INTERNAL MEDICINE

## 2019-07-26 PROCEDURE — 93793 ANTICOAG MGMT PT WARFARIN: CPT | Mod: HCNC,S$GLB,,

## 2019-07-26 NOTE — PROGRESS NOTES
Patient's INR is therapeutic at 2.6.  Reports no recent changes.  Instructed to maintain current dose of Warfarin 6 mg every evening.  Patient repeated back dose instructions.  Recheck on 8/23/2019 at 1345p.

## 2019-08-13 ENCOUNTER — OFFICE VISIT (OUTPATIENT)
Dept: CARDIOLOGY | Facility: CLINIC | Age: 79
End: 2019-08-13
Payer: MEDICARE

## 2019-08-13 ENCOUNTER — CLINICAL SUPPORT (OUTPATIENT)
Dept: CARDIOLOGY | Facility: CLINIC | Age: 79
End: 2019-08-13
Payer: MEDICARE

## 2019-08-13 VITALS
HEIGHT: 67 IN | WEIGHT: 217.63 LBS | DIASTOLIC BLOOD PRESSURE: 67 MMHG | HEART RATE: 95 BPM | BODY MASS INDEX: 34.16 KG/M2 | SYSTOLIC BLOOD PRESSURE: 133 MMHG

## 2019-08-13 DIAGNOSIS — I48.91 ATRIAL FIBRILLATION, UNSPECIFIED TYPE: ICD-10-CM

## 2019-08-13 DIAGNOSIS — E11.8 TYPE 2 DIABETES MELLITUS WITH COMPLICATION, WITHOUT LONG-TERM CURRENT USE OF INSULIN: ICD-10-CM

## 2019-08-13 DIAGNOSIS — M54.9 BACK PAIN, UNSPECIFIED BACK LOCATION, UNSPECIFIED BACK PAIN LATERALITY, UNSPECIFIED CHRONICITY: ICD-10-CM

## 2019-08-13 DIAGNOSIS — I27.20 PULMONARY HYPERTENSION: ICD-10-CM

## 2019-08-13 DIAGNOSIS — I48.91 ATRIAL FIBRILLATION, UNSPECIFIED TYPE: Primary | ICD-10-CM

## 2019-08-13 DIAGNOSIS — I48.19 PERSISTENT ATRIAL FIBRILLATION: Primary | ICD-10-CM

## 2019-08-13 DIAGNOSIS — Z79.01 ANTICOAGULATED ON COUMADIN: ICD-10-CM

## 2019-08-13 DIAGNOSIS — G47.33 OSA (OBSTRUCTIVE SLEEP APNEA): ICD-10-CM

## 2019-08-13 DIAGNOSIS — Z79.01 LONG TERM CURRENT USE OF ANTICOAGULANT THERAPY: ICD-10-CM

## 2019-08-13 DIAGNOSIS — E66.01 MORBID (SEVERE) OBESITY DUE TO EXCESS CALORIES: ICD-10-CM

## 2019-08-13 DIAGNOSIS — E78.5 HYPERLIPIDEMIA WITH TARGET LDL LESS THAN 100: ICD-10-CM

## 2019-08-13 DIAGNOSIS — I10 ESSENTIAL HYPERTENSION: ICD-10-CM

## 2019-08-13 PROCEDURE — 3078F PR MOST RECENT DIASTOLIC BLOOD PRESSURE < 80 MM HG: ICD-10-PCS | Mod: HCNC,CPTII,S$GLB, | Performed by: INTERNAL MEDICINE

## 2019-08-13 PROCEDURE — 93005 EKG 12-LEAD: ICD-10-PCS | Mod: HCNC,S$GLB,, | Performed by: INTERNAL MEDICINE

## 2019-08-13 PROCEDURE — 93005 ELECTROCARDIOGRAM TRACING: CPT | Mod: HCNC,S$GLB,, | Performed by: INTERNAL MEDICINE

## 2019-08-13 PROCEDURE — 3075F PR MOST RECENT SYSTOLIC BLOOD PRESS GE 130-139MM HG: ICD-10-PCS | Mod: HCNC,CPTII,S$GLB, | Performed by: INTERNAL MEDICINE

## 2019-08-13 PROCEDURE — 99999 PR PBB SHADOW E&M-EST. PATIENT-LVL III: ICD-10-PCS | Mod: PBBFAC,HCNC,, | Performed by: INTERNAL MEDICINE

## 2019-08-13 PROCEDURE — 3075F SYST BP GE 130 - 139MM HG: CPT | Mod: HCNC,CPTII,S$GLB, | Performed by: INTERNAL MEDICINE

## 2019-08-13 PROCEDURE — 99214 OFFICE O/P EST MOD 30 MIN: CPT | Mod: HCNC,S$GLB,, | Performed by: INTERNAL MEDICINE

## 2019-08-13 PROCEDURE — 1101F PT FALLS ASSESS-DOCD LE1/YR: CPT | Mod: HCNC,CPTII,S$GLB, | Performed by: INTERNAL MEDICINE

## 2019-08-13 PROCEDURE — 99214 PR OFFICE/OUTPT VISIT, EST, LEVL IV, 30-39 MIN: ICD-10-PCS | Mod: HCNC,S$GLB,, | Performed by: INTERNAL MEDICINE

## 2019-08-13 PROCEDURE — 3078F DIAST BP <80 MM HG: CPT | Mod: HCNC,CPTII,S$GLB, | Performed by: INTERNAL MEDICINE

## 2019-08-13 PROCEDURE — 99999 PR PBB SHADOW E&M-EST. PATIENT-LVL III: CPT | Mod: PBBFAC,HCNC,, | Performed by: INTERNAL MEDICINE

## 2019-08-13 PROCEDURE — 93010 EKG 12-LEAD: ICD-10-PCS | Mod: HCNC,S$GLB,, | Performed by: INTERNAL MEDICINE

## 2019-08-13 PROCEDURE — 1101F PR PT FALLS ASSESS DOC 0-1 FALLS W/OUT INJ PAST YR: ICD-10-PCS | Mod: HCNC,CPTII,S$GLB, | Performed by: INTERNAL MEDICINE

## 2019-08-13 PROCEDURE — 93010 ELECTROCARDIOGRAM REPORT: CPT | Mod: HCNC,S$GLB,, | Performed by: INTERNAL MEDICINE

## 2019-08-13 NOTE — PROGRESS NOTES
Subjective:   Patient ID:  Natalie Greene is a 78 y.o. female who presents for cardiac consult of Atrial Fibrillation      Hypertension   Associated symptoms include malaise/fatigue and shortness of breath. Pertinent negatives include no chest pain or palpitations.     The patient came in today for cardiac consult of Atrial Fibrillation    Natalie Greene is a 78 y.o. female  HTN, non-obs CAD, atrial fibrillation (on Coumadin), pulm HTN,  hyperlipidemia, obesity, and DM presents for CV follow up.    4/10/18  Pt has left sided chest pain described as ache. Feels like it goes to left shoulder,arm,fingers. Feels like it goes straight through. The pain is intermittent, occurred the night before yesterday, feels more tired now due to it. Pain can occur at rest but not really exertional. Pt also has mild SOB but not necessary with ache, no diaphoresis but does have mild nausea. No dizziness/lightheaded. Does feel palpitations with Afib. Pt also has significant fatigue, thinks she snores a lot, has a dry mouth always and has not been tested for sleep apnea. Prior echo and cath results below - negative for signif CAD.     11/27/18  Overall has been doing well. Occ palpitations. She had neg pharm stress test 6/18. Has not gotten sleep study yet but still symptomatic with snoring, dry mouth, fatigue.  present who was diagnosed with DORA but has not gotten CPAP yet. No CP or SOB now. Has varicose veins, has not gotten compression stockings yet.     8/13/19  She was diagnosed with DORA since last visit has been on CPAP. She has been feeling overall bad lately, feels L arm pain, dizzy at times with more fatigue. She has a knot on the back next to her spine.  Is doing ok with coumadin. Has been checking blood sugars overall ok. No CP. IF she squeezes her arm feels better at times.     Patient feels no leg swelling, no PND, no dizziness, no syncope, no CNS symptoms.    Patient is compliant with medications.    ECG - Afib V  rate 95    Nuclear stress  Nuclear Quantitative Functional Analysis:   LVEF: >= 70 %    Impression: NORMAL MYOCARDIAL PERFUSION  1. The perfusion scan is free of evidence for myocardial ischemia or injury.   2. There is a mild intensity fixed defect in the anterior wall of the left ventricle, secondary to soft tissue attenuation.   3. Resting wall motion is physiologic.   4. Resting LV function is normal.   5. The ventricular volumes are normal at rest and stress.   6. The extracardiac distribution of radioactivity is normal.   This document has been electronically    SIGNED BY: Oscar Taveras MD On: 06/12/2018 16:15      2D ECHO  01/17/2019  CONCLUSIONS     1 - Normal left ventricular systolic function (EF 60-65%).     2 - Normal left ventricular diastolic function.     3 - Normal right ventricular systolic function .     4 - Pulmonary hypertension. The estimated PA systolic pressure is 42 mmHg.           4/11/2016 [facility]    Patient has a right dominant coronary artery.      - Left Main Coronary Artery:             The LM is normal. There is NICOLE 3 flow.     - Left Anterior Descending Artery:             The LAD has luminal irregularities. There is NICOLE 3 flow.     - Left Circumflex Artery:             The LCX is normal. There is NICOLE 3 flow.     - Right Coronary Artery:             The RCA has luminal irregularities. There is NICOLE 3 flow.    D. SUMMARY/POST-OPERATIVE DIAGNOSIS:  1. Non-obstructive CAD.  2. Normal LVEF.    Past Medical History:   Diagnosis Date    *Atrial fibrillation     Coronary artery disease involving native coronary artery of native heart with angina pectoris 4/10/2018    Diabetes mellitus     DM (diabetes mellitus) 2002     09/06/2017 no medication    Hyperlipidemia     Hypertension     Obstructive sleep apnea        Past Surgical History:   Procedure Laterality Date    CHOLECYSTECTOMY      COLON SURGERY      HYSTERECTOMY         Social History     Tobacco Use    Smoking status:  Never Smoker    Smokeless tobacco: Never Used   Substance Use Topics    Alcohol use: No    Drug use: No       Family History   Problem Relation Age of Onset    Diabetes Sister     Hypertension Sister     Hypertension Mother     Diabetes Sister        Patient's Medications   New Prescriptions    No medications on file   Previous Medications    ACCU-CHEK SOFTCLIX LANCETS MISC        ALBUTEROL (VENTOLIN HFA) 90 MCG/ACTUATION INHALER    Inhale 2 puffs into the lungs every 6 (six) hours as needed for Wheezing. Rescue    AMLODIPINE (NORVASC) 5 MG TABLET    TAKE 1 TABLET EVERY DAY    ATORVASTATIN (LIPITOR) 40 MG TABLET    Take 1 tablet (40 mg total) by mouth once daily.    DILTIAZEM (CARDIZEM CD) 180 MG 24 HR CAPSULE    Take 1 capsule (180 mg total) by mouth once daily.    FLUTICASONE (FLONASE) 50 MCG/ACTUATION NASAL SPRAY    2 sprays (100 mcg total) by Each Nare route once daily.    INCONTINENCE PAD,LINER,DISP (BLADDER CONTROL PADS EX ABSORB MISC)        INDAPAMIDE (LOZOL) 1.25 MG TAB    TAKE 1 TABLET EVERY DAY    LOSARTAN (COZAAR) 100 MG TABLET    Take 1 tablet (100 mg total) by mouth once daily.    METFORMIN (GLUCOPHAGE-XR) 500 MG 24 HR TABLET    Take 1 tablet (500 mg total) by mouth daily with breakfast.    METOPROLOL SUCCINATE (TOPROL-XL) 100 MG 24 HR TABLET    Take 1 tablet (100 mg total) by mouth once daily.    PROMETHAZINE-DEXTROMETHORPHAN (PROMETHAZINE-DM) 6.25-15 MG/5 ML SYRP    Take 5 mLs by mouth every 6 to 8 hours as needed.    WARFARIN (COUMADIN) 6 MG TABLET    TAKE 1 TABLET DAILY   Modified Medications    No medications on file   Discontinued Medications    No medications on file       Review of Systems   Constitutional: Positive for malaise/fatigue.   HENT: Negative.    Eyes: Negative.    Respiratory: Positive for shortness of breath.    Cardiovascular: Positive for leg swelling. Negative for chest pain and palpitations.   Gastrointestinal: Negative.    Genitourinary: Negative.    Musculoskeletal:  "Negative.    Skin: Negative.    Neurological: Positive for dizziness.   Endo/Heme/Allergies: Negative.    Psychiatric/Behavioral: Negative.    All 12 systems otherwise negative.      Wt Readings from Last 3 Encounters:   08/13/19 98.7 kg (217 lb 9.5 oz)   04/22/19 96.6 kg (212 lb 15.4 oz)   03/21/19 96.5 kg (212 lb 11.9 oz)     Temp Readings from Last 3 Encounters:   04/22/19 97.2 °F (36.2 °C) (Tympanic)   03/21/19 96.8 °F (36 °C) (Tympanic)   02/11/19 97.7 °F (36.5 °C) (Tympanic)     BP Readings from Last 3 Encounters:   08/13/19 133/67   04/22/19 92/60   03/21/19 110/68     Pulse Readings from Last 3 Encounters:   08/13/19 95   04/22/19 104   03/21/19 80       /67 (BP Location: Right arm, Patient Position: Sitting)   Pulse 95   Ht 5' 7" (1.702 m)   Wt 98.7 kg (217 lb 9.5 oz)   LMP  (LMP Unknown)   BMI 34.08 kg/m²     Objective:   Physical Exam   Constitutional: She is oriented to person, place, and time. She appears well-developed and well-nourished. No distress.   HENT:   Head: Normocephalic and atraumatic.   Nose: Nose normal.   Mouth/Throat: Oropharynx is clear and moist.   Eyes: Conjunctivae and EOM are normal. No scleral icterus.   Neck: Normal range of motion. Neck supple. No JVD present. No thyromegaly present.   Cardiovascular: Normal rate, S1 normal and S2 normal. An irregularly irregular rhythm present. Exam reveals no gallop, no S3, no S4 and no friction rub.   Murmur heard.  Pulmonary/Chest: Effort normal and breath sounds normal. No stridor. No respiratory distress. She has no wheezes. She has no rales. She exhibits no tenderness.   Abdominal: Soft. Bowel sounds are normal. She exhibits no distension and no mass. There is no tenderness. There is no rebound.   Genitourinary:   Genitourinary Comments: Deferred   Musculoskeletal: Normal range of motion. She exhibits edema (with varicosities, 1+ b/l). She exhibits no tenderness or deformity.   Lymphadenopathy:     She has no cervical " adenopathy.   Neurological: She is alert and oriented to person, place, and time. She exhibits normal muscle tone. Coordination normal.   Skin: Skin is warm and dry. No rash noted. She is not diaphoretic. No erythema. No pallor.   Psychiatric: She has a normal mood and affect. Her behavior is normal. Judgment and thought content normal.   Nursing note and vitals reviewed.      Lab Results   Component Value Date     (L) 01/17/2019    K 3.4 (L) 01/17/2019     01/17/2019    CO2 21 (L) 01/17/2019    BUN 12 01/17/2019    CREATININE 0.8 01/17/2019     (H) 01/17/2019    HGBA1C 6.8 (H) 04/22/2019    MG 1.8 01/17/2019    AST 23 01/17/2019    ALT 20 01/17/2019    ALBUMIN 3.2 (L) 01/17/2019    PROT 6.3 01/17/2019    BILITOT 1.0 01/17/2019    WBC 5.21 01/17/2019    HGB 12.1 01/17/2019    HCT 37.3 01/17/2019    MCV 93 01/17/2019     01/17/2019    INR 2.6 07/26/2019    INR 1.9 (H) 01/16/2019    TSH 1.785 07/29/2014    CHOL 171 11/12/2018    HDL 40 11/12/2018    LDLCALC 108.8 11/12/2018    TRIG 111 11/12/2018    BNP 81 04/21/2016     Assessment:      1. Persistent atrial fibrillation    2. Essential hypertension    3. Hyperlipidemia with target LDL less than 100    4. Anticoagulated on Coumadin    5. Long term current use of anticoagulant therapy    6. Morbid (severe) obesity due to excess calories    7. DORA (obstructive sleep apnea)    8. Pulmonary hypertension    9. Type 2 diabetes mellitus with complication, without long-term current use of insulin    10. Back pain, unspecified back location, unspecified back pain laterality, unspecified chronicity        Plan:     1. AF, chronic   - cont meds  - cont coumadin and coumadin clinic  - recent INR therapeutic     2. LE edema sec to venous insufficiency   - rec stockings, elevate legs    3. DORA  - cont CPAP    4. Obesity  - rec weight loss with diet and exercise    5. HTN  - cont meds    6. HLD  - cont statin    7. Pulm HTN  - cont CPAP  - f/u with  pulm    8.DM2  - A1c 6.8  - cont meds per PCP    9. Back pain/arm pain with weakness  - refer to PMR    Thank you for allowing me to participate in this patient's care. Please do not hesitate to contact me with any questions or concerns. Consult note has been forwarded to the referral physician.

## 2019-08-23 ENCOUNTER — ANTI-COAG VISIT (OUTPATIENT)
Dept: CARDIOLOGY | Facility: CLINIC | Age: 79
End: 2019-08-23
Payer: MEDICARE

## 2019-08-23 DIAGNOSIS — Z79.01 LONG TERM (CURRENT) USE OF ANTICOAGULANTS: Primary | ICD-10-CM

## 2019-08-23 DIAGNOSIS — I48.19 PERSISTENT ATRIAL FIBRILLATION: ICD-10-CM

## 2019-08-23 LAB — INR PPP: 2.3 (ref 2–3)

## 2019-08-23 PROCEDURE — 85610 PROTHROMBIN TIME: CPT | Mod: QW,HCNC,S$GLB, | Performed by: INTERNAL MEDICINE

## 2019-08-23 PROCEDURE — 93793 ANTICOAG MGMT PT WARFARIN: CPT | Mod: HCNC,S$GLB,,

## 2019-08-23 PROCEDURE — 93793 PR ANTICOAGULANT MGMT FOR PT TAKING WARFARIN: ICD-10-PCS | Mod: HCNC,S$GLB,,

## 2019-08-23 PROCEDURE — 85610 POCT INR: ICD-10-PCS | Mod: QW,HCNC,S$GLB, | Performed by: INTERNAL MEDICINE

## 2019-08-23 NOTE — PROGRESS NOTES
Patient's INR is therapeutic at 2.3.  Reports no recent changes.  Instructed to maintain current dose of Warfarin 6 mg daily.  Recheck in 1 month.

## 2019-08-25 RX ORDER — METOPROLOL SUCCINATE 100 MG/1
TABLET, EXTENDED RELEASE ORAL
Qty: 90 TABLET | Refills: 1 | Status: SHIPPED | OUTPATIENT
Start: 2019-08-25 | End: 2019-10-23 | Stop reason: SDUPTHER

## 2019-08-25 RX ORDER — LOSARTAN POTASSIUM 100 MG/1
TABLET ORAL
Qty: 90 TABLET | Refills: 1 | Status: SHIPPED | OUTPATIENT
Start: 2019-08-25 | End: 2019-10-23 | Stop reason: SDUPTHER

## 2019-09-06 DIAGNOSIS — I48.91 ATRIAL FIBRILLATION, UNSPECIFIED TYPE: Primary | ICD-10-CM

## 2019-09-06 RX ORDER — WARFARIN 6 MG/1
TABLET ORAL
Qty: 90 TABLET | Refills: 1 | Status: SHIPPED | OUTPATIENT
Start: 2019-09-06 | End: 2020-08-27 | Stop reason: SDUPTHER

## 2019-09-06 RX ORDER — INDAPAMIDE 1.25 MG/1
1.25 TABLET ORAL DAILY
Qty: 90 TABLET | Refills: 1 | Status: SHIPPED | OUTPATIENT
Start: 2019-09-06 | End: 2019-10-23 | Stop reason: SDUPTHER

## 2019-09-20 ENCOUNTER — ANTI-COAG VISIT (OUTPATIENT)
Dept: CARDIOLOGY | Facility: CLINIC | Age: 79
End: 2019-09-20
Payer: MEDICARE

## 2019-09-20 DIAGNOSIS — I48.19 PERSISTENT ATRIAL FIBRILLATION: ICD-10-CM

## 2019-09-20 DIAGNOSIS — Z79.01 LONG TERM (CURRENT) USE OF ANTICOAGULANTS: Primary | ICD-10-CM

## 2019-09-20 LAB — INR PPP: 2.3 (ref 2–3)

## 2019-09-20 PROCEDURE — 85610 PROTHROMBIN TIME: CPT | Mod: QW,HCNC,S$GLB, | Performed by: INTERNAL MEDICINE

## 2019-09-20 PROCEDURE — 93793 PR ANTICOAGULANT MGMT FOR PT TAKING WARFARIN: ICD-10-PCS | Mod: HCNC,S$GLB,,

## 2019-09-20 PROCEDURE — 93793 ANTICOAG MGMT PT WARFARIN: CPT | Mod: HCNC,S$GLB,,

## 2019-09-20 PROCEDURE — 85610 POCT INR: ICD-10-PCS | Mod: QW,HCNC,S$GLB, | Performed by: INTERNAL MEDICINE

## 2019-09-20 NOTE — PROGRESS NOTES
Patient's INR remains therapeutic at  2.3.  Reports no recent changes.  Will maintain current dose of Warfarin 6 mg every evening.  Recheck in 1 month.

## 2019-10-18 ENCOUNTER — ANTI-COAG VISIT (OUTPATIENT)
Dept: CARDIOLOGY | Facility: CLINIC | Age: 79
End: 2019-10-18
Payer: MEDICARE

## 2019-10-18 DIAGNOSIS — I48.91 ATRIAL FIBRILLATION, UNSPECIFIED TYPE: ICD-10-CM

## 2019-10-18 DIAGNOSIS — Z79.01 LONG TERM (CURRENT) USE OF ANTICOAGULANTS: Primary | ICD-10-CM

## 2019-10-18 LAB — INR PPP: 2.4 (ref 2–3)

## 2019-10-18 PROCEDURE — 93793 PR ANTICOAGULANT MGMT FOR PT TAKING WARFARIN: ICD-10-PCS | Mod: HCNC,S$GLB,,

## 2019-10-18 PROCEDURE — 85610 PROTHROMBIN TIME: CPT | Mod: QW,HCNC,S$GLB, | Performed by: INTERNAL MEDICINE

## 2019-10-18 PROCEDURE — 93793 ANTICOAG MGMT PT WARFARIN: CPT | Mod: HCNC,S$GLB,,

## 2019-10-18 PROCEDURE — 85610 POCT INR: ICD-10-PCS | Mod: QW,HCNC,S$GLB, | Performed by: INTERNAL MEDICINE

## 2019-10-18 NOTE — PROGRESS NOTES
Patient's INR remains therapeutic at  2.4.  Reports no recent changes.  Will maintain current dose of Warfarin 6 mg daily - a.m..  Recheck in 1 month.   Patient voiced understanding.

## 2019-10-22 RX ORDER — BLOOD-GLUCOSE METER
EACH MISCELLANEOUS
COMMUNITY
Start: 2019-09-09

## 2019-10-22 RX ORDER — CALCIUM CITRATE/VITAMIN D3 200MG-6.25
TABLET ORAL
COMMUNITY
Start: 2019-09-09 | End: 2021-03-02

## 2019-10-22 RX ORDER — BLOOD-GLUCOSE METER
EACH MISCELLANEOUS
COMMUNITY
Start: 2019-09-10 | End: 2021-05-03 | Stop reason: SDUPTHER

## 2019-10-22 NOTE — PROGRESS NOTES
Natalie Greene  10/23/2019  [id number]    Annabella Fenton MD  Patient Care Team:  Annabella Fenton MD as PCP - General (Family Medicine)  Annabella Fenton MD as Consulting Physician (Family Medicine)  Lisbet Lopez LPN as Care Coordinator (Internal Medicine)  Has the patient seen any provider outside of the [facility] network since the last visit? (no). If yes, HIPPA forms completed and records requested.        Visit Type:a scheduled routine follow-up visit    Chief Complaint:  Chief Complaint   Patient presents with    Follow-up       History of Present Illness:  Patient here for follow up on chronic illness.  Needs labs    Colon done externally.  Was told to repeat.     reviewed  Pneumovax 23, Dexa, COLON due.    Labs ordered for DM.  Lab Results   Component Value Date    HGBA1C 6.8 (H) 04/22/2019     On coumadin for A fib.  Followed by [facility] cards        History:  Past Medical History:   Diagnosis Date    *Atrial fibrillation     Coronary artery disease involving native coronary artery of native heart with angina pectoris 4/10/2018    Diabetes mellitus     DM (diabetes mellitus) 2002     09/06/2017 no medication    Hyperlipidemia     Hypertension     Obstructive sleep apnea      Past Surgical History:   Procedure Laterality Date    CHOLECYSTECTOMY      COLON SURGERY      HYSTERECTOMY       Family History   Problem Relation Age of Onset    Diabetes Sister     Hypertension Sister     Hypertension Mother     Diabetes Sister      Social History     Socioeconomic History    Marital status:      Spouse name: Not on file    Number of children: Not on file    Years of education: Not on file    Highest education level: Not on file   Occupational History    Not on file   Social Needs    Financial resource strain: Not on file    Food insecurity:     Worry: Not on file     Inability: Not on file    Transportation needs:     Medical: Not on file     Non-medical: Not on file    Tobacco Use    Smoking status: Never Smoker    Smokeless tobacco: Never Used   Substance and Sexual Activity    Alcohol use: No    Drug use: No    Sexual activity: Not on file   Lifestyle    Physical activity:     Days per week: Not on file     Minutes per session: Not on file    Stress: Not on file   Relationships    Social connections:     Talks on phone: Not on file     Gets together: Not on file     Attends Taoism service: Not on file     Active member of club or organization: Not on file     Attends meetings of clubs or organizations: Not on file     Relationship status: Not on file   Other Topics Concern    Not on file   Social History Narrative    Not on file     Patient Active Problem List   Diagnosis    A-fib    HTN (hypertension)    Hyperlipidemia with target LDL less than 100    DM (diabetes mellitus)    Morbid (severe) obesity due to excess calories    Anticoagulated on Coumadin    Obesity (BMI 30-39.9)    Abnormal stress test    History of malignant neoplasm of colon    Shoulder pain, left    Aortic atherosclerosis    Long term current use of anticoagulant therapy    Abdominal wall seroma    Hypersomnia    DORA (obstructive sleep apnea)    Psychophysiological insomnia    PLMD (periodic limb movement disorder)    Inadequate sleep hygiene    Pulmonary hypertension     Review of patient's allergies indicates:  No Known Allergies    The following were reviewed at this visit: active problem list, medication list, allergies, family history, social history, and health maintenance.    Medications:  Current Outpatient Medications on File Prior to Visit   Medication Sig Dispense Refill    ACCU-CHEK SOFTCLIX LANCETS Misc       INCONTINENCE PAD,LINER,DISP (BLADDER CONTROL PADS EX ABSORB MISC)       TRUE METRIX AIR GLUCOSE METER kit       TRUE METRIX GLUCOSE TEST STRIP Strp       warfarin (COUMADIN) 6 MG tablet TAKE 1 TABLET DAILY 90 tablet 1    [DISCONTINUED] amLODIPine  (NORVASC) 5 MG tablet TAKE 1 TABLET EVERY DAY 90 tablet 1    [DISCONTINUED] atorvastatin (LIPITOR) 40 MG tablet Take 1 tablet (40 mg total) by mouth once daily. 90 tablet 3    [DISCONTINUED] diltiaZEM (CARDIZEM CD) 180 MG 24 hr capsule Take 1 capsule (180 mg total) by mouth once daily. 90 capsule 3    [DISCONTINUED] indapamide (LOZOL) 1.25 MG Tab Take 1 tablet (1.25 mg total) by mouth once daily. 90 tablet 1    [DISCONTINUED] losartan (COZAAR) 100 MG tablet TAKE 1 TABLET EVERY DAY 90 tablet 1    [DISCONTINUED] metFORMIN (GLUCOPHAGE-XR) 500 MG 24 hr tablet Take 1 tablet (500 mg total) by mouth daily with breakfast. 90 tablet 3    [DISCONTINUED] metoprolol succinate (TOPROL-XL) 100 MG 24 hr tablet TAKE 1 TABLET EVERY DAY 90 tablet 1    [DISCONTINUED] promethazine-dextromethorphan (PROMETHAZINE-DM) 6.25-15 mg/5 mL Syrp Take 5 mLs by mouth every 6 to 8 hours as needed. 120 mL 0    albuterol (VENTOLIN HFA) 90 mcg/actuation inhaler Inhale 2 puffs into the lungs every 6 (six) hours as needed for Wheezing. Rescue (Patient not taking: Reported on 10/23/2019) 18 g 1    fluticasone (FLONASE) 50 mcg/actuation nasal spray 2 sprays (100 mcg total) by Each Nare route once daily. (Patient not taking: Reported on 10/23/2019) 16 g 0    TRUE METRIX LEVEL 1 Soln        No current facility-administered medications on file prior to visit.        Medications have been reviewed and reconciled with patient at this visit.  Barriers to medications present (no)    Adverse reactions to current medications (no)    Over the counter medications reviewed (Yes ), and if needed added to active Medication list at this visit.     Exam:  Wt Readings from Last 3 Encounters:   10/23/19 100.5 kg (221 lb 9 oz)   08/13/19 98.7 kg (217 lb 9.5 oz)   04/22/19 96.6 kg (212 lb 15.4 oz)     Temp Readings from Last 3 Encounters:   10/23/19 96.3 °F (35.7 °C) (Tympanic)   04/22/19 97.2 °F (36.2 °C) (Tympanic)   03/21/19 96.8 °F (36 °C) (Tympanic)     BP  Readings from Last 3 Encounters:   10/23/19 (!) 142/78   08/13/19 133/67   04/22/19 92/60     Pulse Readings from Last 3 Encounters:   10/23/19 105   08/13/19 95   04/22/19 104     Body mass index is 34.7 kg/m².      Review of Systems   Constitutional: Positive for malaise/fatigue. Negative for chills and fever.   HENT: Negative.  Negative for congestion, sinus pain and sore throat.    Eyes: Negative for blurred vision and double vision.   Respiratory: Negative for cough, sputum production, shortness of breath and wheezing.    Cardiovascular: Negative for chest pain, palpitations and leg swelling.   Gastrointestinal: Negative for abdominal pain, constipation, diarrhea, heartburn, nausea and vomiting.   Genitourinary: Negative.    Musculoskeletal: Positive for joint pain.   Skin: Negative.  Negative for rash.   Neurological: Negative.    Endo/Heme/Allergies: Negative.  Negative for polydipsia. Does not bruise/bleed easily.   Psychiatric/Behavioral: Negative for depression and substance abuse.     Physical Exam   Constitutional: She is oriented to person, place, and time. She appears well-developed and well-nourished. No distress.   HENT:   Head: Normocephalic and atraumatic.   Right Ear: External ear normal.   Left Ear: External ear normal.   Nose: Nose normal.   Mouth/Throat: Oropharynx is clear and moist. No oropharyngeal exudate.   Eyes: Pupils are equal, round, and reactive to light. Conjunctivae and EOM are normal. Right eye exhibits no discharge. Left eye exhibits no discharge.   Neck: Normal range of motion. Neck supple. No thyromegaly present.   Cardiovascular: Normal rate, regular rhythm, normal heart sounds and intact distal pulses.   No murmur heard.  Pulmonary/Chest: Effort normal and breath sounds normal. No respiratory distress. She has no wheezes.   Abdominal: Soft. Bowel sounds are normal. She exhibits no distension and no mass. There is no tenderness.   Musculoskeletal: Normal range of motion. She  exhibits no edema.   Lymphadenopathy:     She has no cervical adenopathy.   Neurological: She is alert and oriented to person, place, and time. No cranial nerve deficit.   Skin: Capillary refill takes less than 2 seconds. She is not diaphoretic.   Psychiatric: She has a normal mood and affect. Her behavior is normal. Judgment and thought content normal.   Nursing note and vitals reviewed.      Laboratory Reviewed ({N/A)  Lab Results   Component Value Date    WBC 5.21 01/17/2019    HGB 12.1 01/17/2019    HCT 37.3 01/17/2019     01/17/2019    CHOL 171 11/12/2018    TRIG 111 11/12/2018    HDL 40 11/12/2018    ALT 20 01/17/2019    AST 23 01/17/2019     (L) 01/17/2019    K 3.4 (L) 01/17/2019     01/17/2019    CREATININE 0.8 01/17/2019    BUN 12 01/17/2019    CO2 21 (L) 01/17/2019    TSH 1.785 07/29/2014    INR 2.4 10/18/2019    HGBA1C 6.8 (H) 04/22/2019       Natalie was seen today for follow-up.    Diagnoses and all orders for this visit:    Chronic atrial fibrillation    Type 2 diabetes mellitus without complication, without long-term current use of insulin  -     Hemoglobin A1c; Future  -     MICROALBUMIN / CREATININE RATIO URINE    Essential hypertension  -     Comprehensive metabolic panel; Future    Post-menopausal  -     DXA Bone Density Spine And Hip; Future    Long term current use of anticoagulant therapy  -     CBC auto differential; Future    Hyperlipidemia with target LDL less than 100  -     Lipid panel; Future    Coronary artery disease involving native coronary artery of native heart with angina pectoris  -     atorvastatin (LIPITOR) 40 MG tablet; Take 1 tablet (40 mg total) by mouth once daily.    Aortic atherosclerosis  -     atorvastatin (LIPITOR) 40 MG tablet; Take 1 tablet (40 mg total) by mouth once daily.    Other orders  -     Cancel: (In Office Administered) Pneumococcal Polysaccharide Vaccine (23 Valent) (SQ/IM)  -     amLODIPine (NORVASC) 5 MG tablet; Take 1 tablet (5 mg  total) by mouth once daily.  -     diltiaZEM (CARDIZEM CD) 180 MG 24 hr capsule; Take 1 capsule (180 mg total) by mouth once daily.  -     indapamide (LOZOL) 1.25 MG Tab; Take 1 tablet (1.25 mg total) by mouth once daily.  -     metFORMIN (GLUCOPHAGE-XR) 500 MG 24 hr tablet; Take 1 tablet (500 mg total) by mouth daily with breakfast.  -     metoprolol succinate (TOPROL-XL) 100 MG 24 hr tablet; Take 1 tablet (100 mg total) by mouth once daily.  -     losartan (COZAAR) 100 MG tablet; Take 1 tablet (100 mg total) by mouth once daily.      Dr. Rey Colon [facility]  Declined Flu and Pneumovax            Care Plan/Goals: Reviewed  (Yes)  Goals      Take at least one BP reading per week at various times of the day      Hypertension Goals/Individual Care Plan    Hypertension Goal Care Plan    1. Blood pressure goal is to be below 140/90. Normal Blood pressure is 120/80.  Patient's blood pressure is at goal today. (Yes)    2. Goal for self management is to check Blood Pressure daily. Record on Individual log. Patient is agreeable to self management plan. blood pressure log.   Patient will bring logs at next office visit, or communicate to /Care Management team for review for interval follow up.     3. Moderately intense physical activity, such as brisk walking, is beneficial when done regularly. Aim for a total of 40 minutes of moderate to vigorous activity three to four times a week to help lower blood pressure. Exercise of patients choice was recommended at this office visit. walking    4. Eating Healthy Diet is important in Blood Pressure control. As its name implies, the DASH (Dietary Approaches to Stop Hypertension) eating plan is designed to help you manage blood pressure. Emphasizing healthy food sources, it also limits:  Red meat   Sodium (salt)   Sweets, added sugars and sugar-containing beverages.     5. Barriers to goals reviewed and addressed with patient at visit. (Yes)    6.  Adherence and Medication Side effects discussed (Yes)    Referral to on-site Pharmacy for Co-management of hypertension (No)  Patient enrolled in Tele-health Hypertension Management. (No)    Patient is under care of /Care management (No) Referral Sent (No)                  Follow up: No follow-ups on file.    After visit summary was printed and given to patient upon discharge today.  Patient goals and care plan are included in After Visit Summary.

## 2019-10-23 ENCOUNTER — OFFICE VISIT (OUTPATIENT)
Dept: INTERNAL MEDICINE | Facility: CLINIC | Age: 79
End: 2019-10-23
Payer: MEDICARE

## 2019-10-23 VITALS
WEIGHT: 221.56 LBS | BODY MASS INDEX: 34.78 KG/M2 | OXYGEN SATURATION: 98 % | HEART RATE: 105 BPM | HEIGHT: 67 IN | DIASTOLIC BLOOD PRESSURE: 78 MMHG | TEMPERATURE: 96 F | SYSTOLIC BLOOD PRESSURE: 142 MMHG

## 2019-10-23 DIAGNOSIS — E78.5 HYPERLIPIDEMIA WITH TARGET LDL LESS THAN 100: ICD-10-CM

## 2019-10-23 DIAGNOSIS — Z78.0 POST-MENOPAUSAL: ICD-10-CM

## 2019-10-23 DIAGNOSIS — I10 ESSENTIAL HYPERTENSION: ICD-10-CM

## 2019-10-23 DIAGNOSIS — I48.20 CHRONIC ATRIAL FIBRILLATION: Primary | ICD-10-CM

## 2019-10-23 DIAGNOSIS — E11.9 TYPE 2 DIABETES MELLITUS WITHOUT COMPLICATION, WITHOUT LONG-TERM CURRENT USE OF INSULIN: ICD-10-CM

## 2019-10-23 DIAGNOSIS — Z79.01 LONG TERM CURRENT USE OF ANTICOAGULANT THERAPY: ICD-10-CM

## 2019-10-23 DIAGNOSIS — I25.119 CORONARY ARTERY DISEASE INVOLVING NATIVE CORONARY ARTERY OF NATIVE HEART WITH ANGINA PECTORIS: ICD-10-CM

## 2019-10-23 DIAGNOSIS — I70.0 AORTIC ATHEROSCLEROSIS: ICD-10-CM

## 2019-10-23 PROCEDURE — 3077F PR MOST RECENT SYSTOLIC BLOOD PRESSURE >= 140 MM HG: ICD-10-PCS | Mod: HCNC,CPTII,S$GLB, | Performed by: FAMILY MEDICINE

## 2019-10-23 PROCEDURE — 99214 PR OFFICE/OUTPT VISIT, EST, LEVL IV, 30-39 MIN: ICD-10-PCS | Mod: HCNC,S$GLB,, | Performed by: FAMILY MEDICINE

## 2019-10-23 PROCEDURE — 1101F PR PT FALLS ASSESS DOC 0-1 FALLS W/OUT INJ PAST YR: ICD-10-PCS | Mod: HCNC,CPTII,S$GLB, | Performed by: FAMILY MEDICINE

## 2019-10-23 PROCEDURE — 99999 PR PBB SHADOW E&M-EST. PATIENT-LVL III: CPT | Mod: PBBFAC,HCNC,, | Performed by: FAMILY MEDICINE

## 2019-10-23 PROCEDURE — 99214 OFFICE O/P EST MOD 30 MIN: CPT | Mod: HCNC,S$GLB,, | Performed by: FAMILY MEDICINE

## 2019-10-23 PROCEDURE — 99999 PR PBB SHADOW E&M-EST. PATIENT-LVL III: ICD-10-PCS | Mod: PBBFAC,HCNC,, | Performed by: FAMILY MEDICINE

## 2019-10-23 PROCEDURE — 1101F PT FALLS ASSESS-DOCD LE1/YR: CPT | Mod: HCNC,CPTII,S$GLB, | Performed by: FAMILY MEDICINE

## 2019-10-23 PROCEDURE — 3078F PR MOST RECENT DIASTOLIC BLOOD PRESSURE < 80 MM HG: ICD-10-PCS | Mod: HCNC,CPTII,S$GLB, | Performed by: FAMILY MEDICINE

## 2019-10-23 PROCEDURE — 3078F DIAST BP <80 MM HG: CPT | Mod: HCNC,CPTII,S$GLB, | Performed by: FAMILY MEDICINE

## 2019-10-23 PROCEDURE — 82043 UR ALBUMIN QUANTITATIVE: CPT | Mod: HCNC

## 2019-10-23 PROCEDURE — 3077F SYST BP >= 140 MM HG: CPT | Mod: HCNC,CPTII,S$GLB, | Performed by: FAMILY MEDICINE

## 2019-10-23 RX ORDER — METFORMIN HYDROCHLORIDE 500 MG/1
500 TABLET, EXTENDED RELEASE ORAL
Qty: 90 TABLET | Refills: 3 | Status: SHIPPED | OUTPATIENT
Start: 2019-10-23 | End: 2020-02-12 | Stop reason: SDUPTHER

## 2019-10-23 RX ORDER — LOSARTAN POTASSIUM 100 MG/1
100 TABLET ORAL DAILY
Qty: 90 TABLET | Refills: 1 | Status: SHIPPED | OUTPATIENT
Start: 2019-10-23 | End: 2020-02-12 | Stop reason: SDUPTHER

## 2019-10-23 RX ORDER — DILTIAZEM HYDROCHLORIDE 180 MG/1
180 CAPSULE, COATED, EXTENDED RELEASE ORAL DAILY
Qty: 90 CAPSULE | Refills: 3 | Status: SHIPPED | OUTPATIENT
Start: 2019-10-23 | End: 2019-11-01 | Stop reason: SDUPTHER

## 2019-10-23 RX ORDER — METOPROLOL SUCCINATE 100 MG/1
100 TABLET, EXTENDED RELEASE ORAL DAILY
Qty: 90 TABLET | Refills: 1 | Status: SHIPPED | OUTPATIENT
Start: 2019-10-23 | End: 2020-02-12 | Stop reason: SDUPTHER

## 2019-10-23 RX ORDER — INDAPAMIDE 1.25 MG/1
1.25 TABLET ORAL DAILY
Qty: 90 TABLET | Refills: 1 | Status: SHIPPED | OUTPATIENT
Start: 2019-10-23 | End: 2020-02-12 | Stop reason: SDUPTHER

## 2019-10-23 RX ORDER — AMLODIPINE BESYLATE 5 MG/1
5 TABLET ORAL DAILY
Qty: 90 TABLET | Refills: 1 | Status: SHIPPED | OUTPATIENT
Start: 2019-10-23 | End: 2019-11-01 | Stop reason: SDUPTHER

## 2019-10-23 RX ORDER — ATORVASTATIN CALCIUM 40 MG/1
40 TABLET, FILM COATED ORAL DAILY
Qty: 90 TABLET | Refills: 3 | Status: SHIPPED | OUTPATIENT
Start: 2019-10-23 | End: 2020-02-12 | Stop reason: SDUPTHER

## 2019-10-24 LAB
ALBUMIN/CREAT UR: NORMAL UG/MG (ref 0–30)
CREAT UR-MCNC: 31 MG/DL (ref 15–325)
MICROALBUMIN UR DL<=1MG/L-MCNC: <2.5 UG/ML

## 2019-11-01 RX ORDER — DILTIAZEM HYDROCHLORIDE 180 MG/1
180 CAPSULE, COATED, EXTENDED RELEASE ORAL DAILY
Qty: 90 CAPSULE | Refills: 3 | Status: SHIPPED | OUTPATIENT
Start: 2019-11-01 | End: 2019-11-04 | Stop reason: SDUPTHER

## 2019-11-01 RX ORDER — AMLODIPINE BESYLATE 5 MG/1
5 TABLET ORAL DAILY
Qty: 90 TABLET | Refills: 1 | Status: SHIPPED | OUTPATIENT
Start: 2019-11-01 | End: 2019-11-04 | Stop reason: SDUPTHER

## 2019-11-04 RX ORDER — DILTIAZEM HYDROCHLORIDE 180 MG/1
180 CAPSULE, COATED, EXTENDED RELEASE ORAL DAILY
Qty: 90 CAPSULE | Refills: 3 | Status: SHIPPED | OUTPATIENT
Start: 2019-11-04 | End: 2020-02-12 | Stop reason: SDUPTHER

## 2019-11-04 RX ORDER — AMLODIPINE BESYLATE 5 MG/1
5 TABLET ORAL DAILY
Qty: 90 TABLET | Refills: 1 | Status: SHIPPED | OUTPATIENT
Start: 2019-11-04 | End: 2020-02-12 | Stop reason: SDUPTHER

## 2019-11-07 ENCOUNTER — LAB VISIT (OUTPATIENT)
Dept: LAB | Facility: HOSPITAL | Age: 79
End: 2019-11-07
Attending: FAMILY MEDICINE
Payer: MEDICARE

## 2019-11-07 DIAGNOSIS — I10 ESSENTIAL HYPERTENSION: ICD-10-CM

## 2019-11-07 DIAGNOSIS — E11.9 TYPE 2 DIABETES MELLITUS WITHOUT COMPLICATION, WITHOUT LONG-TERM CURRENT USE OF INSULIN: ICD-10-CM

## 2019-11-07 DIAGNOSIS — E78.5 HYPERLIPIDEMIA WITH TARGET LDL LESS THAN 100: ICD-10-CM

## 2019-11-07 DIAGNOSIS — Z79.01 LONG TERM CURRENT USE OF ANTICOAGULANT THERAPY: ICD-10-CM

## 2019-11-07 LAB
ALBUMIN SERPL BCP-MCNC: 3.8 G/DL (ref 3.5–5.2)
ALP SERPL-CCNC: 89 U/L (ref 55–135)
ALT SERPL W/O P-5'-P-CCNC: 19 U/L (ref 10–44)
ANION GAP SERPL CALC-SCNC: 11 MMOL/L (ref 8–16)
AST SERPL-CCNC: 25 U/L (ref 10–40)
BASOPHILS # BLD AUTO: 0.02 K/UL (ref 0–0.2)
BASOPHILS NFR BLD: 0.4 % (ref 0–1.9)
BILIRUB SERPL-MCNC: 1.1 MG/DL (ref 0.1–1)
BUN SERPL-MCNC: 10 MG/DL (ref 8–23)
CALCIUM SERPL-MCNC: 9.2 MG/DL (ref 8.7–10.5)
CHLORIDE SERPL-SCNC: 101 MMOL/L (ref 95–110)
CHOLEST SERPL-MCNC: 91 MG/DL (ref 120–199)
CHOLEST/HDLC SERPL: 2.4 {RATIO} (ref 2–5)
CO2 SERPL-SCNC: 29 MMOL/L (ref 23–29)
CREAT SERPL-MCNC: 0.8 MG/DL (ref 0.5–1.4)
DIFFERENTIAL METHOD: ABNORMAL
EOSINOPHIL # BLD AUTO: 0.1 K/UL (ref 0–0.5)
EOSINOPHIL NFR BLD: 1.1 % (ref 0–8)
ERYTHROCYTE [DISTWIDTH] IN BLOOD BY AUTOMATED COUNT: 15 % (ref 11.5–14.5)
EST. GFR  (AFRICAN AMERICAN): >60 ML/MIN/1.73 M^2
EST. GFR  (NON AFRICAN AMERICAN): >60 ML/MIN/1.73 M^2
ESTIMATED AVG GLUCOSE: 140 MG/DL (ref 68–131)
GLUCOSE SERPL-MCNC: 132 MG/DL (ref 70–110)
HBA1C MFR BLD HPLC: 6.5 % (ref 4–5.6)
HCT VFR BLD AUTO: 39.1 % (ref 37–48.5)
HDLC SERPL-MCNC: 38 MG/DL (ref 40–75)
HDLC SERPL: 41.8 % (ref 20–50)
HGB BLD-MCNC: 12.2 G/DL (ref 12–16)
IMM GRANULOCYTES # BLD AUTO: 0.01 K/UL (ref 0–0.04)
IMM GRANULOCYTES NFR BLD AUTO: 0.2 % (ref 0–0.5)
LDLC SERPL CALC-MCNC: 38.6 MG/DL (ref 63–159)
LYMPHOCYTES # BLD AUTO: 1.2 K/UL (ref 1–4.8)
LYMPHOCYTES NFR BLD: 22.8 % (ref 18–48)
MCH RBC QN AUTO: 29.9 PG (ref 27–31)
MCHC RBC AUTO-ENTMCNC: 31.2 G/DL (ref 32–36)
MCV RBC AUTO: 96 FL (ref 82–98)
MONOCYTES # BLD AUTO: 0.4 K/UL (ref 0.3–1)
MONOCYTES NFR BLD: 8.4 % (ref 4–15)
NEUTROPHILS # BLD AUTO: 3.5 K/UL (ref 1.8–7.7)
NEUTROPHILS NFR BLD: 67.1 % (ref 38–73)
NONHDLC SERPL-MCNC: 53 MG/DL
NRBC BLD-RTO: 0 /100 WBC
PLATELET # BLD AUTO: 174 K/UL (ref 150–350)
PMV BLD AUTO: 12.2 FL (ref 9.2–12.9)
POTASSIUM SERPL-SCNC: 3.9 MMOL/L (ref 3.5–5.1)
PROT SERPL-MCNC: 7.1 G/DL (ref 6–8.4)
RBC # BLD AUTO: 4.08 M/UL (ref 4–5.4)
SODIUM SERPL-SCNC: 141 MMOL/L (ref 136–145)
TRIGL SERPL-MCNC: 72 MG/DL (ref 30–150)
WBC # BLD AUTO: 5.23 K/UL (ref 3.9–12.7)

## 2019-11-07 PROCEDURE — 80061 LIPID PANEL: CPT | Mod: HCNC

## 2019-11-07 PROCEDURE — 85025 COMPLETE CBC W/AUTO DIFF WBC: CPT | Mod: HCNC

## 2019-11-07 PROCEDURE — 83036 HEMOGLOBIN GLYCOSYLATED A1C: CPT | Mod: HCNC

## 2019-11-07 PROCEDURE — 80053 COMPREHEN METABOLIC PANEL: CPT | Mod: HCNC

## 2019-11-07 PROCEDURE — 36415 COLL VENOUS BLD VENIPUNCTURE: CPT | Mod: HCNC

## 2019-11-29 ENCOUNTER — ANTI-COAG VISIT (OUTPATIENT)
Dept: CARDIOLOGY | Facility: CLINIC | Age: 79
End: 2019-11-29
Payer: MEDICARE

## 2019-11-29 DIAGNOSIS — I48.91 ATRIAL FIBRILLATION, UNSPECIFIED TYPE: ICD-10-CM

## 2019-11-29 DIAGNOSIS — Z79.01 LONG TERM (CURRENT) USE OF ANTICOAGULANTS: Primary | ICD-10-CM

## 2019-11-29 LAB — INR PPP: 2.1 (ref 2–3)

## 2019-11-29 PROCEDURE — 85610 PROTHROMBIN TIME: CPT | Mod: QW,HCNC,S$GLB, | Performed by: INTERNAL MEDICINE

## 2019-11-29 PROCEDURE — 93793 ANTICOAG MGMT PT WARFARIN: CPT | Mod: HCNC,S$GLB,,

## 2019-11-29 PROCEDURE — 93793 PR ANTICOAGULANT MGMT FOR PT TAKING WARFARIN: ICD-10-PCS | Mod: HCNC,S$GLB,,

## 2019-11-29 PROCEDURE — 85610 POCT INR: ICD-10-PCS | Mod: QW,HCNC,S$GLB, | Performed by: INTERNAL MEDICINE

## 2019-11-29 NOTE — PROGRESS NOTES
Patient's INR is therapeutic at 2.1.  No upcoming procedures or changes reported.  No changes in dose.  Continue current dose of warfarin 6mg every evening.   Recheck in 1 month.  Patient voiced understanding.  Please call should you have any questions or concerns at [phone] or [phone].

## 2019-12-21 ENCOUNTER — HOSPITAL ENCOUNTER (EMERGENCY)
Facility: HOSPITAL | Age: 79
Discharge: HOME | End: 2019-12-21
Attending: FAMILY MEDICINE
Payer: MEDICARE

## 2019-12-21 VITALS
HEART RATE: 99 BPM | TEMPERATURE: 98 F | HEIGHT: 67 IN | DIASTOLIC BLOOD PRESSURE: 65 MMHG | WEIGHT: 215 LBS | BODY MASS INDEX: 33.74 KG/M2 | OXYGEN SATURATION: 98 % | SYSTOLIC BLOOD PRESSURE: 135 MMHG | RESPIRATION RATE: 18 BRPM

## 2019-12-21 DIAGNOSIS — R11.0 NAUSEA: ICD-10-CM

## 2019-12-21 DIAGNOSIS — K52.9 GASTROENTERITIS: Primary | ICD-10-CM

## 2019-12-21 DIAGNOSIS — K43.9 VENTRAL HERNIA WITHOUT OBSTRUCTION OR GANGRENE: ICD-10-CM

## 2019-12-21 LAB
ALBUMIN SERPL BCP-MCNC: 4 G/DL (ref 3.5–5.2)
ALP SERPL-CCNC: 102 U/L (ref 55–135)
ALT SERPL W/O P-5'-P-CCNC: 22 U/L (ref 10–44)
ANION GAP SERPL CALC-SCNC: 13 MMOL/L (ref 8–16)
AST SERPL-CCNC: 25 U/L (ref 10–40)
BASOPHILS # BLD AUTO: 0.02 K/UL (ref 0–0.2)
BASOPHILS NFR BLD: 0.2 % (ref 0–1.9)
BILIRUB SERPL-MCNC: 0.7 MG/DL (ref 0.1–1)
BILIRUB UR QL STRIP: NEGATIVE
BUN SERPL-MCNC: 11 MG/DL (ref 8–23)
CALCIUM SERPL-MCNC: 9.3 MG/DL (ref 8.7–10.5)
CHLORIDE SERPL-SCNC: 100 MMOL/L (ref 95–110)
CLARITY UR: CLEAR
CO2 SERPL-SCNC: 25 MMOL/L (ref 23–29)
COLOR UR: YELLOW
CREAT SERPL-MCNC: 0.9 MG/DL (ref 0.5–1.4)
DIFFERENTIAL METHOD: ABNORMAL
EOSINOPHIL # BLD AUTO: 0 K/UL (ref 0–0.5)
EOSINOPHIL NFR BLD: 0.3 % (ref 0–8)
ERYTHROCYTE [DISTWIDTH] IN BLOOD BY AUTOMATED COUNT: 14.2 % (ref 11.5–14.5)
EST. GFR  (AFRICAN AMERICAN): >60 ML/MIN/1.73 M^2
EST. GFR  (NON AFRICAN AMERICAN): >60 ML/MIN/1.73 M^2
GLUCOSE SERPL-MCNC: 149 MG/DL (ref 70–110)
GLUCOSE UR QL STRIP: NEGATIVE
HCT VFR BLD AUTO: 41 % (ref 37–48.5)
HGB BLD-MCNC: 13.3 G/DL (ref 12–16)
HGB UR QL STRIP: NEGATIVE
IMM GRANULOCYTES # BLD AUTO: 0.05 K/UL (ref 0–0.04)
IMM GRANULOCYTES NFR BLD AUTO: 0.4 % (ref 0–0.5)
KETONES UR QL STRIP: NEGATIVE
LEUKOCYTE ESTERASE UR QL STRIP: NEGATIVE
LIPASE SERPL-CCNC: 71 U/L (ref 4–60)
LYMPHOCYTES # BLD AUTO: 1.3 K/UL (ref 1–4.8)
LYMPHOCYTES NFR BLD: 11.3 % (ref 18–48)
MCH RBC QN AUTO: 29.6 PG (ref 27–31)
MCHC RBC AUTO-ENTMCNC: 32.4 G/DL (ref 32–36)
MCV RBC AUTO: 91 FL (ref 82–98)
MONOCYTES # BLD AUTO: 0.7 K/UL (ref 0.3–1)
MONOCYTES NFR BLD: 5.7 % (ref 4–15)
NEUTROPHILS # BLD AUTO: 9.5 K/UL (ref 1.8–7.7)
NEUTROPHILS NFR BLD: 82.1 % (ref 38–73)
NITRITE UR QL STRIP: NEGATIVE
NRBC BLD-RTO: 0 /100 WBC
PH UR STRIP: 7 [PH] (ref 5–8)
PLATELET # BLD AUTO: 210 K/UL (ref 150–350)
PMV BLD AUTO: 11.1 FL (ref 9.2–12.9)
POTASSIUM SERPL-SCNC: 4.2 MMOL/L (ref 3.5–5.1)
PROT SERPL-MCNC: 7.6 G/DL (ref 6–8.4)
PROT UR QL STRIP: NEGATIVE
RBC # BLD AUTO: 4.5 M/UL (ref 4–5.4)
SODIUM SERPL-SCNC: 138 MMOL/L (ref 136–145)
SP GR UR STRIP: 1.01 (ref 1–1.03)
TROPONIN I SERPL DL<=0.01 NG/ML-MCNC: 0.01 NG/ML (ref 0–0.03)
URN SPEC COLLECT METH UR: NORMAL
UROBILINOGEN UR STRIP-ACNC: NEGATIVE EU/DL
WBC # BLD AUTO: 11.61 K/UL (ref 3.9–12.7)

## 2019-12-21 PROCEDURE — 84484 ASSAY OF TROPONIN QUANT: CPT | Mod: HCNC

## 2019-12-21 PROCEDURE — 63600175 PHARM REV CODE 636 W HCPCS: Mod: HCNC | Performed by: FAMILY MEDICINE

## 2019-12-21 PROCEDURE — 25500020 PHARM REV CODE 255: Mod: HCNC | Performed by: FAMILY MEDICINE

## 2019-12-21 PROCEDURE — 80053 COMPREHEN METABOLIC PANEL: CPT | Mod: HCNC

## 2019-12-21 PROCEDURE — 81003 URINALYSIS AUTO W/O SCOPE: CPT | Mod: HCNC

## 2019-12-21 PROCEDURE — 96361 HYDRATE IV INFUSION ADD-ON: CPT | Mod: HCNC

## 2019-12-21 PROCEDURE — 83690 ASSAY OF LIPASE: CPT | Mod: HCNC

## 2019-12-21 PROCEDURE — 93010 EKG 12-LEAD: ICD-10-PCS | Mod: HCNC,,, | Performed by: INTERNAL MEDICINE

## 2019-12-21 PROCEDURE — 85025 COMPLETE CBC W/AUTO DIFF WBC: CPT | Mod: HCNC

## 2019-12-21 PROCEDURE — 93005 ELECTROCARDIOGRAM TRACING: CPT | Mod: HCNC

## 2019-12-21 PROCEDURE — 96374 THER/PROPH/DIAG INJ IV PUSH: CPT | Mod: HCNC,59

## 2019-12-21 PROCEDURE — 36415 COLL VENOUS BLD VENIPUNCTURE: CPT | Mod: HCNC

## 2019-12-21 PROCEDURE — 99285 EMERGENCY DEPT VISIT HI MDM: CPT | Mod: 25,HCNC

## 2019-12-21 PROCEDURE — 93010 ELECTROCARDIOGRAM REPORT: CPT | Mod: HCNC,,, | Performed by: INTERNAL MEDICINE

## 2019-12-21 RX ORDER — ONDANSETRON 2 MG/ML
4 INJECTION INTRAMUSCULAR; INTRAVENOUS
Status: COMPLETED | OUTPATIENT
Start: 2019-12-21 | End: 2019-12-21

## 2019-12-21 RX ORDER — ONDANSETRON 4 MG/1
4 TABLET, FILM COATED ORAL EVERY 6 HOURS
Qty: 20 TABLET | Refills: 0 | Status: SHIPPED | OUTPATIENT
Start: 2019-12-21 | End: 2019-12-26

## 2019-12-21 RX ADMIN — IOHEXOL 100 ML: 350 INJECTION, SOLUTION INTRAVENOUS at 02:12

## 2019-12-21 RX ADMIN — ONDANSETRON 4 MG: 2 INJECTION INTRAMUSCULAR; INTRAVENOUS at 12:12

## 2019-12-21 RX ADMIN — SODIUM CHLORIDE 1000 ML: 0.9 INJECTION, SOLUTION INTRAVENOUS at 12:12

## 2019-12-21 NOTE — DISCHARGE INSTRUCTIONS
Please makeA follow-up appointment with Dr. Colbert.  He recommends this Friday, December 27, 2019.  Taken Zofran for nausea and if symptoms worsen, you have fevers chills or do not feel comfortable, do not hesitate to come back to the emergency department.    Discussed the signs and symptoms of gastroenteritis with patient including: abdominal pain; nausea, vomiting, and diarrhea; chills; clammy skin; excessive sweating; fever; joint stiffness; fecal incontinence; muscle pain; and weight loss. Advised patient to drink water or sports beverages such as Gatorade for electrolyte replacement; do NOT use fruit juice (including apple juice), sodas or cola (flat or bubbly), Jell-O, or broth due to high sugar content; drink small amounts of fluid (2-4 oz.) every 30-60 minutes; and eat small amounts of food, when tolerable such as cereals, bread, potatoes, apples, bananas, and vegetables.  I also instructed on disease transmission and need for frequent hand washing.

## 2019-12-21 NOTE — ED PROVIDER NOTES
SCRIBE #1 NOTE: I, Janet Feldman, am scribing for, and in the presence of, Angelic Smith MD. I have scribed the entire note.       History     Chief Complaint   Patient presents with    Emesis     c/o n/v/d x a few hours, history of colon cancer.      Review of patient's allergies indicates:  No Known Allergies      History of Present Illness     HPI    12/21/2019, 12:45 AM  History obtained from the patient      History of Present Illness: Natalie Greene is a 79 y.o. female patient with a PMHx of HTN, HLD, A fib, DM, CAD, and sleep apnea who presents to the Emergency Department for evaluation of nausea which onset gradually 2-3 days ago. Pt reports hx of colon cancer and colon surgery. Symptoms are constant and moderate in severity. No mitigating or exacerbating factors reported. Associated sxs include diarrhea (3 episodes in the last 2 hours) and chronic intermittent CP. Patient denies any fever, chills, SOB, CP, abd pain, vomiting, constipation, hematochezia, dysuria, urinary frequency/urgency, hematuria, dizziness, extremity weakness/numbness, and all other sxs at this time. Pt also denies any recent use of abx or any sick contact. Prior Tx includes OTC nausea medications with no relief. No further complaints or concerns at this time.       Arrival mode: Personal vehicle    PCP: Annabella Fenton MD        Past Medical History:  Past Medical History:   Diagnosis Date    *Atrial fibrillation     Coronary artery disease involving native coronary artery of native heart with angina pectoris 4/10/2018    Diabetes mellitus     DM (diabetes mellitus) 2002     09/06/2017 no medication    Hyperlipidemia     Hypertension     Obstructive sleep apnea        Past Surgical History:  Past Surgical History:   Procedure Laterality Date    CHOLECYSTECTOMY      COLON SURGERY      HYSTERECTOMY           Family History:  Family History   Problem Relation Age of Onset    Diabetes Sister     Hypertension Sister      Hypertension Mother     Diabetes Sister        Social History:  Social History     Tobacco Use    Smoking status: Never Smoker    Smokeless tobacco: Never Used   Substance and Sexual Activity    Alcohol use: No    Drug use: No    Sexual activity: Unknown        Review of Systems     Review of Systems   Constitutional: Negative for chills and fever.   HENT: Negative for rhinorrhea and sore throat.    Respiratory: Negative for cough and shortness of breath.    Cardiovascular: Positive for chest pain (chronic, intermittent). Negative for palpitations and leg swelling.   Gastrointestinal: Positive for diarrhea and nausea. Negative for abdominal pain, blood in stool, constipation and vomiting.   Genitourinary: Negative for dysuria, frequency, hematuria and urgency.   Musculoskeletal: Negative for back pain.   Skin: Negative for rash.   Neurological: Negative for dizziness, weakness and numbness.   All other systems reviewed and are negative.       Physical Exam     Initial Vitals [12/21/19 0028]   BP Pulse Resp Temp SpO2   138/79 106 18 97.4 °F (36.3 °C) 97 %      MAP       --          Physical Exam  Nursing Notes and Vital Signs Reviewed.  Constitutional: Patient is in no acute distress. Well-developed and well-nourished.  Head: Atraumatic. Normocephalic.  Eyes: PERRL. EOM intact. Conjunctivae are not pale. No scleral icterus.  ENT: Dry mucous membranes. Oropharynx is clear and symmetric.    Neck: Supple. Full ROM. No lymphadenopathy.  Cardiovascular: Mildly tachycardic. Regular rhythm. No murmurs, rubs, or gallops. Distal pulses are 2+ and symmetric.  Pulmonary/Chest: No respiratory distress. Clear to auscultation bilaterally. No wheezing or rales.  Abdominal: Soft and non-distended.  There is no tenderness.  No rebound, guarding, or rigidity. Old surgical site ventrally. No erythema, warmth, or induration noted.  Musculoskeletal: Moves all extremities. No obvious deformities. No edema. No calf  "tenderness.  Skin: Warm and dry.  Neurological:  Alert, awake, and appropriate.  Normal speech.  No acute focal neurological deficits are appreciated.  Psychiatric: Normal affect. Good eye contact. Appropriate in content.     ED Course   Procedures  ED Vital Signs:  Vitals:    12/21/19 0028 12/21/19 0243 12/21/19 0431   BP: 138/79 (!) 147/73 135/65   Pulse: 106 109 99   Resp: 18 18 18   Temp: 97.4 °F (36.3 °C)  97.6 °F (36.4 °C)   TempSrc: Oral  Oral   SpO2: 97% 97% 98%   Weight: 97.5 kg (215 lb)     Height: 5' 7" (1.702 m)         Abnormal Lab Results:  Labs Reviewed   CBC W/ AUTO DIFFERENTIAL - Abnormal; Notable for the following components:       Result Value    Gran # (ANC) 9.5 (*)     Immature Grans (Abs) 0.05 (*)     Gran% 82.1 (*)     Lymph% 11.3 (*)     All other components within normal limits   COMPREHENSIVE METABOLIC PANEL - Abnormal; Notable for the following components:    Glucose 149 (*)     All other components within normal limits   LIPASE - Abnormal; Notable for the following components:    Lipase 71 (*)     All other components within normal limits   URINALYSIS, REFLEX TO URINE CULTURE    Narrative:     Preferred Collection Type->Urine, Clean Catch   TROPONIN I        All Lab Results:  Results for orders placed or performed during the hospital encounter of 12/21/19   CBC W/ AUTO DIFFERENTIAL   Result Value Ref Range    WBC 11.61 3.90 - 12.70 K/uL    RBC 4.50 4.00 - 5.40 M/uL    Hemoglobin 13.3 12.0 - 16.0 g/dL    Hematocrit 41.0 37.0 - 48.5 %    Mean Corpuscular Volume 91 82 - 98 fL    Mean Corpuscular Hemoglobin 29.6 27.0 - 31.0 pg    Mean Corpuscular Hemoglobin Conc 32.4 32.0 - 36.0 g/dL    RDW 14.2 11.5 - 14.5 %    Platelets 210 150 - 350 K/uL    MPV 11.1 9.2 - 12.9 fL    Immature Granulocytes 0.4 0.0 - 0.5 %    Gran # (ANC) 9.5 (H) 1.8 - 7.7 K/uL    Immature Grans (Abs) 0.05 (H) 0.00 - 0.04 K/uL    Lymph # 1.3 1.0 - 4.8 K/uL    Mono # 0.7 0.3 - 1.0 K/uL    Eos # 0.0 0.0 - 0.5 K/uL    Baso # " 0.02 0.00 - 0.20 K/uL    nRBC 0 0 /100 WBC    Gran% 82.1 (H) 38.0 - 73.0 %    Lymph% 11.3 (L) 18.0 - 48.0 %    Mono% 5.7 4.0 - 15.0 %    Eosinophil% 0.3 0.0 - 8.0 %    Basophil% 0.2 0.0 - 1.9 %    Differential Method Automated    Comp. Metabolic Panel   Result Value Ref Range    Sodium 138 136 - 145 mmol/L    Potassium 4.2 3.5 - 5.1 mmol/L    Chloride 100 95 - 110 mmol/L    CO2 25 23 - 29 mmol/L    Glucose 149 (H) 70 - 110 mg/dL    BUN, Bld 11 8 - 23 mg/dL    Creatinine 0.9 0.5 - 1.4 mg/dL    Calcium 9.3 8.7 - 10.5 mg/dL    Total Protein 7.6 6.0 - 8.4 g/dL    Albumin 4.0 3.5 - 5.2 g/dL    Total Bilirubin 0.7 0.1 - 1.0 mg/dL    Alkaline Phosphatase 102 55 - 135 U/L    AST 25 10 - 40 U/L    ALT 22 10 - 44 U/L    Anion Gap 13 8 - 16 mmol/L    eGFR if African American >60 >60 mL/min/1.73 m^2    eGFR if non African American >60 >60 mL/min/1.73 m^2   Lipase   Result Value Ref Range    Lipase 71 (H) 4 - 60 U/L   Urinalysis, Reflex to Urine Culture Urine, Clean Catch   Result Value Ref Range    Specimen UA Urine, Clean Catch     Color, UA Yellow Yellow, Straw, Vanesa    Appearance, UA Clear Clear    pH, UA 7.0 5.0 - 8.0    Specific Gravity, UA 1.010 1.005 - 1.030    Protein, UA Negative Negative    Glucose, UA Negative Negative    Ketones, UA Negative Negative    Bilirubin (UA) Negative Negative    Occult Blood UA Negative Negative    Nitrite, UA Negative Negative    Urobilinogen, UA Negative <2.0 EU/dL    Leukocytes, UA Negative Negative   Troponin I   Result Value Ref Range    Troponin I 0.012 0.000 - 0.026 ng/mL       Imaging Results:  Imaging Results          CT Abdomen Pelvis With Contrast (Final result)  Result time 12/21/19 08:53:19    Final result by Javi Wade MD (12/21/19 08:53:19)                 Impression:      No acute abnormality identified.  No bowel obstruction, intra-abdominal abscess, free fluid, or free air.    Fluid in the colon suggesting impending diarrheal illness.    Unchanged large hematoma  about the ventral hernia mesh measuring 20 x 17 x 15      Electronically signed by: Javi Wade  Date:    12/21/2019  Time:    08:53             Narrative:    EXAMINATION:  CT ABDOMEN PELVIS WITH CONTRAST    CLINICAL HISTORY:  Nausea, vomiting, diarrhea;    TECHNIQUE:  Low dose axial images, sagittal and coronal reformations were obtained from the lung bases to the pubic symphysis after  mL Omnipaque 350.    All CT scans at this location are performed using dose modulation techniques as appropriate to a performed exam including the following: Automated exposure control; adjustment of the mA and/or kV according to patient size.    COMPARISON:  01/15/2019    FINDINGS:  Heart: Normal in size. No pericardial effusion.    Lung Bases: Well aerated, without consolidation or pleural fluid.    Liver: Normal in size and attenuation, with no focal hepatic lesions.    Gallbladder: Surgically absent    Bile Ducts: No evidence of dilated ducts.    Pancreas: No mass or peripancreatic fat stranding.    Spleen: Unremarkable.    Adrenals: Unremarkable.    Kidneys/ Ureters: Left upper pole 2 cm simple renal cyst.    Bladder: No evidence of wall thickening.    Reproductive organs: Uterus and ovaries surgically absent    GI Tract/Mesentery: Stomach moderately distended with ingested contents.  No small bowel obstruction.  Evidence of prior right colonic surgery with ileocolic anastomosis.  No evidence of anastomotic complication.  Fluid in the colon suggesting impending diarrheal illness.  Sigmoid diverticulosis without diverticulitis.    Appendix: Appendix likely surgically absent    Peritoneal Space: No ascites. No free air.    Retroperitoneum: No significant adenopathy.    Abdominal wall: Unchanged large hematoma about the ventral hernia mesh measuring 20 x 17 x 15    Vasculature: Aortoiliac  atherosclerotic calcification. No aneurysm.    Bones: Bones appear osteopenic.  Discogenic degenerative change lumbar spine.  Multilevel  mild lumbar vertebral compressions unchanged.                              3:06 AM: Per STAT radiology, pt's CT results: Similar loculated fluid collection surrounding a mesh in the ventral abdominal wall measuring up to 19 cm exerts mass effect on the bowel.  No bowel obstruction, free fluid, free air, or diverticulitis.  Aortic atherosclerosis. No abdominal aneurysm.  Cholecystectomy and hysterectomy.      The EKG was ordered, reviewed, and independently interpreted by the ED provider.  Interpretation time: 102  Rate: 116 BPM  Rhythm: atrial fibrillation with RVR and PVC's  Interpretation: No acute ST changes. No STEMI.           The Emergency Provider reviewed the vital signs and test results, which are outlined above.     ED Discussion     3:27 AM: Discussed pt's case with Dr. Henson (General Surgery). Awaiting recommendation.    4:08 AM: Re-evaluated pt. Awaiting recommendation from General Surgery. Pt states she does not feel nauseous and is no longer in pain. She states she cannot describe what she is feeling but knows she does not feel good.    4:32 AM: Discussed pt's case with Dr. Colbert (General Surgery) who recommends if pt is stable and there are no signs of infection; pt can be discharged and f/u with him in clinic next Friday.    Pt states she will call Dr. Colbert (General Surgery) to make appointment because she has another appointment scheduled for that day.    4:35 AM: Discussed with pt all pertinent ED information and results. Discussed pt dx and plan of tx. Gave pt all f/u and return to the ED instructions. All questions and concerns were addressed at this time. Pt expresses understanding of information and instructions, and is comfortable with plan to discharge. Pt is stable for discharge.    I discussed with patient and/or family/caretaker that evaluation in the ED does not suggest any emergent or life threatening medical conditions requiring immediate intervention beyond what was provided in the ED,  and I believe patient is safe for discharge.  Regardless, an unremarkable evaluation in the ED does not preclude the development or presence of a serious of life threatening condition. As such, patient was instructed to return immediately for any worsening or change in current symptoms.         Discussed the signs and symptoms of gastroenteritis with patient including: abdominal pain; nausea, vomiting, and diarrhea; chills; clammy skin; excessive sweating; fever; joint stiffness; fecal incontinence; muscle pain; and weight loss. Advised patient to drink water or sports beverages such as Gatorade for electrolyte replacement; do NOT use fruit juice (including apple juice), sodas or cola (flat or bubbly), Jell-O, or broth due to high sugar content; drink small amounts of fluid (2-4 oz.) every 30-60 minutes; and eat small amounts of food, when tolerable such as cereals, bread, potatoes, apples, bananas, and vegetables.  I also instructed on disease transmission and need for frequent hand washing.    Regarding HERNIA, I recommend that the patient: avoid lifting, bending, and straining; do not stand for long periods of time; cough gently when needed; support hernia by holding your hand or a pillow over it when coughing; limit sexual intercourse; avoid straining when having bowel movements; eat foods high in fiber (i.e., whole grains, bran, cereals, and uncooked fruit and vegetables)  and drink at least 8 glasses of water a day; avoid using enemas or a laxatives; and do not wear anything tight over the hernia unless instructed to wear elastic support to keep the hernia in place).  Contact primary care provider for:  new symptoms of nausea and vomiting;  constipation or  bloody bowel movements; enlarging hernia; any questions or concerns related to the condition or treatment.  Advised patient to seek immediate care from primary care provider or the nearest emergency department if abdominal pain worsens; a fever develops;  "the hernia remains outside the abdomen and is painful, swollen, or feels hard; or if unable to pass gas or have a bowel movement.          ED Medication(s):  Medications   sodium chloride 0.9% bolus 1,000 mL (0 mLs Intravenous Stopped 12/21/19 0316)   ondansetron injection 4 mg (4 mg Intravenous Given 12/21/19 0045)   iohexol (OMNIPAQUE 350) injection 75 mL (100 mLs Intravenous Given 12/21/19 0208)       Discharge Medication List as of 12/21/2019  4:38 AM      START taking these medications    Details   ondansetron (ZOFRAN) 4 MG tablet Take 1 tablet (4 mg total) by mouth every 6 (six) hours. for 5 days, Starting Sat 12/21/2019, Until Thu 12/26/2019, Print             Follow-up Information     Zhen Colbert MD. Schedule an appointment as soon as possible for a visit on 12/27/2019.    Specialties:  General Surgery, Bariatrics  Contact information:  [id number]  [place] [address]  [phone]                       Scribe Attestation:   Scribe #1: I performed the above scribed service and the documentation accurately describes the services I performed. I attest to the accuracy of the note.     Attending:   Physician Attestation Statement for Scribe #1: I, Angelic Smith MD, personally performed the services described in this documentation, as scribed by Janet Feldman, in my presence, and it is both accurate and complete.           Clinical Impression       ICD-10-CM ICD-9-CM   1. Gastroenteritis K52.9 558.9   2. Nausea R11.0 787.02   3. Ventral hernia without obstruction or gangrene K43.9 553.20       Disposition:   Disposition: Discharged  Condition: Stable    Portions of this note may have been created with voice recognition software. Occasional "wrong-word" or "sound-a-like" substitutions may have occurred due to the inherent limitations of voice recognition software. Please, read the note carefully and recognize, using context, where substitutions have occurred.          Angelic Smith MD  12/25/19 " [id number]

## 2019-12-27 ENCOUNTER — ANTI-COAG VISIT (OUTPATIENT)
Dept: CARDIOLOGY | Facility: CLINIC | Age: 79
End: 2019-12-27
Payer: MEDICARE

## 2019-12-27 DIAGNOSIS — I48.91 ATRIAL FIBRILLATION, UNSPECIFIED TYPE: ICD-10-CM

## 2019-12-27 DIAGNOSIS — Z79.01 LONG TERM (CURRENT) USE OF ANTICOAGULANTS: Primary | ICD-10-CM

## 2019-12-27 LAB — INR PPP: 2.4 (ref 2–3)

## 2019-12-27 PROCEDURE — 93793 PR ANTICOAGULANT MGMT FOR PT TAKING WARFARIN: ICD-10-PCS | Mod: HCNC,S$GLB,,

## 2019-12-27 PROCEDURE — 93793 ANTICOAG MGMT PT WARFARIN: CPT | Mod: HCNC,S$GLB,,

## 2019-12-27 PROCEDURE — 85610 PROTHROMBIN TIME: CPT | Mod: QW,HCNC,S$GLB, | Performed by: INTERNAL MEDICINE

## 2019-12-27 PROCEDURE — 85610 POCT INR: ICD-10-PCS | Mod: QW,HCNC,S$GLB, | Performed by: INTERNAL MEDICINE

## 2019-12-27 NOTE — PROGRESS NOTES
Patient's INR is therapeutic at 2.4.  Recent ER visit on 12/21/2019 for nausea.  Completion of ondansetron (ZOFRAN) 4 mg on 12/26/2019.  No upcoming procedures or other changes reported. Instructions were given to maintain current dose of Warfarin 6 mg every evening.   Recheck on 1/31/2020 at 1030a.  Patient voiced understanding.  Declined AVS.

## 2020-01-31 ENCOUNTER — ANTI-COAG VISIT (OUTPATIENT)
Dept: CARDIOLOGY | Facility: CLINIC | Age: 80
End: 2020-01-31
Payer: MEDICARE

## 2020-01-31 DIAGNOSIS — I48.91 ATRIAL FIBRILLATION, UNSPECIFIED TYPE: ICD-10-CM

## 2020-01-31 DIAGNOSIS — Z79.01 LONG TERM (CURRENT) USE OF ANTICOAGULANTS: Primary | ICD-10-CM

## 2020-01-31 LAB — INR PPP: 2.1 (ref 2–3)

## 2020-01-31 PROCEDURE — 93793 PR ANTICOAGULANT MGMT FOR PT TAKING WARFARIN: ICD-10-PCS | Mod: HCNC,S$GLB,,

## 2020-01-31 PROCEDURE — 85610 POCT INR: ICD-10-PCS | Mod: QW,HCNC,S$GLB, | Performed by: INTERNAL MEDICINE

## 2020-01-31 PROCEDURE — 93793 ANTICOAG MGMT PT WARFARIN: CPT | Mod: HCNC,S$GLB,,

## 2020-01-31 PROCEDURE — 85610 PROTHROMBIN TIME: CPT | Mod: QW,HCNC,S$GLB, | Performed by: INTERNAL MEDICINE

## 2020-01-31 NOTE — PROGRESS NOTES
Patient's INR is therapeutic at 2.1.  No recent changes reported.  Instructions were given to maintain current dose of Warfarin 6 mg every evening.  Recheck on 2/28/2020at 1030a.  Patient voiced understanding.

## 2020-02-11 NOTE — PROGRESS NOTES
Natalie Greene  02/12/2020  [id number]    Annabella Fenton MD  Patient Care Team:  Annabella Fenton MD as PCP - General (Family Medicine)  Annabella Fenton MD as Consulting Physician (Family Medicine)  Lisbet Lopez LPN as Care Coordinator (Internal Medicine)  Has the patient seen any provider outside of the [facility] network since the last visit? (no). If yes, HIPPA forms completed and records requested.        Visit Type:a scheduled routine follow-up visit    Chief Complaint:  Chief Complaint   Patient presents with    Follow-up     3 month f/u       History of Present Illness:  79 year old here for follow up, HTN, non-obs CAD, atrial fibrillation (on Coumadin), pulm HTN,  hyperlipidemia, obesity, and DM       Lab Results   Component Value Date    HGBA1C 6.5 (H) 11/07/2019     Dm has been controlled.  HM up to date, however eye exam is due.  She does check her BS, occasionally.   She reports ambulatory readings are around 150 mg.    She still needs to scheduled External colon. Dr. Anne    Afib- on coumadin. Couldn't afford alternate.  INR checks.    Reports using CPAP.    On Lipitor for LDL reductions, risk reduction.        History:  Past Medical History:   Diagnosis Date    *Atrial fibrillation     Coronary artery disease involving native coronary artery of native heart with angina pectoris 4/10/2018    Diabetes mellitus     DM (diabetes mellitus) 2002     09/06/2017 no medication    Hyperlipidemia     Hypertension     Obstructive sleep apnea      Past Surgical History:   Procedure Laterality Date    CHOLECYSTECTOMY      COLON SURGERY      HYSTERECTOMY       Family History   Problem Relation Age of Onset    Diabetes Sister     Hypertension Sister     Hypertension Mother     Diabetes Sister      Social History     Socioeconomic History    Marital status:      Spouse name: Not on file    Number of children: Not on file    Years of education: Not on file    Highest  education level: Not on file   Occupational History    Not on file   Social Needs    Financial resource strain: Not on file    Food insecurity:     Worry: Not on file     Inability: Not on file    Transportation needs:     Medical: Not on file     Non-medical: Not on file   Tobacco Use    Smoking status: Never Smoker    Smokeless tobacco: Never Used   Substance and Sexual Activity    Alcohol use: No    Drug use: No    Sexual activity: Not on file   Lifestyle    Physical activity:     Days per week: Not on file     Minutes per session: Not on file    Stress: Not on file   Relationships    Social connections:     Talks on phone: Not on file     Gets together: Not on file     Attends Mosque service: Not on file     Active member of club or organization: Not on file     Attends meetings of clubs or organizations: Not on file     Relationship status: Not on file   Other Topics Concern    Not on file   Social History Narrative    Not on file     Patient Active Problem List   Diagnosis    A-fib    HTN (hypertension)    Hyperlipidemia with target LDL less than 100    DM (diabetes mellitus)    Morbid (severe) obesity due to excess calories    Anticoagulated on Coumadin    Obesity (BMI 30-39.9)    History of malignant neoplasm of colon    Shoulder pain, left    Aortic atherosclerosis    Long term current use of anticoagulant therapy    Abdominal wall seroma    DORA (obstructive sleep apnea)    Psychophysiological insomnia    PLMD (periodic limb movement disorder)    Inadequate sleep hygiene    Pulmonary hypertension     Review of patient's allergies indicates:  No Known Allergies    The following were reviewed at this visit: active problem list, medication list, allergies, family history, social history, and health maintenance.    Medications:  Current Outpatient Medications on File Prior to Visit   Medication Sig Dispense Refill    ACCU-CHEK SOFTCLIX LANCETS Misc       fluticasone (FLONASE)  50 mcg/actuation nasal spray 2 sprays (100 mcg total) by Each Nare route once daily. 16 g 0    TRUE METRIX AIR GLUCOSE METER kit       TRUE METRIX GLUCOSE TEST STRIP Strp       TRUE METRIX LEVEL 1 Soln       warfarin (COUMADIN) 6 MG tablet TAKE 1 TABLET DAILY 90 tablet 1    [DISCONTINUED] amLODIPine (NORVASC) 5 MG tablet Take 1 tablet (5 mg total) by mouth once daily. 90 tablet 1    [DISCONTINUED] atorvastatin (LIPITOR) 40 MG tablet Take 1 tablet (40 mg total) by mouth once daily. 90 tablet 3    [DISCONTINUED] diltiaZEM (CARDIZEM CD) 180 MG 24 hr capsule Take 1 capsule (180 mg total) by mouth once daily. 90 capsule 3    [DISCONTINUED] indapamide (LOZOL) 1.25 MG Tab Take 1 tablet (1.25 mg total) by mouth once daily. 90 tablet 1    [DISCONTINUED] losartan (COZAAR) 100 MG tablet Take 1 tablet (100 mg total) by mouth once daily. 90 tablet 1    [DISCONTINUED] metFORMIN (GLUCOPHAGE-XR) 500 MG 24 hr tablet Take 1 tablet (500 mg total) by mouth daily with breakfast. 90 tablet 3    [DISCONTINUED] metoprolol succinate (TOPROL-XL) 100 MG 24 hr tablet Take 1 tablet (100 mg total) by mouth once daily. 90 tablet 1    albuterol (VENTOLIN HFA) 90 mcg/actuation inhaler Inhale 2 puffs into the lungs every 6 (six) hours as needed for Wheezing. Rescue (Patient not taking: Reported on 2/12/2020) 18 g 1    INCONTINENCE PAD,LINER,DISP (BLADDER CONTROL PADS EX ABSORB MISC)        No current facility-administered medications on file prior to visit.        Medications have been reviewed and reconciled with patient at this visit.  Barriers to medications present (no)    Adverse reactions to current medications (no)    Over the counter medications reviewed (Yes ), and if needed added to active Medication list at this visit.     Exam:  Wt Readings from Last 3 Encounters:   02/12/20 100.8 kg (222 lb 1.8 oz)   12/21/19 97.5 kg (215 lb)   10/23/19 100.5 kg (221 lb 9 oz)     Temp Readings from Last 3 Encounters:   02/12/20 98.2 °F  (36.8 °C) (Tympanic)   12/21/19 97.6 °F (36.4 °C) (Oral)   10/23/19 96.3 °F (35.7 °C) (Tympanic)     BP Readings from Last 3 Encounters:   02/12/20 122/78   12/21/19 135/65   10/23/19 (!) 142/78     Pulse Readings from Last 3 Encounters:   02/12/20 92   12/21/19 99   10/23/19 105     Body mass index is 34.79 kg/m².      Review of Systems   Constitutional: Negative.  Negative for chills and fever.   HENT: Negative.  Negative for congestion, sinus pain and sore throat.    Eyes: Negative for blurred vision and double vision.   Respiratory: Negative for cough, sputum production, shortness of breath and wheezing.    Cardiovascular: Negative for chest pain, palpitations and leg swelling.   Gastrointestinal: Negative for abdominal pain, constipation, diarrhea, heartburn, nausea and vomiting.   Genitourinary: Negative.    Musculoskeletal: Negative.    Skin: Negative.  Negative for rash.   Neurological: Negative.    Endo/Heme/Allergies: Negative.  Negative for polydipsia. Does not bruise/bleed easily.   Psychiatric/Behavioral: Negative for depression and substance abuse.     Physical Exam   Constitutional: She is oriented to person, place, and time. She appears well-developed and well-nourished. No distress.   HENT:   Head: Normocephalic and atraumatic.   Right Ear: External ear normal.   Left Ear: External ear normal.   Nose: Nose normal.   Mouth/Throat: Oropharynx is clear and moist. No oropharyngeal exudate.   Eyes: Pupils are equal, round, and reactive to light. Conjunctivae and EOM are normal. Right eye exhibits no discharge. Left eye exhibits no discharge.   Neck: Normal range of motion. Neck supple. No thyromegaly present.   Cardiovascular: Normal rate and intact distal pulses. An irregularly irregular rhythm present.   Murmur heard.  Pulmonary/Chest: Effort normal and breath sounds normal. No respiratory distress. She has no wheezes.   Abdominal: Soft. Bowel sounds are normal. She exhibits no distension and no mass.  There is no tenderness.   Musculoskeletal: Normal range of motion. She exhibits no edema.   Lymphadenopathy:     She has no cervical adenopathy.   Neurological: She is alert and oriented to person, place, and time. No cranial nerve deficit.   Skin: Capillary refill takes less than 2 seconds. She is not diaphoretic.   Psychiatric: She has a normal mood and affect. Her behavior is normal. Judgment and thought content normal.   Nursing note and vitals reviewed.  Protective Sensation (w/ 10 gram monofilament):  Right: Intact  Left: Intact    Visual Inspection:  Normal -  Bilateral    Pedal Pulses:   Right: Present  Left: Present    Posterior tibialis:   Right:Present  Left: Present  Varicose veins      Laboratory Reviewed ({N/A)  Lab Results   Component Value Date    WBC 11.61 12/21/2019    HGB 13.3 12/21/2019    HCT 41.0 12/21/2019     12/21/2019    CHOL 91 (L) 11/07/2019    TRIG 72 11/07/2019    HDL 38 (L) 11/07/2019    ALT 22 12/21/2019    AST 25 12/21/2019     12/21/2019    K 4.2 12/21/2019     12/21/2019    CREATININE 0.9 12/21/2019    BUN 11 12/21/2019    CO2 25 12/21/2019    TSH 1.785 07/29/2014    INR 2.1 01/31/2020    HGBA1C 6.5 (H) 11/07/2019       Natalie was seen today for follow-up.    Diagnoses and all orders for this visit:    Pulmonary hypertension    Psychophysiological insomnia    PLMD (periodic limb movement disorder)    DORA (obstructive sleep apnea)    Morbid (severe) obesity due to excess calories    Long term current use of anticoagulant therapy    Hyperlipidemia with target LDL less than 100    Essential hypertension    History of malignant neoplasm of colon    Type 2 diabetes mellitus without complication, without long-term current use of insulin  -     Hemoglobin A1c; Future  -     Comprehensive metabolic panel; Future    Atrial fibrillation, unspecified type    Anticoagulated on Coumadin    Aortic atherosclerosis  -     atorvastatin (LIPITOR) 40 MG tablet; Take 1 tablet (40 mg  total) by mouth once daily.    Type 2 diabetes mellitus with complication, without long-term current use of insulin    Coronary artery disease involving native coronary artery of native heart with angina pectoris  -     atorvastatin (LIPITOR) 40 MG tablet; Take 1 tablet (40 mg total) by mouth once daily.    Other orders  -     losartan (COZAAR) 100 MG tablet; Take 1 tablet (100 mg total) by mouth once daily.  -     metoprolol succinate (TOPROL-XL) 100 MG 24 hr tablet; Take 1 tablet (100 mg total) by mouth once daily.  -     diltiaZEM (CARDIZEM CD) 180 MG 24 hr capsule; Take 1 capsule (180 mg total) by mouth once daily.  -     amLODIPine (NORVASC) 5 MG tablet; Take 1 tablet (5 mg total) by mouth once daily.  -     indapamide (LOZOL) 1.25 MG Tab; Take 1 tablet (1.25 mg total) by mouth once daily.  -     metFORMIN (GLUCOPHAGE-XR) 500 MG XR 24hr tablet; Take 1 tablet (500 mg total) by mouth daily with breakfast.      Stable  Labs in May ordered    Schedule colon- she will think about continuing to screen.    Schedule eye exam    She is stable today.  No change in meds  Labs in May          Care Plan/Goals: Reviewed  (N/A)  Goals      Take at least one BP reading per week at various times of the day      Hypertension Goals/Individual Care Plan    Hypertension Goal Care Plan    1. Blood pressure goal is to be below 140/90. Normal Blood pressure is 120/80.  Patient's blood pressure is at goal today. (Yes)    2. Goal for self management is to check Blood Pressure daily. Record on Individual log. Patient is agreeable to self management plan. blood pressure log.   Patient will bring logs at next office visit, or communicate to /Care Management team for review for interval follow up.     3. Moderately intense physical activity, such as brisk walking, is beneficial when done regularly. Aim for a total of 40 minutes of moderate to vigorous activity three to four times a week to help lower blood pressure. Exercise  of patients choice was recommended at this office visit. walking    4. Eating Healthy Diet is important in Blood Pressure control. As its name implies, the DASH (Dietary Approaches to Stop Hypertension) eating plan is designed to help you manage blood pressure. Emphasizing healthy food sources, it also limits:  Red meat   Sodium (salt)   Sweets, added sugars and sugar-containing beverages.     5. Barriers to goals reviewed and addressed with patient at visit. (Yes)    6. Adherence and Medication Side effects discussed (Yes)    Referral to on-site Pharmacy for Co-management of hypertension (No)  Patient enrolled in Tele-health Hypertension Management. (No)    Patient is under care of /Care management (No) Referral Sent (No)                  Follow up: No follow-ups on file.    After visit summary was printed and given to patient upon discharge today.  Patient goals and care plan are included in After Visit Summary.

## 2020-02-12 ENCOUNTER — OFFICE VISIT (OUTPATIENT)
Dept: INTERNAL MEDICINE | Facility: CLINIC | Age: 80
End: 2020-02-12
Payer: MEDICARE

## 2020-02-12 VITALS
OXYGEN SATURATION: 96 % | SYSTOLIC BLOOD PRESSURE: 122 MMHG | HEIGHT: 67 IN | BODY MASS INDEX: 34.87 KG/M2 | DIASTOLIC BLOOD PRESSURE: 78 MMHG | WEIGHT: 222.13 LBS | TEMPERATURE: 98 F | HEART RATE: 92 BPM

## 2020-02-12 DIAGNOSIS — I25.119 CORONARY ARTERY DISEASE INVOLVING NATIVE CORONARY ARTERY OF NATIVE HEART WITH ANGINA PECTORIS: ICD-10-CM

## 2020-02-12 DIAGNOSIS — Z79.01 LONG TERM CURRENT USE OF ANTICOAGULANT THERAPY: ICD-10-CM

## 2020-02-12 DIAGNOSIS — Z79.01 ANTICOAGULATED ON COUMADIN: ICD-10-CM

## 2020-02-12 DIAGNOSIS — I10 ESSENTIAL HYPERTENSION: ICD-10-CM

## 2020-02-12 DIAGNOSIS — E11.8 TYPE 2 DIABETES MELLITUS WITH COMPLICATION, WITHOUT LONG-TERM CURRENT USE OF INSULIN: ICD-10-CM

## 2020-02-12 DIAGNOSIS — I27.20 PULMONARY HYPERTENSION: Primary | ICD-10-CM

## 2020-02-12 DIAGNOSIS — E66.01 MORBID (SEVERE) OBESITY DUE TO EXCESS CALORIES: ICD-10-CM

## 2020-02-12 DIAGNOSIS — F51.04 PSYCHOPHYSIOLOGICAL INSOMNIA: ICD-10-CM

## 2020-02-12 DIAGNOSIS — I48.91 ATRIAL FIBRILLATION, UNSPECIFIED TYPE: ICD-10-CM

## 2020-02-12 DIAGNOSIS — G47.61 PLMD (PERIODIC LIMB MOVEMENT DISORDER): ICD-10-CM

## 2020-02-12 DIAGNOSIS — Z85.038 HISTORY OF MALIGNANT NEOPLASM OF COLON: ICD-10-CM

## 2020-02-12 DIAGNOSIS — I70.0 AORTIC ATHEROSCLEROSIS: ICD-10-CM

## 2020-02-12 DIAGNOSIS — E78.5 HYPERLIPIDEMIA WITH TARGET LDL LESS THAN 100: ICD-10-CM

## 2020-02-12 DIAGNOSIS — G47.33 OSA (OBSTRUCTIVE SLEEP APNEA): ICD-10-CM

## 2020-02-12 DIAGNOSIS — E11.9 TYPE 2 DIABETES MELLITUS WITHOUT COMPLICATION, WITHOUT LONG-TERM CURRENT USE OF INSULIN: ICD-10-CM

## 2020-02-12 PROCEDURE — 99499 UNLISTED E&M SERVICE: CPT | Mod: HCNC,S$GLB,, | Performed by: FAMILY MEDICINE

## 2020-02-12 PROCEDURE — 3078F PR MOST RECENT DIASTOLIC BLOOD PRESSURE < 80 MM HG: ICD-10-PCS | Mod: HCNC,CPTII,S$GLB, | Performed by: FAMILY MEDICINE

## 2020-02-12 PROCEDURE — 99999 PR PBB SHADOW E&M-EST. PATIENT-LVL III: CPT | Mod: PBBFAC,HCNC,, | Performed by: FAMILY MEDICINE

## 2020-02-12 PROCEDURE — 1126F PR PAIN SEVERITY QUANTIFIED, NO PAIN PRESENT: ICD-10-PCS | Mod: HCNC,S$GLB,, | Performed by: FAMILY MEDICINE

## 2020-02-12 PROCEDURE — 1101F PT FALLS ASSESS-DOCD LE1/YR: CPT | Mod: HCNC,CPTII,S$GLB, | Performed by: FAMILY MEDICINE

## 2020-02-12 PROCEDURE — 99213 PR OFFICE/OUTPT VISIT, EST, LEVL III, 20-29 MIN: ICD-10-PCS | Mod: HCNC,S$GLB,, | Performed by: FAMILY MEDICINE

## 2020-02-12 PROCEDURE — 3074F PR MOST RECENT SYSTOLIC BLOOD PRESSURE < 130 MM HG: ICD-10-PCS | Mod: HCNC,CPTII,S$GLB, | Performed by: FAMILY MEDICINE

## 2020-02-12 PROCEDURE — 1159F PR MEDICATION LIST DOCUMENTED IN MEDICAL RECORD: ICD-10-PCS | Mod: HCNC,S$GLB,, | Performed by: FAMILY MEDICINE

## 2020-02-12 PROCEDURE — 1126F AMNT PAIN NOTED NONE PRSNT: CPT | Mod: HCNC,S$GLB,, | Performed by: FAMILY MEDICINE

## 2020-02-12 PROCEDURE — 1101F PR PT FALLS ASSESS DOC 0-1 FALLS W/OUT INJ PAST YR: ICD-10-PCS | Mod: HCNC,CPTII,S$GLB, | Performed by: FAMILY MEDICINE

## 2020-02-12 PROCEDURE — 99499 RISK ADDL DX/OHS AUDIT: ICD-10-PCS | Mod: HCNC,S$GLB,, | Performed by: FAMILY MEDICINE

## 2020-02-12 PROCEDURE — 1159F MED LIST DOCD IN RCRD: CPT | Mod: HCNC,S$GLB,, | Performed by: FAMILY MEDICINE

## 2020-02-12 PROCEDURE — 3078F DIAST BP <80 MM HG: CPT | Mod: HCNC,CPTII,S$GLB, | Performed by: FAMILY MEDICINE

## 2020-02-12 PROCEDURE — 3074F SYST BP LT 130 MM HG: CPT | Mod: HCNC,CPTII,S$GLB, | Performed by: FAMILY MEDICINE

## 2020-02-12 PROCEDURE — 99213 OFFICE O/P EST LOW 20 MIN: CPT | Mod: HCNC,S$GLB,, | Performed by: FAMILY MEDICINE

## 2020-02-12 PROCEDURE — 99999 PR PBB SHADOW E&M-EST. PATIENT-LVL III: ICD-10-PCS | Mod: PBBFAC,HCNC,, | Performed by: FAMILY MEDICINE

## 2020-02-12 RX ORDER — AMLODIPINE BESYLATE 5 MG/1
5 TABLET ORAL DAILY
Qty: 90 TABLET | Refills: 1 | Status: SHIPPED | OUTPATIENT
Start: 2020-02-12 | End: 2020-08-27 | Stop reason: SDUPTHER

## 2020-02-12 RX ORDER — METFORMIN HYDROCHLORIDE 500 MG/1
500 TABLET, EXTENDED RELEASE ORAL
Qty: 90 TABLET | Refills: 3 | Status: SHIPPED | OUTPATIENT
Start: 2020-02-12 | End: 2022-01-27

## 2020-02-12 RX ORDER — LOSARTAN POTASSIUM 100 MG/1
100 TABLET ORAL DAILY
Qty: 90 TABLET | Refills: 1 | Status: SHIPPED | OUTPATIENT
Start: 2020-02-12 | End: 2022-03-23 | Stop reason: SDUPTHER

## 2020-02-12 RX ORDER — DILTIAZEM HYDROCHLORIDE 180 MG/1
180 CAPSULE, COATED, EXTENDED RELEASE ORAL DAILY
Qty: 90 CAPSULE | Refills: 3 | Status: SHIPPED | OUTPATIENT
Start: 2020-02-12 | End: 2022-03-23 | Stop reason: SDUPTHER

## 2020-02-12 RX ORDER — INDAPAMIDE 1.25 MG/1
1.25 TABLET ORAL DAILY
Qty: 90 TABLET | Refills: 1 | Status: SHIPPED | OUTPATIENT
Start: 2020-02-12 | End: 2020-08-27 | Stop reason: SDUPTHER

## 2020-02-12 RX ORDER — METOPROLOL SUCCINATE 100 MG/1
100 TABLET, EXTENDED RELEASE ORAL DAILY
Qty: 90 TABLET | Refills: 1 | Status: SHIPPED | OUTPATIENT
Start: 2020-02-12 | End: 2020-08-27 | Stop reason: SDUPTHER

## 2020-02-12 RX ORDER — ATORVASTATIN CALCIUM 40 MG/1
40 TABLET, FILM COATED ORAL DAILY
Qty: 90 TABLET | Refills: 3 | Status: SHIPPED | OUTPATIENT
Start: 2020-02-12 | End: 2022-01-20

## 2020-02-13 ENCOUNTER — PES CALL (OUTPATIENT)
Dept: ADMINISTRATIVE | Facility: CLINIC | Age: 80
End: 2020-02-13

## 2020-02-25 ENCOUNTER — OFFICE VISIT (OUTPATIENT)
Dept: CARDIOLOGY | Facility: CLINIC | Age: 80
End: 2020-02-25
Payer: MEDICARE

## 2020-02-25 VITALS
HEIGHT: 67 IN | DIASTOLIC BLOOD PRESSURE: 70 MMHG | HEART RATE: 96 BPM | BODY MASS INDEX: 34.81 KG/M2 | WEIGHT: 221.81 LBS | OXYGEN SATURATION: 98 % | SYSTOLIC BLOOD PRESSURE: 144 MMHG

## 2020-02-25 DIAGNOSIS — R07.9 CHEST PAIN, UNSPECIFIED TYPE: Primary | ICD-10-CM

## 2020-02-25 DIAGNOSIS — R06.09 OTHER FORM OF DYSPNEA: ICD-10-CM

## 2020-02-25 DIAGNOSIS — E66.01 MORBID (SEVERE) OBESITY DUE TO EXCESS CALORIES: ICD-10-CM

## 2020-02-25 DIAGNOSIS — I10 ESSENTIAL HYPERTENSION: ICD-10-CM

## 2020-02-25 DIAGNOSIS — G47.33 OSA (OBSTRUCTIVE SLEEP APNEA): ICD-10-CM

## 2020-02-25 DIAGNOSIS — E78.5 HYPERLIPIDEMIA WITH TARGET LDL LESS THAN 100: ICD-10-CM

## 2020-02-25 DIAGNOSIS — I48.21 PERMANENT ATRIAL FIBRILLATION: ICD-10-CM

## 2020-02-25 DIAGNOSIS — Z79.01 ANTICOAGULATED ON COUMADIN: ICD-10-CM

## 2020-02-25 PROCEDURE — 99215 PR OFFICE/OUTPT VISIT, EST, LEVL V, 40-54 MIN: ICD-10-PCS | Mod: HCNC,S$GLB,, | Performed by: INTERNAL MEDICINE

## 2020-02-25 PROCEDURE — 1126F AMNT PAIN NOTED NONE PRSNT: CPT | Mod: HCNC,S$GLB,, | Performed by: INTERNAL MEDICINE

## 2020-02-25 PROCEDURE — 99999 PR PBB SHADOW E&M-EST. PATIENT-LVL III: ICD-10-PCS | Mod: PBBFAC,HCNC,, | Performed by: INTERNAL MEDICINE

## 2020-02-25 PROCEDURE — 3078F DIAST BP <80 MM HG: CPT | Mod: HCNC,CPTII,S$GLB, | Performed by: INTERNAL MEDICINE

## 2020-02-25 PROCEDURE — 1126F PR PAIN SEVERITY QUANTIFIED, NO PAIN PRESENT: ICD-10-PCS | Mod: HCNC,S$GLB,, | Performed by: INTERNAL MEDICINE

## 2020-02-25 PROCEDURE — 1159F MED LIST DOCD IN RCRD: CPT | Mod: HCNC,S$GLB,, | Performed by: INTERNAL MEDICINE

## 2020-02-25 PROCEDURE — 1101F PT FALLS ASSESS-DOCD LE1/YR: CPT | Mod: HCNC,CPTII,S$GLB, | Performed by: INTERNAL MEDICINE

## 2020-02-25 PROCEDURE — 3077F PR MOST RECENT SYSTOLIC BLOOD PRESSURE >= 140 MM HG: ICD-10-PCS | Mod: HCNC,CPTII,S$GLB, | Performed by: INTERNAL MEDICINE

## 2020-02-25 PROCEDURE — 3078F PR MOST RECENT DIASTOLIC BLOOD PRESSURE < 80 MM HG: ICD-10-PCS | Mod: HCNC,CPTII,S$GLB, | Performed by: INTERNAL MEDICINE

## 2020-02-25 PROCEDURE — 99215 OFFICE O/P EST HI 40 MIN: CPT | Mod: HCNC,S$GLB,, | Performed by: INTERNAL MEDICINE

## 2020-02-25 PROCEDURE — 3077F SYST BP >= 140 MM HG: CPT | Mod: HCNC,CPTII,S$GLB, | Performed by: INTERNAL MEDICINE

## 2020-02-25 PROCEDURE — 99999 PR PBB SHADOW E&M-EST. PATIENT-LVL III: CPT | Mod: PBBFAC,HCNC,, | Performed by: INTERNAL MEDICINE

## 2020-02-25 PROCEDURE — 1101F PR PT FALLS ASSESS DOC 0-1 FALLS W/OUT INJ PAST YR: ICD-10-PCS | Mod: HCNC,CPTII,S$GLB, | Performed by: INTERNAL MEDICINE

## 2020-02-25 PROCEDURE — 1159F PR MEDICATION LIST DOCUMENTED IN MEDICAL RECORD: ICD-10-PCS | Mod: HCNC,S$GLB,, | Performed by: INTERNAL MEDICINE

## 2020-02-25 NOTE — PROGRESS NOTES
Subjective:   Patient ID:  Natalie Greene is a 79 y.o. female who presents for cardiac consult of Atrial Fibrillation; Hypertension; and Hyperlipidemia      Hypertension   Associated symptoms include chest pain, malaise/fatigue and shortness of breath. Pertinent negatives include no palpitations.   Atrial Fibrillation   Symptoms include chest pain, dizziness and shortness of breath. Symptoms are negative for palpitations. Past medical history includes atrial fibrillation and hyperlipidemia.   Hyperlipidemia   Associated symptoms include chest pain and shortness of breath.     The patient came in today for cardiac consult of Atrial Fibrillation; Hypertension; and Hyperlipidemia    Natalie Greene is a 79 y.o. female  HTN, non-obs CAD, atrial fibrillation (on Coumadin), pulm HTN,  hyperlipidemia, obesity, and DM presents for CV follow up.    4/10/18  Pt has left sided chest pain described as ache. Feels like it goes to left shoulder,arm,fingers. Feels like it goes straight through. The pain is intermittent, occurred the night before yesterday, feels more tired now due to it. Pain can occur at rest but not really exertional. Pt also has mild SOB but not necessary with ache, no diaphoresis but does have mild nausea. No dizziness/lightheaded. Does feel palpitations with Afib. Pt also has significant fatigue, thinks she snores a lot, has a dry mouth always and has not been tested for sleep apnea. Prior echo and cath results below - negative for signif CAD.     11/27/18  Overall has been doing well. Occ palpitations. She had neg pharm stress test 6/18. Has not gotten sleep study yet but still symptomatic with snoring, dry mouth, fatigue.  present who was diagnosed with DORA but has not gotten CPAP yet. No CP or SOB now. Has varicose veins, has not gotten compression stockings yet.     8/13/19  She was diagnosed with DORA since last visit has been on CPAP. She has been feeling overall bad lately, feels L arm pain,  dizzy at times with more fatigue. She has a knot on the back next to her spine.  Is doing ok with coumadin. Has been checking blood sugars overall ok. No CP. IF she squeezes her arm feels better at times.     2/25/20  INR 2.1 3 weeks ago, prior levels in range. She has been having nausea/vomiting due to stomach pains. Her L arm has started to hurt and kept her awake all night. Her left armpit and shoulder has intermittent pain, along with PLAZA. Pt concerned about blockages, discussed will get pharm stress and eval if further workup needed.     Patient feels no leg swelling, no PND, no dizziness, no syncope, no CNS symptoms.    Patient is compliant with medications.    ECG - Afib V rate 95    Nuclear stress  Nuclear Quantitative Functional Analysis:   LVEF: >= 70 %    Impression: NORMAL MYOCARDIAL PERFUSION  1. The perfusion scan is free of evidence for myocardial ischemia or injury.   2. There is a mild intensity fixed defect in the anterior wall of the left ventricle, secondary to soft tissue attenuation.   3. Resting wall motion is physiologic.   4. Resting LV function is normal.   5. The ventricular volumes are normal at rest and stress.   6. The extracardiac distribution of radioactivity is normal.   This document has been electronically    SIGNED BY: Oscar Taveras MD On: 06/12/2018 16:15      2D ECHO  01/17/2019  CONCLUSIONS     1 - Normal left ventricular systolic function (EF 60-65%).     2 - Normal left ventricular diastolic function.     3 - Normal right ventricular systolic function .     4 - Pulmonary hypertension. The estimated PA systolic pressure is 42 mmHg.         4/11/2016 [facility]    Patient has a right dominant coronary artery.      - Left Main Coronary Artery:             The LM is normal. There is NICOLE 3 flow.     - Left Anterior Descending Artery:             The LAD has luminal irregularities. There is NICOLE 3 flow.     - Left Circumflex Artery:             The LCX is normal. There is NICOLE 3 flow.      - Right Coronary Artery:             The RCA has luminal irregularities. There is NICOLE 3 flow.    D. SUMMARY/POST-OPERATIVE DIAGNOSIS:  1. Non-obstructive CAD.  2. Normal LVEF.    Past Medical History:   Diagnosis Date    *Atrial fibrillation     Coronary artery disease involving native coronary artery of native heart with angina pectoris 4/10/2018    Diabetes mellitus     DM (diabetes mellitus) 2002     09/06/2017 no medication    Hyperlipidemia     Hypertension     Obstructive sleep apnea        Past Surgical History:   Procedure Laterality Date    CHOLECYSTECTOMY      COLON SURGERY      HYSTERECTOMY         Social History     Tobacco Use    Smoking status: Never Smoker    Smokeless tobacco: Never Used   Substance Use Topics    Alcohol use: No    Drug use: No       Family History   Problem Relation Age of Onset    Diabetes Sister     Hypertension Sister     Hypertension Mother     Diabetes Sister        Patient's Medications   New Prescriptions    No medications on file   Previous Medications    ACCU-CHEK SOFTCLIX LANCETS MISC        ALBUTEROL (VENTOLIN HFA) 90 MCG/ACTUATION INHALER    Inhale 2 puffs into the lungs every 6 (six) hours as needed for Wheezing. Rescue    AMLODIPINE (NORVASC) 5 MG TABLET    Take 1 tablet (5 mg total) by mouth once daily.    ATORVASTATIN (LIPITOR) 40 MG TABLET    Take 1 tablet (40 mg total) by mouth once daily.    DILTIAZEM (CARDIZEM CD) 180 MG 24 HR CAPSULE    Take 1 capsule (180 mg total) by mouth once daily.    FLUTICASONE (FLONASE) 50 MCG/ACTUATION NASAL SPRAY    2 sprays (100 mcg total) by Each Nare route once daily.    INCONTINENCE PAD,LINER,DISP (BLADDER CONTROL PADS EX ABSORB MISC)        INDAPAMIDE (LOZOL) 1.25 MG TAB    Take 1 tablet (1.25 mg total) by mouth once daily.    LOSARTAN (COZAAR) 100 MG TABLET    Take 1 tablet (100 mg total) by mouth once daily.    METFORMIN (GLUCOPHAGE-XR) 500 MG XR 24HR TABLET    Take 1 tablet (500 mg total) by mouth daily  "with breakfast.    METOPROLOL SUCCINATE (TOPROL-XL) 100 MG 24 HR TABLET    Take 1 tablet (100 mg total) by mouth once daily.    TRUE METRIX AIR GLUCOSE METER KIT        TRUE METRIX GLUCOSE TEST STRIP STRP        TRUE METRIX LEVEL 1 SOLN        WARFARIN (COUMADIN) 6 MG TABLET    TAKE 1 TABLET DAILY   Modified Medications    No medications on file   Discontinued Medications    No medications on file       Review of Systems   Constitutional: Positive for malaise/fatigue.   HENT: Negative.    Eyes: Negative.    Respiratory: Positive for shortness of breath.    Cardiovascular: Positive for chest pain and leg swelling. Negative for palpitations.   Gastrointestinal: Negative.    Genitourinary: Negative.    Musculoskeletal: Negative.    Skin: Negative.    Neurological: Positive for dizziness.   Endo/Heme/Allergies: Negative.    Psychiatric/Behavioral: Negative.    All 12 systems otherwise negative.      Wt Readings from Last 3 Encounters:   02/25/20 100.6 kg (221 lb 12.5 oz)   02/12/20 100.8 kg (222 lb 1.8 oz)   12/21/19 97.5 kg (215 lb)     Temp Readings from Last 3 Encounters:   02/12/20 98.2 °F (36.8 °C) (Tympanic)   12/21/19 97.6 °F (36.4 °C) (Oral)   10/23/19 96.3 °F (35.7 °C) (Tympanic)     BP Readings from Last 3 Encounters:   02/25/20 (!) 144/70   02/12/20 122/78   12/21/19 135/65     Pulse Readings from Last 3 Encounters:   02/25/20 96   02/12/20 92   12/21/19 99       BP (!) 144/70 (BP Location: Right arm, Patient Position: Sitting, BP Method: Large (Manual))   Pulse 96   Ht 5' 7" (1.702 m)   Wt 100.6 kg (221 lb 12.5 oz)   LMP  (LMP Unknown)   SpO2 98%   BMI 34.74 kg/m²     Objective:   Physical Exam   Constitutional: She is oriented to person, place, and time. She appears well-developed and well-nourished. No distress.   HENT:   Head: Normocephalic and atraumatic.   Nose: Nose normal.   Mouth/Throat: Oropharynx is clear and moist.   Eyes: Conjunctivae and EOM are normal. No scleral icterus.   Neck: Normal " range of motion. Neck supple. No JVD present. No thyromegaly present.   Cardiovascular: Normal rate, S1 normal and S2 normal. An irregularly irregular rhythm present. Exam reveals no gallop, no S3, no S4 and no friction rub.   Murmur heard.  Pulmonary/Chest: Effort normal and breath sounds normal. No stridor. No respiratory distress. She has no wheezes. She has no rales. She exhibits no tenderness.   Abdominal: Soft. Bowel sounds are normal. She exhibits no distension and no mass. There is no tenderness. There is no rebound.   Genitourinary:   Genitourinary Comments: Deferred   Musculoskeletal: Normal range of motion. She exhibits edema (with varicosities, 1+ b/l). She exhibits no tenderness or deformity.   Lymphadenopathy:     She has no cervical adenopathy.   Neurological: She is alert and oriented to person, place, and time. She exhibits normal muscle tone. Coordination normal.   Skin: Skin is warm and dry. No rash noted. She is not diaphoretic. No erythema. No pallor.   Psychiatric: She has a normal mood and affect. Her behavior is normal. Judgment and thought content normal.   Nursing note and vitals reviewed.      Lab Results   Component Value Date     12/21/2019    K 4.2 12/21/2019     12/21/2019    CO2 25 12/21/2019    BUN 11 12/21/2019    CREATININE 0.9 12/21/2019     (H) 12/21/2019    HGBA1C 6.5 (H) 11/07/2019    MG 1.8 01/17/2019    AST 25 12/21/2019    ALT 22 12/21/2019    ALBUMIN 4.0 12/21/2019    PROT 7.6 12/21/2019    BILITOT 0.7 12/21/2019    WBC 11.61 12/21/2019    HGB 13.3 12/21/2019    HCT 41.0 12/21/2019    MCV 91 12/21/2019     12/21/2019    INR 2.1 01/31/2020    INR 1.9 (H) 01/16/2019    TSH 1.785 07/29/2014    CHOL 91 (L) 11/07/2019    HDL 38 (L) 11/07/2019    LDLCALC 38.6 (L) 11/07/2019    TRIG 72 11/07/2019    BNP 81 04/21/2016     Assessment:      1. Chest pain, unspecified type    2. Permanent atrial fibrillation    3. Essential hypertension    4. Hyperlipidemia  with target LDL less than 100    5. Anticoagulated on Coumadin    6. Morbid (severe) obesity due to excess calories    7. DORA (obstructive sleep apnea)    8. Other form of dyspnea        Plan:     1. AF, chronic   - cont meds  - cont coumadin and coumadin clinic  - recent INR therapeutic     2. LE edema sec to venous insufficiency   - rec stockings, elevate legs    3. DORA  - cont CPAP    4. Obesity  - rec weight loss with diet and exercise    5. HTN  - cont meds    6. HLD  - cont statin    7. Pulm HTN  - cont CPAP  - f/u with pulm    8.DM2  - A1c 6.8 -- 6.5  - cont meds per PCP    9. Back pain/arm pain with weakness  - refer to PMR    10. Chest pain with PLAZA  - pharm nuclear stress test ordered to r/o, pt cannot walk on treadmill due to dyspnea/weakness    Thank you for allowing me to participate in this patient's care. Please do not hesitate to contact me with any questions or concerns. Consult note has been forwarded to the referral physician.

## 2020-02-28 ENCOUNTER — ANTI-COAG VISIT (OUTPATIENT)
Dept: CARDIOLOGY | Facility: CLINIC | Age: 80
End: 2020-02-28
Payer: MEDICARE

## 2020-02-28 DIAGNOSIS — I48.21 PERMANENT ATRIAL FIBRILLATION: ICD-10-CM

## 2020-02-28 DIAGNOSIS — Z79.01 LONG TERM (CURRENT) USE OF ANTICOAGULANTS: Primary | ICD-10-CM

## 2020-02-28 PROBLEM — M81.0 OSTEOPOROSIS: Status: ACTIVE | Noted: 2020-02-28

## 2020-02-28 PROBLEM — I25.10 CAD (CORONARY ARTERY DISEASE): Status: ACTIVE | Noted: 2020-02-28

## 2020-02-28 PROBLEM — I27.9 PULMONARY HEART DISEASE: Status: ACTIVE | Noted: 2020-02-28

## 2020-02-28 LAB — INR PPP: 2.4 (ref 2–3)

## 2020-02-28 PROCEDURE — 85610 POCT INR: ICD-10-PCS | Mod: QW,HCNC,S$GLB, | Performed by: INTERNAL MEDICINE

## 2020-02-28 PROCEDURE — 85610 PROTHROMBIN TIME: CPT | Mod: QW,HCNC,S$GLB, | Performed by: INTERNAL MEDICINE

## 2020-02-28 PROCEDURE — 93793 PR ANTICOAGULANT MGMT FOR PT TAKING WARFARIN: ICD-10-PCS | Mod: HCNC,S$GLB,,

## 2020-02-28 PROCEDURE — 93793 ANTICOAG MGMT PT WARFARIN: CPT | Mod: HCNC,S$GLB,,

## 2020-02-28 NOTE — PROGRESS NOTES
Patient's INR is therapeutic at 2.4.  No recent changes reported.  Instructions were given to maintain current dose of Warfarin 6 mg every evening.  Recheck in 1 month.  Patient voiced understanding.

## 2020-03-09 ENCOUNTER — OFFICE VISIT (OUTPATIENT)
Dept: URGENT CARE | Facility: CLINIC | Age: 80
End: 2020-03-09
Payer: MEDICARE

## 2020-03-09 VITALS
DIASTOLIC BLOOD PRESSURE: 75 MMHG | HEIGHT: 67 IN | SYSTOLIC BLOOD PRESSURE: 161 MMHG | WEIGHT: 224.88 LBS | BODY MASS INDEX: 35.29 KG/M2 | HEART RATE: 100 BPM | OXYGEN SATURATION: 97 % | RESPIRATION RATE: 16 BRPM | TEMPERATURE: 100 F

## 2020-03-09 DIAGNOSIS — I10 ESSENTIAL HYPERTENSION: ICD-10-CM

## 2020-03-09 DIAGNOSIS — B96.89 ACUTE BACTERIAL SINUSITIS: Primary | ICD-10-CM

## 2020-03-09 DIAGNOSIS — J01.90 ACUTE BACTERIAL SINUSITIS: Primary | ICD-10-CM

## 2020-03-09 PROCEDURE — 1101F PR PT FALLS ASSESS DOC 0-1 FALLS W/OUT INJ PAST YR: ICD-10-PCS | Mod: HCNC,CPTII,S$GLB, | Performed by: PHYSICIAN ASSISTANT

## 2020-03-09 PROCEDURE — 99999 PR PBB SHADOW E&M-EST. PATIENT-LVL III: CPT | Mod: PBBFAC,HCNC,, | Performed by: PHYSICIAN ASSISTANT

## 2020-03-09 PROCEDURE — 1101F PT FALLS ASSESS-DOCD LE1/YR: CPT | Mod: HCNC,CPTII,S$GLB, | Performed by: PHYSICIAN ASSISTANT

## 2020-03-09 PROCEDURE — 99999 PR PBB SHADOW E&M-EST. PATIENT-LVL III: ICD-10-PCS | Mod: PBBFAC,HCNC,, | Performed by: PHYSICIAN ASSISTANT

## 2020-03-09 PROCEDURE — 1126F PR PAIN SEVERITY QUANTIFIED, NO PAIN PRESENT: ICD-10-PCS | Mod: HCNC,S$GLB,, | Performed by: PHYSICIAN ASSISTANT

## 2020-03-09 PROCEDURE — 3077F PR MOST RECENT SYSTOLIC BLOOD PRESSURE >= 140 MM HG: ICD-10-PCS | Mod: HCNC,CPTII,S$GLB, | Performed by: PHYSICIAN ASSISTANT

## 2020-03-09 PROCEDURE — 1159F PR MEDICATION LIST DOCUMENTED IN MEDICAL RECORD: ICD-10-PCS | Mod: HCNC,S$GLB,, | Performed by: PHYSICIAN ASSISTANT

## 2020-03-09 PROCEDURE — 3077F SYST BP >= 140 MM HG: CPT | Mod: HCNC,CPTII,S$GLB, | Performed by: PHYSICIAN ASSISTANT

## 2020-03-09 PROCEDURE — 1126F AMNT PAIN NOTED NONE PRSNT: CPT | Mod: HCNC,S$GLB,, | Performed by: PHYSICIAN ASSISTANT

## 2020-03-09 PROCEDURE — 99214 OFFICE O/P EST MOD 30 MIN: CPT | Mod: HCNC,S$GLB,, | Performed by: PHYSICIAN ASSISTANT

## 2020-03-09 PROCEDURE — 3078F DIAST BP <80 MM HG: CPT | Mod: HCNC,CPTII,S$GLB, | Performed by: PHYSICIAN ASSISTANT

## 2020-03-09 PROCEDURE — 1159F MED LIST DOCD IN RCRD: CPT | Mod: HCNC,S$GLB,, | Performed by: PHYSICIAN ASSISTANT

## 2020-03-09 PROCEDURE — 3078F PR MOST RECENT DIASTOLIC BLOOD PRESSURE < 80 MM HG: ICD-10-PCS | Mod: HCNC,CPTII,S$GLB, | Performed by: PHYSICIAN ASSISTANT

## 2020-03-09 PROCEDURE — 99214 PR OFFICE/OUTPT VISIT, EST, LEVL IV, 30-39 MIN: ICD-10-PCS | Mod: HCNC,S$GLB,, | Performed by: PHYSICIAN ASSISTANT

## 2020-03-09 RX ORDER — AMOXICILLIN AND CLAVULANATE POTASSIUM 875; 125 MG/1; MG/1
1 TABLET, FILM COATED ORAL EVERY 12 HOURS
Qty: 14 TABLET | Refills: 0 | Status: SHIPPED | OUTPATIENT
Start: 2020-03-09 | End: 2020-03-16 | Stop reason: SDUPTHER

## 2020-03-09 RX ORDER — FLUTICASONE PROPIONATE 50 MCG
2 SPRAY, SUSPENSION (ML) NASAL DAILY
Qty: 16 G | Refills: 0 | Status: SHIPPED | OUTPATIENT
Start: 2020-03-09 | End: 2020-03-23

## 2020-03-09 NOTE — PATIENT INSTRUCTIONS
Advise increase p.o. fluids--at least 64 ounces of water/juice & rest  Meds:  Augmentin, Flonase sent to pharmacy.  Advise complete antibiotics  Normal saline nasal wash to irrigate sinuses and for congestion/runny nose.  Cool mist humidifier/vaporizer.  Practice good handwashing.  Mucinex for cough and chest congestion.  Tylenol or Ibuprofen for fever, headache and body aches.  Warm salt water gargles for throat comfort.  Chloraseptic spray or lozenges for throat comfort.  See PCP or go to ER if symptoms worsen or fail to improve with treatment

## 2020-03-09 NOTE — PROGRESS NOTES
Natalie Greene is a 79 y.o. female who presents today with complaints of cough, wheezing at night, sinus headache, and post nasal drip for approximately 2 weeks.  Patient denies fever, body aches, chills, SOB, or Chest pain.  She states she has tried OTC meds with only temporary relief.  No known sick contacts.    Review of Social history, family history, past medical history, allergies, and past surgical history performed.    Review of Systems:  Review of Systems   Constitutional: Negative for fever.   HENT: Positive for congestion and sinus pain. Negative for sore throat.    Respiratory: Positive for cough, sputum production and wheezing. Negative for shortness of breath.    Cardiovascular: Negative for chest pain.   Gastrointestinal: Negative for abdominal pain and nausea.   Genitourinary: Negative for dysuria and frequency.   Musculoskeletal: Negative for back pain.   Neurological: Positive for headaches.   All other systems reviewed and are negative.      Physical Exam:  Vitals:    03/09/20 1716   BP: (!) 161/75   Pulse: 100   Resp: 16   Temp: 99.6 °F (37.6 °C)     Physical Exam   Constitutional: She is oriented to person, place, and time and well-developed, well-nourished, and in no distress.   HENT:   Head: Normocephalic.   Right Ear: Tympanic membrane and external ear normal.   Left Ear: Tympanic membrane and external ear normal.   Nose: Nose normal.   Mouth/Throat: Uvula is midline and mucous membranes are normal. Posterior oropharyngeal erythema (cobblestoning) present. No oropharyngeal exudate.   Neck: Normal range of motion.   Cardiovascular: Normal rate and normal heart sounds.   Pulmonary/Chest: Effort normal and breath sounds normal. No respiratory distress.   Neurological: She is alert and oriented to person, place, and time.   Skin: Skin is warm.   Psychiatric: Affect normal.   Vitals reviewed.      Assessment:  Encounter Diagnoses   Name Primary?    Acute bacterial sinusitis Yes    Essential  hypertension        Plan:  Advise increase p.o. fluids--at least 64 ounces of water/juice & rest  Meds:  Augmentin, Flonase sent to pharmacy.  Advise complete antibiotics  Normal saline nasal wash to irrigate sinuses and for congestion/runny nose.  Cool mist humidifier/vaporizer.  Practice good handwashing.  Mucinex for cough and chest congestion.  Tylenol or Ibuprofen for fever, headache and body aches.  Warm salt water gargles for throat comfort.  Chloraseptic spray or lozenges for throat comfort.  See PCP or go to ER if symptoms worsen or fail to improve with treatment

## 2020-03-16 ENCOUNTER — TELEPHONE (OUTPATIENT)
Dept: INTERNAL MEDICINE | Facility: CLINIC | Age: 80
End: 2020-03-16

## 2020-03-16 DIAGNOSIS — B96.89 ACUTE BACTERIAL SINUSITIS: ICD-10-CM

## 2020-03-16 DIAGNOSIS — J01.90 ACUTE BACTERIAL SINUSITIS: ICD-10-CM

## 2020-03-16 RX ORDER — BENZONATATE 200 MG/1
200 CAPSULE ORAL 3 TIMES DAILY PRN
Qty: 30 CAPSULE | Refills: 0 | Status: SHIPPED | OUTPATIENT
Start: 2020-03-16 | End: 2020-03-26

## 2020-03-16 RX ORDER — ALBUTEROL SULFATE 90 UG/1
2 AEROSOL, METERED RESPIRATORY (INHALATION) EVERY 6 HOURS PRN
Qty: 18 G | Refills: 1 | Status: SHIPPED | OUTPATIENT
Start: 2020-03-16 | End: 2022-01-20

## 2020-03-16 RX ORDER — AMOXICILLIN AND CLAVULANATE POTASSIUM 875; 125 MG/1; MG/1
1 TABLET, FILM COATED ORAL EVERY 12 HOURS
Qty: 14 TABLET | Refills: 0 | Status: SHIPPED | OUTPATIENT
Start: 2020-03-16 | End: 2020-03-23

## 2020-03-16 NOTE — TELEPHONE ENCOUNTER
Patient seen at Urgent care last week  Sinus.    She just finished meds.  She is still coughing.  No shortness of breath. No fever.    She reports the cough is worse at night.  Some sputum.    She has had clinical symptoms in past.    She is not taking her Albuterol on the med list.   I reviewed this with her.    At this time. She will start albuterol. Monitor temp twice a day.  If shortness of breath at rest occurs, or fever, we will need to see her for swab.    She was seen at Urgent care, no worse, but not better.    She did not have telemedicine visit capacity, so we did a phone visit.  She was not in respiratory distress on phone.    Discuss need to shelter in, and call doctor with any changes.    Augmentin, ALbuterol, and tessalon sent

## 2020-03-16 NOTE — TELEPHONE ENCOUNTER
Called and spoke with patient. Patient went to Urgent care on the 9th for Sinus infection. She said the medication they gave her did not help. She said she still has the drip cough and wheezing but she said she doesn't think she has any fever. Please Advise

## 2020-03-20 ENCOUNTER — TELEPHONE (OUTPATIENT)
Dept: CARDIOLOGY | Facility: CLINIC | Age: 80
End: 2020-03-20

## 2020-03-23 DIAGNOSIS — Z79.01 LONG TERM (CURRENT) USE OF ANTICOAGULANTS: Primary | ICD-10-CM

## 2020-03-26 ENCOUNTER — LAB VISIT (OUTPATIENT)
Dept: LAB | Facility: HOSPITAL | Age: 80
End: 2020-03-26
Attending: INTERNAL MEDICINE
Payer: MEDICARE

## 2020-03-26 DIAGNOSIS — Z79.01 LONG TERM (CURRENT) USE OF ANTICOAGULANTS: ICD-10-CM

## 2020-03-26 LAB
INR PPP: 2 (ref 0.8–1.2)
PROTHROMBIN TIME: 19 SEC (ref 9–12.5)

## 2020-03-26 PROCEDURE — 36415 COLL VENOUS BLD VENIPUNCTURE: CPT | Mod: HCNC,PO

## 2020-03-26 PROCEDURE — 85610 PROTHROMBIN TIME: CPT | Mod: HCNC

## 2020-03-27 ENCOUNTER — ANTI-COAG VISIT (OUTPATIENT)
Dept: CARDIOLOGY | Facility: CLINIC | Age: 80
End: 2020-03-27
Payer: MEDICARE

## 2020-03-27 DIAGNOSIS — Z79.01 LONG TERM (CURRENT) USE OF ANTICOAGULANTS: ICD-10-CM

## 2020-03-27 DIAGNOSIS — I48.21 PERMANENT ATRIAL FIBRILLATION: ICD-10-CM

## 2020-03-27 PROCEDURE — 93793 PR ANTICOAGULANT MGMT FOR PT TAKING WARFARIN: ICD-10-PCS | Mod: S$GLB,,,

## 2020-03-27 PROCEDURE — 93793 ANTICOAG MGMT PT WARFARIN: CPT | Mod: S$GLB,,,

## 2020-03-27 NOTE — PROGRESS NOTES
Patient contacted:  INR is therapeutic at 2.0.  No recent changes reported.  Instructions were given to maintain current dose of Warfarin 6 mg daily.  Advised to keep diet consistent.  Recheck on 4/30/2020 ([facility]). Patient voiced understanding.

## 2020-05-04 ENCOUNTER — LAB VISIT (OUTPATIENT)
Dept: LAB | Facility: HOSPITAL | Age: 80
End: 2020-05-04
Attending: INTERNAL MEDICINE
Payer: MEDICARE

## 2020-05-04 DIAGNOSIS — Z79.01 LONG TERM (CURRENT) USE OF ANTICOAGULANTS: ICD-10-CM

## 2020-05-04 LAB
INR PPP: 1.8 (ref 0.8–1.2)
PROTHROMBIN TIME: 17.5 SEC (ref 9–12.5)

## 2020-05-04 PROCEDURE — 36415 COLL VENOUS BLD VENIPUNCTURE: CPT | Mod: HCNC,PO

## 2020-05-04 PROCEDURE — 85610 PROTHROMBIN TIME: CPT | Mod: HCNC

## 2020-05-05 ENCOUNTER — ANTI-COAG VISIT (OUTPATIENT)
Dept: CARDIOLOGY | Facility: CLINIC | Age: 80
End: 2020-05-05
Payer: MEDICARE

## 2020-05-05 DIAGNOSIS — Z79.01 LONG TERM (CURRENT) USE OF ANTICOAGULANTS: ICD-10-CM

## 2020-05-05 DIAGNOSIS — I48.21 PERMANENT ATRIAL FIBRILLATION: ICD-10-CM

## 2020-05-05 PROCEDURE — 93793 ANTICOAG MGMT PT WARFARIN: CPT | Mod: S$GLB,,,

## 2020-05-05 PROCEDURE — 93793 PR ANTICOAGULANT MGMT FOR PT TAKING WARFARIN: ICD-10-PCS | Mod: S$GLB,,,

## 2020-05-05 NOTE — PROGRESS NOTES
Patient contacted:  INR is subtherapeutic at 1.8.  Reports x 1 dose missed last week.  No other changes.  Instructions given:  Will boost today's warfarin dose to 9 mg - only, then resume current maintenance dose of 6 mg every evening.  Recheck in the [facility] on 5/28/2020 at 1000a.  Patient repeated back instructions and voiced understanding.

## 2020-05-28 ENCOUNTER — ANTI-COAG VISIT (OUTPATIENT)
Dept: CARDIOLOGY | Facility: CLINIC | Age: 80
End: 2020-05-28
Payer: MEDICARE

## 2020-05-28 DIAGNOSIS — I48.21 PERMANENT ATRIAL FIBRILLATION: ICD-10-CM

## 2020-05-28 DIAGNOSIS — Z79.01 LONG TERM (CURRENT) USE OF ANTICOAGULANTS: Primary | ICD-10-CM

## 2020-05-28 LAB — INR PPP: 2.4 (ref 2–3)

## 2020-05-28 PROCEDURE — 93793 PR ANTICOAGULANT MGMT FOR PT TAKING WARFARIN: ICD-10-PCS | Mod: HCNC,S$GLB,,

## 2020-05-28 PROCEDURE — 93793 ANTICOAG MGMT PT WARFARIN: CPT | Mod: HCNC,S$GLB,,

## 2020-05-28 PROCEDURE — 85610 PROTHROMBIN TIME: CPT | Mod: QW,HCNC,S$GLB, | Performed by: INTERNAL MEDICINE

## 2020-05-28 PROCEDURE — 85610 POCT INR: ICD-10-PCS | Mod: QW,HCNC,S$GLB, | Performed by: INTERNAL MEDICINE

## 2020-05-28 NOTE — PROGRESS NOTES
Patient's INR is therapeutic at 2.4.  Reports no recent changes.  Instructions given:  Maintain current dose of warfarin 6 mg daily.  Recheck in 1 month.  Patient verbalized understanding.

## 2020-06-25 ENCOUNTER — ANTI-COAG VISIT (OUTPATIENT)
Dept: CARDIOLOGY | Facility: CLINIC | Age: 80
End: 2020-06-25
Payer: MEDICARE

## 2020-06-25 DIAGNOSIS — I48.21 PERMANENT ATRIAL FIBRILLATION: ICD-10-CM

## 2020-06-25 DIAGNOSIS — Z79.01 LONG TERM (CURRENT) USE OF ANTICOAGULANTS: Primary | ICD-10-CM

## 2020-06-25 LAB — INR PPP: 2.1 (ref 2–3)

## 2020-06-25 PROCEDURE — 93793 PR ANTICOAGULANT MGMT FOR PT TAKING WARFARIN: ICD-10-PCS | Mod: HCNC,S$GLB,,

## 2020-06-25 PROCEDURE — 93793 ANTICOAG MGMT PT WARFARIN: CPT | Mod: HCNC,S$GLB,,

## 2020-06-25 PROCEDURE — 85610 PROTHROMBIN TIME: CPT | Mod: QW,HCNC,S$GLB, | Performed by: INTERNAL MEDICINE

## 2020-06-25 PROCEDURE — 85610 POCT INR: ICD-10-PCS | Mod: QW,HCNC,S$GLB, | Performed by: INTERNAL MEDICINE

## 2020-06-25 NOTE — PROGRESS NOTES
Patient's INR is therapeutic at 2.1.  Reports no recent changes or upcoming procedures.  Instructions given:  Maintain current dose of warfarin 6 mg daily.  Recheck on 7/23/2020 at 1130a. Patient verbalized understanding.  Declined AVS.

## 2020-07-23 ENCOUNTER — ANTI-COAG VISIT (OUTPATIENT)
Dept: CARDIOLOGY | Facility: CLINIC | Age: 80
End: 2020-07-23
Payer: MEDICARE

## 2020-07-23 DIAGNOSIS — Z79.01 LONG TERM (CURRENT) USE OF ANTICOAGULANTS: Primary | ICD-10-CM

## 2020-07-23 DIAGNOSIS — I48.21 PERMANENT ATRIAL FIBRILLATION: ICD-10-CM

## 2020-07-23 LAB — INR PPP: 1.8 (ref 2–3)

## 2020-07-23 PROCEDURE — 85610 POCT INR: ICD-10-PCS | Mod: QW,HCNC,S$GLB, | Performed by: INTERNAL MEDICINE

## 2020-07-23 PROCEDURE — 93793 PR ANTICOAGULANT MGMT FOR PT TAKING WARFARIN: ICD-10-PCS | Mod: HCNC,S$GLB,,

## 2020-07-23 PROCEDURE — 85610 PROTHROMBIN TIME: CPT | Mod: QW,HCNC,S$GLB, | Performed by: INTERNAL MEDICINE

## 2020-07-23 PROCEDURE — 93793 ANTICOAG MGMT PT WARFARIN: CPT | Mod: HCNC,S$GLB,,

## 2020-07-23 NOTE — PROGRESS NOTES
Patient's INR is slightly subtherapeutic at 1.8.  Reports no missed doses or diet changes.  No signs or symptoms reported.  Instructions given to maintain current dose of warfarin 6 mg daily.  Will rechallenge.  Recheck in 3 weeks.  Advised to keep diet consistent.  Patient verbalizes understanding.

## 2020-08-13 ENCOUNTER — ANTI-COAG VISIT (OUTPATIENT)
Dept: CARDIOLOGY | Facility: CLINIC | Age: 80
End: 2020-08-13
Payer: MEDICARE

## 2020-08-13 DIAGNOSIS — I48.21 PERMANENT ATRIAL FIBRILLATION: ICD-10-CM

## 2020-08-13 DIAGNOSIS — Z79.01 LONG TERM (CURRENT) USE OF ANTICOAGULANTS: Primary | ICD-10-CM

## 2020-08-13 LAB — INR PPP: 2.8 (ref 2–3)

## 2020-08-13 PROCEDURE — 85610 POCT INR: ICD-10-PCS | Mod: QW,HCNC,S$GLB, | Performed by: INTERNAL MEDICINE

## 2020-08-13 PROCEDURE — 93793 PR ANTICOAGULANT MGMT FOR PT TAKING WARFARIN: ICD-10-PCS | Mod: HCNC,S$GLB,,

## 2020-08-13 PROCEDURE — 85610 PROTHROMBIN TIME: CPT | Mod: QW,HCNC,S$GLB, | Performed by: INTERNAL MEDICINE

## 2020-08-13 PROCEDURE — 93793 ANTICOAG MGMT PT WARFARIN: CPT | Mod: HCNC,S$GLB,,

## 2020-08-13 NOTE — PROGRESS NOTES
Patient's INR is therapeutic at 2.8.  Reports no recent changes.  Instructions given:  No change in dose.  Continue current dose of warfarin 6 mg daily.  Recheck on 9/10/2020 at 1120a.  Patient verbalized understanding.  Declined AVS.

## 2020-08-26 ENCOUNTER — PES CALL (OUTPATIENT)
Dept: ADMINISTRATIVE | Facility: CLINIC | Age: 80
End: 2020-08-26

## 2020-08-27 DIAGNOSIS — I48.91 ATRIAL FIBRILLATION, UNSPECIFIED TYPE: ICD-10-CM

## 2020-08-27 RX ORDER — INDAPAMIDE 1.25 MG/1
1.25 TABLET ORAL DAILY
Qty: 90 TABLET | Refills: 1 | Status: SHIPPED | OUTPATIENT
Start: 2020-08-27 | End: 2021-03-02

## 2020-08-27 RX ORDER — METOPROLOL SUCCINATE 100 MG/1
100 TABLET, EXTENDED RELEASE ORAL DAILY
Qty: 90 TABLET | Refills: 1 | Status: SHIPPED | OUTPATIENT
Start: 2020-08-27 | End: 2021-03-02

## 2020-08-27 RX ORDER — AMLODIPINE BESYLATE 5 MG/1
5 TABLET ORAL DAILY
Qty: 90 TABLET | Refills: 1 | Status: SHIPPED | OUTPATIENT
Start: 2020-08-27 | End: 2021-03-02

## 2020-08-27 RX ORDER — WARFARIN 6 MG/1
TABLET ORAL
Qty: 90 TABLET | Refills: 1 | Status: SHIPPED | OUTPATIENT
Start: 2020-08-27 | End: 2021-03-02

## 2020-09-10 ENCOUNTER — ANTI-COAG VISIT (OUTPATIENT)
Dept: CARDIOLOGY | Facility: CLINIC | Age: 80
End: 2020-09-10
Payer: MEDICARE

## 2020-09-10 ENCOUNTER — OFFICE VISIT (OUTPATIENT)
Dept: INTERNAL MEDICINE | Facility: CLINIC | Age: 80
End: 2020-09-10
Payer: MEDICARE

## 2020-09-10 VITALS
OXYGEN SATURATION: 98 % | WEIGHT: 218.5 LBS | SYSTOLIC BLOOD PRESSURE: 124 MMHG | HEART RATE: 96 BPM | BODY MASS INDEX: 34.29 KG/M2 | DIASTOLIC BLOOD PRESSURE: 66 MMHG | HEIGHT: 67 IN

## 2020-09-10 DIAGNOSIS — I48.21 PERMANENT ATRIAL FIBRILLATION: ICD-10-CM

## 2020-09-10 DIAGNOSIS — I70.0 CALCIFICATION OF AORTA: ICD-10-CM

## 2020-09-10 DIAGNOSIS — Z79.01 LONG TERM (CURRENT) USE OF ANTICOAGULANTS: Primary | ICD-10-CM

## 2020-09-10 DIAGNOSIS — I10 ESSENTIAL HYPERTENSION: ICD-10-CM

## 2020-09-10 DIAGNOSIS — E11.69 HYPERLIPIDEMIA ASSOCIATED WITH TYPE 2 DIABETES MELLITUS: ICD-10-CM

## 2020-09-10 DIAGNOSIS — M81.0 OSTEOPOROSIS, UNSPECIFIED OSTEOPOROSIS TYPE, UNSPECIFIED PATHOLOGICAL FRACTURE PRESENCE: ICD-10-CM

## 2020-09-10 DIAGNOSIS — I25.10 CORONARY ARTERY DISEASE, ANGINA PRESENCE UNSPECIFIED, UNSPECIFIED VESSEL OR LESION TYPE, UNSPECIFIED WHETHER NATIVE OR TRANSPLANTED HEART: ICD-10-CM

## 2020-09-10 DIAGNOSIS — H91.90 HEARING DIFFICULTY, UNSPECIFIED LATERALITY: ICD-10-CM

## 2020-09-10 DIAGNOSIS — Z85.038 HISTORY OF COLON CANCER: ICD-10-CM

## 2020-09-10 DIAGNOSIS — Z74.09 OTHER REDUCED MOBILITY: ICD-10-CM

## 2020-09-10 DIAGNOSIS — Z00.00 ENCOUNTER FOR PREVENTIVE HEALTH EXAMINATION: Primary | ICD-10-CM

## 2020-09-10 DIAGNOSIS — Z99.89 DEPENDENCE ON OTHER ENABLING MACHINES AND DEVICES: ICD-10-CM

## 2020-09-10 DIAGNOSIS — R94.120 ABNORMAL HEARING SCREEN: ICD-10-CM

## 2020-09-10 DIAGNOSIS — E66.9 OBESITY (BMI 30.0-34.9): ICD-10-CM

## 2020-09-10 DIAGNOSIS — I48.91 ATRIAL FIBRILLATION, UNSPECIFIED TYPE: ICD-10-CM

## 2020-09-10 DIAGNOSIS — E78.5 HYPERLIPIDEMIA ASSOCIATED WITH TYPE 2 DIABETES MELLITUS: ICD-10-CM

## 2020-09-10 DIAGNOSIS — G47.33 OSA (OBSTRUCTIVE SLEEP APNEA): ICD-10-CM

## 2020-09-10 DIAGNOSIS — I27.9 PULMONARY HEART DISEASE: ICD-10-CM

## 2020-09-10 PROBLEM — E66.811 OBESITY (BMI 30.0-34.9): Status: ACTIVE | Noted: 2020-09-10

## 2020-09-10 LAB — INR PPP: 2.7 (ref 2–3)

## 2020-09-10 PROCEDURE — 99499 RISK ADDL DX/OHS AUDIT: ICD-10-PCS | Mod: HCNC,S$GLB,, | Performed by: NURSE PRACTITIONER

## 2020-09-10 PROCEDURE — 3078F DIAST BP <80 MM HG: CPT | Mod: HCNC,CPTII,S$GLB, | Performed by: NURSE PRACTITIONER

## 2020-09-10 PROCEDURE — 99499 UNLISTED E&M SERVICE: CPT | Mod: HCNC,S$GLB,, | Performed by: NURSE PRACTITIONER

## 2020-09-10 PROCEDURE — G0439 PPPS, SUBSEQ VISIT: HCPCS | Mod: HCNC,S$GLB,, | Performed by: NURSE PRACTITIONER

## 2020-09-10 PROCEDURE — 93793 ANTICOAG MGMT PT WARFARIN: CPT | Mod: HCNC,S$GLB,,

## 2020-09-10 PROCEDURE — 99999 PR PBB SHADOW E&M-EST. PATIENT-LVL IV: CPT | Mod: PBBFAC,HCNC,, | Performed by: NURSE PRACTITIONER

## 2020-09-10 PROCEDURE — 3074F PR MOST RECENT SYSTOLIC BLOOD PRESSURE < 130 MM HG: ICD-10-PCS | Mod: HCNC,CPTII,S$GLB, | Performed by: NURSE PRACTITIONER

## 2020-09-10 PROCEDURE — G0439 PR MEDICARE ANNUAL WELLNESS SUBSEQUENT VISIT: ICD-10-PCS | Mod: HCNC,S$GLB,, | Performed by: NURSE PRACTITIONER

## 2020-09-10 PROCEDURE — 99999 PR PBB SHADOW E&M-EST. PATIENT-LVL IV: ICD-10-PCS | Mod: PBBFAC,HCNC,, | Performed by: NURSE PRACTITIONER

## 2020-09-10 PROCEDURE — 85610 PROTHROMBIN TIME: CPT | Mod: QW,HCNC,S$GLB, | Performed by: INTERNAL MEDICINE

## 2020-09-10 PROCEDURE — 85610 POCT INR: ICD-10-PCS | Mod: QW,HCNC,S$GLB, | Performed by: INTERNAL MEDICINE

## 2020-09-10 PROCEDURE — 3074F SYST BP LT 130 MM HG: CPT | Mod: HCNC,CPTII,S$GLB, | Performed by: NURSE PRACTITIONER

## 2020-09-10 PROCEDURE — 93793 PR ANTICOAGULANT MGMT FOR PT TAKING WARFARIN: ICD-10-PCS | Mod: HCNC,S$GLB,,

## 2020-09-10 PROCEDURE — 3078F PR MOST RECENT DIASTOLIC BLOOD PRESSURE < 80 MM HG: ICD-10-PCS | Mod: HCNC,CPTII,S$GLB, | Performed by: NURSE PRACTITIONER

## 2020-09-10 NOTE — PROGRESS NOTES
Patient's INR is therapeutic at 2.7.  Reports no changes.  Current dose instructions verified.  Instructions given to maintain warfarin 6 mg every evening.  Recheck in 1 month.  Patient verbalized understanding.

## 2020-09-10 NOTE — Clinical Note
Your patient was seen today for AWV. Abnormalities bolded. Please review.   Thank you,   VERONA Bill

## 2020-09-10 NOTE — PATIENT INSTRUCTIONS
Counseling and Referral of Other Preventative  (Italic type indicates deductible and co-insurance are waived)    Patient Name: Natalie Greene  Today's Date: 9/10/2020    Health Maintenance       Date Due Completion Date    Hepatitis C Screening 1940 ---    Colonoscopy 02/02/2018 2/2/2015    Eye Exam 02/19/2020 2/19/2019    Hemoglobin A1c 05/07/2020 11/7/2019    Urine Microalbumin 10/23/2020 10/23/2019    Shingles Vaccine (1 of 2) 10/10/2020 (Originally 9/23/1992) ---    Influenza Vaccine (1) 10/22/2020 (Originally 8/1/2020) 10/23/2019 (Declined)    Override on 10/23/2019: Declined    Pneumococcal Vaccine (65+ Low/Medium Risk) (2 of 2 - PPSV23) 10/23/2020 (Originally 8/23/2018) 8/23/2017    Lipid Panel 11/07/2020 11/7/2019    Foot Exam 02/12/2021 2/12/2020    DEXA SCAN 11/07/2022 11/7/2019    TETANUS VACCINE 11/29/2027 11/29/2017 (Declined)    Override on 11/29/2017: Declined        No orders of the defined types were placed in this encounter.    The following information is provided to all patients.  This information is to help you find resources for any of the problems found today that may be affecting your health:                Living healthy guide: www.[facility].[place].gov      Understanding Diabetes: www.diabetes.org      Eating healthy: www.cdc.gov/healthyweight      CDC home safety checklist: www.cdc.gov/steadi/patient.html      Agency on Aging: www.goea.[place].[facility]      Alcoholics anonymous (AA): www.aa.org      Physical Activity: www.junior.nih.gov/wn6ijnz      Tobacco use: www.quitwithusla.org

## 2020-09-10 NOTE — PROGRESS NOTES
"  Natalie Greene presented for a  Medicare AWV and comprehensive Health Risk Assessment today. The following components were reviewed and updated:    · Medical history  · Family History  · Social history  · Allergies and Current Medications  · Health Risk Assessment  · Health Maintenance  · Care Team     ** See Completed Assessments for Annual Wellness Visit within the encounter summary.**         The following assessments were completed:  · Living Situation  · CAGE  · Depression Screening  · Timed Get Up and Go  · Whisper Test  · Cognitive Function Screening  · Nutrition Screening  · ADL Screening  · PAQ Screening        Vitals:    09/10/20 1242   BP: 124/66   BP Location: Right arm   Patient Position: Sitting   BP Method: Medium (Manual)   Pulse: 96   SpO2: 98%   Weight: 99.1 kg (218 lb 7.6 oz)   Height: 5' 7" (1.702 m)     Body mass index is 34.22 kg/m².  Physical Exam  Vitals signs and nursing note reviewed.   Constitutional:       Appearance: She is well-developed.   HENT:      Head: Normocephalic.   Cardiovascular:      Rate and Rhythm: Normal rate and regular rhythm.      Heart sounds: Normal heart sounds. No murmur.   Pulmonary:      Effort: Pulmonary effort is normal. No respiratory distress.      Breath sounds: Normal breath sounds.   Abdominal:      Palpations: There is no mass.      Tenderness: There is no abdominal tenderness.      Comments: Hernia noted. Patient reports no change   Musculoskeletal: Normal range of motion.   Skin:     General: Skin is warm and dry.   Neurological:      Mental Status: She is alert and oriented to person, place, and time.      Motor: No abnormal muscle tone.   Psychiatric:         Speech: Speech normal.         Behavior: Behavior normal.               Diagnoses and health risks identified today and associated recommendations/orders:    1. Encounter for preventive health examination  She declines scheduling PCP appointment, eye exam, cardiology follow up, hep c screening, " colonoscopy, flu vaccine and Shingrix. Reports she will discuss again with PCP at appointment she will call to schedule.     Reports stress but does not seem to be problematic. She will follow up with PCP if becomes problematic.     2. Coronary artery disease, angina presence unspecified, unspecified vessel or lesion type, unspecified whether native or transplanted heart  Stable and controlled. Continue current treatment plan as previously prescribed with your cardiologist.     3. Atrial fibrillation, unspecified type  Stable and controlled. Continue current treatment plan as previously prescribed with your cardiologist.     4. Calcification of aorta  cxr 4/2016  Stable and controlled. Continue current treatment plan as previously prescribed with your cardiologist.     5. Essential hypertension  Stable and controlled. Continue current treatment plan as previously prescribed with your PCP and cardiologist.     6. Hyperlipidemia associated with type 2 diabetes mellitus  A1C 6.5  Stable and controlled. Continue current treatment plan as previously prescribed with your cardiologist and PCP.     7. Obesity (BMI 30.0-34.9)  Continue current treatment plan as previously prescribed with your PCP.    8. Pulmonary heart disease  Echo 1/2019  Continue current treatment plan as previously prescribed with your cardiologist.     9. DORA (obstructive sleep apnea)  She is unsure if she ever had CPAP titration.   Reports she does not wear CPAP. Discussed risks of untreated sleep apnea.   Advised to follow up with PCP for further evaluation. She expressed understanding.      10. Osteoporosis, unspecified osteoporosis type, unspecified pathological fracture presence  DEXA 11/2019  Continue current treatment plan as previously prescribed with your PCP.     11. History of colon cancer  Continue current treatment plan as previously prescribed with your PCP.     12. Abnormal hearing screen  Reports difficulty hearing.   Abnormal whisper  test.   Declines audiology referral at this time.   Advised to follow up with PCP for further evaluation and recommendations. Patient expressed understanding.       13. Hearing difficulty, unspecified laterality  See #12    14. Dependence on other enabling machines and devices  Abnormal timed get up and go test. Denies any falls in the last 12 months. Uses a cane to aid with ambulation.   Fall precautions reviewed with patient. Advised to follow up with PCP for further recommendations. Patient expressed understanding.       15. Other reduced mobility  See #14      Provided Natalie with a 5-10 year written screening schedule and personal prevention plan. Recommendations were developed using the USPSTF age appropriate recommendations. Education, counseling, and referrals were provided as needed. After Visit Summary printed and given to patient which includes a list of additional screenings\tests needed.    Follow up in about 1 year (around 9/10/2021) for awv.    Eva Fuentes NP  I offered to discuss end of life issues, including information on how to make advance directives that the patient could use to name someone who would make medical decisions on their behalf if they became too ill to make themselves.    ___Patient declined  _X_Patient is interested, I provided paper work and offered to discuss.

## 2020-09-17 ENCOUNTER — OFFICE VISIT (OUTPATIENT)
Dept: INTERNAL MEDICINE | Facility: CLINIC | Age: 80
End: 2020-09-17
Payer: MEDICARE

## 2020-09-17 VITALS
BODY MASS INDEX: 34.25 KG/M2 | OXYGEN SATURATION: 97 % | TEMPERATURE: 98 F | DIASTOLIC BLOOD PRESSURE: 68 MMHG | HEART RATE: 68 BPM | RESPIRATION RATE: 18 BRPM | SYSTOLIC BLOOD PRESSURE: 126 MMHG | WEIGHT: 218.69 LBS

## 2020-09-17 DIAGNOSIS — R21 SKIN RASH: Primary | ICD-10-CM

## 2020-09-17 PROCEDURE — 1159F MED LIST DOCD IN RCRD: CPT | Mod: HCNC,S$GLB,, | Performed by: FAMILY MEDICINE

## 2020-09-17 PROCEDURE — 3074F SYST BP LT 130 MM HG: CPT | Mod: HCNC,CPTII,S$GLB, | Performed by: FAMILY MEDICINE

## 2020-09-17 PROCEDURE — 1159F PR MEDICATION LIST DOCUMENTED IN MEDICAL RECORD: ICD-10-PCS | Mod: HCNC,S$GLB,, | Performed by: FAMILY MEDICINE

## 2020-09-17 PROCEDURE — 1126F PR PAIN SEVERITY QUANTIFIED, NO PAIN PRESENT: ICD-10-PCS | Mod: HCNC,S$GLB,, | Performed by: FAMILY MEDICINE

## 2020-09-17 PROCEDURE — 1101F PT FALLS ASSESS-DOCD LE1/YR: CPT | Mod: HCNC,CPTII,S$GLB, | Performed by: FAMILY MEDICINE

## 2020-09-17 PROCEDURE — 3078F PR MOST RECENT DIASTOLIC BLOOD PRESSURE < 80 MM HG: ICD-10-PCS | Mod: HCNC,CPTII,S$GLB, | Performed by: FAMILY MEDICINE

## 2020-09-17 PROCEDURE — 3078F DIAST BP <80 MM HG: CPT | Mod: HCNC,CPTII,S$GLB, | Performed by: FAMILY MEDICINE

## 2020-09-17 PROCEDURE — 99213 PR OFFICE/OUTPT VISIT, EST, LEVL III, 20-29 MIN: ICD-10-PCS | Mod: HCNC,S$GLB,, | Performed by: FAMILY MEDICINE

## 2020-09-17 PROCEDURE — 3074F PR MOST RECENT SYSTOLIC BLOOD PRESSURE < 130 MM HG: ICD-10-PCS | Mod: HCNC,CPTII,S$GLB, | Performed by: FAMILY MEDICINE

## 2020-09-17 PROCEDURE — 99213 OFFICE O/P EST LOW 20 MIN: CPT | Mod: HCNC,S$GLB,, | Performed by: FAMILY MEDICINE

## 2020-09-17 PROCEDURE — 99999 PR PBB SHADOW E&M-EST. PATIENT-LVL III: CPT | Mod: PBBFAC,HCNC,, | Performed by: FAMILY MEDICINE

## 2020-09-17 PROCEDURE — 99999 PR PBB SHADOW E&M-EST. PATIENT-LVL III: ICD-10-PCS | Mod: PBBFAC,HCNC,, | Performed by: FAMILY MEDICINE

## 2020-09-17 PROCEDURE — 1101F PR PT FALLS ASSESS DOC 0-1 FALLS W/OUT INJ PAST YR: ICD-10-PCS | Mod: HCNC,CPTII,S$GLB, | Performed by: FAMILY MEDICINE

## 2020-09-17 PROCEDURE — 1126F AMNT PAIN NOTED NONE PRSNT: CPT | Mod: HCNC,S$GLB,, | Performed by: FAMILY MEDICINE

## 2020-09-17 RX ORDER — TRIAMCINOLONE ACETONIDE 1 MG/G
CREAM TOPICAL 2 TIMES DAILY
Qty: 45 G | Refills: 0 | Status: SHIPPED | OUTPATIENT
Start: 2020-09-17 | End: 2024-01-04

## 2020-09-17 RX ORDER — HYDROXYZINE HYDROCHLORIDE 25 MG/1
25 TABLET, FILM COATED ORAL 3 TIMES DAILY PRN
Qty: 30 TABLET | Refills: 0 | Status: SHIPPED | OUTPATIENT
Start: 2020-09-17 | End: 2020-09-17 | Stop reason: SDUPTHER

## 2020-09-17 RX ORDER — TRIAMCINOLONE ACETONIDE 1 MG/G
CREAM TOPICAL 2 TIMES DAILY
Qty: 45 G | Refills: 0 | Status: SHIPPED | OUTPATIENT
Start: 2020-09-17 | End: 2020-09-17 | Stop reason: SDUPTHER

## 2020-09-17 RX ORDER — HYDROXYZINE HYDROCHLORIDE 25 MG/1
25 TABLET, FILM COATED ORAL 3 TIMES DAILY PRN
Qty: 30 TABLET | Refills: 0 | Status: SHIPPED | OUTPATIENT
Start: 2020-09-17 | End: 2022-01-20

## 2020-09-17 NOTE — PROGRESS NOTES
Subjective:       Patient ID: Natalie Greene is a 79 y.o. female.    Chief Complaint: Rash and Dizziness    HPI     78 yo F    Past Medical History:   Diagnosis Date    *Atrial fibrillation     Coronary artery disease involving native coronary artery of native heart with angina pectoris 4/10/2018    Diabetes mellitus     DM (diabetes mellitus) 2002     09/06/2017 no medication    Hyperlipidemia     Hypertension     Morbid (severe) obesity due to excess calories 7/23/2013    Obesity (BMI 30-39.9) 2/24/2015    Obstructive sleep apnea      Past Surgical History:   Procedure Laterality Date    CHOLECYSTECTOMY      COLON SURGERY      HYSTERECTOMY       Social History     Tobacco Use   Smoking Status Never Smoker   Smokeless Tobacco Never Used         Rash    Has been doing yard work    On forearms and leg      Itching is severe.    No fever  No chills        Review of Systems   Constitutional: Negative for chills and fever.   HENT: Negative for trouble swallowing.    Respiratory: Negative for shortness of breath.    Cardiovascular: Negative for chest pain.   Skin: Positive for rash. Negative for color change.   Neurological: Positive for dizziness.       Objective:       Vitals:    09/17/20 1417   BP: 126/68   Pulse: 68   Resp: 18   Temp: 98 °F (36.7 °C)       Physical Exam  HENT:      Head: Normocephalic.   Eyes:      Conjunctiva/sclera: Conjunctivae normal.   Abdominal:      General: Bowel sounds are normal.   Skin:     Findings: Rash present.      Comments: Bilateral forearm  Erythematous lesions  Evidence of scratching  No drainage  No tenderness     Neurological:      Mental Status: She is alert.   Psychiatric:         Mood and Affect: Mood normal.         Assessment:       1. Skin rash        Plan:   Skin rash  -     triamcinolone acetonide 0.1% (KENALOG) 0.1 % cream; Apply topically 2 (two) times daily.  Dispense: 45 g; Refill: 0  -     hydrOXYzine HCL (ATARAX) 25 MG tablet; Take 1 tablet (25 mg  total) by mouth 3 (three) times daily as needed for Itching.  Dispense: 30 tablet; Refill: 0        Suspect contact dermatitis given yard work  However on L arm some of the erythema is in a  Linear fashion - must consider scabies if not improving.

## 2020-10-08 ENCOUNTER — ANTI-COAG VISIT (OUTPATIENT)
Dept: CARDIOLOGY | Facility: CLINIC | Age: 80
End: 2020-10-08
Payer: MEDICARE

## 2020-10-08 DIAGNOSIS — Z79.01 LONG TERM (CURRENT) USE OF ANTICOAGULANTS: Primary | ICD-10-CM

## 2020-10-08 DIAGNOSIS — I48.21 PERMANENT ATRIAL FIBRILLATION: ICD-10-CM

## 2020-10-08 LAB — INR PPP: 2.9 (ref 2–3)

## 2020-10-08 PROCEDURE — 85610 PROTHROMBIN TIME: CPT | Mod: QW,HCNC,S$GLB, | Performed by: INTERNAL MEDICINE

## 2020-10-08 PROCEDURE — 85610 POCT INR: ICD-10-PCS | Mod: QW,HCNC,S$GLB, | Performed by: INTERNAL MEDICINE

## 2020-10-08 PROCEDURE — 93793 PR ANTICOAGULANT MGMT FOR PT TAKING WARFARIN: ICD-10-PCS | Mod: HCNC,S$GLB,,

## 2020-10-08 PROCEDURE — 93793 ANTICOAG MGMT PT WARFARIN: CPT | Mod: HCNC,S$GLB,,

## 2020-10-08 NOTE — PROGRESS NOTES
Patient's INR is therapeutic at 2.9.  Current warfarin regimen verified.  No recent changes reported.  No changes in dose - instructed to maintain warfarin 6 mg daily.  Recheck in 1 month.  Patient verbalized understanding.

## 2020-11-12 ENCOUNTER — ANTI-COAG VISIT (OUTPATIENT)
Dept: CARDIOLOGY | Facility: CLINIC | Age: 80
End: 2020-11-12
Payer: MEDICARE

## 2020-11-12 DIAGNOSIS — Z79.01 LONG TERM (CURRENT) USE OF ANTICOAGULANTS: Primary | ICD-10-CM

## 2020-11-12 DIAGNOSIS — I48.21 PERMANENT ATRIAL FIBRILLATION: ICD-10-CM

## 2020-11-12 LAB — INR PPP: 2.8 (ref 2–3)

## 2020-11-12 PROCEDURE — 93793 ANTICOAG MGMT PT WARFARIN: CPT | Mod: HCNC,S$GLB,,

## 2020-11-12 PROCEDURE — 85610 PROTHROMBIN TIME: CPT | Mod: QW,HCNC,S$GLB, | Performed by: INTERNAL MEDICINE

## 2020-11-12 PROCEDURE — 85610 POCT INR: ICD-10-PCS | Mod: QW,HCNC,S$GLB, | Performed by: INTERNAL MEDICINE

## 2020-11-12 PROCEDURE — 93793 PR ANTICOAGULANT MGMT FOR PT TAKING WARFARIN: ICD-10-PCS | Mod: HCNC,S$GLB,,

## 2020-11-12 NOTE — PROGRESS NOTES
Patient's INR is therapeutic at 2.8.  Reports no recent changes.  Current warfarin regimen verified.  No change in dose - patient to maintain warfarin 6 mg every evening.  Recheck in 1 month.  Patient verbalized understanding.

## 2020-12-03 ENCOUNTER — PES CALL (OUTPATIENT)
Dept: ADMINISTRATIVE | Facility: CLINIC | Age: 80
End: 2020-12-03

## 2020-12-08 ENCOUNTER — OFFICE VISIT (OUTPATIENT)
Dept: URGENT CARE | Facility: CLINIC | Age: 80
End: 2020-12-08
Payer: MEDICARE

## 2020-12-08 VITALS
TEMPERATURE: 100 F | SYSTOLIC BLOOD PRESSURE: 142 MMHG | OXYGEN SATURATION: 97 % | DIASTOLIC BLOOD PRESSURE: 79 MMHG | HEART RATE: 87 BPM | WEIGHT: 224.56 LBS | BODY MASS INDEX: 35.17 KG/M2

## 2020-12-08 DIAGNOSIS — B96.89 ACUTE BACTERIAL SINUSITIS: ICD-10-CM

## 2020-12-08 DIAGNOSIS — R05.9 COUGH: ICD-10-CM

## 2020-12-08 DIAGNOSIS — E78.5 HYPERLIPIDEMIA ASSOCIATED WITH TYPE 2 DIABETES MELLITUS: ICD-10-CM

## 2020-12-08 DIAGNOSIS — Z20.822 SUSPECTED COVID-19 VIRUS INFECTION: ICD-10-CM

## 2020-12-08 DIAGNOSIS — R51.9 SINUS HEADACHE: ICD-10-CM

## 2020-12-08 DIAGNOSIS — J01.90 ACUTE BACTERIAL SINUSITIS: ICD-10-CM

## 2020-12-08 DIAGNOSIS — I10 HYPERTENSION, UNSPECIFIED TYPE: ICD-10-CM

## 2020-12-08 DIAGNOSIS — E11.69 HYPERLIPIDEMIA ASSOCIATED WITH TYPE 2 DIABETES MELLITUS: ICD-10-CM

## 2020-12-08 DIAGNOSIS — R52 GENERALIZED BODY ACHES: Primary | ICD-10-CM

## 2020-12-08 LAB
CTP QC/QA: YES
SARS-COV-2 RDRP RESP QL NAA+PROBE: NEGATIVE

## 2020-12-08 PROCEDURE — U0002 COVID-19 LAB TEST NON-CDC: HCPCS | Mod: QW,HCNC,S$GLB, | Performed by: NURSE PRACTITIONER

## 2020-12-08 PROCEDURE — 3288F FALL RISK ASSESSMENT DOCD: CPT | Mod: HCNC,CPTII,S$GLB, | Performed by: NURSE PRACTITIONER

## 2020-12-08 PROCEDURE — 1126F AMNT PAIN NOTED NONE PRSNT: CPT | Mod: HCNC,S$GLB,, | Performed by: NURSE PRACTITIONER

## 2020-12-08 PROCEDURE — 3077F SYST BP >= 140 MM HG: CPT | Mod: HCNC,CPTII,S$GLB, | Performed by: NURSE PRACTITIONER

## 2020-12-08 PROCEDURE — 1126F PR PAIN SEVERITY QUANTIFIED, NO PAIN PRESENT: ICD-10-PCS | Mod: HCNC,S$GLB,, | Performed by: NURSE PRACTITIONER

## 2020-12-08 PROCEDURE — 1159F PR MEDICATION LIST DOCUMENTED IN MEDICAL RECORD: ICD-10-PCS | Mod: HCNC,S$GLB,, | Performed by: NURSE PRACTITIONER

## 2020-12-08 PROCEDURE — 99999 PR PBB SHADOW E&M-EST. PATIENT-LVL IV: ICD-10-PCS | Mod: PBBFAC,HCNC,, | Performed by: NURSE PRACTITIONER

## 2020-12-08 PROCEDURE — 99214 OFFICE O/P EST MOD 30 MIN: CPT | Mod: HCNC,S$GLB,CS, | Performed by: NURSE PRACTITIONER

## 2020-12-08 PROCEDURE — 3072F PR LOW RISK FOR RETINOPATHY: ICD-10-PCS | Mod: HCNC,S$GLB,, | Performed by: NURSE PRACTITIONER

## 2020-12-08 PROCEDURE — 99999 PR PBB SHADOW E&M-EST. PATIENT-LVL IV: CPT | Mod: PBBFAC,HCNC,, | Performed by: NURSE PRACTITIONER

## 2020-12-08 PROCEDURE — 3077F PR MOST RECENT SYSTOLIC BLOOD PRESSURE >= 140 MM HG: ICD-10-PCS | Mod: HCNC,CPTII,S$GLB, | Performed by: NURSE PRACTITIONER

## 2020-12-08 PROCEDURE — 1159F MED LIST DOCD IN RCRD: CPT | Mod: HCNC,S$GLB,, | Performed by: NURSE PRACTITIONER

## 2020-12-08 PROCEDURE — 3288F PR FALLS RISK ASSESSMENT DOCUMENTED: ICD-10-PCS | Mod: HCNC,CPTII,S$GLB, | Performed by: NURSE PRACTITIONER

## 2020-12-08 PROCEDURE — 1101F PR PT FALLS ASSESS DOC 0-1 FALLS W/OUT INJ PAST YR: ICD-10-PCS | Mod: HCNC,CPTII,S$GLB, | Performed by: NURSE PRACTITIONER

## 2020-12-08 PROCEDURE — U0002: ICD-10-PCS | Mod: QW,HCNC,S$GLB, | Performed by: NURSE PRACTITIONER

## 2020-12-08 PROCEDURE — 1101F PT FALLS ASSESS-DOCD LE1/YR: CPT | Mod: HCNC,CPTII,S$GLB, | Performed by: NURSE PRACTITIONER

## 2020-12-08 PROCEDURE — 3078F DIAST BP <80 MM HG: CPT | Mod: HCNC,CPTII,S$GLB, | Performed by: NURSE PRACTITIONER

## 2020-12-08 PROCEDURE — 3072F LOW RISK FOR RETINOPATHY: CPT | Mod: HCNC,S$GLB,, | Performed by: NURSE PRACTITIONER

## 2020-12-08 PROCEDURE — 99214 PR OFFICE/OUTPT VISIT, EST, LEVL IV, 30-39 MIN: ICD-10-PCS | Mod: HCNC,S$GLB,CS, | Performed by: NURSE PRACTITIONER

## 2020-12-08 PROCEDURE — 3078F PR MOST RECENT DIASTOLIC BLOOD PRESSURE < 80 MM HG: ICD-10-PCS | Mod: HCNC,CPTII,S$GLB, | Performed by: NURSE PRACTITIONER

## 2020-12-08 RX ORDER — FLUTICASONE PROPIONATE 50 MCG
2 SPRAY, SUSPENSION (ML) NASAL DAILY
Qty: 16 G | Refills: 1 | Status: SHIPPED | OUTPATIENT
Start: 2020-12-08 | End: 2022-01-20

## 2020-12-08 RX ORDER — BENZONATATE 100 MG/1
200 CAPSULE ORAL 3 TIMES DAILY PRN
Qty: 60 CAPSULE | Refills: 1 | Status: SHIPPED | OUTPATIENT
Start: 2020-12-08 | End: 2022-01-20

## 2020-12-08 RX ORDER — AMOXICILLIN AND CLAVULANATE POTASSIUM 875; 125 MG/1; MG/1
1 TABLET, FILM COATED ORAL EVERY 12 HOURS
Qty: 14 TABLET | Refills: 0 | Status: SHIPPED | OUTPATIENT
Start: 2020-12-08 | End: 2020-12-15

## 2020-12-09 NOTE — PATIENT INSTRUCTIONS
PLAN: Lab work POCT rapid COVID-19 screen, negative  Advise increase p.o. fluids--water/juice & rest  Meds:  Augmentin, Flonase and Tessalon Perles  / no refills  Simply saline nasal wash to irrigate sinuses and for congestion/runny nose.  Cool mist humidifier/vaporizer.  Practice good handwashing.  Advise use of honey lemon tea   Tylenol or Ibuprofen for fever, headache and body aches.  Warm salt water gargles for throat comfort.  Chloraseptic spray or lozenges for throat comfort.  Advise follow up with PCP in 2-3 days for recheck  Advise go to ER if symptoms worsen or fail to improve with treatment.  Instructions, follow up, and supportive care as per AVS.  AVS provided and reviewed with patient including supportive care, follow up, and red flag symptoms.   Patient verbalizes understanding and agrees with treatment plan. Discharged from Urgent Care in stable condition.  You have tested negative for COVID-19 today. If you did not have any close exposure as defined below, then effective today, you can return to your normal daily activities including social distancing, wearing masks, and frequent handwashing.

## 2020-12-09 NOTE — PROGRESS NOTES
"CHIEF COMPLAINT/REASON FOR VISIT:  nasal congestion, sinus headache, cough, fever with body aches       HISTORY OF PRESENT ILLNESS:   79 y/o female with  complains of nasal congestion, post nasal drip, sore throat, dizziness, facial pressure, ears popping and productive cough with wheezing, chest discomfort with cough on & off for 2-3 weeks.  Complains of having recurrent sinus infections in the past.  Concerned regarding COVID-19.  Patient admits tried  medications with little relief. Denies chest pain, blurred vision, nausea, vomiting, diarrhea, " aching all over", fatigue, loss of appetite & fever.       Past Medical History:   Diagnosis Date    *Atrial fibrillation     Coronary artery disease involving native coronary artery of native heart with angina pectoris 4/10/2018    Diabetes mellitus     DM (diabetes mellitus) 2002     09/06/2017 no medication    Hyperlipidemia     Hypertension     Morbid (severe) obesity due to excess calories 7/23/2013    Obesity (BMI 30-39.9) 2/24/2015    Obstructive sleep apnea          .  Past Surgical History:   Procedure Laterality Date    CHOLECYSTECTOMY      COLON SURGERY      HYSTERECTOMY           Social History     Socioeconomic History    Marital status:      Spouse name: Not on file    Number of children: 1    Years of education: Not on file    Highest education level: High school graduate   Occupational History    Not on file   Social Needs    Financial resource strain: Not hard at all    Food insecurity     Worry: Never true     Inability: Never true    Transportation needs     Medical: No     Non-medical: No   Tobacco Use    Smoking status: Never Smoker    Smokeless tobacco: Never Used   Substance and Sexual Activity    Alcohol use: No    Drug use: No    Sexual activity: Yes     Partners: Male   Lifestyle    Physical activity     Days per week: 0 days     Minutes per session: 0 min    Stress: To some extent   Relationships "    Social connections     Talks on phone: Twice a week     Gets together: Twice a week     Attends Mandae[employer] service: 1 to 4 times per year     Active member of club or organization: No     Attends meetings of clubs or organizations: Never     Relationship status:    Other Topics Concern    Not on file   Social History Narrative    Not on file       Family History   Problem Relation Age of Onset    Diabetes Sister     Hypertension Sister     Hypertension Mother     Diabetes Sister     COPD Neg Hx     Cancer Neg Hx     Heart disease Neg Hx     Hyperlipidemia Neg Hx     Stroke Neg Hx          ROS:  GENERAL:  Fatigue, body aches  SKIN: No rashes, itching or changes in color or texture of skin.   HEENT:  Reports  nasal congestion, postnasal drip, dizziness, headache  NODES: No masses or lesions. Denies swollen glands.   CHEST: reports productive cough with wheezing, chest discomfort with cough  CARDIOVASCULAR: Denies chest pain, shortness of breath.  ABDOMEN: Appetite fine. No weight loss. Denies diarrhea, abdominal pain  MUSCULOSKELETAL: No joint stiffness or swelling. Denies back pain.  NEUROLOGIC: No history of seizures, paralysis, alteration of gait or coordination.  PSYCHIATRIC: Denies mood swings, depression or suicidal thoughts.    PE:   APPEARANCE: Well nourished, well developed, in mild distress.  Temp 99.5, pulse ox 97%  V/S: Reviewed.  SKIN: Normal skin turgor, no lesions.  HEENT: Turbinates injected, minimal red pharynx. TM's poor light reflex bilateral, minimal facial tenderness.  CHEST: Lungs clear to auscultation. No wheezing  CARDIOVASCULAR: Regular rate and rhythm.  NEUROLOGIC: No sensory deficits. Gait & Posture: Normal, No cerebellar signs.  MENTAL STATUS: Patient alert, oriented x 3 & conversant.    PLAN: Lab work POCT rapid COVID-19 screen, negative  Advise increase p.o. fluids--water/juice & rest  Meds:  Augmentin, Flonase and Tessalon Perles  / no refills  Simply saline nasal  wash to irrigate sinuses and for congestion/runny nose.  Cool mist humidifier/vaporizer.  Practice good handwashing.  Advise use of honey lemon tea   Tylenol or Ibuprofen for fever, headache and body aches.  Warm salt water gargles for throat comfort.  Chloraseptic spray or lozenges for throat comfort.  Advise follow up with PCP in 2-3 days for recheck  Advise go to ER if symptoms worsen or fail to improve with treatment.  Instructions, follow up, and supportive care as per AVS.  AVS provided and reviewed with patient including supportive care, follow up, and red flag symptoms.   Patient verbalizes understanding and agrees with treatment plan. Discharged from Urgent Care in stable condition.  You have tested negative for COVID-19 today. If you did not have any close exposure as defined below, then effective today, you can return to your normal daily activities including social distancing, wearing masks, and frequent handwashing.    DIAGNOSIS:  Cough  Hypertension  Sinus headache  Suspect COVID-19  Acute bacterial sinusitis  Hyper hyperlipidemia associated with type 2 diabetes mellitus

## 2020-12-16 ENCOUNTER — ANTI-COAG VISIT (OUTPATIENT)
Dept: CARDIOLOGY | Facility: CLINIC | Age: 80
End: 2020-12-16
Payer: MEDICARE

## 2020-12-16 DIAGNOSIS — I48.21 PERMANENT ATRIAL FIBRILLATION: ICD-10-CM

## 2020-12-16 DIAGNOSIS — Z79.01 LONG TERM (CURRENT) USE OF ANTICOAGULANTS: Primary | ICD-10-CM

## 2020-12-16 LAB — INR PPP: 2.2 (ref 2–3)

## 2020-12-16 PROCEDURE — 85610 PROTHROMBIN TIME: CPT | Mod: QW,HCNC,S$GLB, | Performed by: INTERNAL MEDICINE

## 2020-12-16 PROCEDURE — 93793 ANTICOAG MGMT PT WARFARIN: CPT | Mod: HCNC,S$GLB,,

## 2020-12-16 PROCEDURE — 85610 POCT INR: ICD-10-PCS | Mod: QW,HCNC,S$GLB, | Performed by: INTERNAL MEDICINE

## 2020-12-16 PROCEDURE — 93793 PR ANTICOAGULANT MGMT FOR PT TAKING WARFARIN: ICD-10-PCS | Mod: HCNC,S$GLB,,

## 2020-12-16 NOTE — PROGRESS NOTES
Patient's INR is therapeutic at 2.2.  Reports no recent changes.  Current warfarin dose verified and followed.  No changes in dose - patient to maintain 6 mg daily.  Recheck on 1/12/2021.  Patient verbalized understanding.

## 2021-01-12 ENCOUNTER — OFFICE VISIT (OUTPATIENT)
Dept: INTERNAL MEDICINE | Facility: CLINIC | Age: 81
End: 2021-01-12
Payer: MEDICARE

## 2021-01-12 ENCOUNTER — ANTI-COAG VISIT (OUTPATIENT)
Dept: CARDIOLOGY | Facility: CLINIC | Age: 81
End: 2021-01-12
Payer: MEDICARE

## 2021-01-12 VITALS
DIASTOLIC BLOOD PRESSURE: 70 MMHG | BODY MASS INDEX: 34.36 KG/M2 | OXYGEN SATURATION: 96 % | WEIGHT: 218.94 LBS | SYSTOLIC BLOOD PRESSURE: 122 MMHG | HEART RATE: 87 BPM | HEIGHT: 67 IN

## 2021-01-12 DIAGNOSIS — M81.0 OSTEOPOROSIS, UNSPECIFIED OSTEOPOROSIS TYPE, UNSPECIFIED PATHOLOGICAL FRACTURE PRESENCE: ICD-10-CM

## 2021-01-12 DIAGNOSIS — I27.9 PULMONARY HEART DISEASE: ICD-10-CM

## 2021-01-12 DIAGNOSIS — R26.9 ABNORMALITY OF GAIT AND MOBILITY: ICD-10-CM

## 2021-01-12 DIAGNOSIS — R94.120 ABNORMAL HEARING SCREEN: ICD-10-CM

## 2021-01-12 DIAGNOSIS — E78.5 HYPERLIPIDEMIA ASSOCIATED WITH TYPE 2 DIABETES MELLITUS: ICD-10-CM

## 2021-01-12 DIAGNOSIS — I48.91 ATRIAL FIBRILLATION, UNSPECIFIED TYPE: ICD-10-CM

## 2021-01-12 DIAGNOSIS — Z74.09 OTHER REDUCED MOBILITY: ICD-10-CM

## 2021-01-12 DIAGNOSIS — I48.21 PERMANENT ATRIAL FIBRILLATION: ICD-10-CM

## 2021-01-12 DIAGNOSIS — I10 HYPERTENSION, UNSPECIFIED TYPE: ICD-10-CM

## 2021-01-12 DIAGNOSIS — Z79.01 LONG TERM (CURRENT) USE OF ANTICOAGULANTS: Primary | ICD-10-CM

## 2021-01-12 DIAGNOSIS — I25.118 CORONARY ARTERY DISEASE WITH STABLE ANGINA PECTORIS, UNSPECIFIED VESSEL OR LESION TYPE, UNSPECIFIED WHETHER NATIVE OR TRANSPLANTED HEART: Primary | ICD-10-CM

## 2021-01-12 DIAGNOSIS — I25.118 CORONARY ARTERY DISEASE WITH STABLE ANGINA PECTORIS, UNSPECIFIED VESSEL OR LESION TYPE, UNSPECIFIED WHETHER NATIVE OR TRANSPLANTED HEART: ICD-10-CM

## 2021-01-12 DIAGNOSIS — Z00.00 ENCOUNTER FOR PREVENTIVE HEALTH EXAMINATION: Primary | ICD-10-CM

## 2021-01-12 DIAGNOSIS — I10 ESSENTIAL HYPERTENSION: ICD-10-CM

## 2021-01-12 DIAGNOSIS — Z85.038 HISTORY OF COLON CANCER: ICD-10-CM

## 2021-01-12 DIAGNOSIS — I70.0 CALCIFICATION OF AORTA: ICD-10-CM

## 2021-01-12 DIAGNOSIS — R32 URINARY INCONTINENCE, UNSPECIFIED TYPE: ICD-10-CM

## 2021-01-12 DIAGNOSIS — Z99.89 DEPENDENCE ON OTHER ENABLING MACHINES AND DEVICES: ICD-10-CM

## 2021-01-12 DIAGNOSIS — E66.9 OBESITY (BMI 30.0-34.9): ICD-10-CM

## 2021-01-12 DIAGNOSIS — E11.69 HYPERLIPIDEMIA ASSOCIATED WITH TYPE 2 DIABETES MELLITUS: ICD-10-CM

## 2021-01-12 DIAGNOSIS — G47.33 OSA (OBSTRUCTIVE SLEEP APNEA): ICD-10-CM

## 2021-01-12 DIAGNOSIS — H91.90 HEARING DIFFICULTY, UNSPECIFIED LATERALITY: ICD-10-CM

## 2021-01-12 LAB — INR PPP: 2.1 (ref 2–3)

## 2021-01-12 PROCEDURE — 1158F PR ADVANCE CARE PLANNING DISCUSS DOCUMENTED IN MEDICAL RECORD: ICD-10-PCS | Mod: HCNC,S$GLB,, | Performed by: NURSE PRACTITIONER

## 2021-01-12 PROCEDURE — 1158F ADVNC CARE PLAN TLK DOCD: CPT | Mod: HCNC,S$GLB,, | Performed by: NURSE PRACTITIONER

## 2021-01-12 PROCEDURE — 3078F PR MOST RECENT DIASTOLIC BLOOD PRESSURE < 80 MM HG: ICD-10-PCS | Mod: HCNC,CPTII,S$GLB, | Performed by: NURSE PRACTITIONER

## 2021-01-12 PROCEDURE — 85610 POCT INR: ICD-10-PCS | Mod: QW,HCNC,S$GLB, | Performed by: INTERNAL MEDICINE

## 2021-01-12 PROCEDURE — 1126F AMNT PAIN NOTED NONE PRSNT: CPT | Mod: HCNC,S$GLB,, | Performed by: NURSE PRACTITIONER

## 2021-01-12 PROCEDURE — 99499 UNLISTED E&M SERVICE: CPT | Mod: S$GLB,,, | Performed by: NURSE PRACTITIONER

## 2021-01-12 PROCEDURE — 93793 ANTICOAG MGMT PT WARFARIN: CPT | Mod: HCNC,S$GLB,,

## 2021-01-12 PROCEDURE — 1101F PR PT FALLS ASSESS DOC 0-1 FALLS W/OUT INJ PAST YR: ICD-10-PCS | Mod: HCNC,CPTII,S$GLB, | Performed by: NURSE PRACTITIONER

## 2021-01-12 PROCEDURE — 3074F SYST BP LT 130 MM HG: CPT | Mod: HCNC,CPTII,S$GLB, | Performed by: NURSE PRACTITIONER

## 2021-01-12 PROCEDURE — 85610 PROTHROMBIN TIME: CPT | Mod: QW,HCNC,S$GLB, | Performed by: INTERNAL MEDICINE

## 2021-01-12 PROCEDURE — 3288F PR FALLS RISK ASSESSMENT DOCUMENTED: ICD-10-PCS | Mod: HCNC,CPTII,S$GLB, | Performed by: NURSE PRACTITIONER

## 2021-01-12 PROCEDURE — G0439 PR MEDICARE ANNUAL WELLNESS SUBSEQUENT VISIT: ICD-10-PCS | Mod: HCNC,S$GLB,, | Performed by: NURSE PRACTITIONER

## 2021-01-12 PROCEDURE — 99999 PR PBB SHADOW E&M-EST. PATIENT-LVL V: ICD-10-PCS | Mod: PBBFAC,HCNC,, | Performed by: NURSE PRACTITIONER

## 2021-01-12 PROCEDURE — 99999 PR PBB SHADOW E&M-EST. PATIENT-LVL V: CPT | Mod: PBBFAC,HCNC,, | Performed by: NURSE PRACTITIONER

## 2021-01-12 PROCEDURE — 1101F PT FALLS ASSESS-DOCD LE1/YR: CPT | Mod: HCNC,CPTII,S$GLB, | Performed by: NURSE PRACTITIONER

## 2021-01-12 PROCEDURE — 99499 RISK ADDL DX/OHS AUDIT: ICD-10-PCS | Mod: S$GLB,,, | Performed by: NURSE PRACTITIONER

## 2021-01-12 PROCEDURE — G0439 PPPS, SUBSEQ VISIT: HCPCS | Mod: HCNC,S$GLB,, | Performed by: NURSE PRACTITIONER

## 2021-01-12 PROCEDURE — 3074F PR MOST RECENT SYSTOLIC BLOOD PRESSURE < 130 MM HG: ICD-10-PCS | Mod: HCNC,CPTII,S$GLB, | Performed by: NURSE PRACTITIONER

## 2021-01-12 PROCEDURE — 93793 PR ANTICOAGULANT MGMT FOR PT TAKING WARFARIN: ICD-10-PCS | Mod: HCNC,S$GLB,,

## 2021-01-12 PROCEDURE — 3078F DIAST BP <80 MM HG: CPT | Mod: HCNC,CPTII,S$GLB, | Performed by: NURSE PRACTITIONER

## 2021-01-12 PROCEDURE — 1126F PR PAIN SEVERITY QUANTIFIED, NO PAIN PRESENT: ICD-10-PCS | Mod: HCNC,S$GLB,, | Performed by: NURSE PRACTITIONER

## 2021-01-12 PROCEDURE — 3288F FALL RISK ASSESSMENT DOCD: CPT | Mod: HCNC,CPTII,S$GLB, | Performed by: NURSE PRACTITIONER

## 2021-01-19 ENCOUNTER — HOSPITAL ENCOUNTER (OUTPATIENT)
Dept: CARDIOLOGY | Facility: HOSPITAL | Age: 81
Discharge: HOME | End: 2021-01-19
Attending: INTERNAL MEDICINE
Payer: MEDICARE

## 2021-01-19 ENCOUNTER — OFFICE VISIT (OUTPATIENT)
Dept: CARDIOLOGY | Facility: CLINIC | Age: 81
End: 2021-01-19
Payer: MEDICARE

## 2021-01-19 VITALS
SYSTOLIC BLOOD PRESSURE: 132 MMHG | DIASTOLIC BLOOD PRESSURE: 70 MMHG | HEART RATE: 59 BPM | WEIGHT: 218.25 LBS | BODY MASS INDEX: 34.26 KG/M2 | OXYGEN SATURATION: 98 % | HEIGHT: 67 IN

## 2021-01-19 DIAGNOSIS — I25.118 CORONARY ARTERY DISEASE WITH STABLE ANGINA PECTORIS, UNSPECIFIED VESSEL OR LESION TYPE, UNSPECIFIED WHETHER NATIVE OR TRANSPLANTED HEART: ICD-10-CM

## 2021-01-19 DIAGNOSIS — I10 HYPERTENSION, UNSPECIFIED TYPE: ICD-10-CM

## 2021-01-19 DIAGNOSIS — R06.09 OTHER FORM OF DYSPNEA: ICD-10-CM

## 2021-01-19 DIAGNOSIS — E11.69 HYPERLIPIDEMIA ASSOCIATED WITH TYPE 2 DIABETES MELLITUS: ICD-10-CM

## 2021-01-19 DIAGNOSIS — I48.21 PERMANENT ATRIAL FIBRILLATION: Primary | ICD-10-CM

## 2021-01-19 DIAGNOSIS — E78.5 HYPERLIPIDEMIA WITH TARGET LDL LESS THAN 100: ICD-10-CM

## 2021-01-19 DIAGNOSIS — I70.0 AORTIC ATHEROSCLEROSIS: ICD-10-CM

## 2021-01-19 DIAGNOSIS — Z79.01 ANTICOAGULATED ON COUMADIN: ICD-10-CM

## 2021-01-19 DIAGNOSIS — I48.21 PERMANENT ATRIAL FIBRILLATION: ICD-10-CM

## 2021-01-19 DIAGNOSIS — I25.118 CORONARY ARTERY DISEASE OF NATIVE ARTERY OF NATIVE HEART WITH STABLE ANGINA PECTORIS: ICD-10-CM

## 2021-01-19 DIAGNOSIS — R07.9 CHEST PAIN, UNSPECIFIED TYPE: ICD-10-CM

## 2021-01-19 DIAGNOSIS — E78.5 HYPERLIPIDEMIA ASSOCIATED WITH TYPE 2 DIABETES MELLITUS: ICD-10-CM

## 2021-01-19 PROCEDURE — 99999 PR PBB SHADOW E&M-EST. PATIENT-LVL IV: CPT | Mod: PBBFAC,HCNC,, | Performed by: INTERNAL MEDICINE

## 2021-01-19 PROCEDURE — 3288F FALL RISK ASSESSMENT DOCD: CPT | Mod: CPTII,S$GLB,, | Performed by: INTERNAL MEDICINE

## 2021-01-19 PROCEDURE — 3288F PR FALLS RISK ASSESSMENT DOCUMENTED: ICD-10-PCS | Mod: CPTII,S$GLB,, | Performed by: INTERNAL MEDICINE

## 2021-01-19 PROCEDURE — 3078F DIAST BP <80 MM HG: CPT | Mod: CPTII,S$GLB,, | Performed by: INTERNAL MEDICINE

## 2021-01-19 PROCEDURE — 99215 OFFICE O/P EST HI 40 MIN: CPT | Mod: S$GLB,,, | Performed by: INTERNAL MEDICINE

## 2021-01-19 PROCEDURE — 1159F MED LIST DOCD IN RCRD: CPT | Mod: S$GLB,,, | Performed by: INTERNAL MEDICINE

## 2021-01-19 PROCEDURE — 93010 ELECTROCARDIOGRAM REPORT: CPT | Mod: ,,, | Performed by: INTERNAL MEDICINE

## 2021-01-19 PROCEDURE — 93010 EKG 12-LEAD: ICD-10-PCS | Mod: ,,, | Performed by: INTERNAL MEDICINE

## 2021-01-19 PROCEDURE — 1125F AMNT PAIN NOTED PAIN PRSNT: CPT | Mod: S$GLB,,, | Performed by: INTERNAL MEDICINE

## 2021-01-19 PROCEDURE — 93005 ELECTROCARDIOGRAM TRACING: CPT

## 2021-01-19 PROCEDURE — 3075F PR MOST RECENT SYSTOLIC BLOOD PRESS GE 130-139MM HG: ICD-10-PCS | Mod: CPTII,S$GLB,, | Performed by: INTERNAL MEDICINE

## 2021-01-19 PROCEDURE — 1101F PR PT FALLS ASSESS DOC 0-1 FALLS W/OUT INJ PAST YR: ICD-10-PCS | Mod: CPTII,S$GLB,, | Performed by: INTERNAL MEDICINE

## 2021-01-19 PROCEDURE — 1159F PR MEDICATION LIST DOCUMENTED IN MEDICAL RECORD: ICD-10-PCS | Mod: S$GLB,,, | Performed by: INTERNAL MEDICINE

## 2021-01-19 PROCEDURE — 3078F PR MOST RECENT DIASTOLIC BLOOD PRESSURE < 80 MM HG: ICD-10-PCS | Mod: CPTII,S$GLB,, | Performed by: INTERNAL MEDICINE

## 2021-01-19 PROCEDURE — 1101F PT FALLS ASSESS-DOCD LE1/YR: CPT | Mod: CPTII,S$GLB,, | Performed by: INTERNAL MEDICINE

## 2021-01-19 PROCEDURE — 99215 PR OFFICE/OUTPT VISIT, EST, LEVL V, 40-54 MIN: ICD-10-PCS | Mod: S$GLB,,, | Performed by: INTERNAL MEDICINE

## 2021-01-19 PROCEDURE — 99999 PR PBB SHADOW E&M-EST. PATIENT-LVL IV: ICD-10-PCS | Mod: PBBFAC,HCNC,, | Performed by: INTERNAL MEDICINE

## 2021-01-19 PROCEDURE — 1125F PR PAIN SEVERITY QUANTIFIED, PAIN PRESENT: ICD-10-PCS | Mod: S$GLB,,, | Performed by: INTERNAL MEDICINE

## 2021-01-19 PROCEDURE — 3075F SYST BP GE 130 - 139MM HG: CPT | Mod: CPTII,S$GLB,, | Performed by: INTERNAL MEDICINE

## 2021-01-19 RX ORDER — HYDROCHLOROTHIAZIDE 12.5 MG/1
12.5 TABLET ORAL DAILY PRN
Qty: 30 TABLET | Refills: 3 | Status: SHIPPED | OUTPATIENT
Start: 2021-01-19 | End: 2021-03-02

## 2021-01-20 RX ORDER — POTASSIUM CHLORIDE 20 MEQ/1
20 TABLET, EXTENDED RELEASE ORAL DAILY
Qty: 90 TABLET | Refills: 1 | Status: SHIPPED | OUTPATIENT
Start: 2021-01-20 | End: 2022-01-27

## 2021-01-21 ENCOUNTER — TELEPHONE (OUTPATIENT)
Dept: CARDIOLOGY | Facility: CLINIC | Age: 81
End: 2021-01-21

## 2021-01-29 ENCOUNTER — HOSPITAL ENCOUNTER (OUTPATIENT)
Dept: RADIOLOGY | Facility: HOSPITAL | Age: 81
Discharge: HOME | End: 2021-01-29
Attending: INTERNAL MEDICINE
Payer: MEDICARE

## 2021-01-29 ENCOUNTER — HOSPITAL ENCOUNTER (OUTPATIENT)
Dept: PULMONOLOGY | Facility: HOSPITAL | Age: 81
Discharge: HOME | End: 2021-01-29
Attending: INTERNAL MEDICINE
Payer: MEDICARE

## 2021-01-29 ENCOUNTER — HOSPITAL ENCOUNTER (OUTPATIENT)
Dept: CARDIOLOGY | Facility: HOSPITAL | Age: 81
Discharge: HOME | End: 2021-01-29
Attending: INTERNAL MEDICINE
Payer: MEDICARE

## 2021-01-29 VITALS
HEART RATE: 85 BPM | DIASTOLIC BLOOD PRESSURE: 81 MMHG | HEIGHT: 67 IN | BODY MASS INDEX: 32.96 KG/M2 | SYSTOLIC BLOOD PRESSURE: 137 MMHG | WEIGHT: 210 LBS

## 2021-01-29 VITALS — WEIGHT: 218 LBS | BODY MASS INDEX: 34.21 KG/M2 | HEIGHT: 67 IN

## 2021-01-29 DIAGNOSIS — I25.118 CORONARY ARTERY DISEASE OF NATIVE ARTERY OF NATIVE HEART WITH STABLE ANGINA PECTORIS: ICD-10-CM

## 2021-01-29 DIAGNOSIS — I48.21 PERMANENT ATRIAL FIBRILLATION: ICD-10-CM

## 2021-01-29 DIAGNOSIS — I70.0 AORTIC ATHEROSCLEROSIS: ICD-10-CM

## 2021-01-29 DIAGNOSIS — Z79.01 ANTICOAGULATED ON COUMADIN: ICD-10-CM

## 2021-01-29 DIAGNOSIS — E78.5 HYPERLIPIDEMIA WITH TARGET LDL LESS THAN 100: ICD-10-CM

## 2021-01-29 LAB
AORTIC ROOT ANNULUS: 2.86 CM
ASCENDING AORTA: 2.97 CM
AV INDEX (PROSTH): 0.88
AV MEAN GRADIENT: 3 MMHG
AV PEAK GRADIENT: 5 MMHG
AV VALVE AREA: 2.68 CM2
AV VELOCITY RATIO: 0.7
BSA FOR ECHO PROCEDURE: 2.16 M2
CV ECHO LV RWT: 0.62 CM
CV STRESS BASE HR: 85 BPM
DOP CALC AO PEAK VEL: 1.15 M/S
DOP CALC AO VTI: 20.77 CM
DOP CALC LVOT AREA: 3 CM2
DOP CALC LVOT DIAMETER: 1.97 CM
DOP CALC LVOT PEAK VEL: 0.81 M/S
DOP CALC LVOT STROKE VOLUME: 55.63 CM3
DOP CALC RVOT PEAK VEL: 0.69 M/S
DOP CALC RVOT VTI: 14.6 CM
DOP CALCLVOT PEAK VEL VTI: 18.26 CM
E WAVE DECELERATION TIME: 193.29 MSEC
E/A RATIO: 3
ECHO LV POSTERIOR WALL: 1.23 CM (ref 0.6–1.1)
FRACTIONAL SHORTENING: 32 % (ref 28–44)
INTERVENTRICULAR SEPTUM: 1.48 CM (ref 0.6–1.1)
IVRT: 64.59 MSEC
LA MAJOR: 5.6 CM
LA MINOR: 6.03 CM
LA WIDTH: 4.15 CM
LEFT ATRIUM SIZE: 4.05 CM
LEFT ATRIUM VOLUME INDEX: 39.5 ML/M2
LEFT ATRIUM VOLUME: 82.96 CM3
LEFT INTERNAL DIMENSION IN SYSTOLE: 2.68 CM (ref 2.1–4)
LEFT VENTRICLE DIASTOLIC VOLUME INDEX: 32.51 ML/M2
LEFT VENTRICLE DIASTOLIC VOLUME: 68.27 ML
LEFT VENTRICLE MASS INDEX: 93 G/M2
LEFT VENTRICLE SYSTOLIC VOLUME INDEX: 12.6 ML/M2
LEFT VENTRICLE SYSTOLIC VOLUME: 26.51 ML
LEFT VENTRICULAR INTERNAL DIMENSION IN DIASTOLE: 3.96 CM (ref 3.5–6)
LEFT VENTRICULAR MASS: 195.82 G
MV PEAK A VEL: 0.29 M/S
MV PEAK E VEL: 0.87 M/S
NUC REST DIASTOLIC VOLUME INDEX: 77
NUC REST EJECTION FRACTION: 58
NUC REST SYSTOLIC VOLUME INDEX: 32
NUC STRESS DIASTOLIC VOLUME INDEX: 79
NUC STRESS EJECTION FRACTION: 65 %
NUC STRESS SYSTOLIC VOLUME INDEX: 28
OHS CV CPX 85 PERCENT MAX PREDICTED HEART RATE MALE: 117
OHS CV CPX MAX PREDICTED HEART RATE: 137
OHS CV CPX PATIENT IS FEMALE: 1
OHS CV CPX PATIENT IS MALE: 0
OHS CV CPX PEAK HEAR RATE: 121 BPM
OHS CV CPX PERCENT MAX PREDICTED HEART RATE ACHIEVED: 88
PISA MRMAX VEL: 0.04 M/S
PISA TR MAX VEL: 2.67 M/S
PV MEAN GRADIENT: 1 MMHG
PV PEAK GRADIENT: 2 MMHG
RA MAJOR: 4.75 CM
RA PRESSURE: 3 MMHG
RA WIDTH: 2.39 CM
SINUS: 3.08 CM
STJ: 2.99 CM
TR MAX PG: 29 MMHG
TRICUSPID ANNULAR PLANE SYSTOLIC EXCURSION: 2 CM
TV REST PULMONARY ARTERY PRESSURE: 32 MMHG

## 2021-01-29 PROCEDURE — A9502 TC99M TETROFOSMIN: HCPCS

## 2021-01-29 PROCEDURE — 93306 ECHO (CUPID ONLY): ICD-10-PCS | Mod: 26,,, | Performed by: INTERNAL MEDICINE

## 2021-01-29 PROCEDURE — 93306 TTE W/DOPPLER COMPLETE: CPT | Mod: 26,,, | Performed by: INTERNAL MEDICINE

## 2021-01-29 PROCEDURE — 93306 TTE W/DOPPLER COMPLETE: CPT

## 2021-01-29 PROCEDURE — 63600175 PHARM REV CODE 636 W HCPCS: Performed by: INTERNAL MEDICINE

## 2021-01-29 PROCEDURE — 93017 CV STRESS TEST TRACING ONLY: CPT

## 2021-01-29 RX ORDER — REGADENOSON 0.08 MG/ML
0.4 INJECTION, SOLUTION INTRAVENOUS ONCE
Status: COMPLETED | OUTPATIENT
Start: 2021-01-29 | End: 2021-01-29

## 2021-01-29 RX ADMIN — REGADENOSON 0.4 MG: 0.08 INJECTION, SOLUTION INTRAVENOUS at 12:01

## 2021-02-02 ENCOUNTER — TELEPHONE (OUTPATIENT)
Dept: CARDIOLOGY | Facility: CLINIC | Age: 81
End: 2021-02-02

## 2021-02-02 RX ORDER — NITROGLYCERIN 0.4 MG/1
0.4 TABLET SUBLINGUAL EVERY 5 MIN PRN
Qty: 30 TABLET | Refills: 0 | Status: SHIPPED | OUTPATIENT
Start: 2021-02-02 | End: 2023-10-20

## 2021-02-10 ENCOUNTER — ANTI-COAG VISIT (OUTPATIENT)
Dept: CARDIOLOGY | Facility: CLINIC | Age: 81
End: 2021-02-10
Payer: MEDICARE

## 2021-02-10 DIAGNOSIS — I48.21 PERMANENT ATRIAL FIBRILLATION: ICD-10-CM

## 2021-02-10 DIAGNOSIS — Z79.01 LONG TERM (CURRENT) USE OF ANTICOAGULANTS: Primary | ICD-10-CM

## 2021-02-10 LAB — INR PPP: 2.7 (ref 2–3)

## 2021-02-10 PROCEDURE — 93793 ANTICOAG MGMT PT WARFARIN: CPT | Mod: S$GLB,,,

## 2021-02-10 PROCEDURE — 85610 PROTHROMBIN TIME: CPT | Mod: QW,S$GLB,, | Performed by: INTERNAL MEDICINE

## 2021-02-10 PROCEDURE — 93793 PR ANTICOAGULANT MGMT FOR PT TAKING WARFARIN: ICD-10-PCS | Mod: S$GLB,,,

## 2021-02-10 PROCEDURE — 85610 POCT INR: ICD-10-PCS | Mod: QW,S$GLB,, | Performed by: INTERNAL MEDICINE

## 2021-02-25 ENCOUNTER — OFFICE VISIT (OUTPATIENT)
Dept: URGENT CARE | Facility: CLINIC | Age: 81
End: 2021-02-25
Payer: MEDICARE

## 2021-02-25 VITALS
BODY MASS INDEX: 33.98 KG/M2 | RESPIRATION RATE: 18 BRPM | TEMPERATURE: 97 F | HEART RATE: 80 BPM | DIASTOLIC BLOOD PRESSURE: 62 MMHG | OXYGEN SATURATION: 97 % | SYSTOLIC BLOOD PRESSURE: 144 MMHG | WEIGHT: 216.94 LBS

## 2021-02-25 DIAGNOSIS — J32.9 SINUSITIS, UNSPECIFIED CHRONICITY, UNSPECIFIED LOCATION: Primary | ICD-10-CM

## 2021-02-25 DIAGNOSIS — Z20.822 SUSPECTED COVID-19 VIRUS INFECTION: ICD-10-CM

## 2021-02-25 LAB
CTP QC/QA: YES
SARS-COV-2 RDRP RESP QL NAA+PROBE: NEGATIVE

## 2021-02-25 PROCEDURE — 99999 PR PBB SHADOW E&M-EST. PATIENT-LVL V: CPT | Mod: PBBFAC,,, | Performed by: NURSE PRACTITIONER

## 2021-02-25 PROCEDURE — 99214 OFFICE O/P EST MOD 30 MIN: CPT | Mod: S$GLB,CS,, | Performed by: NURSE PRACTITIONER

## 2021-02-25 PROCEDURE — 99999 PR PBB SHADOW E&M-EST. PATIENT-LVL V: ICD-10-PCS | Mod: PBBFAC,,, | Performed by: NURSE PRACTITIONER

## 2021-02-25 PROCEDURE — 1125F PR PAIN SEVERITY QUANTIFIED, PAIN PRESENT: ICD-10-PCS | Mod: S$GLB,,, | Performed by: NURSE PRACTITIONER

## 2021-02-25 PROCEDURE — 3077F PR MOST RECENT SYSTOLIC BLOOD PRESSURE >= 140 MM HG: ICD-10-PCS | Mod: CPTII,S$GLB,, | Performed by: NURSE PRACTITIONER

## 2021-02-25 PROCEDURE — U0002: ICD-10-PCS | Mod: QW,S$GLB,, | Performed by: NURSE PRACTITIONER

## 2021-02-25 PROCEDURE — U0002 COVID-19 LAB TEST NON-CDC: HCPCS | Mod: QW,S$GLB,, | Performed by: NURSE PRACTITIONER

## 2021-02-25 PROCEDURE — 1159F PR MEDICATION LIST DOCUMENTED IN MEDICAL RECORD: ICD-10-PCS | Mod: S$GLB,,, | Performed by: NURSE PRACTITIONER

## 2021-02-25 PROCEDURE — 1125F AMNT PAIN NOTED PAIN PRSNT: CPT | Mod: S$GLB,,, | Performed by: NURSE PRACTITIONER

## 2021-02-25 PROCEDURE — 1159F MED LIST DOCD IN RCRD: CPT | Mod: S$GLB,,, | Performed by: NURSE PRACTITIONER

## 2021-02-25 PROCEDURE — 3077F SYST BP >= 140 MM HG: CPT | Mod: CPTII,S$GLB,, | Performed by: NURSE PRACTITIONER

## 2021-02-25 PROCEDURE — 3078F DIAST BP <80 MM HG: CPT | Mod: CPTII,S$GLB,, | Performed by: NURSE PRACTITIONER

## 2021-02-25 PROCEDURE — 99214 PR OFFICE/OUTPT VISIT, EST, LEVL IV, 30-39 MIN: ICD-10-PCS | Mod: S$GLB,CS,, | Performed by: NURSE PRACTITIONER

## 2021-02-25 PROCEDURE — 3078F PR MOST RECENT DIASTOLIC BLOOD PRESSURE < 80 MM HG: ICD-10-PCS | Mod: CPTII,S$GLB,, | Performed by: NURSE PRACTITIONER

## 2021-02-25 RX ORDER — DOXYCYCLINE 100 MG/1
100 CAPSULE ORAL EVERY 12 HOURS
Qty: 10 CAPSULE | Refills: 0 | Status: SHIPPED | OUTPATIENT
Start: 2021-02-25 | End: 2021-03-02

## 2021-02-25 RX ORDER — PREDNISONE 20 MG/1
20 TABLET ORAL DAILY
Qty: 3 TABLET | Refills: 0 | Status: SHIPPED | OUTPATIENT
Start: 2021-02-25 | End: 2021-02-28

## 2021-03-17 ENCOUNTER — ANTI-COAG VISIT (OUTPATIENT)
Dept: CARDIOLOGY | Facility: CLINIC | Age: 81
End: 2021-03-17
Payer: MEDICARE

## 2021-03-17 DIAGNOSIS — I48.21 PERMANENT ATRIAL FIBRILLATION: ICD-10-CM

## 2021-03-17 DIAGNOSIS — Z79.01 LONG TERM (CURRENT) USE OF ANTICOAGULANTS: Primary | ICD-10-CM

## 2021-03-17 LAB — INR PPP: 2.6 (ref 2–3)

## 2021-03-17 PROCEDURE — 93793 PR ANTICOAGULANT MGMT FOR PT TAKING WARFARIN: ICD-10-PCS | Mod: S$GLB,,,

## 2021-03-17 PROCEDURE — 85610 PROTHROMBIN TIME: CPT | Mod: QW,S$GLB,, | Performed by: INTERNAL MEDICINE

## 2021-03-17 PROCEDURE — 93793 ANTICOAG MGMT PT WARFARIN: CPT | Mod: S$GLB,,,

## 2021-03-17 PROCEDURE — 85610 POCT INR: ICD-10-PCS | Mod: QW,S$GLB,, | Performed by: INTERNAL MEDICINE

## 2021-04-20 DIAGNOSIS — Z79.01 LONG TERM (CURRENT) USE OF ANTICOAGULANTS: Primary | ICD-10-CM

## 2021-04-23 ENCOUNTER — ANTI-COAG VISIT (OUTPATIENT)
Dept: CARDIOLOGY | Facility: CLINIC | Age: 81
End: 2021-04-23
Payer: MEDICARE

## 2021-04-23 DIAGNOSIS — I48.21 PERMANENT ATRIAL FIBRILLATION: ICD-10-CM

## 2021-04-23 DIAGNOSIS — Z79.01 LONG TERM (CURRENT) USE OF ANTICOAGULANTS: Primary | ICD-10-CM

## 2021-04-23 LAB — INR PPP: 1.8 (ref 2–3)

## 2021-04-23 PROCEDURE — 93793 ANTICOAG MGMT PT WARFARIN: CPT | Mod: S$GLB,,,

## 2021-04-23 PROCEDURE — 85610 PROTHROMBIN TIME: CPT | Mod: QW,S$GLB,, | Performed by: INTERNAL MEDICINE

## 2021-04-23 PROCEDURE — 85610 POCT INR: ICD-10-PCS | Mod: QW,S$GLB,, | Performed by: INTERNAL MEDICINE

## 2021-04-23 PROCEDURE — 93793 PR ANTICOAGULANT MGMT FOR PT TAKING WARFARIN: ICD-10-PCS | Mod: S$GLB,,,

## 2021-05-03 RX ORDER — HYDROCHLOROTHIAZIDE 12.5 MG/1
TABLET ORAL
COMMUNITY
Start: 2021-04-24 | End: 2022-01-20

## 2021-05-03 RX ORDER — CALCIUM CITRATE/VITAMIN D3 200MG-6.25
TABLET ORAL
Qty: 200 STRIP | Refills: 11 | Status: SHIPPED | OUTPATIENT
Start: 2021-05-03 | End: 2022-06-28

## 2021-05-03 RX ORDER — BLOOD-GLUCOSE METER
1 EACH MISCELLANEOUS DAILY
Qty: 1 EACH | Refills: 11 | Status: SHIPPED | OUTPATIENT
Start: 2021-05-03 | End: 2024-03-14

## 2021-05-04 ENCOUNTER — PES CALL (OUTPATIENT)
Dept: ADMINISTRATIVE | Facility: CLINIC | Age: 81
End: 2021-05-04

## 2021-05-21 ENCOUNTER — ANTI-COAG VISIT (OUTPATIENT)
Dept: CARDIOLOGY | Facility: CLINIC | Age: 81
End: 2021-05-21
Payer: MEDICARE

## 2021-05-21 DIAGNOSIS — Z79.01 LONG TERM (CURRENT) USE OF ANTICOAGULANTS: Primary | ICD-10-CM

## 2021-05-21 DIAGNOSIS — I48.21 PERMANENT ATRIAL FIBRILLATION: ICD-10-CM

## 2021-05-21 LAB — INR PPP: 2.5 (ref 2–3)

## 2021-05-21 PROCEDURE — 93793 ANTICOAG MGMT PT WARFARIN: CPT | Mod: S$GLB,,,

## 2021-05-21 PROCEDURE — 85610 POCT INR: ICD-10-PCS | Mod: QW,S$GLB,, | Performed by: INTERNAL MEDICINE

## 2021-05-21 PROCEDURE — 85610 PROTHROMBIN TIME: CPT | Mod: QW,S$GLB,, | Performed by: INTERNAL MEDICINE

## 2021-05-21 PROCEDURE — 93793 PR ANTICOAGULANT MGMT FOR PT TAKING WARFARIN: ICD-10-PCS | Mod: S$GLB,,,

## 2021-06-18 ENCOUNTER — ANTI-COAG VISIT (OUTPATIENT)
Dept: CARDIOLOGY | Facility: CLINIC | Age: 81
End: 2021-06-18
Payer: MEDICARE

## 2021-06-18 DIAGNOSIS — Z79.01 LONG TERM (CURRENT) USE OF ANTICOAGULANTS: Primary | ICD-10-CM

## 2021-06-18 DIAGNOSIS — I48.21 PERMANENT ATRIAL FIBRILLATION: ICD-10-CM

## 2021-06-18 LAB — INR PPP: 1.9 (ref 2–3)

## 2021-06-18 PROCEDURE — 85610 POCT INR: ICD-10-PCS | Mod: QW,S$GLB,, | Performed by: INTERNAL MEDICINE

## 2021-06-18 PROCEDURE — 93793 ANTICOAG MGMT PT WARFARIN: CPT | Mod: S$GLB,,,

## 2021-06-18 PROCEDURE — 93793 PR ANTICOAGULANT MGMT FOR PT TAKING WARFARIN: ICD-10-PCS | Mod: S$GLB,,,

## 2021-06-18 PROCEDURE — 85610 PROTHROMBIN TIME: CPT | Mod: QW,S$GLB,, | Performed by: INTERNAL MEDICINE

## 2021-07-03 ENCOUNTER — CLINICAL SUPPORT (OUTPATIENT)
Dept: URGENT CARE | Facility: CLINIC | Age: 81
End: 2021-07-03
Payer: MEDICARE

## 2021-07-03 DIAGNOSIS — Z20.822 CLOSE EXPOSURE TO COVID-19 VIRUS: Primary | ICD-10-CM

## 2021-07-03 LAB
CTP QC/QA: YES
SARS-COV-2 RDRP RESP QL NAA+PROBE: NEGATIVE

## 2021-07-03 PROCEDURE — U0002 COVID-19 LAB TEST NON-CDC: HCPCS | Mod: QW,S$GLB,, | Performed by: INTERNAL MEDICINE

## 2021-07-03 PROCEDURE — U0002: ICD-10-PCS | Mod: QW,S$GLB,, | Performed by: INTERNAL MEDICINE

## 2021-08-05 ENCOUNTER — ANTI-COAG VISIT (OUTPATIENT)
Dept: CARDIOLOGY | Facility: CLINIC | Age: 81
End: 2021-08-05
Payer: MEDICARE

## 2021-08-05 DIAGNOSIS — I48.21 PERMANENT ATRIAL FIBRILLATION: ICD-10-CM

## 2021-08-05 DIAGNOSIS — Z79.01 LONG TERM (CURRENT) USE OF ANTICOAGULANTS: Primary | ICD-10-CM

## 2021-08-05 LAB — INR PPP: 3.1 (ref 2–3)

## 2021-08-05 PROCEDURE — 93793 ANTICOAG MGMT PT WARFARIN: CPT | Mod: S$GLB,,,

## 2021-08-05 PROCEDURE — 85610 PROTHROMBIN TIME: CPT | Mod: QW,S$GLB,, | Performed by: INTERNAL MEDICINE

## 2021-08-05 PROCEDURE — 85610 POCT INR: ICD-10-PCS | Mod: QW,S$GLB,, | Performed by: INTERNAL MEDICINE

## 2021-08-05 PROCEDURE — 93793 PR ANTICOAGULANT MGMT FOR PT TAKING WARFARIN: ICD-10-PCS | Mod: S$GLB,,,

## 2021-09-13 DIAGNOSIS — I48.91 ATRIAL FIBRILLATION, UNSPECIFIED TYPE: ICD-10-CM

## 2021-09-16 RX ORDER — AMLODIPINE BESYLATE 5 MG/1
TABLET ORAL
Qty: 90 TABLET | Refills: 0 | Status: SHIPPED | OUTPATIENT
Start: 2021-09-16 | End: 2021-12-27

## 2021-09-16 RX ORDER — METOPROLOL SUCCINATE 100 MG/1
TABLET, EXTENDED RELEASE ORAL
Qty: 90 TABLET | Refills: 0 | Status: SHIPPED | OUTPATIENT
Start: 2021-09-16 | End: 2021-12-27

## 2021-09-16 RX ORDER — INDAPAMIDE 1.25 MG/1
TABLET ORAL
Qty: 90 TABLET | Refills: 0 | Status: SHIPPED | OUTPATIENT
Start: 2021-09-16 | End: 2021-12-27

## 2021-09-16 RX ORDER — WARFARIN 6 MG/1
TABLET ORAL
Qty: 90 TABLET | Refills: 0 | Status: SHIPPED | OUTPATIENT
Start: 2021-09-16 | End: 2021-12-27

## 2021-09-20 ENCOUNTER — ANTI-COAG VISIT (OUTPATIENT)
Dept: CARDIOLOGY | Facility: CLINIC | Age: 81
End: 2021-09-20
Payer: MEDICARE

## 2021-09-20 DIAGNOSIS — Z79.01 LONG TERM (CURRENT) USE OF ANTICOAGULANTS: Primary | ICD-10-CM

## 2021-09-20 DIAGNOSIS — I48.21 PERMANENT ATRIAL FIBRILLATION: ICD-10-CM

## 2021-09-20 LAB — INR PPP: 1.7 (ref 2–3)

## 2021-09-20 PROCEDURE — 85610 PROTHROMBIN TIME: CPT | Mod: QW,HCNC,S$GLB, | Performed by: INTERNAL MEDICINE

## 2021-09-20 PROCEDURE — 93793 PR ANTICOAGULANT MGMT FOR PT TAKING WARFARIN: ICD-10-PCS | Mod: HCNC,S$GLB,,

## 2021-09-20 PROCEDURE — 93793 ANTICOAG MGMT PT WARFARIN: CPT | Mod: HCNC,S$GLB,,

## 2021-09-20 PROCEDURE — 85610 POCT INR: ICD-10-PCS | Mod: QW,HCNC,S$GLB, | Performed by: INTERNAL MEDICINE

## 2021-09-28 ENCOUNTER — OFFICE VISIT (OUTPATIENT)
Dept: OPHTHALMOLOGY | Facility: CLINIC | Age: 81
End: 2021-09-28
Payer: MEDICARE

## 2021-09-28 ENCOUNTER — TELEPHONE (OUTPATIENT)
Dept: OPHTHALMOLOGY | Facility: CLINIC | Age: 81
End: 2021-09-28

## 2021-09-28 DIAGNOSIS — E11.36 DIABETIC CATARACT: ICD-10-CM

## 2021-09-28 DIAGNOSIS — E11.9 DIABETES MELLITUS TYPE 2 WITHOUT RETINOPATHY: Primary | ICD-10-CM

## 2021-09-28 DIAGNOSIS — H52.7 REFRACTIVE ERRORS: ICD-10-CM

## 2021-09-28 PROCEDURE — 1159F MED LIST DOCD IN RCRD: CPT | Mod: HCNC,CPTII,S$GLB, | Performed by: OPTOMETRIST

## 2021-09-28 PROCEDURE — 92014 COMPRE OPH EXAM EST PT 1/>: CPT | Mod: HCNC,S$GLB,, | Performed by: OPTOMETRIST

## 2021-09-28 PROCEDURE — 92015 PR REFRACTION: ICD-10-PCS | Mod: HCNC,S$GLB,, | Performed by: OPTOMETRIST

## 2021-09-28 PROCEDURE — 92015 DETERMINE REFRACTIVE STATE: CPT | Mod: HCNC,S$GLB,, | Performed by: OPTOMETRIST

## 2021-09-28 PROCEDURE — 2023F DILAT RTA XM W/O RTNOPTHY: CPT | Mod: HCNC,CPTII,S$GLB, | Performed by: OPTOMETRIST

## 2021-09-28 PROCEDURE — 99499 UNLISTED E&M SERVICE: CPT | Mod: HCNC,S$GLB,, | Performed by: OPTOMETRIST

## 2021-09-28 PROCEDURE — 1159F PR MEDICATION LIST DOCUMENTED IN MEDICAL RECORD: ICD-10-PCS | Mod: HCNC,CPTII,S$GLB, | Performed by: OPTOMETRIST

## 2021-09-28 PROCEDURE — 92014 PR EYE EXAM, EST PATIENT,COMPREHESV: ICD-10-PCS | Mod: HCNC,S$GLB,, | Performed by: OPTOMETRIST

## 2021-09-28 PROCEDURE — 99999 PR PBB SHADOW E&M-EST. PATIENT-LVL III: CPT | Mod: PBBFAC,HCNC,, | Performed by: OPTOMETRIST

## 2021-09-28 PROCEDURE — 99999 PR PBB SHADOW E&M-EST. PATIENT-LVL III: ICD-10-PCS | Mod: PBBFAC,HCNC,, | Performed by: OPTOMETRIST

## 2021-09-28 PROCEDURE — 99499 RISK ADDL DX/OHS AUDIT: ICD-10-PCS | Mod: HCNC,S$GLB,, | Performed by: OPTOMETRIST

## 2021-09-28 PROCEDURE — 2023F PR DILATED RETINAL EXAM W/O EVID OF RETINOPATHY: ICD-10-PCS | Mod: HCNC,CPTII,S$GLB, | Performed by: OPTOMETRIST

## 2021-10-04 ENCOUNTER — ANTI-COAG VISIT (OUTPATIENT)
Dept: CARDIOLOGY | Facility: CLINIC | Age: 81
End: 2021-10-04
Payer: MEDICARE

## 2021-10-04 DIAGNOSIS — I48.91 ATRIAL FIBRILLATION, UNSPECIFIED TYPE: ICD-10-CM

## 2021-10-04 DIAGNOSIS — Z79.01 LONG TERM (CURRENT) USE OF ANTICOAGULANTS: Primary | ICD-10-CM

## 2021-10-04 LAB — INR PPP: 2 (ref 2–3)

## 2021-10-04 PROCEDURE — 85610 POCT INR: ICD-10-PCS | Mod: QW,HCNC,S$GLB, | Performed by: NUCLEAR MEDICINE

## 2021-10-04 PROCEDURE — 93793 ANTICOAG MGMT PT WARFARIN: CPT | Mod: HCNC,S$GLB,,

## 2021-10-04 PROCEDURE — 93793 PR ANTICOAGULANT MGMT FOR PT TAKING WARFARIN: ICD-10-PCS | Mod: HCNC,S$GLB,,

## 2021-10-04 PROCEDURE — 85610 PROTHROMBIN TIME: CPT | Mod: QW,HCNC,S$GLB, | Performed by: NUCLEAR MEDICINE

## 2021-10-12 ENCOUNTER — TELEPHONE (OUTPATIENT)
Dept: OPHTHALMOLOGY | Facility: CLINIC | Age: 81
End: 2021-10-12

## 2021-10-19 ENCOUNTER — ANTI-COAG VISIT (OUTPATIENT)
Dept: CARDIOLOGY | Facility: CLINIC | Age: 81
End: 2021-10-19
Payer: MEDICARE

## 2021-10-19 DIAGNOSIS — I48.91 ATRIAL FIBRILLATION, UNSPECIFIED TYPE: ICD-10-CM

## 2021-10-19 DIAGNOSIS — Z79.01 LONG TERM (CURRENT) USE OF ANTICOAGULANTS: Primary | ICD-10-CM

## 2021-10-19 LAB — INR PPP: 2.2 (ref 2–3)

## 2021-10-19 PROCEDURE — 93793 PR ANTICOAGULANT MGMT FOR PT TAKING WARFARIN: ICD-10-PCS | Mod: HCNC,S$GLB,,

## 2021-10-19 PROCEDURE — 85610 POCT INR: ICD-10-PCS | Mod: QW,HCNC,S$GLB, | Performed by: INTERNAL MEDICINE

## 2021-10-19 PROCEDURE — 85610 PROTHROMBIN TIME: CPT | Mod: QW,HCNC,S$GLB, | Performed by: INTERNAL MEDICINE

## 2021-10-19 PROCEDURE — 93793 ANTICOAG MGMT PT WARFARIN: CPT | Mod: HCNC,S$GLB,,

## 2021-11-01 ENCOUNTER — PATIENT OUTREACH (OUTPATIENT)
Dept: ADMINISTRATIVE | Facility: OTHER | Age: 81
End: 2021-11-01
Payer: MEDICARE

## 2021-11-01 ENCOUNTER — OFFICE VISIT (OUTPATIENT)
Dept: OPHTHALMOLOGY | Facility: CLINIC | Age: 81
End: 2021-11-01
Payer: MEDICARE

## 2021-11-01 DIAGNOSIS — E11.36 DIABETIC CATARACT: ICD-10-CM

## 2021-11-01 DIAGNOSIS — H25.042 POSTERIOR SUBCAPSULAR AGE-RELATED CATARACT OF LEFT EYE: Primary | ICD-10-CM

## 2021-11-01 DIAGNOSIS — H25.13 NUCLEAR SCLEROSIS OF BOTH EYES: ICD-10-CM

## 2021-11-01 DIAGNOSIS — E11.9 DIABETES MELLITUS WITHOUT COMPLICATION: Primary | ICD-10-CM

## 2021-11-01 PROCEDURE — 99499 UNLISTED E&M SERVICE: CPT | Mod: S$GLB,,, | Performed by: OPHTHALMOLOGY

## 2021-11-01 PROCEDURE — 99499 RISK ADDL DX/OHS AUDIT: ICD-10-PCS | Mod: S$GLB,,, | Performed by: OPHTHALMOLOGY

## 2021-11-01 PROCEDURE — 1159F MED LIST DOCD IN RCRD: CPT | Mod: HCNC,CPTII,S$GLB, | Performed by: OPHTHALMOLOGY

## 2021-11-01 PROCEDURE — 1159F PR MEDICATION LIST DOCUMENTED IN MEDICAL RECORD: ICD-10-PCS | Mod: HCNC,CPTII,S$GLB, | Performed by: OPHTHALMOLOGY

## 2021-11-01 PROCEDURE — 99999 PR PBB SHADOW E&M-EST. PATIENT-LVL II: ICD-10-PCS | Mod: PBBFAC,HCNC,, | Performed by: OPHTHALMOLOGY

## 2021-11-01 PROCEDURE — 1160F RVW MEDS BY RX/DR IN RCRD: CPT | Mod: HCNC,CPTII,S$GLB, | Performed by: OPHTHALMOLOGY

## 2021-11-01 PROCEDURE — 92004 COMPRE OPH EXAM NEW PT 1/>: CPT | Mod: HCNC,S$GLB,, | Performed by: OPHTHALMOLOGY

## 2021-11-01 PROCEDURE — 1160F PR REVIEW ALL MEDS BY PRESCRIBER/CLIN PHARMACIST DOCUMENTED: ICD-10-PCS | Mod: HCNC,CPTII,S$GLB, | Performed by: OPHTHALMOLOGY

## 2021-11-01 PROCEDURE — 99999 PR PBB SHADOW E&M-EST. PATIENT-LVL II: CPT | Mod: PBBFAC,HCNC,, | Performed by: OPHTHALMOLOGY

## 2021-11-01 PROCEDURE — 92004 PR EYE EXAM, NEW PATIENT,COMPREHESV: ICD-10-PCS | Mod: HCNC,S$GLB,, | Performed by: OPHTHALMOLOGY

## 2021-11-01 RX ORDER — PREDNISOLONE ACETATE 10 MG/ML
1 SUSPENSION/ DROPS OPHTHALMIC 4 TIMES DAILY
Qty: 10 ML | Refills: 1 | Status: SHIPPED | OUTPATIENT
Start: 2021-11-01 | End: 2022-01-20

## 2021-11-01 RX ORDER — KETOROLAC TROMETHAMINE 5 MG/ML
1 SOLUTION OPHTHALMIC 4 TIMES DAILY
Qty: 10 ML | Refills: 1 | Status: SHIPPED | OUTPATIENT
Start: 2021-11-01 | End: 2022-01-20

## 2021-11-01 RX ORDER — MOXIFLOXACIN 5 MG/ML
1 SOLUTION/ DROPS OPHTHALMIC EVERY 12 HOURS
Qty: 5 ML | Refills: 1 | Status: SHIPPED | OUTPATIENT
Start: 2021-11-01 | End: 2022-01-20

## 2021-11-16 ENCOUNTER — ANTI-COAG VISIT (OUTPATIENT)
Dept: CARDIOLOGY | Facility: CLINIC | Age: 81
End: 2021-11-16
Payer: MEDICARE

## 2021-11-16 DIAGNOSIS — Z79.01 LONG TERM (CURRENT) USE OF ANTICOAGULANTS: Primary | ICD-10-CM

## 2021-11-16 DIAGNOSIS — I48.91 ATRIAL FIBRILLATION, UNSPECIFIED TYPE: ICD-10-CM

## 2021-11-16 LAB — INR PPP: 1.7 (ref 2–3)

## 2021-11-16 PROCEDURE — 85610 PROTHROMBIN TIME: CPT | Mod: QW,HCNC,S$GLB, | Performed by: INTERNAL MEDICINE

## 2021-11-16 PROCEDURE — 85610 POCT INR: ICD-10-PCS | Mod: QW,HCNC,S$GLB, | Performed by: INTERNAL MEDICINE

## 2021-11-16 PROCEDURE — 93793 ANTICOAG MGMT PT WARFARIN: CPT | Mod: HCNC,S$GLB,,

## 2021-11-16 PROCEDURE — 93793 PR ANTICOAGULANT MGMT FOR PT TAKING WARFARIN: ICD-10-PCS | Mod: HCNC,S$GLB,,

## 2021-11-30 ENCOUNTER — ANTI-COAG VISIT (OUTPATIENT)
Dept: CARDIOLOGY | Facility: CLINIC | Age: 81
End: 2021-11-30
Payer: MEDICARE

## 2021-11-30 DIAGNOSIS — I48.91 ATRIAL FIBRILLATION, UNSPECIFIED TYPE: ICD-10-CM

## 2021-11-30 DIAGNOSIS — Z79.01 LONG TERM (CURRENT) USE OF ANTICOAGULANTS: Primary | ICD-10-CM

## 2021-11-30 LAB — INR PPP: 2.5 (ref 2–3)

## 2021-11-30 PROCEDURE — 93793 ANTICOAG MGMT PT WARFARIN: CPT | Mod: HCNC,S$GLB,,

## 2021-11-30 PROCEDURE — 85610 PROTHROMBIN TIME: CPT | Mod: QW,HCNC,S$GLB, | Performed by: INTERNAL MEDICINE

## 2021-11-30 PROCEDURE — 85610 POCT INR: ICD-10-PCS | Mod: QW,HCNC,S$GLB, | Performed by: INTERNAL MEDICINE

## 2021-11-30 PROCEDURE — 93793 PR ANTICOAGULANT MGMT FOR PT TAKING WARFARIN: ICD-10-PCS | Mod: HCNC,S$GLB,,

## 2021-12-03 ENCOUNTER — PATIENT OUTREACH (OUTPATIENT)
Dept: ADMINISTRATIVE | Facility: HOSPITAL | Age: 81
End: 2021-12-03
Payer: MEDICARE

## 2021-12-15 ENCOUNTER — TELEPHONE (OUTPATIENT)
Dept: OPHTHALMOLOGY | Facility: CLINIC | Age: 81
End: 2021-12-15
Payer: MEDICARE

## 2021-12-15 ENCOUNTER — OUTSIDE PLACE OF SERVICE (OUTPATIENT)
Dept: OPHTHALMOLOGY | Facility: CLINIC | Age: 81
End: 2021-12-15
Payer: MEDICARE

## 2021-12-15 PROCEDURE — 66984 XCAPSL CTRC RMVL W/O ECP: CPT | Mod: LT,,, | Performed by: OPHTHALMOLOGY

## 2021-12-15 PROCEDURE — 66984 PR REMOVAL, CATARACT, W/INSRT INTRAOC LENS, W/O ENDO CYCLO: ICD-10-PCS | Mod: LT,,, | Performed by: OPHTHALMOLOGY

## 2021-12-16 ENCOUNTER — OFFICE VISIT (OUTPATIENT)
Dept: OPHTHALMOLOGY | Facility: CLINIC | Age: 81
End: 2021-12-16
Payer: MEDICARE

## 2021-12-16 DIAGNOSIS — Z98.890 POST-OPERATIVE STATE: Primary | ICD-10-CM

## 2021-12-16 PROCEDURE — 99999 PR PBB SHADOW E&M-EST. PATIENT-LVL III: CPT | Mod: PBBFAC,HCNC,, | Performed by: OPHTHALMOLOGY

## 2021-12-16 PROCEDURE — 99024 PR POST-OP FOLLOW-UP VISIT: ICD-10-PCS | Mod: HCNC,S$GLB,, | Performed by: OPHTHALMOLOGY

## 2021-12-16 PROCEDURE — 99024 POSTOP FOLLOW-UP VISIT: CPT | Mod: HCNC,S$GLB,, | Performed by: OPHTHALMOLOGY

## 2021-12-16 PROCEDURE — 99999 PR PBB SHADOW E&M-EST. PATIENT-LVL III: ICD-10-PCS | Mod: PBBFAC,HCNC,, | Performed by: OPHTHALMOLOGY

## 2021-12-20 ENCOUNTER — OFFICE VISIT (OUTPATIENT)
Dept: OPHTHALMOLOGY | Facility: CLINIC | Age: 81
End: 2021-12-20
Payer: MEDICARE

## 2021-12-20 DIAGNOSIS — Z98.42 CATARACT EXTRACTION STATUS OF EYE, LEFT: Primary | ICD-10-CM

## 2021-12-20 PROCEDURE — 99999 PR PBB SHADOW E&M-EST. PATIENT-LVL III: CPT | Mod: PBBFAC,HCNC,, | Performed by: OPTOMETRIST

## 2021-12-20 PROCEDURE — 99024 PR POST-OP FOLLOW-UP VISIT: ICD-10-PCS | Mod: HCNC,S$GLB,, | Performed by: OPTOMETRIST

## 2021-12-20 PROCEDURE — 99999 PR PBB SHADOW E&M-EST. PATIENT-LVL III: ICD-10-PCS | Mod: PBBFAC,HCNC,, | Performed by: OPTOMETRIST

## 2021-12-20 PROCEDURE — 99024 POSTOP FOLLOW-UP VISIT: CPT | Mod: HCNC,S$GLB,, | Performed by: OPTOMETRIST

## 2021-12-28 ENCOUNTER — ANTI-COAG VISIT (OUTPATIENT)
Dept: CARDIOLOGY | Facility: CLINIC | Age: 81
End: 2021-12-28
Payer: MEDICARE

## 2021-12-28 DIAGNOSIS — I48.91 ATRIAL FIBRILLATION, UNSPECIFIED TYPE: ICD-10-CM

## 2021-12-28 DIAGNOSIS — Z79.01 LONG TERM (CURRENT) USE OF ANTICOAGULANTS: Primary | ICD-10-CM

## 2021-12-28 LAB — INR PPP: 2.3 (ref 2–3)

## 2021-12-28 PROCEDURE — 85610 PROTHROMBIN TIME: CPT | Mod: QW,HCNC,S$GLB, | Performed by: INTERNAL MEDICINE

## 2021-12-28 PROCEDURE — 93793 PR ANTICOAGULANT MGMT FOR PT TAKING WARFARIN: ICD-10-PCS | Mod: HCNC,S$GLB,,

## 2021-12-28 PROCEDURE — 85610 POCT INR: ICD-10-PCS | Mod: QW,HCNC,S$GLB, | Performed by: INTERNAL MEDICINE

## 2021-12-28 PROCEDURE — 93793 ANTICOAG MGMT PT WARFARIN: CPT | Mod: HCNC,S$GLB,,

## 2022-01-20 ENCOUNTER — LAB VISIT (OUTPATIENT)
Dept: LAB | Facility: HOSPITAL | Age: 82
End: 2022-01-20
Attending: FAMILY MEDICINE
Payer: MEDICARE

## 2022-01-20 ENCOUNTER — OFFICE VISIT (OUTPATIENT)
Dept: INTERNAL MEDICINE | Facility: CLINIC | Age: 82
End: 2022-01-20
Payer: MEDICARE

## 2022-01-20 VITALS
HEART RATE: 110 BPM | BODY MASS INDEX: 34.08 KG/M2 | DIASTOLIC BLOOD PRESSURE: 70 MMHG | OXYGEN SATURATION: 95 % | SYSTOLIC BLOOD PRESSURE: 128 MMHG | WEIGHT: 217.13 LBS | HEIGHT: 67 IN | TEMPERATURE: 96 F

## 2022-01-20 DIAGNOSIS — I83.93 VARICOSE VEINS OF BOTH LOWER EXTREMITIES, UNSPECIFIED WHETHER COMPLICATED: ICD-10-CM

## 2022-01-20 DIAGNOSIS — Z79.01 LONG TERM CURRENT USE OF ANTICOAGULANT THERAPY: ICD-10-CM

## 2022-01-20 DIAGNOSIS — E78.5 HYPERLIPIDEMIA ASSOCIATED WITH TYPE 2 DIABETES MELLITUS: ICD-10-CM

## 2022-01-20 DIAGNOSIS — I10 PRIMARY HYPERTENSION: ICD-10-CM

## 2022-01-20 DIAGNOSIS — I70.0 AORTIC ATHEROSCLEROSIS: ICD-10-CM

## 2022-01-20 DIAGNOSIS — E11.69 HYPERLIPIDEMIA ASSOCIATED WITH TYPE 2 DIABETES MELLITUS: ICD-10-CM

## 2022-01-20 DIAGNOSIS — I27.20 PULMONARY HYPERTENSION: ICD-10-CM

## 2022-01-20 DIAGNOSIS — I48.91 ATRIAL FIBRILLATION, UNSPECIFIED TYPE: ICD-10-CM

## 2022-01-20 DIAGNOSIS — E78.5 HYPERLIPIDEMIA WITH TARGET LDL LESS THAN 100: ICD-10-CM

## 2022-01-20 DIAGNOSIS — I25.118 CORONARY ARTERY DISEASE OF NATIVE ARTERY OF NATIVE HEART WITH STABLE ANGINA PECTORIS: ICD-10-CM

## 2022-01-20 DIAGNOSIS — Z00.00 ANNUAL PHYSICAL EXAM: Primary | ICD-10-CM

## 2022-01-20 DIAGNOSIS — M81.0 OSTEOPOROSIS, UNSPECIFIED OSTEOPOROSIS TYPE, UNSPECIFIED PATHOLOGICAL FRACTURE PRESENCE: ICD-10-CM

## 2022-01-20 DIAGNOSIS — I25.119 CORONARY ARTERY DISEASE INVOLVING NATIVE CORONARY ARTERY OF NATIVE HEART WITH ANGINA PECTORIS: ICD-10-CM

## 2022-01-20 DIAGNOSIS — Z00.00 ANNUAL PHYSICAL EXAM: ICD-10-CM

## 2022-01-20 PROBLEM — S30.1XXA ABDOMINAL WALL SEROMA: Status: RESOLVED | Noted: 2019-01-16 | Resolved: 2022-01-20

## 2022-01-20 LAB
ALBUMIN SERPL BCP-MCNC: 3.8 G/DL (ref 3.5–5.2)
ALP SERPL-CCNC: 75 U/L (ref 55–135)
ALT SERPL W/O P-5'-P-CCNC: 48 U/L (ref 10–44)
ANION GAP SERPL CALC-SCNC: 9 MMOL/L (ref 8–16)
AST SERPL-CCNC: 29 U/L (ref 10–40)
BASOPHILS # BLD AUTO: 0.03 K/UL (ref 0–0.2)
BASOPHILS NFR BLD: 0.5 % (ref 0–1.9)
BILIRUB SERPL-MCNC: 0.6 MG/DL (ref 0.1–1)
BUN SERPL-MCNC: 8 MG/DL (ref 8–23)
CALCIUM SERPL-MCNC: 9.3 MG/DL (ref 8.7–10.5)
CHLORIDE SERPL-SCNC: 100 MMOL/L (ref 95–110)
CHOLEST SERPL-MCNC: 163 MG/DL (ref 120–199)
CHOLEST/HDLC SERPL: 4.5 {RATIO} (ref 2–5)
CO2 SERPL-SCNC: 29 MMOL/L (ref 23–29)
CREAT SERPL-MCNC: 0.8 MG/DL (ref 0.5–1.4)
DIFFERENTIAL METHOD: NORMAL
EOSINOPHIL # BLD AUTO: 0.1 K/UL (ref 0–0.5)
EOSINOPHIL NFR BLD: 1.5 % (ref 0–8)
ERYTHROCYTE [DISTWIDTH] IN BLOOD BY AUTOMATED COUNT: 14.4 % (ref 11.5–14.5)
EST. GFR  (AFRICAN AMERICAN): >60 ML/MIN/1.73 M^2
EST. GFR  (NON AFRICAN AMERICAN): >60 ML/MIN/1.73 M^2
ESTIMATED AVG GLUCOSE: 146 MG/DL (ref 68–131)
GLUCOSE SERPL-MCNC: 171 MG/DL (ref 70–110)
HBA1C MFR BLD: 6.7 % (ref 4–5.6)
HCT VFR BLD AUTO: 40.2 % (ref 37–48.5)
HDLC SERPL-MCNC: 36 MG/DL (ref 40–75)
HDLC SERPL: 22.1 % (ref 20–50)
HGB BLD-MCNC: 12.9 G/DL (ref 12–16)
IMM GRANULOCYTES # BLD AUTO: 0.02 K/UL (ref 0–0.04)
IMM GRANULOCYTES NFR BLD AUTO: 0.3 % (ref 0–0.5)
LDLC SERPL CALC-MCNC: 89 MG/DL (ref 63–159)
LYMPHOCYTES # BLD AUTO: 1.4 K/UL (ref 1–4.8)
LYMPHOCYTES NFR BLD: 22.5 % (ref 18–48)
MCH RBC QN AUTO: 30.1 PG (ref 27–31)
MCHC RBC AUTO-ENTMCNC: 32.1 G/DL (ref 32–36)
MCV RBC AUTO: 94 FL (ref 82–98)
MONOCYTES # BLD AUTO: 0.4 K/UL (ref 0.3–1)
MONOCYTES NFR BLD: 7.1 % (ref 4–15)
NEUTROPHILS # BLD AUTO: 4.1 K/UL (ref 1.8–7.7)
NEUTROPHILS NFR BLD: 68.1 % (ref 38–73)
NONHDLC SERPL-MCNC: 127 MG/DL
NRBC BLD-RTO: 0 /100 WBC
PLATELET # BLD AUTO: 188 K/UL (ref 150–450)
PMV BLD AUTO: 12.4 FL (ref 9.2–12.9)
POTASSIUM SERPL-SCNC: 3.8 MMOL/L (ref 3.5–5.1)
PROT SERPL-MCNC: 7.6 G/DL (ref 6–8.4)
RBC # BLD AUTO: 4.28 M/UL (ref 4–5.4)
SODIUM SERPL-SCNC: 138 MMOL/L (ref 136–145)
TRIGL SERPL-MCNC: 190 MG/DL (ref 30–150)
WBC # BLD AUTO: 6.04 K/UL (ref 3.9–12.7)

## 2022-01-20 PROCEDURE — 1159F MED LIST DOCD IN RCRD: CPT | Mod: HCNC,CPTII,S$GLB, | Performed by: FAMILY MEDICINE

## 2022-01-20 PROCEDURE — 3072F PR LOW RISK FOR RETINOPATHY: ICD-10-PCS | Mod: HCNC,CPTII,S$GLB, | Performed by: FAMILY MEDICINE

## 2022-01-20 PROCEDURE — 36415 COLL VENOUS BLD VENIPUNCTURE: CPT | Mod: HCNC | Performed by: FAMILY MEDICINE

## 2022-01-20 PROCEDURE — 99214 OFFICE O/P EST MOD 30 MIN: CPT | Mod: HCNC,S$GLB,, | Performed by: FAMILY MEDICINE

## 2022-01-20 PROCEDURE — 85025 COMPLETE CBC W/AUTO DIFF WBC: CPT | Mod: HCNC | Performed by: FAMILY MEDICINE

## 2022-01-20 PROCEDURE — 3288F PR FALLS RISK ASSESSMENT DOCUMENTED: ICD-10-PCS | Mod: HCNC,CPTII,S$GLB, | Performed by: FAMILY MEDICINE

## 2022-01-20 PROCEDURE — 99999 PR PBB SHADOW E&M-EST. PATIENT-LVL IV: ICD-10-PCS | Mod: PBBFAC,HCNC,, | Performed by: FAMILY MEDICINE

## 2022-01-20 PROCEDURE — 83036 HEMOGLOBIN GLYCOSYLATED A1C: CPT | Mod: HCNC | Performed by: FAMILY MEDICINE

## 2022-01-20 PROCEDURE — 3288F FALL RISK ASSESSMENT DOCD: CPT | Mod: HCNC,CPTII,S$GLB, | Performed by: FAMILY MEDICINE

## 2022-01-20 PROCEDURE — 3074F SYST BP LT 130 MM HG: CPT | Mod: HCNC,CPTII,S$GLB, | Performed by: FAMILY MEDICINE

## 2022-01-20 PROCEDURE — 1159F PR MEDICATION LIST DOCUMENTED IN MEDICAL RECORD: ICD-10-PCS | Mod: HCNC,CPTII,S$GLB, | Performed by: FAMILY MEDICINE

## 2022-01-20 PROCEDURE — 80061 LIPID PANEL: CPT | Mod: HCNC | Performed by: FAMILY MEDICINE

## 2022-01-20 PROCEDURE — 3078F DIAST BP <80 MM HG: CPT | Mod: HCNC,CPTII,S$GLB, | Performed by: FAMILY MEDICINE

## 2022-01-20 PROCEDURE — 80053 COMPREHEN METABOLIC PANEL: CPT | Mod: HCNC | Performed by: FAMILY MEDICINE

## 2022-01-20 PROCEDURE — 1101F PR PT FALLS ASSESS DOC 0-1 FALLS W/OUT INJ PAST YR: ICD-10-PCS | Mod: HCNC,CPTII,S$GLB, | Performed by: FAMILY MEDICINE

## 2022-01-20 PROCEDURE — 3078F PR MOST RECENT DIASTOLIC BLOOD PRESSURE < 80 MM HG: ICD-10-PCS | Mod: HCNC,CPTII,S$GLB, | Performed by: FAMILY MEDICINE

## 2022-01-20 PROCEDURE — 1101F PT FALLS ASSESS-DOCD LE1/YR: CPT | Mod: HCNC,CPTII,S$GLB, | Performed by: FAMILY MEDICINE

## 2022-01-20 PROCEDURE — 99999 PR PBB SHADOW E&M-EST. PATIENT-LVL IV: CPT | Mod: PBBFAC,HCNC,, | Performed by: FAMILY MEDICINE

## 2022-01-20 PROCEDURE — 3074F PR MOST RECENT SYSTOLIC BLOOD PRESSURE < 130 MM HG: ICD-10-PCS | Mod: HCNC,CPTII,S$GLB, | Performed by: FAMILY MEDICINE

## 2022-01-20 PROCEDURE — 99214 PR OFFICE/OUTPT VISIT, EST, LEVL IV, 30-39 MIN: ICD-10-PCS | Mod: HCNC,S$GLB,, | Performed by: FAMILY MEDICINE

## 2022-01-20 PROCEDURE — 3072F LOW RISK FOR RETINOPATHY: CPT | Mod: HCNC,CPTII,S$GLB, | Performed by: FAMILY MEDICINE

## 2022-01-20 RX ORDER — ATORVASTATIN CALCIUM 40 MG/1
40 TABLET, FILM COATED ORAL DAILY
Qty: 90 TABLET | Refills: 3 | Status: SHIPPED | OUTPATIENT
Start: 2022-01-20 | End: 2023-03-31 | Stop reason: SDUPTHER

## 2022-01-20 NOTE — ASSESSMENT & PLAN NOTE
Needs follow up with Cards  A fib    She was to be on Diltizem and metoprolol and Norvasc per Cards  She is not taking the Cardizem.

## 2022-01-20 NOTE — ASSESSMENT & PLAN NOTE
Chronic  Rate control  Lab Results   Component Value Date    INR 2.3 12/28/2021    INR 2.5 11/30/2021    INR 1.7 (A) 11/16/2021     On metoprolol 100 daily

## 2022-01-20 NOTE — PROGRESS NOTES
Natalie Greene  01/20/2022  [id number]    Annabella Fenton MD  Patient Care Team:  Annabella Fenton MD as PCP - General (Family Medicine)  Annabella Fenton MD as Consulting Physician (Family Medicine)  Lisbet Lopez LPN as Care Coordinator (Internal Medicine)  Law Rod MD as Consulting Physician (Cardiology)  Henry Rojas OD as Consulting Physician (Optometry)  Has the patient seen any provider outside of the [facility] network since the last visit? (no). If yes, HIPPA forms completed and records requested.        Visit Type:Annual    Chief Complaint:  Chief Complaint   Patient presents with    Annual Exam       History of Present Illness:  81 year old    Overdue for annual exam.    See Problem oriented charting.    She is unclear on all the meds she is on    She does check her BP at home.    She does check her BS, not as often.  She reports always under 200.    She does admit to some memory loss. She is with her .     History:  Past Medical History:   Diagnosis Date    *Atrial fibrillation     Abdominal wall seroma 1/16/2019    Seroma developed after her hernia repair in 2012.  Please review the notes in the Care everywhere section of the chart.  There is no luis evidence of infection at this time.  I would recommend checking a erythrocyte sedimentation rate and C reactive protein.  These are significant elevated then aspiration of the seroma fluid would be warranted.  If not the seroma does not require any intervention    Coronary artery disease involving native coronary artery of native heart with angina pectoris 4/10/2018    Diabetes mellitus     DM (diabetes mellitus) 2002     09/06/2017 no medication    Hyperlipidemia     Hypertension     Morbid (severe) obesity due to excess calories 7/23/2013    Obesity (BMI 30-39.9) 2/24/2015    Obstructive sleep apnea      Past Surgical History:   Procedure Laterality Date    CATARACT EXTRACTION Left 12/15/2021    distance     CHOLECYSTECTOMY      COLON SURGERY      HYSTERECTOMY       Family History   Problem Relation Age of Onset    Diabetes Sister     Hypertension Sister     Hypertension Mother     Diabetes Sister     COPD Neg Hx     Cancer Neg Hx     Heart disease Neg Hx     Hyperlipidemia Neg Hx     Stroke Neg Hx      Social History     Socioeconomic History    Marital status:     Number of children: 1    Highest education level: High school graduate   Occupational History    Occupation: Retired   Tobacco Use    Smoking status: Never Smoker    Smokeless tobacco: Never Used   Substance and Sexual Activity    Alcohol use: No    Drug use: No    Sexual activity: Yes     Partners: Male     Patient Active Problem List   Diagnosis    A-fib    HTN (hypertension)    Hyperlipidemia with target LDL less than 100    Hyperlipidemia associated with type 2 diabetes mellitus    Anticoagulated on Coumadin    History of malignant neoplasm of colon    Shoulder pain, left    Aortic atherosclerosis    Long term current use of anticoagulant therapy    DORA (obstructive sleep apnea)    Psychophysiological insomnia    PLMD (periodic limb movement disorder)    Inadequate sleep hygiene    Pulmonary hypertension    CAD (coronary artery disease)    Osteoporosis    Obesity (BMI 30.0-34.9)    Varicose veins of both lower extremities     Review of patient's allergies indicates:  No Known Allergies    The following were reviewed at this visit: active problem list, medication list, allergies, family history, social history, and health maintenance.    Medications:  Current Outpatient Medications on File Prior to Visit   Medication Sig Dispense Refill    ACCU-CHEK SOFTCLIX LANCETS Misc       amLODIPine (NORVASC) 5 MG tablet TAKE 1 TABLET EVERY DAY 90 tablet 0    blood sugar diagnostic (TRUE METRIX GLUCOSE TEST STRIP) Strp TEST BLOOD SUGAR TWICE DAILY 200 strip 11    losartan (COZAAR) 100 MG tablet Take 1 tablet (100 mg  total) by mouth once daily. 90 tablet 1    metoprolol succinate (TOPROL-XL) 100 MG 24 hr tablet TAKE 1 TABLET EVERY DAY 90 tablet 0    potassium chloride SA (K-DUR,KLOR-CON) 20 MEQ tablet Take 1 tablet (20 mEq total) by mouth once daily. 90 tablet 1    TRUE METRIX AIR GLUCOSE METER kit       TRUE METRIX LEVEL 1 Soln 1 each by Tube route once daily. 1 each 11    warfarin (COUMADIN) 6 MG tablet TAKE 1 TABLET EVERY DAY 90 tablet 0    diltiaZEM (CARDIZEM CD) 180 MG 24 hr capsule Take 1 capsule (180 mg total) by mouth once daily. (Patient not taking: Reported on 9/28/2021) 90 capsule 3    INCONTINENCE PAD,LINER,DISP (BLADDER CONTROL PADS EX ABSORB MISC)       indapamide (LOZOL) 1.25 MG Tab TAKE 1 TABLET EVERY DAY (Patient not taking: Reported on 1/20/2022) 90 tablet 0    metFORMIN (GLUCOPHAGE-XR) 500 MG XR 24hr tablet Take 1 tablet (500 mg total) by mouth daily with breakfast. (Patient not taking: Reported on 9/28/2021) 90 tablet 3    nitroGLYCERIN (NITROSTAT) 0.4 MG SL tablet Place 1 tablet (0.4 mg total) under the tongue every 5 (five) minutes as needed for Chest pain. Take up to 3 times 5 min apart (Patient not taking: Reported on 1/20/2022) 30 tablet 0    triamcinolone acetonide 0.1% (KENALOG) 0.1 % cream Apply topically 2 (two) times daily. (Patient not taking: Reported on 1/20/2022) 45 g 0    [DISCONTINUED] albuterol (VENTOLIN HFA) 90 mcg/actuation inhaler Inhale 2 puffs into the lungs every 6 (six) hours as needed for Wheezing. Rescue (Patient not taking: Reported on 1/20/2022) 18 g 1    [DISCONTINUED] atorvastatin (LIPITOR) 40 MG tablet Take 1 tablet (40 mg total) by mouth once daily. 90 tablet 3    [DISCONTINUED] benzonatate (TESSALON PERLES) 100 MG capsule Take 2 capsules (200 mg total) by mouth 3 (three) times daily as needed for Cough. (Patient not taking: Reported on 1/20/2022) 60 capsule 1    [DISCONTINUED] fluticasone propionate (FLONASE) 50 mcg/actuation nasal spray 2 sprays (100 mcg total)  by Each Nostril route once daily. (Patient not taking: Reported on 1/20/2022) 16 g 1    [DISCONTINUED] hydroCHLOROthiazide (HYDRODIURIL) 12.5 MG Tab       [DISCONTINUED] hydrOXYzine HCL (ATARAX) 25 MG tablet Take 1 tablet (25 mg total) by mouth 3 (three) times daily as needed for Itching. (Patient not taking: Reported on 1/20/2022) 30 tablet 0    [DISCONTINUED] ketorolac 0.5% (ACULAR) 0.5 % Drop Place 1 drop into the left eye 4 (four) times daily. Eyedrops to start one day before surgery (Patient not taking: Reported on 1/20/2022) 10 mL 1    [DISCONTINUED] moxifloxacin (VIGAMOX) 0.5 % ophthalmic solution Place 1 drop into the left eye every 12 (twelve) hours. Start eyedrops one day before surgery. (Patient not taking: Reported on 1/20/2022) 5 mL 1    [DISCONTINUED] prednisoLONE acetate (PRED FORTE) 1 % DrpS Place 1 drop into the left eye 4 (four) times daily. Start one day before surgery (Patient not taking: Reported on 1/20/2022) 10 mL 1     No current facility-administered medications on file prior to visit.       Medications have been reviewed and reconciled with patient at this visit.  Barriers to medications reviewed with patient.    Adverse reactions to current medications reviewed with patient..    Over the counter medications reviewed and reconciled with patient.    Exam:  Wt Readings from Last 3 Encounters:   01/20/22 98.5 kg (217 lb 2.5 oz)   02/25/21 98.4 kg (216 lb 14.9 oz)   01/29/21 98.9 kg (218 lb)     Temp Readings from Last 3 Encounters:   01/20/22 96 °F (35.6 °C) (Tympanic)   02/25/21 96.7 °F (35.9 °C)   12/08/20 99.5 °F (37.5 °C) (Oral)     BP Readings from Last 3 Encounters:   01/20/22 128/70   02/25/21 (!) 144/62   01/29/21 137/81     Pulse Readings from Last 3 Encounters:   01/20/22 110   02/25/21 80   01/29/21 85     Body mass index is 34.01 kg/m².      Review of Systems   Constitutional: Negative.  Negative for chills and fever.   HENT: Negative.  Negative for congestion, sinus pain and  sore throat.    Eyes: Negative for blurred vision and double vision.   Respiratory: Negative for cough, sputum production, shortness of breath and wheezing.    Cardiovascular: Negative for chest pain, palpitations and leg swelling.   Gastrointestinal: Negative for abdominal pain, constipation, diarrhea, heartburn, nausea and vomiting.   Genitourinary: Negative.    Musculoskeletal: Negative.    Skin: Negative.  Negative for rash.   Neurological: Negative.    Endo/Heme/Allergies: Negative.  Negative for polydipsia. Does not bruise/bleed easily.   Psychiatric/Behavioral: Negative for depression and substance abuse.     Physical Exam  Vitals and nursing note reviewed.   Constitutional:       General: She is not in acute distress.     Appearance: She is well-developed. She is not diaphoretic.   HENT:      Head: Normocephalic and atraumatic.      Right Ear: External ear normal.      Left Ear: External ear normal.      Nose: Nose normal.   Eyes:      General:         Right eye: No discharge.         Left eye: No discharge.      Conjunctiva/sclera: Conjunctivae normal.      Pupils: Pupils are equal, round, and reactive to light.   Neck:      Thyroid: No thyromegaly.   Cardiovascular:      Rate and Rhythm: Normal rate. Rhythm irregular.      Heart sounds: Normal heart sounds. No murmur heard.      Pulmonary:      Effort: Pulmonary effort is normal. No respiratory distress.      Breath sounds: Normal breath sounds. No wheezing.   Musculoskeletal:         General: Normal range of motion.      Cervical back: Normal range of motion and neck supple.   Lymphadenopathy:      Cervical: No cervical adenopathy.   Skin:     Capillary Refill: Capillary refill takes less than 2 seconds.   Neurological:      Mental Status: She is alert and oriented to person, place, and time.      Cranial Nerves: No cranial nerve deficit.   Psychiatric:         Behavior: Behavior normal.         Thought Content: Thought content normal.         Judgment:  Judgment normal.       Protective Sensation (w/ 10 gram monofilament):  Right: Intact  Left: Intact    Visual Inspection:  Varicose veins    Pedal Pulses:   Right: Present  Left: Present    Posterior tibialis:   Right:Present  Left: Present        Laboratory Reviewed ({Yes)  Lab Results   Component Value Date    WBC 11.61 12/21/2019    HGB 13.3 12/21/2019    HCT 41.0 12/21/2019     12/21/2019    CHOL 91 (L) 11/07/2019    TRIG 72 11/07/2019    HDL 38 (L) 11/07/2019    ALT 22 12/21/2019    AST 25 12/21/2019     01/19/2021    K 3.4 (L) 01/19/2021    CL 98 01/19/2021    CREATININE 0.9 01/19/2021    BUN 11 01/19/2021    CO2 28 01/19/2021    TSH 1.785 07/29/2014    INR 2.3 12/28/2021    HGBA1C 6.5 (H) 11/07/2019       Natalie was seen today for annual exam.    Diagnoses and all orders for this visit:    Annual physical exam  -     CBC Auto Differential; Future  -     Comprehensive Metabolic Panel; Future  -     Lipid Panel; Future    Pulmonary hypertension  -     Lipid Panel; Future    Osteoporosis, unspecified osteoporosis type, unspecified pathological fracture presence  -     Comprehensive Metabolic Panel; Future    Long term current use of anticoagulant therapy  -     CBC Auto Differential; Future    Hyperlipidemia with target LDL less than 100  -     Comprehensive Metabolic Panel; Future  -     Hemoglobin A1C; Future  -     Lipid Panel; Future  -     Microalbumin/Creatinine Ratio, Urine; Future    Hyperlipidemia associated with type 2 diabetes mellitus  -     Lipid Panel; Future  -     Microalbumin/Creatinine Ratio, Urine; Future    Primary hypertension  -     Comprehensive Metabolic Panel; Future  -     Lipid Panel; Future    Coronary artery disease of native artery of native heart with stable angina pectoris  -     Comprehensive Metabolic Panel; Future  -     Lipid Panel; Future    Aortic atherosclerosis  -     Comprehensive Metabolic Panel; Future  -     Lipid Panel; Future  -     atorvastatin  (LIPITOR) 40 MG tablet; Take 1 tablet (40 mg total) by mouth once daily.    Atrial fibrillation, unspecified type  -     Comprehensive Metabolic Panel; Future    Varicose veins of both lower extremities, unspecified whether complicated    Coronary artery disease involving native coronary artery of native heart with angina pectoris  -     atorvastatin (LIPITOR) 40 MG tablet; Take 1 tablet (40 mg total) by mouth once daily.      Pulmonary hypertension  Needs follow up with Cards  LAst Echo Jan 2021  Summary    · Concentric remodeling and normal systolic function. The estimated ejection fraction is 60%  · Mild left atrial enlargement.  · Mild right atrial enlargement.  · Normal left ventricular diastolic function.  · With normal right ventricular systolic function.  · Mild mitral regurgitation.  · Normal central venous pressure (3 mmHg).  · The estimated PA systolic pressure is 32 mmHg.          Osteoporosis  Dexa 2019  Will advuse to recheck  Vit D and Clayton  Supplement  We referred to Rheum, but she didn't go to discuss Prolia        Long term current use of anticoagulant therapy  Coumadin(preference due to cost)  Lab Results   Component Value Date    INR 2.3 12/28/2021    INR 2.5 11/30/2021    INR 1.7 (A) 11/16/2021         Hyperlipidemia with target LDL less than 100  Lab Results   Component Value Date    LDLCALC 38.6 (L) 11/07/2019   Said she stopped taking.        Hyperlipidemia associated with type 2 diabetes mellitus  Lab Results   Component Value Date    LDLCALC 38.6 (L) 11/07/2019     She reprots she stopped the Statin.  Lab Results   Component Value Date    HGBA1C 6.5 (H) 11/07/2019     Not taking metformin       HTN (hypertension)  Needs follow up with Cards  A fib    She was to be on Diltizem and metoprolol and Norvasc per Cards  She is not taking the Cardizem.      CAD (coronary artery disease)  Cath 2016  Non obstructive  On coumadin, non on ASA due to bleeding risk      Aortic atherosclerosis  On  statin    A-fib  Chronic  Rate control  Lab Results   Component Value Date    INR 2.3 12/28/2021    INR 2.5 11/30/2021    INR 1.7 (A) 11/16/2021     On metoprolol 100 daily      Check Labs  Discussed future screen for Colon and Breast  She declines.    Call back and tell me what medications she has at home.      She is will need to see Cards          Care Plan/Goals: Reviewed   Goals      Take at least one BP reading per week at various times of the day      Hypertension Goals/Individual Care Plan    Hypertension Goal Care Plan    1. Blood pressure goal is to be below 140/90. Normal Blood pressure is 120/80.  Patient's blood pressure is at goal today. (Yes)    2. Goal for self management is to check Blood Pressure daily. Record on Individual log. Patient is agreeable to self management plan. blood pressure log.   Patient will bring logs at next office visit, or communicate to /Care Management team for review for interval follow up.     3. Moderately intense physical activity, such as brisk walking, is beneficial when done regularly. Aim for a total of 40 minutes of moderate to vigorous activity three to four times a week to help lower blood pressure. Exercise of patients choice was recommended at this office visit. walking    4. Eating Healthy Diet is important in Blood Pressure control. As its name implies, the DASH (Dietary Approaches to Stop Hypertension) eating plan is designed to help you manage blood pressure. Emphasizing healthy food sources, it also limits:  Red meat   Sodium (salt)   Sweets, added sugars and sugar-containing beverages.     5. Barriers to goals reviewed and addressed with patient at visit. (Yes)    6. Adherence and Medication Side effects discussed (Yes)    Referral to on-site Pharmacy for Co-management of hypertension (No)  Patient enrolled in Tele-health Hypertension Management. (No)    Patient is under care of /Care management (No) Referral Sent (No)                   Follow up: Follow up in about 6 months (around 7/20/2022).    After visit summary was printed and given to patient upon discharge today.  Patient goals and care plan are included in After Visit Summary.

## 2022-01-20 NOTE — ASSESSMENT & PLAN NOTE
Coumadin(preference due to cost)  Lab Results   Component Value Date    INR 2.3 12/28/2021    INR 2.5 11/30/2021    INR 1.7 (A) 11/16/2021

## 2022-01-20 NOTE — ASSESSMENT & PLAN NOTE
Lab Results   Component Value Date    LDLCALC 38.6 (L) 11/07/2019     She reprots she stopped the Statin.  Lab Results   Component Value Date    HGBA1C 6.5 (H) 11/07/2019     Not taking metformin

## 2022-01-20 NOTE — ASSESSMENT & PLAN NOTE
Dexa 2019  Will advuse to recheck  Vit D and Clayton  Supplement  We referred to Rheum, but she didn't go to discuss Prolia

## 2022-01-20 NOTE — ASSESSMENT & PLAN NOTE
Needs follow up with Cards  LAst Echo Jan 2021  Summary    · Concentric remodeling and normal systolic function. The estimated ejection fraction is 60%  · Mild left atrial enlargement.  · Mild right atrial enlargement.  · Normal left ventricular diastolic function.  · With normal right ventricular systolic function.  · Mild mitral regurgitation.  · Normal central venous pressure (3 mmHg).  · The estimated PA systolic pressure is 32 mmHg.

## 2022-01-25 ENCOUNTER — ANTI-COAG VISIT (OUTPATIENT)
Dept: CARDIOLOGY | Facility: CLINIC | Age: 82
End: 2022-01-25
Payer: MEDICARE

## 2022-01-25 ENCOUNTER — OFFICE VISIT (OUTPATIENT)
Dept: OPHTHALMOLOGY | Facility: CLINIC | Age: 82
End: 2022-01-25
Payer: MEDICARE

## 2022-01-25 DIAGNOSIS — I48.91 ATRIAL FIBRILLATION, UNSPECIFIED TYPE: ICD-10-CM

## 2022-01-25 DIAGNOSIS — Z79.01 LONG TERM (CURRENT) USE OF ANTICOAGULANTS: Primary | ICD-10-CM

## 2022-01-25 DIAGNOSIS — Z98.890 POST-OPERATIVE STATE: Primary | ICD-10-CM

## 2022-01-25 LAB — INR PPP: 2.4 (ref 2–3)

## 2022-01-25 PROCEDURE — 85610 POCT INR: ICD-10-PCS | Mod: QW,HCNC,S$GLB, | Performed by: INTERNAL MEDICINE

## 2022-01-25 PROCEDURE — 1160F RVW MEDS BY RX/DR IN RCRD: CPT | Mod: HCNC,CPTII,S$GLB, | Performed by: OPHTHALMOLOGY

## 2022-01-25 PROCEDURE — 1159F PR MEDICATION LIST DOCUMENTED IN MEDICAL RECORD: ICD-10-PCS | Mod: HCNC,CPTII,S$GLB, | Performed by: OPHTHALMOLOGY

## 2022-01-25 PROCEDURE — 99999 PR PBB SHADOW E&M-EST. PATIENT-LVL III: CPT | Mod: PBBFAC,HCNC,, | Performed by: OPHTHALMOLOGY

## 2022-01-25 PROCEDURE — 99024 POSTOP FOLLOW-UP VISIT: CPT | Mod: HCNC,S$GLB,, | Performed by: OPHTHALMOLOGY

## 2022-01-25 PROCEDURE — 1160F PR REVIEW ALL MEDS BY PRESCRIBER/CLIN PHARMACIST DOCUMENTED: ICD-10-PCS | Mod: HCNC,CPTII,S$GLB, | Performed by: OPHTHALMOLOGY

## 2022-01-25 PROCEDURE — 99024 PR POST-OP FOLLOW-UP VISIT: ICD-10-PCS | Mod: HCNC,S$GLB,, | Performed by: OPHTHALMOLOGY

## 2022-01-25 PROCEDURE — 99999 PR PBB SHADOW E&M-EST. PATIENT-LVL III: ICD-10-PCS | Mod: PBBFAC,HCNC,, | Performed by: OPHTHALMOLOGY

## 2022-01-25 PROCEDURE — 1159F MED LIST DOCD IN RCRD: CPT | Mod: HCNC,CPTII,S$GLB, | Performed by: OPHTHALMOLOGY

## 2022-01-25 PROCEDURE — 85610 PROTHROMBIN TIME: CPT | Mod: QW,HCNC,S$GLB, | Performed by: INTERNAL MEDICINE

## 2022-01-25 PROCEDURE — 93793 ANTICOAG MGMT PT WARFARIN: CPT | Mod: HCNC,S$GLB,,

## 2022-01-25 PROCEDURE — 93793 PR ANTICOAGULANT MGMT FOR PT TAKING WARFARIN: ICD-10-PCS | Mod: HCNC,S$GLB,,

## 2022-01-25 NOTE — PROGRESS NOTES
SUBJECTIVE  Natalie Greene is 81 y.o. female  Uncorrected distance visual acuity was 20/50 -2 in the right eye and 20/30 -2 in the left eye.   Chief Complaint   Patient presents with    Post-op Evaluation     1 month PO PC IOL OS 12/15/21          HPI     Post-op Evaluation      Additional comments: 1 month PO PC IOL OS 12/15/21              Comments     States that her vision is and that she has been compliant with gttts as   directed.     DM x2010  1. Diabetic Cataract OD  PCIOL OS 12/15/2021 (+21.0 SN60WF)  2. DM without retinopathy            Last edited by LUZ Samayoa on 1/25/2022  1:37 PM. (History)         Assessment /Plan :  1. Post-operative state   Exam:    Slit lamp exam:  L/L: nl  S/C: quiet and uninflamed  K: clear, wound sealed  AC: no cell or flare  Iris/Lens: IOL centered and stable      Assessment:    PO Month 1 S/P Phaco/IOL OS : Doing Well and completing healing process. Final visual correction options discussed and appropriate prescriptions and/or OTC reading glass recommendations offered to patient. Pt desires OTC Readers and to keep current glasses. Pt instructed to follow up with Dr. Rome in 6 months for next eye exam or PRN any pain, redness, vision changes or other concerns.

## 2022-01-25 NOTE — PROGRESS NOTES
INR is therapeutic at 2.4.  Patient reports no recent changes.  Currently taking warfarin 6 mg daily.  Will continue same dose of warfarin.  Recheck on 2/22/2022 at 0240p.  Patient verbalized understanding.  Declined AVS.

## 2022-01-27 ENCOUNTER — TELEPHONE (OUTPATIENT)
Dept: INTERNAL MEDICINE | Facility: CLINIC | Age: 82
End: 2022-01-27
Payer: MEDICARE

## 2022-01-27 NOTE — TELEPHONE ENCOUNTER
Patient med list:  Morning  Indapamide  Warafin  Losartan    Night  Metoprolol  Amlodipine  Atorvastatin

## 2022-02-02 ENCOUNTER — TELEPHONE (OUTPATIENT)
Dept: INTERNAL MEDICINE | Facility: CLINIC | Age: 82
End: 2022-02-02
Payer: MEDICARE

## 2022-02-02 NOTE — TELEPHONE ENCOUNTER
----- Message from Fabienne Carter sent at 2/1/2022  4:04 PM CST -----  Contact: [phone]  Patient would like to consult with a nurse in regards to a prescription request for a glucose meter. Please call back at [phone]. Thanks r/s

## 2022-02-06 ENCOUNTER — NURSE TRIAGE (OUTPATIENT)
Dept: ADMINISTRATIVE | Facility: CLINIC | Age: 82
End: 2022-02-06
Payer: MEDICARE

## 2022-02-06 NOTE — TELEPHONE ENCOUNTER
"Natalie calling c/o left arm pain radiating to left shoulder and nausea since last night. Denies chest pain. Advised pt per triage protocol to go to ED now for physician lianet. Verbalized understanding.     Reason for Disposition   [1] Age > 40 AND [2] no obvious cause AND [3] pain even when not moving the arm    (Exception: pain is clearly made worse by moving arm or bending neck)    Additional Information   Negative: Shock suspected (e.g., cold/pale/clammy skin, too weak to stand, low BP, rapid pulse)   Negative: [1] Similar pain previously AND [2] it was from "heart attack"   Negative: [1] Similar pain previously AND [2] it was from "angina" AND [3] not relieved by nitroglycerin   Negative: Sounds like a life-threatening emergency to the triager   Negative: Followed an arm injury   Negative: Chest pain   Negative: Pain, redness, or swelling at intravenous (IV) site or along course of vein   Negative: Difficulty breathing or unusual sweating (e.g., sweating without exertion)   Negative: [1] Age > 40 AND [2] associated chest or jaw pain AND [3] pain lasts > 5 minutes    Protocols used: ARM PAIN-A-AH      "

## 2022-02-07 ENCOUNTER — HOSPITAL ENCOUNTER (EMERGENCY)
Facility: HOSPITAL | Age: 82
Discharge: HOME | End: 2022-02-07
Attending: EMERGENCY MEDICINE
Payer: MEDICARE

## 2022-02-07 VITALS
BODY MASS INDEX: 34.14 KG/M2 | RESPIRATION RATE: 16 BRPM | OXYGEN SATURATION: 100 % | HEART RATE: 82 BPM | TEMPERATURE: 98 F | DIASTOLIC BLOOD PRESSURE: 80 MMHG | SYSTOLIC BLOOD PRESSURE: 175 MMHG | WEIGHT: 218 LBS

## 2022-02-07 DIAGNOSIS — M54.12 CERVICAL RADICULOPATHY: Primary | ICD-10-CM

## 2022-02-07 DIAGNOSIS — M25.519 SHOULDER PAIN: ICD-10-CM

## 2022-02-07 DIAGNOSIS — R07.9 CHEST PAIN: ICD-10-CM

## 2022-02-07 LAB
ALBUMIN SERPL BCP-MCNC: 4 G/DL (ref 3.5–5.2)
ALP SERPL-CCNC: 74 U/L (ref 55–135)
ALT SERPL W/O P-5'-P-CCNC: 34 U/L (ref 10–44)
ANION GAP SERPL CALC-SCNC: 10 MMOL/L (ref 8–16)
AST SERPL-CCNC: 32 U/L (ref 10–40)
BASOPHILS # BLD AUTO: 0.03 K/UL (ref 0–0.2)
BASOPHILS NFR BLD: 0.5 % (ref 0–1.9)
BILIRUB SERPL-MCNC: 0.8 MG/DL (ref 0.1–1)
BUN SERPL-MCNC: 11 MG/DL (ref 8–23)
CALCIUM SERPL-MCNC: 9 MG/DL (ref 8.7–10.5)
CHLORIDE SERPL-SCNC: 100 MMOL/L (ref 95–110)
CK SERPL-CCNC: 44 U/L (ref 20–180)
CO2 SERPL-SCNC: 29 MMOL/L (ref 23–29)
CREAT SERPL-MCNC: 0.8 MG/DL (ref 0.5–1.4)
DIFFERENTIAL METHOD: NORMAL
EOSINOPHIL # BLD AUTO: 0.1 K/UL (ref 0–0.5)
EOSINOPHIL NFR BLD: 1.2 % (ref 0–8)
ERYTHROCYTE [DISTWIDTH] IN BLOOD BY AUTOMATED COUNT: 14.3 % (ref 11.5–14.5)
EST. GFR  (AFRICAN AMERICAN): >60 ML/MIN/1.73 M^2
EST. GFR  (NON AFRICAN AMERICAN): >60 ML/MIN/1.73 M^2
GLUCOSE SERPL-MCNC: 148 MG/DL (ref 70–110)
HCT VFR BLD AUTO: 40.2 % (ref 37–48.5)
HGB BLD-MCNC: 13.4 G/DL (ref 12–16)
IMM GRANULOCYTES # BLD AUTO: 0.01 K/UL (ref 0–0.04)
IMM GRANULOCYTES NFR BLD AUTO: 0.2 % (ref 0–0.5)
LYMPHOCYTES # BLD AUTO: 1.2 K/UL (ref 1–4.8)
LYMPHOCYTES NFR BLD: 20.3 % (ref 18–48)
MCH RBC QN AUTO: 30.7 PG (ref 27–31)
MCHC RBC AUTO-ENTMCNC: 33.3 G/DL (ref 32–36)
MCV RBC AUTO: 92 FL (ref 82–98)
MONOCYTES # BLD AUTO: 0.3 K/UL (ref 0.3–1)
MONOCYTES NFR BLD: 5.7 % (ref 4–15)
NEUTROPHILS # BLD AUTO: 4.2 K/UL (ref 1.8–7.7)
NEUTROPHILS NFR BLD: 72.1 % (ref 38–73)
NRBC BLD-RTO: 0 /100 WBC
PLATELET # BLD AUTO: 200 K/UL (ref 150–450)
PMV BLD AUTO: 11.3 FL (ref 9.2–12.9)
POTASSIUM SERPL-SCNC: 3.9 MMOL/L (ref 3.5–5.1)
PROT SERPL-MCNC: 7.7 G/DL (ref 6–8.4)
RBC # BLD AUTO: 4.36 M/UL (ref 4–5.4)
SODIUM SERPL-SCNC: 139 MMOL/L (ref 136–145)
TROPONIN I SERPL DL<=0.01 NG/ML-MCNC: 0.01 NG/ML (ref 0–0.03)
WBC # BLD AUTO: 5.81 K/UL (ref 3.9–12.7)

## 2022-02-07 PROCEDURE — 93010 ELECTROCARDIOGRAM REPORT: CPT | Mod: HCNC,,, | Performed by: INTERNAL MEDICINE

## 2022-02-07 PROCEDURE — 85025 COMPLETE CBC W/AUTO DIFF WBC: CPT | Mod: HCNC | Performed by: NURSE PRACTITIONER

## 2022-02-07 PROCEDURE — 84484 ASSAY OF TROPONIN QUANT: CPT | Mod: HCNC | Performed by: NURSE PRACTITIONER

## 2022-02-07 PROCEDURE — 93005 ELECTROCARDIOGRAM TRACING: CPT | Mod: HCNC

## 2022-02-07 PROCEDURE — 99285 EMERGENCY DEPT VISIT HI MDM: CPT | Mod: 25,HCNC

## 2022-02-07 PROCEDURE — 82550 ASSAY OF CK (CPK): CPT | Mod: HCNC | Performed by: NURSE PRACTITIONER

## 2022-02-07 PROCEDURE — 80053 COMPREHEN METABOLIC PANEL: CPT | Mod: HCNC | Performed by: NURSE PRACTITIONER

## 2022-02-07 PROCEDURE — 93010 EKG 12-LEAD: ICD-10-PCS | Mod: HCNC,,, | Performed by: INTERNAL MEDICINE

## 2022-02-07 RX ORDER — HYDROCODONE BITARTRATE AND ACETAMINOPHEN 5; 325 MG/1; MG/1
1 TABLET ORAL NIGHTLY PRN
Qty: 12 TABLET | Refills: 0 | Status: SHIPPED | OUTPATIENT
Start: 2022-02-07 | End: 2024-01-04

## 2022-02-07 NOTE — ED PROVIDER NOTES
Encounter Date: 2/7/2022       History     Chief Complaint   Patient presents with    Shoulder Pain     Denies trauma, left shoulder pain x 1 week     81 year old female with complaint of left sided neck pain with radiation into left arm X 4-5 days.  Reports occasional radiation into left chest wall this weekend. No SOB.  No fever or chills. No vomiting. Pain worse with movement.         Review of patient's allergies indicates:  No Known Allergies  Past Medical History:   Diagnosis Date    *Atrial fibrillation     Abdominal wall seroma 1/16/2019    Seroma developed after her hernia repair in 2012.  Please review the notes in the Care everywhere section of the chart.  There is no luis evidence of infection at this time.  I would recommend checking a erythrocyte sedimentation rate and C reactive protein.  These are significant elevated then aspiration of the seroma fluid would be warranted.  If not the seroma does not require any intervention    Coronary artery disease involving native coronary artery of native heart with angina pectoris 4/10/2018    Diabetes mellitus     DM (diabetes mellitus) 2002     09/06/2017 no medication    Hyperlipidemia     Hypertension     Morbid (severe) obesity due to excess calories 7/23/2013    Obesity (BMI 30-39.9) 2/24/2015    Obstructive sleep apnea      Past Surgical History:   Procedure Laterality Date    CATARACT EXTRACTION Left 12/15/2021    distance    CHOLECYSTECTOMY      COLON SURGERY      HYSTERECTOMY       Family History   Problem Relation Age of Onset    Diabetes Sister     Hypertension Sister     Hypertension Mother     Diabetes Sister     COPD Neg Hx     Cancer Neg Hx     Heart disease Neg Hx     Hyperlipidemia Neg Hx     Stroke Neg Hx      Social History     Tobacco Use    Smoking status: Never Smoker    Smokeless tobacco: Never Used   Substance Use Topics    Alcohol use: No    Drug use: No     Review of Systems   Constitutional:  Negative for fever.   HENT: Negative for sore throat.    Respiratory: Negative for shortness of breath.    Cardiovascular: Negative for chest pain.   Gastrointestinal: Negative for nausea.   Genitourinary: Negative for dysuria.   Musculoskeletal: Negative for back pain.        Left sided neck pain, left arm pain    Skin: Negative for rash.   Neurological: Negative for weakness.   Hematological: Does not bruise/bleed easily.       Physical Exam     Initial Vitals [02/07/22 1131]   BP Pulse Resp Temp SpO2   (!) 194/85 87 18 97.8 °F (36.6 °C) (!) 93 %      MAP       --         Physical Exam    Nursing note and vitals reviewed.  Constitutional: She appears well-developed and well-nourished.   HENT:   Head: Normocephalic and atraumatic.   Eyes: Conjunctivae and EOM are normal. Pupils are equal, round, and reactive to light.   Neck: Neck supple.   Normal range of motion.  Cardiovascular: Normal rate, regular rhythm, normal heart sounds and intact distal pulses.   Pulmonary/Chest: Breath sounds normal.   Abdominal: Abdomen is soft. There is no abdominal tenderness. There is no rebound and no guarding.   Musculoskeletal:         General: Normal range of motion.      Cervical back: Normal range of motion and neck supple.      Comments: Left trapezial tenderness, pain with ROM of left shoulder     Neurological: She is alert and oriented to person, place, and time. She has normal strength and normal reflexes.   Skin: Skin is warm and dry.   Psychiatric: She has a normal mood and affect. Her behavior is normal. Thought content normal.         ED Course   Procedures  Labs Reviewed   COMPREHENSIVE METABOLIC PANEL - Abnormal; Notable for the following components:       Result Value    Glucose 148 (*)     All other components within normal limits   CBC W/ AUTO DIFFERENTIAL   CK   TROPONIN I     EKG Readings: (Independently Interpreted)   Other EKG Interpretations: EKG: atrial fibrillation with rate of 102, no change from previous  EKG     ECG Results          EKG 12-lead (In process)  Result time 02/07/22 12:16:15    In process by Interface, Lab In [facility] (02/07/22 12:16:15)                 Narrative:    Test Reason : M25.519,    Vent. Rate : 102 BPM     Atrial Rate : 000 BPM     P-R Int : 000 ms          QRS Dur : 084 ms      QT Int : 316 ms       P-R-T Axes : 000 072 -08 degrees     QTc Int : 411 ms    Atrial fibrillation with rapid ventricular response with premature  ventricular or aberrantly conducted complexes  Nonspecific T wave abnormality  Abnormal ECG  When compared with ECG of 29-JAN-2021 12:02,  Previous ECG has undetermined rhythm, needs review  Nonspecific T wave abnormality, worse in Inferior leads  Nonspecific T wave abnormality now evident in Lateral leads  QT has shortened    Referred By: [email or web address]   SELF           Confirmed By:                             Imaging Results          X-Ray Chest 1 View (Final result)  Result time 02/07/22 12:29:01    Final result by Tyson Donahue MD (02/07/22 12:29:01)                 Impression:      1.  Negative for acute process involving the chest.    2.  Stable findings as noted above.      Electronically signed by: Tyson Donahue MD  Date:    02/07/2022  Time:    12:29             Narrative:    EXAMINATION:  XR CHEST 1 VIEW    CLINICAL HISTORY:  Chest pain, unspecified    COMPARISON:  February 9, 2019    FINDINGS:  The study is kyphotic in position and rotated to the right.  Stable mild hyperinflation.  The lungs are free of new pulmonary opacities.  The cardiac silhouette size is enlarged.  The trachea is midline and the mediastinal width is normal. Negative for focal infiltrate, effusion or pneumothorax. Pulmonary vasculature is normal. Negative for osseous abnormalities. Convex right curvature of the midthoracic spine with marginal spondylosis.  Stable mildly tortuous aorta with aortic arch calcifications.  Stable eventration of the hemidiaphragms.  Cholecystectomy clips.   Degenerative changes of the shoulder girdles.                              Imaging Results          X-Ray Chest 1 View (Final result)  Result time 02/07/22 12:29:01    Final result by Tyson Donahue MD (02/07/22 12:29:01)                 Impression:      1.  Negative for acute process involving the chest.    2.  Stable findings as noted above.      Electronically signed by: Tyson Donahue MD  Date:    02/07/2022  Time:    12:29             Narrative:    EXAMINATION:  XR CHEST 1 VIEW    CLINICAL HISTORY:  Chest pain, unspecified    COMPARISON:  February 9, 2019    FINDINGS:  The study is kyphotic in position and rotated to the right.  Stable mild hyperinflation.  The lungs are free of new pulmonary opacities.  The cardiac silhouette size is enlarged.  The trachea is midline and the mediastinal width is normal. Negative for focal infiltrate, effusion or pneumothorax. Pulmonary vasculature is normal. Negative for osseous abnormalities. Convex right curvature of the midthoracic spine with marginal spondylosis.  Stable mildly tortuous aorta with aortic arch calcifications.  Stable eventration of the hemidiaphragms.  Cholecystectomy clips.  Degenerative changes of the shoulder girdles.                              Labs Reviewed   COMPREHENSIVE METABOLIC PANEL - Abnormal; Notable for the following components:       Result Value    Glucose 148 (*)     All other components within normal limits   CBC W/ AUTO DIFFERENTIAL   CK   TROPONIN I          Medications - No data to display      12:53 PM  Pt denies chest pain, H/P consistent with cervical radiculopathy, pt has chronic a-fib, HR 85 at present moment, will have pt follow up with PCP for recheck, pt in agreement with discharge plan                 Clinical Impression:   Final diagnoses:  [M25.519] Shoulder pain  [R07.9] Chest pain  [M54.12] Cervical radiculopathy (Primary)          ED Disposition Condition    Discharge Stable        ED Prescriptions     None         Follow-up Information     Follow up With Specialties Details Why Contact Info    Annabella Fenton MD Family Medicine Schedule an appointment as soon as possible for a visit in 2 days  [id number] [place] [id number] [phone]             Matthew Cummins NP  02/07/22 1250

## 2022-02-07 NOTE — PROGRESS NOTES
Spoke with the patient and she let me know that she was able to get an appt with Dr. Fenton today.  I instructed the patient to call us after her visit with Dr. Fenton.

## 2022-02-22 ENCOUNTER — ANTI-COAG VISIT (OUTPATIENT)
Dept: CARDIOLOGY | Facility: CLINIC | Age: 82
End: 2022-02-22
Payer: MEDICARE

## 2022-02-22 DIAGNOSIS — Z79.01 LONG TERM (CURRENT) USE OF ANTICOAGULANTS: Primary | ICD-10-CM

## 2022-02-22 DIAGNOSIS — I48.91 ATRIAL FIBRILLATION, UNSPECIFIED TYPE: ICD-10-CM

## 2022-02-22 LAB — INR PPP: 2.3 (ref 2–3)

## 2022-02-22 PROCEDURE — 85610 PROTHROMBIN TIME: CPT | Mod: QW,HCNC,S$GLB, | Performed by: INTERNAL MEDICINE

## 2022-02-22 PROCEDURE — 93793 PR ANTICOAGULANT MGMT FOR PT TAKING WARFARIN: ICD-10-PCS | Mod: HCNC,S$GLB,,

## 2022-02-22 PROCEDURE — 93793 ANTICOAG MGMT PT WARFARIN: CPT | Mod: HCNC,S$GLB,,

## 2022-02-22 PROCEDURE — 85610 POCT INR: ICD-10-PCS | Mod: QW,HCNC,S$GLB, | Performed by: INTERNAL MEDICINE

## 2022-02-22 NOTE — PROGRESS NOTES
INR is therapeutic at 2.3.  Patient reports no recent changes.  Currently taking warfarin 6 mg daily.  No change in dose.  Recheck in 1 month.  Dose calendar given.  Patient verbalized understanding.

## 2022-03-23 ENCOUNTER — ANTI-COAG VISIT (OUTPATIENT)
Dept: CARDIOLOGY | Facility: CLINIC | Age: 82
End: 2022-03-23
Payer: MEDICARE

## 2022-03-23 ENCOUNTER — OFFICE VISIT (OUTPATIENT)
Dept: CARDIOLOGY | Facility: CLINIC | Age: 82
End: 2022-03-23
Payer: MEDICARE

## 2022-03-23 VITALS
WEIGHT: 216.5 LBS | SYSTOLIC BLOOD PRESSURE: 135 MMHG | HEART RATE: 86 BPM | OXYGEN SATURATION: 100 % | BODY MASS INDEX: 33.98 KG/M2 | HEIGHT: 67 IN | DIASTOLIC BLOOD PRESSURE: 75 MMHG

## 2022-03-23 DIAGNOSIS — Z79.01 LONG TERM (CURRENT) USE OF ANTICOAGULANTS: Primary | ICD-10-CM

## 2022-03-23 DIAGNOSIS — Z79.01 ANTICOAGULATED ON COUMADIN: ICD-10-CM

## 2022-03-23 DIAGNOSIS — I25.118 CORONARY ARTERY DISEASE OF NATIVE ARTERY OF NATIVE HEART WITH STABLE ANGINA PECTORIS: ICD-10-CM

## 2022-03-23 DIAGNOSIS — I48.91 ATRIAL FIBRILLATION, UNSPECIFIED TYPE: ICD-10-CM

## 2022-03-23 DIAGNOSIS — I10 HYPERTENSION, UNSPECIFIED TYPE: ICD-10-CM

## 2022-03-23 DIAGNOSIS — E11.69 HYPERLIPIDEMIA ASSOCIATED WITH TYPE 2 DIABETES MELLITUS: ICD-10-CM

## 2022-03-23 DIAGNOSIS — Z79.01 LONG TERM (CURRENT) USE OF ANTICOAGULANTS: ICD-10-CM

## 2022-03-23 DIAGNOSIS — I70.0 AORTIC ATHEROSCLEROSIS: ICD-10-CM

## 2022-03-23 DIAGNOSIS — E78.5 HYPERLIPIDEMIA ASSOCIATED WITH TYPE 2 DIABETES MELLITUS: ICD-10-CM

## 2022-03-23 DIAGNOSIS — I25.118 CORONARY ARTERY DISEASE WITH STABLE ANGINA PECTORIS, UNSPECIFIED VESSEL OR LESION TYPE, UNSPECIFIED WHETHER NATIVE OR TRANSPLANTED HEART: ICD-10-CM

## 2022-03-23 DIAGNOSIS — R06.09 OTHER FORM OF DYSPNEA: ICD-10-CM

## 2022-03-23 DIAGNOSIS — E78.5 HYPERLIPIDEMIA WITH TARGET LDL LESS THAN 100: ICD-10-CM

## 2022-03-23 DIAGNOSIS — K21.9 GASTROESOPHAGEAL REFLUX DISEASE, UNSPECIFIED WHETHER ESOPHAGITIS PRESENT: ICD-10-CM

## 2022-03-23 DIAGNOSIS — I48.20 CHRONIC A-FIB: Primary | ICD-10-CM

## 2022-03-23 DIAGNOSIS — G47.33 OSA (OBSTRUCTIVE SLEEP APNEA): ICD-10-CM

## 2022-03-23 DIAGNOSIS — R07.9 CHEST PAIN, UNSPECIFIED TYPE: ICD-10-CM

## 2022-03-23 LAB — INR PPP: 2.6 (ref 2–3)

## 2022-03-23 PROCEDURE — 1101F PT FALLS ASSESS-DOCD LE1/YR: CPT | Mod: CPTII,S$GLB,, | Performed by: INTERNAL MEDICINE

## 2022-03-23 PROCEDURE — 99214 OFFICE O/P EST MOD 30 MIN: CPT | Mod: S$GLB,,, | Performed by: INTERNAL MEDICINE

## 2022-03-23 PROCEDURE — 1101F PR PT FALLS ASSESS DOC 0-1 FALLS W/OUT INJ PAST YR: ICD-10-PCS | Mod: CPTII,S$GLB,, | Performed by: INTERNAL MEDICINE

## 2022-03-23 PROCEDURE — 3072F PR LOW RISK FOR RETINOPATHY: ICD-10-PCS | Mod: CPTII,S$GLB,, | Performed by: INTERNAL MEDICINE

## 2022-03-23 PROCEDURE — 3075F SYST BP GE 130 - 139MM HG: CPT | Mod: CPTII,S$GLB,, | Performed by: INTERNAL MEDICINE

## 2022-03-23 PROCEDURE — 99999 PR PBB SHADOW E&M-EST. PATIENT-LVL IV: ICD-10-PCS | Mod: PBBFAC,,, | Performed by: INTERNAL MEDICINE

## 2022-03-23 PROCEDURE — 1126F PR PAIN SEVERITY QUANTIFIED, NO PAIN PRESENT: ICD-10-PCS | Mod: CPTII,S$GLB,, | Performed by: INTERNAL MEDICINE

## 2022-03-23 PROCEDURE — 1160F RVW MEDS BY RX/DR IN RCRD: CPT | Mod: CPTII,S$GLB,, | Performed by: INTERNAL MEDICINE

## 2022-03-23 PROCEDURE — 3075F PR MOST RECENT SYSTOLIC BLOOD PRESS GE 130-139MM HG: ICD-10-PCS | Mod: CPTII,S$GLB,, | Performed by: INTERNAL MEDICINE

## 2022-03-23 PROCEDURE — 3078F PR MOST RECENT DIASTOLIC BLOOD PRESSURE < 80 MM HG: ICD-10-PCS | Mod: CPTII,S$GLB,, | Performed by: INTERNAL MEDICINE

## 2022-03-23 PROCEDURE — 3078F DIAST BP <80 MM HG: CPT | Mod: CPTII,S$GLB,, | Performed by: INTERNAL MEDICINE

## 2022-03-23 PROCEDURE — 1159F MED LIST DOCD IN RCRD: CPT | Mod: CPTII,S$GLB,, | Performed by: INTERNAL MEDICINE

## 2022-03-23 PROCEDURE — 85610 PROTHROMBIN TIME: CPT | Mod: QW,S$GLB,, | Performed by: INTERNAL MEDICINE

## 2022-03-23 PROCEDURE — 3288F PR FALLS RISK ASSESSMENT DOCUMENTED: ICD-10-PCS | Mod: CPTII,S$GLB,, | Performed by: INTERNAL MEDICINE

## 2022-03-23 PROCEDURE — 1126F AMNT PAIN NOTED NONE PRSNT: CPT | Mod: CPTII,S$GLB,, | Performed by: INTERNAL MEDICINE

## 2022-03-23 PROCEDURE — 1159F PR MEDICATION LIST DOCUMENTED IN MEDICAL RECORD: ICD-10-PCS | Mod: CPTII,S$GLB,, | Performed by: INTERNAL MEDICINE

## 2022-03-23 PROCEDURE — 99214 PR OFFICE/OUTPT VISIT, EST, LEVL IV, 30-39 MIN: ICD-10-PCS | Mod: S$GLB,,, | Performed by: INTERNAL MEDICINE

## 2022-03-23 PROCEDURE — 1160F PR REVIEW ALL MEDS BY PRESCRIBER/CLIN PHARMACIST DOCUMENTED: ICD-10-PCS | Mod: CPTII,S$GLB,, | Performed by: INTERNAL MEDICINE

## 2022-03-23 PROCEDURE — 3072F LOW RISK FOR RETINOPATHY: CPT | Mod: CPTII,S$GLB,, | Performed by: INTERNAL MEDICINE

## 2022-03-23 PROCEDURE — 99999 PR PBB SHADOW E&M-EST. PATIENT-LVL IV: CPT | Mod: PBBFAC,,, | Performed by: INTERNAL MEDICINE

## 2022-03-23 PROCEDURE — 85610 POCT INR: ICD-10-PCS | Mod: QW,S$GLB,, | Performed by: INTERNAL MEDICINE

## 2022-03-23 PROCEDURE — 3288F FALL RISK ASSESSMENT DOCD: CPT | Mod: CPTII,S$GLB,, | Performed by: INTERNAL MEDICINE

## 2022-03-23 RX ORDER — LOSARTAN POTASSIUM 100 MG/1
100 TABLET ORAL DAILY
Qty: 90 TABLET | Refills: 1 | Status: SHIPPED | OUTPATIENT
Start: 2022-03-23

## 2022-03-23 RX ORDER — DILTIAZEM HYDROCHLORIDE 180 MG/1
180 CAPSULE, COATED, EXTENDED RELEASE ORAL DAILY
Qty: 90 CAPSULE | Refills: 3 | Status: SHIPPED | OUTPATIENT
Start: 2022-03-23 | End: 2023-04-27 | Stop reason: SDUPTHER

## 2022-03-23 RX ORDER — METOPROLOL SUCCINATE 100 MG/1
100 TABLET, EXTENDED RELEASE ORAL DAILY
Qty: 90 TABLET | Refills: 2 | Status: SHIPPED | OUTPATIENT
Start: 2022-03-23 | End: 2022-07-05 | Stop reason: SDUPTHER

## 2022-03-23 NOTE — PROGRESS NOTES
Subjective:   Patient ID:  Natalie Greene is a 81 y.o. female who presents for cardiac consult of No chief complaint on file.      Atrial Fibrillation  Symptoms include chest pain, dizziness and shortness of breath. Symptoms are negative for palpitations. Past medical history includes atrial fibrillation and hyperlipidemia.   Hypertension  Associated symptoms include chest pain, malaise/fatigue and shortness of breath. Pertinent negatives include no palpitations.   Hyperlipidemia  Associated symptoms include chest pain and shortness of breath.     The patient came in today for cardiac consult of No chief complaint on file.    Natalie Greene is a 81 y.o. female  HTN, non-obs CAD, atrial fibrillation (on Coumadin), pulm HTN,  hyperlipidemia, obesity, and DM presents for CV follow up.    4/10/18  Pt has left sided chest pain described as ache. Feels like it goes to left shoulder,arm,fingers. Feels like it goes straight through. The pain is intermittent, occurred the night before yesterday, feels more tired now due to it. Pain can occur at rest but not really exertional. Pt also has mild SOB but not necessary with ache, no diaphoresis but does have mild nausea. No dizziness/lightheaded. Does feel palpitations with Afib. Pt also has significant fatigue, thinks she snores a lot, has a dry mouth always and has not been tested for sleep apnea. Prior echo and cath results below - negative for signif CAD.     11/27/18  Overall has been doing well. Occ palpitations. She had neg pharm stress test 6/18. Has not gotten sleep study yet but still symptomatic with snoring, dry mouth, fatigue.  present who was diagnosed with DORA but has not gotten CPAP yet. No CP or SOB now. Has varicose veins, has not gotten compression stockings yet.     8/13/19  She was diagnosed with DORA since last visit has been on CPAP. She has been feeling overall bad lately, feels L arm pain, dizzy at times with more fatigue. She has a knot on the  back next to her spine.  Is doing ok with coumadin. Has been checking blood sugars overall ok. No CP. IF she squeezes her arm feels better at times.     2/25/20  INR 2.1 3 weeks ago, prior levels in range. She has been having nausea/vomiting due to stomach pains. Her L arm has started to hurt and kept her awake all night. Her left armpit and shoulder has intermittent pain, along with PLAZA. Pt concerned about blockages, discussed will get pharm stress and eval if further workup needed.     1/19/21  Has not followed up since last Feb, nuclear stress pending. She continues to have chest pain, L sided usually at night but yesterday happened earlier. Neg for COVID recently, does not want vaccine.     3/23/22  Follow up since 1/2021. ECHO and Nuclear stress neg 1/2021. BP today 140s/80s. HR 80s. She feels overall ok but she occ feels strange sensation in chest.     Patient feels  no PND, no dizziness, no syncope, no CNS symptoms.    Patient is compliant with medications.    ECG - Afib V rate 95      Results for orders placed during the hospital encounter of 01/29/21    Echo Color Flow Doppler? Yes    Interpretation Summary  · Concentric remodeling and normal systolic function. The estimated ejection fraction is 60%  · Mild left atrial enlargement.  · Mild right atrial enlargement.  · Normal left ventricular diastolic function.  · With normal right ventricular systolic function.  · Mild mitral regurgitation.  · Normal central venous pressure (3 mmHg).  · The estimated PA systolic pressure is 32 mmHg.      Results for orders placed during the hospital encounter of 01/29/21    Nuclear Stress - Cardiology Interpreted    Interpretation Summary    Normal myocardial perfusion scan. There is no evidence of myocardial ischemia or infarction.    The gated perfusion images showed an ejection fraction of 58% at rest. The gated perfusion images showed an ejection fraction of 65% post stress. Normal ejection fraction is greater than  %.    The EKG portion of this study is negative for ischemia.    Arrhythmias during stress: PVCs.      4/11/2016 [facility]    Patient has a right dominant coronary artery.      - Left Main Coronary Artery:             The LM is normal. There is NICOLE 3 flow.     - Left Anterior Descending Artery:             The LAD has luminal irregularities. There is NICOLE 3 flow.     - Left Circumflex Artery:             The LCX is normal. There is NICOLE 3 flow.     - Right Coronary Artery:             The RCA has luminal irregularities. There is NICOLE 3 flow.    D. SUMMARY/POST-OPERATIVE DIAGNOSIS:  1. Non-obstructive CAD.  2. Normal LVEF.    Past Medical History:   Diagnosis Date    *Atrial fibrillation     Abdominal wall seroma 1/16/2019    Seroma developed after her hernia repair in 2012.  Please review the notes in the Care everywhere section of the chart.  There is no luis evidence of infection at this time.  I would recommend checking a erythrocyte sedimentation rate and C reactive protein.  These are significant elevated then aspiration of the seroma fluid would be warranted.  If not the seroma does not require any intervention    Coronary artery disease involving native coronary artery of native heart with angina pectoris 4/10/2018    Diabetes mellitus     DM (diabetes mellitus) 2002     09/06/2017 no medication    Hyperlipidemia     Hypertension     Morbid (severe) obesity due to excess calories 7/23/2013    Obesity (BMI 30-39.9) 2/24/2015    Obstructive sleep apnea        Past Surgical History:   Procedure Laterality Date    CATARACT EXTRACTION Left 12/15/2021    distance    CHOLECYSTECTOMY      COLON SURGERY      HYSTERECTOMY         Social History     Tobacco Use    Smoking status: Never Smoker    Smokeless tobacco: Never Used   Substance Use Topics    Alcohol use: No    Drug use: No       Family History   Problem Relation Age of Onset    Diabetes Sister     Hypertension Sister     Hypertension  Mother     Diabetes Sister     COPD Neg Hx     Cancer Neg Hx     Heart disease Neg Hx     Hyperlipidemia Neg Hx     Stroke Neg Hx        Patient's Medications   New Prescriptions    No medications on file   Previous Medications    ACCU-CHEK SOFTCLIX LANCETS MISC        AMLODIPINE (NORVASC) 5 MG TABLET    TAKE 1 TABLET EVERY DAY    ATORVASTATIN (LIPITOR) 40 MG TABLET    Take 1 tablet (40 mg total) by mouth once daily.    BLOOD SUGAR DIAGNOSTIC (TRUE METRIX GLUCOSE TEST STRIP) STRP    TEST BLOOD SUGAR TWICE DAILY    HYDROCODONE-ACETAMINOPHEN (NORCO) 5-325 MG PER TABLET    Take 1 tablet by mouth nightly as needed for Pain.    INCONTINENCE PAD,LINER,DISP (BLADDER CONTROL PADS EX ABSORB MISC)        INDAPAMIDE (LOZOL) 1.25 MG TAB    TAKE 1 TABLET EVERY DAY    NITROGLYCERIN (NITROSTAT) 0.4 MG SL TABLET    Place 1 tablet (0.4 mg total) under the tongue every 5 (five) minutes as needed for Chest pain. Take up to 3 times 5 min apart    TRIAMCINOLONE ACETONIDE 0.1% (KENALOG) 0.1 % CREAM    Apply topically 2 (two) times daily.    TRUE METRIX AIR GLUCOSE METER KIT        TRUE METRIX LEVEL 1 SOLN    1 each by Tube route once daily.    WARFARIN (COUMADIN) 6 MG TABLET    TAKE 1 TABLET EVERY DAY   Modified Medications    Modified Medication Previous Medication    DILTIAZEM (CARDIZEM CD) 180 MG 24 HR CAPSULE diltiaZEM (CARDIZEM CD) 180 MG 24 hr capsule       Take 1 capsule (180 mg total) by mouth once daily.    Take 1 capsule (180 mg total) by mouth once daily.    LOSARTAN (COZAAR) 100 MG TABLET losartan (COZAAR) 100 MG tablet       Take 1 tablet (100 mg total) by mouth once daily.    Take 1 tablet (100 mg total) by mouth once daily.    METOPROLOL SUCCINATE (TOPROL-XL) 100 MG 24 HR TABLET metoprolol succinate (TOPROL-XL) 100 MG 24 hr tablet       Take 1 tablet (100 mg total) by mouth once daily.    TAKE 1 TABLET EVERY DAY   Discontinued Medications    No medications on file       Review of Systems   Constitutional: Positive  "for malaise/fatigue.   HENT: Negative.    Eyes: Negative.    Respiratory: Positive for shortness of breath.    Cardiovascular: Positive for chest pain and leg swelling. Negative for palpitations.   Gastrointestinal: Negative.    Genitourinary: Negative.    Musculoskeletal: Negative.    Skin: Negative.    Neurological: Positive for dizziness.   Endo/Heme/Allergies: Negative.    Psychiatric/Behavioral: Negative.    All 12 systems otherwise negative.      Wt Readings from Last 3 Encounters:   03/23/22 98.2 kg (216 lb 7.9 oz)   02/07/22 98.9 kg (218 lb)   01/20/22 98.5 kg (217 lb 2.5 oz)     Temp Readings from Last 3 Encounters:   02/07/22 98 °F (36.7 °C) (Oral)   01/20/22 96 °F (35.6 °C) (Tympanic)   02/25/21 96.7 °F (35.9 °C)     BP Readings from Last 3 Encounters:   03/23/22 135/75   02/07/22 (!) 175/80   01/20/22 128/70     Pulse Readings from Last 3 Encounters:   03/23/22 86   02/07/22 82   01/20/22 110       /75   Pulse 86   Ht 5' 7" (1.702 m)   Wt 98.2 kg (216 lb 7.9 oz)   LMP  (LMP Unknown)   SpO2 100%   BMI 33.91 kg/m²     Objective:   Physical Exam  Vitals and nursing note reviewed.   Constitutional:       General: She is not in acute distress.     Appearance: She is well-developed. She is not diaphoretic.   HENT:      Head: Normocephalic and atraumatic.      Nose: Nose normal.   Eyes:      General: No scleral icterus.     Conjunctiva/sclera: Conjunctivae normal.   Neck:      Thyroid: No thyromegaly.      Vascular: No JVD.   Cardiovascular:      Rate and Rhythm: Normal rate. Rhythm irregularly irregular.      Heart sounds: S1 normal and S2 normal. Murmur heard.     No friction rub. No gallop. No S3 or S4 sounds.   Pulmonary:      Effort: Pulmonary effort is normal. No respiratory distress.      Breath sounds: Normal breath sounds. No stridor. No wheezing or rales.   Chest:      Chest wall: No tenderness.   Abdominal:      General: Bowel sounds are normal. There is no distension.      Palpations: " Abdomen is soft. There is no mass.      Tenderness: There is no abdominal tenderness. There is no rebound.   Genitourinary:     Comments: Deferred  Musculoskeletal:         General: No tenderness or deformity. Normal range of motion.      Cervical back: Normal range of motion and neck supple.   Lymphadenopathy:      Cervical: No cervical adenopathy.   Skin:     General: Skin is warm and dry.      Coloration: Skin is not pale.      Findings: No erythema or rash.   Neurological:      Mental Status: She is alert and oriented to person, place, and time.      Motor: No abnormal muscle tone.      Coordination: Coordination normal.   Psychiatric:         Behavior: Behavior normal.         Thought Content: Thought content normal.         Judgment: Judgment normal.         Lab Results   Component Value Date     02/07/2022    K 3.9 02/07/2022     02/07/2022    CO2 29 02/07/2022    BUN 11 02/07/2022    CREATININE 0.8 02/07/2022     (H) 02/07/2022    HGBA1C 6.7 (H) 01/20/2022    MG 1.6 01/19/2021    AST 32 02/07/2022    ALT 34 02/07/2022    ALBUMIN 4.0 02/07/2022    PROT 7.7 02/07/2022    BILITOT 0.8 02/07/2022    WBC 5.81 02/07/2022    HGB 13.4 02/07/2022    HCT 40.2 02/07/2022    MCV 92 02/07/2022     02/07/2022    INR 2.6 03/23/2022    INR 1.8 (H) 05/04/2020    TSH 1.785 07/29/2014    CHOL 163 01/20/2022    HDL 36 (L) 01/20/2022    LDLCALC 89.0 01/20/2022    TRIG 190 (H) 01/20/2022    BNP 65 01/19/2021     Assessment:      1. Chronic a-fib    2. Anticoagulated on Coumadin    3. Long term (current) use of anticoagulants    4. Coronary artery disease of native artery of native heart with stable angina pectoris    5. Aortic atherosclerosis    6. Hyperlipidemia with target LDL less than 100    7. Hyperlipidemia associated with type 2 diabetes mellitus    8. Coronary artery disease with stable angina pectoris, unspecified vessel or lesion type, unspecified whether native or transplanted heart    9.  Hypertension, unspecified type    10. Other form of dyspnea    11. Chest pain, unspecified type    12. Gastroesophageal reflux disease, unspecified whether esophagitis present    13. DORA (obstructive sleep apnea)        Plan:     1. AF, chronic   - cont meds  - cont coumadin and f/u coumadin clinic    2. LE edema sec to venous insufficiency   - rec stockings, elevate legs  - HCTZ PRN    3. DORA  - needs CPAP    4. Obesity BMI 33 - 216  - rec weight loss with diet and exercise    5. HTN with non obs CAD  - cont meds    6. HLD  - cont statin    7. Pulm HTN  - needs CPAP  - f/u with pulm    8.DM2  - A1c 6.8 -- 6.5 --> 6.7  - cont meds per PCP    9. Back pain/arm pain with weakness  - refer to PMR    10. Chest pain with PLAZA  - ECHO and Nuclear stress neg 1/2021.   - refer to GI - may have esoph issues/GERD    Thank you for allowing me to participate in this patient's care. Please do not hesitate to contact me with any questions or concerns. Consult note has been forwarded to the referral physician.

## 2022-03-23 NOTE — PROGRESS NOTES
INR is therapeutic at 2.6.  Patient reports no recent changes.  Currently taking warfarin 6 mg daily.  No change in dose.  Recheck in 1 month - 4/27/2022 at 1:00p.  Patient verbalized understanding.  Declined AVS.

## 2022-04-26 ENCOUNTER — TELEPHONE (OUTPATIENT)
Dept: ADMINISTRATIVE | Facility: HOSPITAL | Age: 82
End: 2022-04-26
Payer: MEDICARE

## 2022-04-27 ENCOUNTER — ANTI-COAG VISIT (OUTPATIENT)
Dept: CARDIOLOGY | Facility: CLINIC | Age: 82
End: 2022-04-27
Payer: MEDICARE

## 2022-04-27 DIAGNOSIS — Z79.01 LONG TERM (CURRENT) USE OF ANTICOAGULANTS: Primary | ICD-10-CM

## 2022-04-27 DIAGNOSIS — I48.91 ATRIAL FIBRILLATION, UNSPECIFIED TYPE: ICD-10-CM

## 2022-04-27 LAB — INR PPP: 2.8 (ref 2–3)

## 2022-04-27 PROCEDURE — 93793 ANTICOAG MGMT PT WARFARIN: CPT | Mod: S$GLB,,,

## 2022-04-27 PROCEDURE — 85610 POCT INR: ICD-10-PCS | Mod: QW,S$GLB,, | Performed by: INTERNAL MEDICINE

## 2022-04-27 PROCEDURE — 93793 PR ANTICOAGULANT MGMT FOR PT TAKING WARFARIN: ICD-10-PCS | Mod: S$GLB,,,

## 2022-04-27 PROCEDURE — 85610 PROTHROMBIN TIME: CPT | Mod: QW,S$GLB,, | Performed by: INTERNAL MEDICINE

## 2022-04-27 NOTE — PROGRESS NOTES
INR is therapeutic at 2.8.  Patient reports no recent changes.  Currently taking warfarin 6 mg daily.  No change in dose.  Recheck on 6/01/2022.  Patient verbalized understanding.

## 2022-05-06 ENCOUNTER — TELEPHONE (OUTPATIENT)
Dept: ADMINISTRATIVE | Facility: HOSPITAL | Age: 82
End: 2022-05-06
Payer: MEDICARE

## 2022-06-01 ENCOUNTER — ANTI-COAG VISIT (OUTPATIENT)
Dept: CARDIOLOGY | Facility: CLINIC | Age: 82
End: 2022-06-01
Payer: MEDICARE

## 2022-06-01 DIAGNOSIS — I48.91 ATRIAL FIBRILLATION, UNSPECIFIED TYPE: ICD-10-CM

## 2022-06-01 DIAGNOSIS — Z79.01 LONG TERM (CURRENT) USE OF ANTICOAGULANTS: Primary | ICD-10-CM

## 2022-06-01 LAB — INR PPP: 1.7 (ref 2–3)

## 2022-06-01 PROCEDURE — 85610 PROTHROMBIN TIME: CPT | Mod: QW,S$GLB,, | Performed by: INTERNAL MEDICINE

## 2022-06-01 PROCEDURE — 93793 ANTICOAG MGMT PT WARFARIN: CPT | Mod: S$GLB,,,

## 2022-06-01 PROCEDURE — 93793 PR ANTICOAGULANT MGMT FOR PT TAKING WARFARIN: ICD-10-PCS | Mod: S$GLB,,,

## 2022-06-01 PROCEDURE — 85610 POCT INR: ICD-10-PCS | Mod: QW,S$GLB,, | Performed by: INTERNAL MEDICINE

## 2022-06-01 NOTE — PROGRESS NOTES
INR is subtherapeutic at 1.7.  Patient reports a consumption of mustard greens x 2 days this week.  Patient normally avoids greens.  No s/s reported.  Patient has taken her warfarin dose this morning.  Instructions given:  Will boost tomorrow's (Thursday) dose to 9 mg, then resume 6 mg daily.  Advised to maintain a consistent diet.  Recheck in 3 weeks.  Dose calendar given and reviewed with patient.  Patient verbalized understanding.

## 2022-06-23 ENCOUNTER — ANTI-COAG VISIT (OUTPATIENT)
Dept: CARDIOLOGY | Facility: CLINIC | Age: 82
End: 2022-06-23
Payer: MEDICARE

## 2022-06-23 DIAGNOSIS — Z79.01 LONG TERM (CURRENT) USE OF ANTICOAGULANTS: Primary | ICD-10-CM

## 2022-06-23 DIAGNOSIS — I48.91 ATRIAL FIBRILLATION, UNSPECIFIED TYPE: ICD-10-CM

## 2022-06-23 LAB — INR PPP: 2.5 (ref 2–3)

## 2022-06-23 PROCEDURE — 93793 PR ANTICOAGULANT MGMT FOR PT TAKING WARFARIN: ICD-10-PCS | Mod: S$GLB,,,

## 2022-06-23 PROCEDURE — 85610 PROTHROMBIN TIME: CPT | Mod: QW,S$GLB,, | Performed by: INTERNAL MEDICINE

## 2022-06-23 PROCEDURE — 85610 POCT INR: ICD-10-PCS | Mod: QW,S$GLB,, | Performed by: INTERNAL MEDICINE

## 2022-06-23 PROCEDURE — 93793 ANTICOAG MGMT PT WARFARIN: CPT | Mod: S$GLB,,,

## 2022-06-23 NOTE — PROGRESS NOTES
INR: 2.5 - therapeutic.  Patient reports taking warfarin 6 mg daily.  No recent changes reported.  Instructions given to continue same dose of warfarin.  Recheck in 1 month.  Patient verbalized understanding.

## 2022-07-01 RX ORDER — METOPROLOL SUCCINATE 100 MG/1
100 TABLET, EXTENDED RELEASE ORAL DAILY
Qty: 90 TABLET | Refills: 2 | OUTPATIENT
Start: 2022-07-01

## 2022-07-01 NOTE — TELEPHONE ENCOUNTER
No new care gaps identified.  [facility] Embedded Care Gaps. Reference number: [id number]. 7/01/2022   4:18:54 PM CDT

## 2022-07-01 NOTE — TELEPHONE ENCOUNTER
----- Message from Mary Benavidez sent at 7/1/2022  4:01 PM CDT -----  [facility] called regarding a refill request for metoprolol succinate (TOPROL-XL) 100 MG 24 hr tablet. Fax number is [phone] and call back number is [phone]. Thx. EL

## 2022-07-05 RX ORDER — METOPROLOL SUCCINATE 100 MG/1
100 TABLET, EXTENDED RELEASE ORAL DAILY
Qty: 90 TABLET | Refills: 2 | Status: SHIPPED | OUTPATIENT
Start: 2022-07-05 | End: 2023-04-11 | Stop reason: SDUPTHER

## 2022-07-07 ENCOUNTER — TELEPHONE (OUTPATIENT)
Dept: INTERNAL MEDICINE | Facility: CLINIC | Age: 82
End: 2022-07-07
Payer: MEDICARE

## 2022-07-07 NOTE — TELEPHONE ENCOUNTER
----- Message from Chantel Abel sent at 7/7/2022  1:56 PM CDT -----  Regarding: covid  Contact: patient  Patient was just DX with covid, and needs to know what she needs to do and if anything can be called in, please call her back at [phone]

## 2022-07-07 NOTE — TELEPHONE ENCOUNTER
Returned call to patient. Patient states that she tested positive for Covid, and wanted to know if there was anything her provider can call in for it,  Advised patient that there is not anything we can call in for Covid, but we just treat the symptoms of the virus with OTC med's. Verbalized understanding.

## 2022-08-04 ENCOUNTER — ANTI-COAG VISIT (OUTPATIENT)
Dept: CARDIOLOGY | Facility: CLINIC | Age: 82
End: 2022-08-04
Payer: MEDICARE

## 2022-08-04 DIAGNOSIS — Z79.01 LONG TERM (CURRENT) USE OF ANTICOAGULANTS: Primary | ICD-10-CM

## 2022-08-04 DIAGNOSIS — I48.91 ATRIAL FIBRILLATION, UNSPECIFIED TYPE: ICD-10-CM

## 2022-08-04 LAB — INR PPP: 5 (ref 2–3)

## 2022-08-04 PROCEDURE — 93793 ANTICOAG MGMT PT WARFARIN: CPT | Mod: S$GLB,,,

## 2022-08-04 PROCEDURE — 93793 PR ANTICOAGULANT MGMT FOR PT TAKING WARFARIN: ICD-10-PCS | Mod: S$GLB,,,

## 2022-08-04 PROCEDURE — 85610 POCT INR: ICD-10-PCS | Mod: QW,S$GLB,, | Performed by: INTERNAL MEDICINE

## 2022-08-04 PROCEDURE — 85610 PROTHROMBIN TIME: CPT | Mod: QW,S$GLB,, | Performed by: INTERNAL MEDICINE

## 2022-08-04 NOTE — PROGRESS NOTES
Critical INR of 5.0 - supratherapeutic.  Patient reports no bleeding issues, medication or diet changes.  Recent CV+ - resolved.  Currently taking warfarin 6 mg daily as directed.  Instructions given:  Hold warfarin dose x 2 days (8/04 and 8/05), then resume current dose of 6 mg daily.  Bleeding precautions given - ED for any abnormal bleeding.  Recheck next week - 8/11/2022.  Dose calendar given and reviewed with patient.  Patient verbalized understanding of all medical information given.

## 2022-08-11 ENCOUNTER — ANTI-COAG VISIT (OUTPATIENT)
Dept: CARDIOLOGY | Facility: CLINIC | Age: 82
End: 2022-08-11
Payer: MEDICARE

## 2022-08-11 DIAGNOSIS — I48.91 ATRIAL FIBRILLATION, UNSPECIFIED TYPE: ICD-10-CM

## 2022-08-11 DIAGNOSIS — Z79.01 LONG TERM (CURRENT) USE OF ANTICOAGULANTS: Primary | ICD-10-CM

## 2022-08-11 LAB — INR PPP: 1.4 (ref 2–3)

## 2022-08-11 PROCEDURE — 93793 ANTICOAG MGMT PT WARFARIN: CPT | Mod: S$GLB,,,

## 2022-08-11 PROCEDURE — 93793 PR ANTICOAGULANT MGMT FOR PT TAKING WARFARIN: ICD-10-PCS | Mod: S$GLB,,,

## 2022-08-11 PROCEDURE — 85610 PROTHROMBIN TIME: CPT | Mod: QW,S$GLB,, | Performed by: INTERNAL MEDICINE

## 2022-08-11 PROCEDURE — 85610 POCT INR: ICD-10-PCS | Mod: QW,S$GLB,, | Performed by: INTERNAL MEDICINE

## 2022-08-11 NOTE — PROGRESS NOTES
"INR is subtherapeutic at 1.4.  Warfarin was held x 2 days as instructed, then resume 6 mg daily.  Patient reports a consumption of greens daily since last visit "to help bring number down".  No s/s reported.  Patient has taken her warfarin this morning. Sent to PharmD for dosing.    "

## 2022-08-11 NOTE — PROGRESS NOTES
INR not at goal. Medications, chart, and patient findings reviewed. See calendar for adjustments to dose and follow up plan.  Atypical elevation for Mrs Ramona.  We will resume with out a boost since she is not elevated often.  Repeat in 1 about 1.5 to verify dose.

## 2022-08-23 ENCOUNTER — ANTI-COAG VISIT (OUTPATIENT)
Dept: CARDIOLOGY | Facility: CLINIC | Age: 82
End: 2022-08-23
Payer: MEDICARE

## 2022-08-23 DIAGNOSIS — I48.91 ATRIAL FIBRILLATION, UNSPECIFIED TYPE: ICD-10-CM

## 2022-08-23 DIAGNOSIS — Z79.01 LONG TERM (CURRENT) USE OF ANTICOAGULANTS: Primary | ICD-10-CM

## 2022-08-23 LAB — INR PPP: 2.5 (ref 2–3)

## 2022-08-23 PROCEDURE — 85610 POCT INR: ICD-10-PCS | Mod: QW,S$GLB,, | Performed by: INTERNAL MEDICINE

## 2022-08-23 PROCEDURE — 93793 PR ANTICOAGULANT MGMT FOR PT TAKING WARFARIN: ICD-10-PCS | Mod: S$GLB,,,

## 2022-08-23 PROCEDURE — 93793 ANTICOAG MGMT PT WARFARIN: CPT | Mod: S$GLB,,,

## 2022-08-23 PROCEDURE — 85610 PROTHROMBIN TIME: CPT | Mod: QW,S$GLB,, | Performed by: INTERNAL MEDICINE

## 2022-08-23 NOTE — PROGRESS NOTES
INR is therapeutic at 2.5.  Patient reports taking warfarin 6 mg daily as directed.  Reports no recent changes.  Instructions given to continue 6 mg daily.  Patient requested to be rechecked in 1 month - scheduled.  Dose calendar given.

## 2022-09-01 ENCOUNTER — TELEPHONE (OUTPATIENT)
Dept: ADMINISTRATIVE | Facility: HOSPITAL | Age: 82
End: 2022-09-01
Payer: MEDICARE

## 2022-09-07 ENCOUNTER — TELEPHONE (OUTPATIENT)
Dept: ADMINISTRATIVE | Facility: CLINIC | Age: 82
End: 2022-09-07
Payer: MEDICARE

## 2022-09-08 ENCOUNTER — OFFICE VISIT (OUTPATIENT)
Dept: INTERNAL MEDICINE | Facility: CLINIC | Age: 82
End: 2022-09-08
Payer: MEDICARE

## 2022-09-08 VITALS
WEIGHT: 224.63 LBS | SYSTOLIC BLOOD PRESSURE: 128 MMHG | BODY MASS INDEX: 37.43 KG/M2 | DIASTOLIC BLOOD PRESSURE: 72 MMHG | OXYGEN SATURATION: 97 % | HEIGHT: 65 IN | HEART RATE: 78 BPM

## 2022-09-08 DIAGNOSIS — E78.5 HYPERLIPIDEMIA ASSOCIATED WITH TYPE 2 DIABETES MELLITUS: ICD-10-CM

## 2022-09-08 DIAGNOSIS — R80.9 DIABETES MELLITUS WITH MICROALBUMINURIA: ICD-10-CM

## 2022-09-08 DIAGNOSIS — I25.118 CORONARY ARTERY DISEASE OF NATIVE ARTERY OF NATIVE HEART WITH STABLE ANGINA PECTORIS: ICD-10-CM

## 2022-09-08 DIAGNOSIS — E66.01 SEVERE OBESITY (BMI 35.0-39.9) WITH COMORBIDITY: ICD-10-CM

## 2022-09-08 DIAGNOSIS — E11.29 DIABETES MELLITUS WITH MICROALBUMINURIA: ICD-10-CM

## 2022-09-08 DIAGNOSIS — I48.91 ATRIAL FIBRILLATION, UNSPECIFIED TYPE: ICD-10-CM

## 2022-09-08 DIAGNOSIS — M81.0 OSTEOPOROSIS, UNSPECIFIED OSTEOPOROSIS TYPE, UNSPECIFIED PATHOLOGICAL FRACTURE PRESENCE: ICD-10-CM

## 2022-09-08 DIAGNOSIS — Z85.038 HISTORY OF COLON CANCER: ICD-10-CM

## 2022-09-08 DIAGNOSIS — H91.90 DECREASED HEARING, UNSPECIFIED LATERALITY: ICD-10-CM

## 2022-09-08 DIAGNOSIS — I27.9 PULMONARY HEART DISEASE: ICD-10-CM

## 2022-09-08 DIAGNOSIS — I70.0 CALCIFICATION OF AORTA: ICD-10-CM

## 2022-09-08 DIAGNOSIS — Z00.00 ENCOUNTER FOR PREVENTIVE HEALTH EXAMINATION: Primary | ICD-10-CM

## 2022-09-08 DIAGNOSIS — I10 PRIMARY HYPERTENSION: ICD-10-CM

## 2022-09-08 DIAGNOSIS — G47.33 OSA (OBSTRUCTIVE SLEEP APNEA): ICD-10-CM

## 2022-09-08 DIAGNOSIS — E11.69 HYPERLIPIDEMIA ASSOCIATED WITH TYPE 2 DIABETES MELLITUS: ICD-10-CM

## 2022-09-08 DIAGNOSIS — R20.0 NUMBNESS: ICD-10-CM

## 2022-09-08 PROCEDURE — 99999 PR PBB SHADOW E&M-EST. PATIENT-LVL V: CPT | Mod: PBBFAC,,, | Performed by: NURSE PRACTITIONER

## 2022-09-08 PROCEDURE — 99499 UNLISTED E&M SERVICE: CPT | Mod: HCNC,S$GLB,, | Performed by: NURSE PRACTITIONER

## 2022-09-08 PROCEDURE — 3074F SYST BP LT 130 MM HG: CPT | Mod: CPTII,S$GLB,, | Performed by: NURSE PRACTITIONER

## 2022-09-08 PROCEDURE — 1160F PR REVIEW ALL MEDS BY PRESCRIBER/CLIN PHARMACIST DOCUMENTED: ICD-10-PCS | Mod: CPTII,S$GLB,, | Performed by: NURSE PRACTITIONER

## 2022-09-08 PROCEDURE — 1126F AMNT PAIN NOTED NONE PRSNT: CPT | Mod: CPTII,S$GLB,, | Performed by: NURSE PRACTITIONER

## 2022-09-08 PROCEDURE — 3074F PR MOST RECENT SYSTOLIC BLOOD PRESSURE < 130 MM HG: ICD-10-PCS | Mod: CPTII,S$GLB,, | Performed by: NURSE PRACTITIONER

## 2022-09-08 PROCEDURE — G0439 PPPS, SUBSEQ VISIT: HCPCS | Mod: S$GLB,,, | Performed by: NURSE PRACTITIONER

## 2022-09-08 PROCEDURE — 1159F PR MEDICATION LIST DOCUMENTED IN MEDICAL RECORD: ICD-10-PCS | Mod: CPTII,S$GLB,, | Performed by: NURSE PRACTITIONER

## 2022-09-08 PROCEDURE — 1101F PT FALLS ASSESS-DOCD LE1/YR: CPT | Mod: CPTII,S$GLB,, | Performed by: NURSE PRACTITIONER

## 2022-09-08 PROCEDURE — 1170F FXNL STATUS ASSESSED: CPT | Mod: CPTII,S$GLB,, | Performed by: NURSE PRACTITIONER

## 2022-09-08 PROCEDURE — 3078F PR MOST RECENT DIASTOLIC BLOOD PRESSURE < 80 MM HG: ICD-10-PCS | Mod: CPTII,S$GLB,, | Performed by: NURSE PRACTITIONER

## 2022-09-08 PROCEDURE — 1159F MED LIST DOCD IN RCRD: CPT | Mod: CPTII,S$GLB,, | Performed by: NURSE PRACTITIONER

## 2022-09-08 PROCEDURE — 3288F PR FALLS RISK ASSESSMENT DOCUMENTED: ICD-10-PCS | Mod: CPTII,S$GLB,, | Performed by: NURSE PRACTITIONER

## 2022-09-08 PROCEDURE — 3288F FALL RISK ASSESSMENT DOCD: CPT | Mod: CPTII,S$GLB,, | Performed by: NURSE PRACTITIONER

## 2022-09-08 PROCEDURE — 99999 PR PBB SHADOW E&M-EST. PATIENT-LVL V: ICD-10-PCS | Mod: PBBFAC,,, | Performed by: NURSE PRACTITIONER

## 2022-09-08 PROCEDURE — 1101F PR PT FALLS ASSESS DOC 0-1 FALLS W/OUT INJ PAST YR: ICD-10-PCS | Mod: CPTII,S$GLB,, | Performed by: NURSE PRACTITIONER

## 2022-09-08 PROCEDURE — G0439 PR MEDICARE ANNUAL WELLNESS SUBSEQUENT VISIT: ICD-10-PCS | Mod: S$GLB,,, | Performed by: NURSE PRACTITIONER

## 2022-09-08 PROCEDURE — 1126F PR PAIN SEVERITY QUANTIFIED, NO PAIN PRESENT: ICD-10-PCS | Mod: CPTII,S$GLB,, | Performed by: NURSE PRACTITIONER

## 2022-09-08 PROCEDURE — 99499 RISK ADDL DX/OHS AUDIT: ICD-10-PCS | Mod: HCNC,S$GLB,, | Performed by: NURSE PRACTITIONER

## 2022-09-08 PROCEDURE — 1170F PR FUNCTIONAL STATUS ASSESSED: ICD-10-PCS | Mod: CPTII,S$GLB,, | Performed by: NURSE PRACTITIONER

## 2022-09-08 PROCEDURE — 3072F LOW RISK FOR RETINOPATHY: CPT | Mod: CPTII,S$GLB,, | Performed by: NURSE PRACTITIONER

## 2022-09-08 PROCEDURE — 3078F DIAST BP <80 MM HG: CPT | Mod: CPTII,S$GLB,, | Performed by: NURSE PRACTITIONER

## 2022-09-08 PROCEDURE — 3072F PR LOW RISK FOR RETINOPATHY: ICD-10-PCS | Mod: CPTII,S$GLB,, | Performed by: NURSE PRACTITIONER

## 2022-09-08 PROCEDURE — 1160F RVW MEDS BY RX/DR IN RCRD: CPT | Mod: CPTII,S$GLB,, | Performed by: NURSE PRACTITIONER

## 2022-09-08 NOTE — PATIENT INSTRUCTIONS
Counseling and Referral of Other Preventative  (Italic type indicates deductible and co-insurance are waived)    Patient Name: Natalie Greene  Today's Date: 9/8/2022    Health Maintenance       Date Due Completion Date    Hepatitis C Screening Never done ---    Hemoglobin A1c 07/20/2022 1/20/2022    COVID-19 Vaccine (1) 01/20/2023 (Originally 3/23/1941) ---    Influenza Vaccine (1) 06/30/2023 (Originally 9/1/2022) 10/23/2019 (Declined)    Override on 10/23/2019: Declined    Shingles Vaccine (1 of 2) 09/08/2023 (Originally 9/23/1990) ---    Pneumococcal Vaccines (Age 65+) (2 - PPSV23 or PCV20) 09/08/2023 (Originally 8/23/2018) 8/23/2017    Eye Exam 11/01/2022 11/1/2021    DEXA Scan 11/07/2022 11/7/2019    Diabetes Urine Screening 01/20/2023 1/20/2022    Foot Exam 01/20/2023 1/20/2022    Lipid Panel 01/20/2023 1/20/2022    TETANUS VACCINE 11/29/2027 11/29/2017 (Declined)    Override on 11/29/2017: Declined        Orders Placed This Encounter   Procedures    Ambulatory referral/consult to Audiology     The following information is provided to all patients.  This information is to help you find resources for any of the problems found today that may be affecting your health:                Living healthy guide: www.[facility].[place].gov      Understanding Diabetes: www.diabetes.org      Eating healthy: www.cdc.gov/healthyweight      CDC home safety checklist: www.cdc.gov/steadi/patient.html      Agency on Aging: www.goea.[place].gov      Alcoholics anonymous (AA): www.aa.org      Physical Activity: www.junior.nih.gov/rk0bbya      Tobacco use: www.quitwithusla.org

## 2022-09-08 NOTE — PROGRESS NOTES
"  Natalie Greene presented for a  Medicare AWV and comprehensive Health Risk Assessment today. The following components were reviewed and updated:    Medical history  Family History  Social history  Allergies and Current Medications  Health Risk Assessment  Health Maintenance  Care Team         ** See Completed Assessments for Annual Wellness Visit within the encounter summary.**         The following assessments were completed:  Living Situation  CAGE  Depression Screening  Timed Get Up and Go  Whisper Test  Cognitive Function Screening  Nutrition Screening  ADL Screening  PAQ Screening        Vitals:    09/08/22 1357   BP: 128/72   Pulse: 78   SpO2: 97%   Weight: 101.9 kg (224 lb 10.4 oz)   Height: 5' 5" (1.651 m)     Body mass index is 37.38 kg/m².  Physical Exam  Vitals and nursing note reviewed.   Constitutional:       Appearance: She is well-developed.   HENT:      Head: Normocephalic.   Cardiovascular:      Rate and Rhythm: Normal rate. Rhythm irregular.      Heart sounds: Normal heart sounds.   Pulmonary:      Effort: Pulmonary effort is normal. No respiratory distress.      Breath sounds: Normal breath sounds.   Abdominal:      Palpations: Abdomen is soft.      Tenderness: There is no abdominal tenderness.          Comments: Large, hard mass like area palpated.    Musculoskeletal:         General: Normal range of motion.   Skin:     General: Skin is warm and dry.   Neurological:      Mental Status: She is alert and oriented to person, place, and time.      Motor: No abnormal muscle tone.   Psychiatric:         Speech: Speech normal.         Behavior: Behavior normal.             Diagnoses and health risks identified today and associated recommendations/orders:    1. Encounter for preventive health examination  See above PE for abdominal exam. Patient reports a large hernia, reports has been hard for many years. No changes per pt. Denies tenderness. Last BM yesterday. Advised to follow up with PCP for further " evaluation and recommendations. Patient expressed understanding.      She would like to defer Blood work to PCP at upcoming apt.    Scheduled  PCP    Declines Shingrix, flu and pneumonia vaccines.   Reports cardiac conditions are stable.    Reports stress but is not problematic at this time. Patient knows to follow up with PCP if becomes problematic.    Patient unsure of names of medications.  Advised to bring medications to next appointment so that chart can be updated appropriately. Patient verbalized understanding.      Only uses cane prn.     2. Coronary artery disease of native artery of native heart with stable angina pectoris  Stable. Continue current treatment plan as previously prescribed with your  cardiologist.     3. Hyperlipidemia associated with type 2 diabetes mellitus  A1c 6.7  Lipid- not at goal  Continue current treatment plan as previously prescribed with your  pcp and cardiologist.     4. Diabetes mellitus with microalbuminuria  Continue current treatment plan as previously prescribed with your  pcp     5. Numbness  BLE  Chronic  Advised to follow up with PCP for further evaluation and recommendations. Patient expressed understanding.      6. Primary hypertension  Continue current treatment plan as previously prescribed with your  pcp and cardiologist.     7. Atrial fibrillation, unspecified type  Stable. Continue current treatment plan as previously prescribed with your  cardiologist.     8. Calcification of aorta  Cxr 4/16  Continue current treatment plan as previously prescribed with your  cardiologist.     9. Pulmonary heart disease  Echo 1/19  Continue current treatment plan as previously prescribed with your  cardiologist.     10. DORA (obstructive sleep apnea)  Discussed risks associated with untreated sleep apnea  Advised to follow up with PCP for further evaluation and recommendations. Patient expressed understanding.      11. Osteoporosis, unspecified osteoporosis type, unspecified  pathological fracture presence  Dexa 11/19  Continue current treatment plan as previously prescribed with your  pcp     12. Severe obesity (BMI 35.0-39.9) with comorbidity  Encouraged healthy diet and exercise as tolerated to help bring BMI into normal range.    Continue current treatment plan as previously prescribed with your  pcp     13. History of colon cancer  Continue current treatment plan as previously prescribed with your  pcp     14. Decreased hearing, unspecified laterality  Abnormal whisper test.   Advised to follow up with PCP for further evaluation and recommendations. Patient expressed understanding.     - Ambulatory referral/consult to Audiology; Future      Provided Natalie with a 5-10 year written screening schedule and personal prevention plan. Recommendations were developed using the USPSTF age appropriate recommendations. Education, counseling, and referrals were provided as needed. After Visit Summary printed and given to patient which includes a list of additional screenings\tests needed.    Follow up in about 1 year (around 9/8/2023) for awv.    Eva Fuentes NP  I offered to discuss advanced care planning, including how to pick a person who would make decisions for you if you were unable to make them for yourself, called a health care power of , and what kind of decisions you might make such as use of life sustaining treatments such as ventilators and tube feeding when faced with a life limiting illness recorded on a living will that they will need to know. (How you want to be cared for as you near the end of your natural life)     X Patient is interested in learning more about how to make advanced directives.  I provided them paperwork and offered to discuss this with them.

## 2022-09-20 ENCOUNTER — ANTI-COAG VISIT (OUTPATIENT)
Dept: CARDIOLOGY | Facility: CLINIC | Age: 82
End: 2022-09-20
Payer: MEDICARE

## 2022-09-20 DIAGNOSIS — Z79.01 LONG TERM (CURRENT) USE OF ANTICOAGULANTS: Primary | ICD-10-CM

## 2022-09-20 DIAGNOSIS — I48.91 ATRIAL FIBRILLATION, UNSPECIFIED TYPE: ICD-10-CM

## 2022-09-20 LAB — INR PPP: 3.1 (ref 2–3)

## 2022-09-20 PROCEDURE — 85610 PROTHROMBIN TIME: CPT | Mod: QW,S$GLB,, | Performed by: INTERNAL MEDICINE

## 2022-09-20 PROCEDURE — 93793 ANTICOAG MGMT PT WARFARIN: CPT | Mod: S$GLB,,,

## 2022-09-20 PROCEDURE — 93793 PR ANTICOAGULANT MGMT FOR PT TAKING WARFARIN: ICD-10-PCS | Mod: S$GLB,,,

## 2022-09-20 PROCEDURE — 85610 POCT INR: ICD-10-PCS | Mod: QW,S$GLB,, | Performed by: INTERNAL MEDICINE

## 2022-09-20 NOTE — PROGRESS NOTES
INR: 3.1 - just above goal.  Patient reports taking an OTC cough medication (?) - no DM.  No bleeding issues reported.  Current dose verified and followed.  Instructions given to eat a small serving of greens today. Continue warfarin 6 mg daily.  Recheck in 1 month - requested by patient.  Dose calendar given.  Advised on bleeding precautions.  Patient verbalized understanding.

## 2022-09-29 ENCOUNTER — OFFICE VISIT (OUTPATIENT)
Dept: INTERNAL MEDICINE | Facility: CLINIC | Age: 82
End: 2022-09-29
Payer: MEDICARE

## 2022-09-29 ENCOUNTER — LAB VISIT (OUTPATIENT)
Dept: LAB | Facility: HOSPITAL | Age: 82
End: 2022-09-29
Attending: FAMILY MEDICINE
Payer: MEDICARE

## 2022-09-29 VITALS
BODY MASS INDEX: 36.31 KG/M2 | TEMPERATURE: 98 F | OXYGEN SATURATION: 97 % | DIASTOLIC BLOOD PRESSURE: 64 MMHG | SYSTOLIC BLOOD PRESSURE: 110 MMHG | HEIGHT: 65 IN | WEIGHT: 217.94 LBS | HEART RATE: 92 BPM

## 2022-09-29 DIAGNOSIS — I70.0 AORTIC ATHEROSCLEROSIS: ICD-10-CM

## 2022-09-29 DIAGNOSIS — E11.29 DIABETES MELLITUS WITH MICROALBUMINURIA: ICD-10-CM

## 2022-09-29 DIAGNOSIS — I83.93 VARICOSE VEINS OF BOTH LOWER EXTREMITIES, UNSPECIFIED WHETHER COMPLICATED: ICD-10-CM

## 2022-09-29 DIAGNOSIS — G47.33 OSA (OBSTRUCTIVE SLEEP APNEA): ICD-10-CM

## 2022-09-29 DIAGNOSIS — E78.5 HYPERLIPIDEMIA ASSOCIATED WITH TYPE 2 DIABETES MELLITUS: ICD-10-CM

## 2022-09-29 DIAGNOSIS — Z79.01 ANTICOAGULATED ON COUMADIN: ICD-10-CM

## 2022-09-29 DIAGNOSIS — I48.91 ATRIAL FIBRILLATION, UNSPECIFIED TYPE: ICD-10-CM

## 2022-09-29 DIAGNOSIS — E11.69 HYPERLIPIDEMIA ASSOCIATED WITH TYPE 2 DIABETES MELLITUS: ICD-10-CM

## 2022-09-29 DIAGNOSIS — R80.9 DIABETES MELLITUS WITH MICROALBUMINURIA: ICD-10-CM

## 2022-09-29 DIAGNOSIS — R05.9 COUGH: ICD-10-CM

## 2022-09-29 DIAGNOSIS — I25.118 CORONARY ARTERY DISEASE OF NATIVE ARTERY OF NATIVE HEART WITH STABLE ANGINA PECTORIS: ICD-10-CM

## 2022-09-29 DIAGNOSIS — I10 PRIMARY HYPERTENSION: Primary | ICD-10-CM

## 2022-09-29 DIAGNOSIS — I10 PRIMARY HYPERTENSION: ICD-10-CM

## 2022-09-29 LAB
ALBUMIN SERPL BCP-MCNC: 4 G/DL (ref 3.5–5.2)
ALP SERPL-CCNC: 78 U/L (ref 55–135)
ALT SERPL W/O P-5'-P-CCNC: 38 U/L (ref 10–44)
ANION GAP SERPL CALC-SCNC: 10 MMOL/L (ref 8–16)
AST SERPL-CCNC: 29 U/L (ref 10–40)
BILIRUB SERPL-MCNC: 0.7 MG/DL (ref 0.1–1)
BUN SERPL-MCNC: 14 MG/DL (ref 8–23)
CALCIUM SERPL-MCNC: 8.9 MG/DL (ref 8.7–10.5)
CHLORIDE SERPL-SCNC: 101 MMOL/L (ref 95–110)
CHOLEST SERPL-MCNC: 156 MG/DL (ref 120–199)
CHOLEST/HDLC SERPL: 5 {RATIO} (ref 2–5)
CO2 SERPL-SCNC: 27 MMOL/L (ref 23–29)
CREAT SERPL-MCNC: 0.8 MG/DL (ref 0.5–1.4)
ERYTHROCYTE [DISTWIDTH] IN BLOOD BY AUTOMATED COUNT: 14.6 % (ref 11.5–14.5)
EST. GFR  (NO RACE VARIABLE): >60 ML/MIN/1.73 M^2
ESTIMATED AVG GLUCOSE: 146 MG/DL (ref 68–131)
GLUCOSE SERPL-MCNC: 143 MG/DL (ref 70–110)
HBA1C MFR BLD: 6.7 % (ref 4–5.6)
HCT VFR BLD AUTO: 38.7 % (ref 37–48.5)
HDLC SERPL-MCNC: 31 MG/DL (ref 40–75)
HDLC SERPL: 19.9 % (ref 20–50)
HGB BLD-MCNC: 12.6 G/DL (ref 12–16)
LDLC SERPL CALC-MCNC: 91.6 MG/DL (ref 63–159)
MCH RBC QN AUTO: 30.4 PG (ref 27–31)
MCHC RBC AUTO-ENTMCNC: 32.6 G/DL (ref 32–36)
MCV RBC AUTO: 94 FL (ref 82–98)
NONHDLC SERPL-MCNC: 125 MG/DL
PLATELET # BLD AUTO: 222 K/UL (ref 150–450)
PMV BLD AUTO: 12.5 FL (ref 9.2–12.9)
POTASSIUM SERPL-SCNC: 3.7 MMOL/L (ref 3.5–5.1)
PROT SERPL-MCNC: 7 G/DL (ref 6–8.4)
RBC # BLD AUTO: 4.14 M/UL (ref 4–5.4)
SODIUM SERPL-SCNC: 138 MMOL/L (ref 136–145)
TRIGL SERPL-MCNC: 167 MG/DL (ref 30–150)
TSH SERPL DL<=0.005 MIU/L-ACNC: 1.52 UIU/ML (ref 0.4–4)
WBC # BLD AUTO: 6.79 K/UL (ref 3.9–12.7)

## 2022-09-29 PROCEDURE — 1101F PT FALLS ASSESS-DOCD LE1/YR: CPT | Mod: CPTII,S$GLB,, | Performed by: FAMILY MEDICINE

## 2022-09-29 PROCEDURE — 1101F PR PT FALLS ASSESS DOC 0-1 FALLS W/OUT INJ PAST YR: ICD-10-PCS | Mod: CPTII,S$GLB,, | Performed by: FAMILY MEDICINE

## 2022-09-29 PROCEDURE — 3074F PR MOST RECENT SYSTOLIC BLOOD PRESSURE < 130 MM HG: ICD-10-PCS | Mod: CPTII,S$GLB,, | Performed by: FAMILY MEDICINE

## 2022-09-29 PROCEDURE — 80053 COMPREHEN METABOLIC PANEL: CPT | Performed by: FAMILY MEDICINE

## 2022-09-29 PROCEDURE — 3072F LOW RISK FOR RETINOPATHY: CPT | Mod: CPTII,S$GLB,, | Performed by: FAMILY MEDICINE

## 2022-09-29 PROCEDURE — 99999 PR PBB SHADOW E&M-EST. PATIENT-LVL III: CPT | Mod: PBBFAC,,, | Performed by: FAMILY MEDICINE

## 2022-09-29 PROCEDURE — 3288F PR FALLS RISK ASSESSMENT DOCUMENTED: ICD-10-PCS | Mod: CPTII,S$GLB,, | Performed by: FAMILY MEDICINE

## 2022-09-29 PROCEDURE — 84443 ASSAY THYROID STIM HORMONE: CPT | Performed by: FAMILY MEDICINE

## 2022-09-29 PROCEDURE — 80061 LIPID PANEL: CPT | Performed by: FAMILY MEDICINE

## 2022-09-29 PROCEDURE — 36415 COLL VENOUS BLD VENIPUNCTURE: CPT | Performed by: FAMILY MEDICINE

## 2022-09-29 PROCEDURE — 3078F DIAST BP <80 MM HG: CPT | Mod: CPTII,S$GLB,, | Performed by: FAMILY MEDICINE

## 2022-09-29 PROCEDURE — 3078F PR MOST RECENT DIASTOLIC BLOOD PRESSURE < 80 MM HG: ICD-10-PCS | Mod: CPTII,S$GLB,, | Performed by: FAMILY MEDICINE

## 2022-09-29 PROCEDURE — 3074F SYST BP LT 130 MM HG: CPT | Mod: CPTII,S$GLB,, | Performed by: FAMILY MEDICINE

## 2022-09-29 PROCEDURE — 99214 PR OFFICE/OUTPT VISIT, EST, LEVL IV, 30-39 MIN: ICD-10-PCS | Mod: S$GLB,,, | Performed by: FAMILY MEDICINE

## 2022-09-29 PROCEDURE — 83036 HEMOGLOBIN GLYCOSYLATED A1C: CPT | Performed by: FAMILY MEDICINE

## 2022-09-29 PROCEDURE — 1126F AMNT PAIN NOTED NONE PRSNT: CPT | Mod: CPTII,S$GLB,, | Performed by: FAMILY MEDICINE

## 2022-09-29 PROCEDURE — 3288F FALL RISK ASSESSMENT DOCD: CPT | Mod: CPTII,S$GLB,, | Performed by: FAMILY MEDICINE

## 2022-09-29 PROCEDURE — 3072F PR LOW RISK FOR RETINOPATHY: ICD-10-PCS | Mod: CPTII,S$GLB,, | Performed by: FAMILY MEDICINE

## 2022-09-29 PROCEDURE — 99214 OFFICE O/P EST MOD 30 MIN: CPT | Mod: S$GLB,,, | Performed by: FAMILY MEDICINE

## 2022-09-29 PROCEDURE — 85027 COMPLETE CBC AUTOMATED: CPT | Performed by: FAMILY MEDICINE

## 2022-09-29 PROCEDURE — 1126F PR PAIN SEVERITY QUANTIFIED, NO PAIN PRESENT: ICD-10-PCS | Mod: CPTII,S$GLB,, | Performed by: FAMILY MEDICINE

## 2022-09-29 PROCEDURE — 99999 PR PBB SHADOW E&M-EST. PATIENT-LVL III: ICD-10-PCS | Mod: PBBFAC,,, | Performed by: FAMILY MEDICINE

## 2022-09-29 NOTE — PROGRESS NOTES
Natalie Greene  09/29/2022  [id number]    Annabella Fenton MD  Patient Care Team:  Annabella Fenton MD as PCP - General (Family Medicine)  Annabella Fenton MD as Consulting Physician (Family Medicine)  Lisbet Lopez LPN as Care Coordinator (Internal Medicine)  Law Rod MD as Consulting Physician (Cardiology)  Henry Rojas OD as Consulting Physician (Optometry)          Visit Type:a scheduled routine follow-up visit    Chief Complaint:  Chief Complaint   Patient presents with    Follow-up     6 month        History of Present Illness:  She is here for follow up   She had her AWV.    She is having chronic cough. She has reflux at times as well.  She remains on Coumadin.    She is taking her BP meds. She saw Dr. Rod in March.  Gets her INR check.          History:  Past Medical History:   Diagnosis Date    *Atrial fibrillation     Abdominal wall seroma 1/16/2019    Seroma developed after her hernia repair in 2012.  Please review the notes in the Care everywhere section of the chart.  There is no luis evidence of infection at this time.  I would recommend checking a erythrocyte sedimentation rate and C reactive protein.  These are significant elevated then aspiration of the seroma fluid would be warranted.  If not the seroma does not require any intervention    Coronary artery disease involving native coronary artery of native heart with angina pectoris 4/10/2018    Diabetes mellitus     DM (diabetes mellitus) 2002     09/06/2017 no medication    Hyperlipidemia     Hypertension     Morbid (severe) obesity due to excess calories 7/23/2013    Obesity (BMI 30-39.9) 2/24/2015    Obstructive sleep apnea      Past Surgical History:   Procedure Laterality Date    CATARACT EXTRACTION Left 12/15/2021    distance    CHOLECYSTECTOMY      COLON SURGERY      HYSTERECTOMY       Family History   Problem Relation Age of Onset    Diabetes Sister     Hypertension Sister     Hypertension Mother     Diabetes Sister      COPD Neg Hx     Cancer Neg Hx     Heart disease Neg Hx     Hyperlipidemia Neg Hx     Stroke Neg Hx      Social History     Socioeconomic History    Marital status:     Number of children: 1    Highest education level: High school graduate   Occupational History    Occupation: Retired   Tobacco Use    Smoking status: Never    Smokeless tobacco: Never   Substance and Sexual Activity    Alcohol use: No    Drug use: No    Sexual activity: Yes     Partners: Male     Social Determinants of Health     Financial Resource Strain: Low Risk     Difficulty of Paying Living Expenses: Not hard at all   Food Insecurity: No Food Insecurity    Worried About Running Out of Food in the Last Year: Never true    Ran Out of Food in the Last Year: Never true   Transportation Needs: No Transportation Needs    Lack of Transportation (Medical): No    Lack of Transportation (Non-Medical): No   Physical Activity: Inactive    Days of Exercise per Week: 0 days    Minutes of Exercise per Session: 0 min   Stress: Stress Concern Present    Feeling of Stress : To some extent   Social Connections: Moderately Integrated    Frequency of Communication with Friends and Family: More than three times a week    Frequency of Social Gatherings with Friends and Family: More than three times a week    Attends Baptism Services: More than 4 times per year    Active Member of Clubs or Organizations: No    Marital Status:    Housing Stability: Low Risk     Unable to Pay for Housing in the Last Year: No    Number of Places Lived in the Last Year: 1    Unstable Housing in the Last Year: No     Patient Active Problem List   Diagnosis    A-fib    HTN (hypertension)    Hyperlipidemia with target LDL less than 100    Hyperlipidemia associated with type 2 diabetes mellitus    Anticoagulated on Coumadin    History of malignant neoplasm of colon    Shoulder pain, left    Aortic atherosclerosis    Long term current use of anticoagulant therapy    DORA  (obstructive sleep apnea)    Psychophysiological insomnia    PLMD (periodic limb movement disorder)    Inadequate sleep hygiene    Pulmonary hypertension    CAD (coronary artery disease)    Osteoporosis    Severe obesity (BMI 35.0-39.9) with comorbidity    Varicose veins of both lower extremities     Review of patient's allergies indicates:  No Known Allergies    The following were reviewed at this visit: active problem list, medication list, allergies, family history, social history, and health maintenance.    Medications:  Current Outpatient Medications on File Prior to Visit   Medication Sig Dispense Refill    ACCU-CHEK SOFTCLIX LANCETS Misc       amLODIPine (NORVASC) 5 MG tablet TAKE 1 TABLET EVERY DAY 90 tablet 0    indapamide (LOZOL) 1.25 MG Tab TAKE 1 TABLET EVERY DAY 90 tablet 0    metoprolol succinate (TOPROL-XL) 100 MG 24 hr tablet Take 1 tablet (100 mg total) by mouth once daily. 90 tablet 2    TRUE METRIX AIR GLUCOSE METER kit       TRUE METRIX GLUCOSE TEST STRIP Strp TEST BLOOD SUGAR TWICE DAILY 200 strip 11    TRUE METRIX LEVEL 1 Soln 1 each by Tube route once daily. 1 each 11    warfarin (COUMADIN) 6 MG tablet TAKE 1 TABLET EVERY DAY 90 tablet 0    atorvastatin (LIPITOR) 40 MG tablet Take 1 tablet (40 mg total) by mouth once daily. (Patient not taking: Reported on 9/29/2022) 90 tablet 3    diltiaZEM (CARDIZEM CD) 180 MG 24 hr capsule Take 1 capsule (180 mg total) by mouth once daily. (Patient not taking: Reported on 9/29/2022) 90 capsule 3    HYDROcodone-acetaminophen (NORCO) 5-325 mg per tablet Take 1 tablet by mouth nightly as needed for Pain. (Patient not taking: Reported on 9/29/2022) 12 tablet 0    INCONTINENCE PAD,LINER,DISP (BLADDER CONTROL PADS EX ABSORB MISC)       losartan (COZAAR) 100 MG tablet Take 1 tablet (100 mg total) by mouth once daily. (Patient not taking: Reported on 9/29/2022) 90 tablet 1    nitroGLYCERIN (NITROSTAT) 0.4 MG SL tablet Place 1 tablet (0.4 mg total) under the tongue  every 5 (five) minutes as needed for Chest pain. Take up to 3 times 5 min apart 30 tablet 0    triamcinolone acetonide 0.1% (KENALOG) 0.1 % cream Apply topically 2 (two) times daily. (Patient not taking: Reported on 9/29/2022) 45 g 0     No current facility-administered medications on file prior to visit.       Medications have been reviewed and reconciled with patient at this visit.  Barriers to medications reviewed with patient.    Adverse reactions to current medications reviewed with patient..    Over the counter medications reviewed and reconciled with patient.    Exam:  Wt Readings from Last 3 Encounters:   09/29/22 98.9 kg (217 lb 14.8 oz)   09/08/22 101.9 kg (224 lb 10.4 oz)   03/23/22 98.2 kg (216 lb 7.9 oz)     Temp Readings from Last 3 Encounters:   09/29/22 97.9 °F (36.6 °C) (Temporal)   02/07/22 98 °F (36.7 °C) (Oral)   01/20/22 96 °F (35.6 °C) (Tympanic)     BP Readings from Last 3 Encounters:   09/29/22 110/64   09/08/22 128/72   03/23/22 135/75     Pulse Readings from Last 3 Encounters:   09/29/22 92   09/08/22 78   03/23/22 86     Body mass index is 36.26 kg/m².      Review of Systems   Constitutional: Negative.  Negative for chills and fever.   HENT: Negative.  Negative for congestion, sinus pain and sore throat.    Eyes:  Negative for blurred vision and double vision.   Respiratory:  Negative for cough, sputum production, shortness of breath and wheezing.    Cardiovascular:  Negative for chest pain, palpitations and leg swelling.   Gastrointestinal:  Negative for abdominal pain, constipation, diarrhea, heartburn, nausea and vomiting.   Genitourinary: Negative.    Musculoskeletal: Negative.    Skin: Negative.  Negative for rash.   Neurological: Negative.    Endo/Heme/Allergies: Negative.  Negative for polydipsia. Does not bruise/bleed easily.   Psychiatric/Behavioral:  Negative for depression and substance abuse.    Physical Exam  Nursing note reviewed.   Cardiovascular:      Rate and Rhythm:  Normal rate. Rhythm irregular.   Pulmonary:      Effort: Pulmonary effort is normal. No respiratory distress.   Neurological:      Mental Status: She is alert and oriented to person, place, and time.   Psychiatric:         Mood and Affect: Mood normal.         Behavior: Behavior normal.         Thought Content: Thought content normal.         Judgment: Judgment normal.       Laboratory Reviewed ({Yes)  Lab Results   Component Value Date    WBC 5.81 02/07/2022    HGB 13.4 02/07/2022    HCT 40.2 02/07/2022     02/07/2022    CHOL 163 01/20/2022    TRIG 190 (H) 01/20/2022    HDL 36 (L) 01/20/2022    ALT 34 02/07/2022    AST 32 02/07/2022     02/07/2022    K 3.9 02/07/2022     02/07/2022    CREATININE 0.8 02/07/2022    BUN 11 02/07/2022    CO2 29 02/07/2022    TSH 1.785 07/29/2014    INR 3.1 (A) 09/20/2022    HGBA1C 6.7 (H) 01/20/2022       Natalie was seen today for follow-up.    Diagnoses and all orders for this visit:    Primary hypertension  -     TSH; Future    Atrial fibrillation, unspecified type  -     TSH; Future    Hyperlipidemia associated with type 2 diabetes mellitus  -     Lipid Panel; Future    Anticoagulated on Coumadin  -     CBC Without Differential; Future    Aortic atherosclerosis  -     Lipid Panel; Future  -     CBC Without Differential; Future    DORA (obstructive sleep apnea)    Coronary artery disease of native artery of native heart with stable angina pectoris  -     Comprehensive Metabolic Panel; Future    Varicose veins of both lower extremities, unspecified whether complicated  -     Comprehensive Metabolic Panel; Future    Diabetes mellitus with microalbuminuria  -     Hemoglobin A1C; Future  -     Microalbumin/Creatinine Ratio, Urine; Future              Care Plan/Goals: Reviewed    Goals        Take at least one BP reading per week at various times of the day      Hypertension Goals/Individual Care Plan    Hypertension Goal Care Plan    1. Blood pressure goal is to be  below 140/90. Normal Blood pressure is 120/80.  Patient's blood pressure is at goal today. (Yes)    2. Goal for self management is to check Blood Pressure daily. Record on Individual log. Patient is agreeable to self management plan. blood pressure log.   Patient will bring logs at next office visit, or communicate to /Care Management team for review for interval follow up.     3. Moderately intense physical activity, such as brisk walking, is beneficial when done regularly. Aim for a total of 40 minutes of moderate to vigorous activity three to four times a week to help lower blood pressure. Exercise of patients choice was recommended at this office visit. walking    4. Eating Healthy Diet is important in Blood Pressure control. As its name implies, the DASH (Dietary Approaches to Stop Hypertension) eating plan is designed to help you manage blood pressure. Emphasizing healthy food sources, it also limits:  Red meat   Sodium (salt)   Sweets, added sugars and sugar-containing beverages.     5. Barriers to goals reviewed and addressed with patient at visit. (Yes)    6. Adherence and Medication Side effects discussed (Yes)    Referral to on-site Pharmacy for Co-management of hypertension (No)  Patient enrolled in Tele-health Hypertension Management. (No)    Patient is under care of /Care management (No) Referral Sent (No)                    Follow up: Follow up in about 6 months (around 3/29/2023) for Follow up MANINDER.    After visit summary was printed and given to patient upon discharge today.  Patient goals and care plan are included in After Visit Summary.

## 2022-09-29 NOTE — PATIENT INSTRUCTIONS
LOW sugar diet    You have diabetes, and your HgA1c 6.7, we want to keep this number under 7.  This is done with diet, lower sugar and limited Bread and Carbohydrate.      You are due for eye exam.

## 2022-10-06 ENCOUNTER — OFFICE VISIT (OUTPATIENT)
Dept: OTOLARYNGOLOGY | Facility: CLINIC | Age: 82
End: 2022-10-06
Payer: MEDICARE

## 2022-10-06 VITALS — WEIGHT: 222.44 LBS | BODY MASS INDEX: 37.02 KG/M2 | TEMPERATURE: 98 F

## 2022-10-06 DIAGNOSIS — H60.8X3 CHRONIC ECZEMATOID OTITIS EXTERNA OF BOTH EARS: ICD-10-CM

## 2022-10-06 DIAGNOSIS — R05.9 COUGH: ICD-10-CM

## 2022-10-06 DIAGNOSIS — K21.9 LARYNGOPHARYNGEAL REFLUX (LPR): Primary | ICD-10-CM

## 2022-10-06 PROCEDURE — 1101F PR PT FALLS ASSESS DOC 0-1 FALLS W/OUT INJ PAST YR: ICD-10-PCS | Mod: CPTII,S$GLB,, | Performed by: OTOLARYNGOLOGY

## 2022-10-06 PROCEDURE — 3072F PR LOW RISK FOR RETINOPATHY: ICD-10-PCS | Mod: CPTII,S$GLB,, | Performed by: OTOLARYNGOLOGY

## 2022-10-06 PROCEDURE — 1126F PR PAIN SEVERITY QUANTIFIED, NO PAIN PRESENT: ICD-10-PCS | Mod: CPTII,S$GLB,, | Performed by: OTOLARYNGOLOGY

## 2022-10-06 PROCEDURE — 1101F PT FALLS ASSESS-DOCD LE1/YR: CPT | Mod: CPTII,S$GLB,, | Performed by: OTOLARYNGOLOGY

## 2022-10-06 PROCEDURE — 99999 PR PBB SHADOW E&M-EST. PATIENT-LVL IV: ICD-10-PCS | Mod: PBBFAC,,, | Performed by: OTOLARYNGOLOGY

## 2022-10-06 PROCEDURE — 31575 DIAGNOSTIC LARYNGOSCOPY: CPT | Mod: S$GLB,,, | Performed by: OTOLARYNGOLOGY

## 2022-10-06 PROCEDURE — 1126F AMNT PAIN NOTED NONE PRSNT: CPT | Mod: CPTII,S$GLB,, | Performed by: OTOLARYNGOLOGY

## 2022-10-06 PROCEDURE — 99204 OFFICE O/P NEW MOD 45 MIN: CPT | Mod: 25,S$GLB,, | Performed by: OTOLARYNGOLOGY

## 2022-10-06 PROCEDURE — 31575 PR LARYNGOSCOPY, FLEXIBLE; DIAGNOSTIC: ICD-10-PCS | Mod: S$GLB,,, | Performed by: OTOLARYNGOLOGY

## 2022-10-06 PROCEDURE — 99204 PR OFFICE/OUTPT VISIT, NEW, LEVL IV, 45-59 MIN: ICD-10-PCS | Mod: 25,S$GLB,, | Performed by: OTOLARYNGOLOGY

## 2022-10-06 PROCEDURE — 1159F MED LIST DOCD IN RCRD: CPT | Mod: CPTII,S$GLB,, | Performed by: OTOLARYNGOLOGY

## 2022-10-06 PROCEDURE — 3288F FALL RISK ASSESSMENT DOCD: CPT | Mod: CPTII,S$GLB,, | Performed by: OTOLARYNGOLOGY

## 2022-10-06 PROCEDURE — 1159F PR MEDICATION LIST DOCUMENTED IN MEDICAL RECORD: ICD-10-PCS | Mod: CPTII,S$GLB,, | Performed by: OTOLARYNGOLOGY

## 2022-10-06 PROCEDURE — 3072F LOW RISK FOR RETINOPATHY: CPT | Mod: CPTII,S$GLB,, | Performed by: OTOLARYNGOLOGY

## 2022-10-06 PROCEDURE — 3288F PR FALLS RISK ASSESSMENT DOCUMENTED: ICD-10-PCS | Mod: CPTII,S$GLB,, | Performed by: OTOLARYNGOLOGY

## 2022-10-06 PROCEDURE — 99999 PR PBB SHADOW E&M-EST. PATIENT-LVL IV: CPT | Mod: PBBFAC,,, | Performed by: OTOLARYNGOLOGY

## 2022-10-06 RX ORDER — FLUOCINOLONE ACETONIDE 0.11 MG/ML
3 OIL AURICULAR (OTIC) 2 TIMES DAILY
Qty: 20 ML | Refills: 5 | Status: SHIPPED | OUTPATIENT
Start: 2022-10-06 | End: 2024-01-04

## 2022-10-06 RX ORDER — ESOMEPRAZOLE MAGNESIUM 40 MG/1
40 CAPSULE, DELAYED RELEASE ORAL
Qty: 90 CAPSULE | Refills: 5 | Status: SHIPPED | OUTPATIENT
Start: 2022-10-06 | End: 2024-01-04

## 2022-10-06 NOTE — PATIENT INSTRUCTIONS
Laryngopharyngeal Reflux (LPR) Patient Information and Medication Instructions    LPR (laryngopharyngeal reflux) can be a challenging condition to control.  Some patients require once daily medication like a proton pump inhibitor to control their symptoms (such as Omeprazole, Pantoprazole, Esomeprazole).  HOWEVER, other patients will require taking this medication twice daily and even may be started on another medication called an H2 blocker (such as Pepcid, Zantac) at bedtime.    It is important that medication is not the only technique that you use to help control your LPR.  Therapeutic lifestyle changes are very important as well:     Weight Loss:  If you are overweight, losing weight can significantly reduce your reflux.  Diet Control:  It is important to determine what your Trigger foods for reflux are.  Limiting your intake of these foods will significantly improve your reflux.  Examples of foods known to increase reflux are listed below  Carbonated beverages  Caffeine (this includes Coffee, soda, iced tea, energy drinks etc.)  Alcohol  Fried/ fatty/ greasy foods  Acidic foods (citrus, tomato etc.)  Chocolate  Spearmint/Peppermint  Elevating the head of your bed:  This doesn't mean more pillows.  You need to actually elevate the head of your bed.  Some patients have found something to put under the legs of the head of their bed.  Other patients have employed a wedge or an air bladder of some sort to elevate the head of their bed.  This makes a significant difference with reflux and can also help with nasal congestion.  Eating before bedtime:  Try not to eat anything for at least 2 hours before bedtime.  This allows for less material to remain in your stomach when you lay down at night which equals less severe reflux.  More information:  If you would like to read more about diet changes and lifestyle changes that you can employ that will help with your reflux, the books below may be helpful.  Dropping Acid:   The Reflux diet Cookbook & Cure by James Tai M.D. and Juan Carlos Baez M.D.  The Acid Watcher Diet:  A 28 Day Reflux Prevention and Healing Program by Aravind Moore M.D.      Weaning Instructions After Symptom Improvement    It can sometimes take up to 12 weeks to see symptom improvement in LPR.  The medications may reduce the amount of acid you are producing very quickly, but then the tissues of your voice box and upper throat have to heal themselves.  Your physician may have increased year proton pump inhibitor to twice daily dosing and even may have added an H2 blocker at bedtime.  Eventually, the goal is a complete resolution of your symptoms.  Hopefully you have been working on the lifestyle modifications listed above over this time period as well!    Once your symptoms have improved, our goal is to wean you back off of your reflux medication.  The instructions below will help guide you through this process.    Take all anti-reflux medications for at least 3 weeks beyond when your symptoms have improved/resolved  If you are on Twice daily dosing of a proton pump inhibitor (omeprazole, pantoprazole, esomeprazole etc.) and an H2 blocker at bedtime (pepcid, zantac) then you should first discontinue your night time dose of your proton pump inhibitor.  If your symptoms are still controlled after 3 weeks of taking your PPI in the morning and your H2 blocker at bedtime,  discontinue your bedtime H2 blocker  If your symptoms are still controlled after 3 more weeks of only taking your PPI in the morning, discontinue your morning PPI    If at any point your symptoms return during this weaning process, you can return to the previous step and let your physician know at the next appointment.

## 2022-10-06 NOTE — PROGRESS NOTES
Referring Provider:    Annabella Fenton Md  [address] [place] [address]  Subjective:   Patient: Natalie Greene [id number], :1940   Visit date:10/6/2022 1:22 PM    Chief Complaint:  Cough (Been going on for years, sinuses run down throat cause her to choke. Both ears are itchy, left is worse)    HPI:    Prior notes reviewed by myself.  Clinical documentation obtained by nursing staff reviewed.     81 y/o female with complaints of chronic cough, dry nonproductive.  She states that this cough happens more when she lays down at night.  She has frequent throat clearing throughout the day.  She denies any heartburn and she feels as if the cough may be due to postnasal drip.  She also reports bilateral ear pruritus.  No recent imaging or allergy testing.        Objective:     Physical Exam:  Vitals:  Temp 98.1 °F (36.7 °C) (Temporal)   Wt 100.9 kg (222 lb 7.1 oz)   LMP  (LMP Unknown)   BMI 37.02 kg/m²   General appearance:  Well developed, well nourished    Ears:  Otoscopy of external auditory canals and tympanic membranes was normal, clinical speech reception thresholds grossly intact, no mass/lesion of auricle.    Nose:  No masses/lesions of external nose, nasal mucosa, septum, and turbinates were within normal limits.    Mouth:  No mass/lesion of lips, teeth, gums, hard/soft palate, tongue, tonsils, or oropharynx.    Neck & Lymphatics:  No cervical lymphadenopathy, no neck mass/crepitus/ asymmetry, trachea is midline, no thyroid enlargement/tenderness/mass.        [x]  Data Reviewed:    Lab Results   Component Value Date    WBC 6.79 2022    HGB 12.6 2022    HCT 38.7 2022    MCV 94 2022    EOSINOPHIL 1.2 2022         [x]  Independent interpretation of test:  Due to indication in patient's history, presentation or risk factors,  a fiber optic exam was performed.    SEPARATE PROCEDURE NOTE:    ANESTHESIA:  Topical xylocaine with lizabeth-synephrine  FINDINGS:   Moderate to severe inaterarytenoid erythema and edema    PROCEDURE:  After verbal consent was obtained, the flexible scope was passed through the patient's nasal cavity without difficulty.  The nasopharynx (adenoid pad) and eustachian tube orifices were first visualized and were found to be normal, without masses or irregularity.  The posterior pharyngeal wall and base of tongue were then examined and no mass or irregular tissue was seen.  The scope was then advanced to the larynx, and the epiglottis, valleculae, and piriform sinuses were normal, without masses or mucosal irregularity.  The false vocal folds and true vocal folds were then examined and were found to have normal mobility (full abduction and adduction) and no masses or mucosal irregularity was seen.  The interartyenoid area had moderate to severe edema and erythema consistent with reflux.            Assessment & Plan:   Laryngopharyngeal reflux (LPR)  -     esomeprazole (NEXIUM) 40 MG capsule; Take 1 capsule (40 mg total) by mouth before breakfast.  Dispense: 90 capsule; Refill: 5    Cough  -     Ambulatory referral/consult to ENT    Chronic eczematoid otitis externa of both ears  -     fluocinolone acetonide oiL (DERMOTIC OIL) 0.01 % Drop; Place 3 drops in ear(s) 2 (two) times daily.  Dispense: 20 mL; Refill: 5      Evidence of reflux on flexible laryngoscopy.  Agreed on a therapeutic trial for the next 6 weeks.  She was also given therapeutic lifestyle change instructions as noted below.  She has chronic eczematoid changes of both ear canals, so we will treat this with derm otic oil.    Laryngopharyngeal Reflux (LPR) Patient Information and Medication Instructions    LPR (laryngopharyngeal reflux) can be a challenging condition to control.  Some patients require once daily medication like a proton pump inhibitor to control their symptoms (such as Omeprazole, Pantoprazole, Esomeprazole).  HOWEVER, other patients will require taking this medication  twice daily and even may be started on another medication called an H2 blocker (such as Pepcid, Zantac) at bedtime.    It is important that medication is not the only technique that you use to help control your LPR.  Therapeutic lifestyle changes are very important as well:     Weight Loss:  If you are overweight, losing weight can significantly reduce your reflux.  Diet Control:  It is important to determine what your Trigger foods for reflux are.  Limiting your intake of these foods will significantly improve your reflux.  Examples of foods known to increase reflux are listed below  Carbonated beverages  Caffeine (this includes Coffee, soda, iced tea, energy drinks etc.)  Alcohol  Fried/ fatty/ greasy foods  Acidic foods (citrus, tomato etc.)  Chocolate  Spearmint/Peppermint  Elevating the head of your bed:  This doesn't mean more pillows.  You need to actually elevate the head of your bed.  Some patients have found something to put under the legs of the head of their bed.  Other patients have employed a wedge or an air bladder of some sort to elevate the head of their bed.  This makes a significant difference with reflux and can also help with nasal congestion.  Eating before bedtime:  Try not to eat anything for at least 2 hours before bedtime.  This allows for less material to remain in your stomach when you lay down at night which equals less severe reflux.  More information:  If you would like to read more about diet changes and lifestyle changes that you can employ that will help with your reflux, the books below may be helpful.  Dropping Acid:  The Reflux diet Cookbook & Cure by James Tai M.D. and Juan Carlos Baez M.D.  The Acid Watcher Diet:  A 28 Day Reflux Prevention and Healing Program by Aravind Moore M.D.      Weaning Instructions After Symptom Improvement    It can sometimes take up to 12 weeks to see symptom improvement in LPR.  The medications may reduce the amount of acid you are producing very  quickly, but then the tissues of your voice box and upper throat have to heal themselves.  Your physician may have increased year proton pump inhibitor to twice daily dosing and even may have added an H2 blocker at bedtime.  Eventually, the goal is a complete resolution of your symptoms.  Hopefully you have been working on the lifestyle modifications listed above over this time period as well!    Once your symptoms have improved, our goal is to wean you back off of your reflux medication.  The instructions below will help guide you through this process.    Take all anti-reflux medications for at least 3 weeks beyond when your symptoms have improved/resolved  If you are on Twice daily dosing of a proton pump inhibitor (omeprazole, pantoprazole, esomeprazole etc.) and an H2 blocker at bedtime (pepcid, zantac) then you should first discontinue your night time dose of your proton pump inhibitor.  If your symptoms are still controlled after 3 weeks of taking your PPI in the morning and your H2 blocker at bedtime,  discontinue your bedtime H2 blocker  If your symptoms are still controlled after 3 more weeks of only taking your PPI in the morning, discontinue your morning PPI    If at any point your symptoms return during this weaning process, you can return to the previous step and let your physician know at the next appointment.

## 2022-10-18 ENCOUNTER — ANTI-COAG VISIT (OUTPATIENT)
Dept: CARDIOLOGY | Facility: CLINIC | Age: 82
End: 2022-10-18
Payer: MEDICARE

## 2022-10-18 DIAGNOSIS — Z79.01 LONG TERM (CURRENT) USE OF ANTICOAGULANTS: Primary | ICD-10-CM

## 2022-10-18 DIAGNOSIS — I48.91 ATRIAL FIBRILLATION, UNSPECIFIED TYPE: ICD-10-CM

## 2022-10-18 LAB — INR PPP: 2.5 (ref 2–3)

## 2022-10-18 PROCEDURE — 85610 PROTHROMBIN TIME: CPT | Mod: QW,S$GLB,, | Performed by: INTERNAL MEDICINE

## 2022-10-18 PROCEDURE — 85610 POCT INR: ICD-10-PCS | Mod: QW,S$GLB,, | Performed by: INTERNAL MEDICINE

## 2022-10-18 PROCEDURE — 93793 ANTICOAG MGMT PT WARFARIN: CPT | Mod: S$GLB,,,

## 2022-10-18 PROCEDURE — 93793 PR ANTICOAGULANT MGMT FOR PT TAKING WARFARIN: ICD-10-PCS | Mod: S$GLB,,,

## 2022-10-18 NOTE — PROGRESS NOTES
INR is therapeutic at 2.5.  Patient reports no recent changes.  Currently taking warfarin 6 mg daily as directed.  No change in dose.  Recheck in 1 month.  Patient voices understanding.

## 2022-10-20 ENCOUNTER — TELEPHONE (OUTPATIENT)
Dept: CARDIOLOGY | Facility: CLINIC | Age: 82
End: 2022-10-20
Payer: MEDICARE

## 2022-10-20 DIAGNOSIS — I48.91 ATRIAL FIBRILLATION, UNSPECIFIED TYPE: Primary | ICD-10-CM

## 2022-10-25 ENCOUNTER — HOSPITAL ENCOUNTER (OUTPATIENT)
Dept: CARDIOLOGY | Facility: HOSPITAL | Age: 82
Discharge: HOME | End: 2022-10-25
Attending: INTERNAL MEDICINE
Payer: MEDICARE

## 2022-10-25 ENCOUNTER — OFFICE VISIT (OUTPATIENT)
Dept: CARDIOLOGY | Facility: CLINIC | Age: 82
End: 2022-10-25
Payer: MEDICARE

## 2022-10-25 VITALS
BODY MASS INDEX: 36.8 KG/M2 | DIASTOLIC BLOOD PRESSURE: 72 MMHG | SYSTOLIC BLOOD PRESSURE: 126 MMHG | WEIGHT: 220.88 LBS | HEART RATE: 90 BPM | OXYGEN SATURATION: 95 % | HEIGHT: 65 IN

## 2022-10-25 DIAGNOSIS — I25.118 CORONARY ARTERY DISEASE OF NATIVE ARTERY OF NATIVE HEART WITH STABLE ANGINA PECTORIS: ICD-10-CM

## 2022-10-25 DIAGNOSIS — I25.118 CORONARY ARTERY DISEASE WITH STABLE ANGINA PECTORIS, UNSPECIFIED VESSEL OR LESION TYPE, UNSPECIFIED WHETHER NATIVE OR TRANSPLANTED HEART: ICD-10-CM

## 2022-10-25 DIAGNOSIS — I83.93 VARICOSE VEINS OF BOTH LOWER EXTREMITIES, UNSPECIFIED WHETHER COMPLICATED: ICD-10-CM

## 2022-10-25 DIAGNOSIS — I70.0 AORTIC ATHEROSCLEROSIS: ICD-10-CM

## 2022-10-25 DIAGNOSIS — E78.5 HYPERLIPIDEMIA ASSOCIATED WITH TYPE 2 DIABETES MELLITUS: ICD-10-CM

## 2022-10-25 DIAGNOSIS — E11.69 HYPERLIPIDEMIA ASSOCIATED WITH TYPE 2 DIABETES MELLITUS: ICD-10-CM

## 2022-10-25 DIAGNOSIS — I48.91 ATRIAL FIBRILLATION, UNSPECIFIED TYPE: ICD-10-CM

## 2022-10-25 DIAGNOSIS — E78.5 HYPERLIPIDEMIA WITH TARGET LDL LESS THAN 100: ICD-10-CM

## 2022-10-25 DIAGNOSIS — Z79.01 LONG TERM (CURRENT) USE OF ANTICOAGULANTS: Primary | ICD-10-CM

## 2022-10-25 DIAGNOSIS — I10 HYPERTENSION, UNSPECIFIED TYPE: ICD-10-CM

## 2022-10-25 DIAGNOSIS — Z79.01 ANTICOAGULATED ON COUMADIN: ICD-10-CM

## 2022-10-25 DIAGNOSIS — R06.09 OTHER FORM OF DYSPNEA: ICD-10-CM

## 2022-10-25 DIAGNOSIS — R06.09 DOE (DYSPNEA ON EXERTION): ICD-10-CM

## 2022-10-25 DIAGNOSIS — I48.20 CHRONIC A-FIB: ICD-10-CM

## 2022-10-25 PROCEDURE — 99214 PR OFFICE/OUTPT VISIT, EST, LEVL IV, 30-39 MIN: ICD-10-PCS | Mod: S$GLB,,, | Performed by: INTERNAL MEDICINE

## 2022-10-25 PROCEDURE — 3072F PR LOW RISK FOR RETINOPATHY: ICD-10-PCS | Mod: CPTII,S$GLB,, | Performed by: INTERNAL MEDICINE

## 2022-10-25 PROCEDURE — 93010 ELECTROCARDIOGRAM REPORT: CPT | Mod: ,,, | Performed by: INTERNAL MEDICINE

## 2022-10-25 PROCEDURE — 99999 PR PBB SHADOW E&M-EST. PATIENT-LVL IV: CPT | Mod: PBBFAC,,, | Performed by: INTERNAL MEDICINE

## 2022-10-25 PROCEDURE — 1101F PT FALLS ASSESS-DOCD LE1/YR: CPT | Mod: CPTII,S$GLB,, | Performed by: INTERNAL MEDICINE

## 2022-10-25 PROCEDURE — 3074F SYST BP LT 130 MM HG: CPT | Mod: CPTII,S$GLB,, | Performed by: INTERNAL MEDICINE

## 2022-10-25 PROCEDURE — 3078F DIAST BP <80 MM HG: CPT | Mod: CPTII,S$GLB,, | Performed by: INTERNAL MEDICINE

## 2022-10-25 PROCEDURE — 1126F AMNT PAIN NOTED NONE PRSNT: CPT | Mod: CPTII,S$GLB,, | Performed by: INTERNAL MEDICINE

## 2022-10-25 PROCEDURE — 3074F PR MOST RECENT SYSTOLIC BLOOD PRESSURE < 130 MM HG: ICD-10-PCS | Mod: CPTII,S$GLB,, | Performed by: INTERNAL MEDICINE

## 2022-10-25 PROCEDURE — 1159F MED LIST DOCD IN RCRD: CPT | Mod: CPTII,S$GLB,, | Performed by: INTERNAL MEDICINE

## 2022-10-25 PROCEDURE — 3288F PR FALLS RISK ASSESSMENT DOCUMENTED: ICD-10-PCS | Mod: CPTII,S$GLB,, | Performed by: INTERNAL MEDICINE

## 2022-10-25 PROCEDURE — 1160F RVW MEDS BY RX/DR IN RCRD: CPT | Mod: CPTII,S$GLB,, | Performed by: INTERNAL MEDICINE

## 2022-10-25 PROCEDURE — 3078F PR MOST RECENT DIASTOLIC BLOOD PRESSURE < 80 MM HG: ICD-10-PCS | Mod: CPTII,S$GLB,, | Performed by: INTERNAL MEDICINE

## 2022-10-25 PROCEDURE — 1160F PR REVIEW ALL MEDS BY PRESCRIBER/CLIN PHARMACIST DOCUMENTED: ICD-10-PCS | Mod: CPTII,S$GLB,, | Performed by: INTERNAL MEDICINE

## 2022-10-25 PROCEDURE — 93005 ELECTROCARDIOGRAM TRACING: CPT

## 2022-10-25 PROCEDURE — 93010 EKG 12-LEAD: ICD-10-PCS | Mod: ,,, | Performed by: INTERNAL MEDICINE

## 2022-10-25 PROCEDURE — 3288F FALL RISK ASSESSMENT DOCD: CPT | Mod: CPTII,S$GLB,, | Performed by: INTERNAL MEDICINE

## 2022-10-25 PROCEDURE — 3072F LOW RISK FOR RETINOPATHY: CPT | Mod: CPTII,S$GLB,, | Performed by: INTERNAL MEDICINE

## 2022-10-25 PROCEDURE — 1101F PR PT FALLS ASSESS DOC 0-1 FALLS W/OUT INJ PAST YR: ICD-10-PCS | Mod: CPTII,S$GLB,, | Performed by: INTERNAL MEDICINE

## 2022-10-25 PROCEDURE — 1159F PR MEDICATION LIST DOCUMENTED IN MEDICAL RECORD: ICD-10-PCS | Mod: CPTII,S$GLB,, | Performed by: INTERNAL MEDICINE

## 2022-10-25 PROCEDURE — 1126F PR PAIN SEVERITY QUANTIFIED, NO PAIN PRESENT: ICD-10-PCS | Mod: CPTII,S$GLB,, | Performed by: INTERNAL MEDICINE

## 2022-10-25 PROCEDURE — 99999 PR PBB SHADOW E&M-EST. PATIENT-LVL IV: ICD-10-PCS | Mod: PBBFAC,,, | Performed by: INTERNAL MEDICINE

## 2022-10-25 PROCEDURE — 99214 OFFICE O/P EST MOD 30 MIN: CPT | Mod: S$GLB,,, | Performed by: INTERNAL MEDICINE

## 2022-10-25 RX ORDER — INDAPAMIDE 2.5 MG/1
2.5 TABLET ORAL DAILY
Qty: 90 TABLET | Refills: 1 | Status: SHIPPED | OUTPATIENT
Start: 2022-10-25 | End: 2023-02-03 | Stop reason: SDUPTHER

## 2022-10-25 NOTE — PROGRESS NOTES
Subjective:   Patient ID:  Natalie Greene is a 82 y.o. female who presents for cardiac consult of No chief complaint on file.        The patient came in today for cardiac consult of No chief complaint on file.    Natalie Greene is a 82 y.o. female  HTN, non-obs CAD, atrial fibrillation (on Coumadin), pulm HTN,  hyperlipidemia, obesity, and DM, DORA presents for CV follow up.    4/10/18  Pt has left sided chest pain described as ache. Feels like it goes to left shoulder,arm,fingers. Feels like it goes straight through. The pain is intermittent, occurred the night before yesterday, feels more tired now due to it. Pain can occur at rest but not really exertional. Pt also has mild SOB but not necessary with ache, no diaphoresis but does have mild nausea. No dizziness/lightheaded. Does feel palpitations with Afib. Pt also has significant fatigue, thinks she snores a lot, has a dry mouth always and has not been tested for sleep apnea. Prior echo and cath results below - negative for signif CAD.     11/27/18  Overall has been doing well. Occ palpitations. She had neg pharm stress test 6/18. Has not gotten sleep study yet but still symptomatic with snoring, dry mouth, fatigue.  present who was diagnosed with DORA but has not gotten CPAP yet. No CP or SOB now. Has varicose veins, has not gotten compression stockings yet.     8/13/19  She was diagnosed with DORA since last visit has been on CPAP. She has been feeling overall bad lately, feels L arm pain, dizzy at times with more fatigue. She has a knot on the back next to her spine.  Is doing ok with coumadin. Has been checking blood sugars overall ok. No CP. IF she squeezes her arm feels better at times.     2/25/20  INR 2.1 3 weeks ago, prior levels in range. She has been having nausea/vomiting due to stomach pains. Her L arm has started to hurt and kept her awake all night. Her left armpit and shoulder has intermittent pain, along with PLAZA. Pt concerned about  blockages, discussed will get pharm stress and eval if further workup needed.     1/19/21  Has not followed up since last Feb, nuclear stress pending. She continues to have chest pain, L sided usually at night but yesterday happened earlier. Neg for COVID recently, does not want vaccine.     3/23/22  Follow up since 1/2021. ECHO and Nuclear stress neg 1/2021. BP today 140s/80s. HR 80s. She feels overall ok but she occ feels strange sensation in chest.     10/25/22  BP and HR well controlled. BMI 36 - 229 lbs. She had chest pain on Sunday felt like pressure, has not used NTG.   ECG - Afib V rate 95, anterior TWI    Patient feels  no PND, no dizziness, no syncope, no CNS symptoms.    Patient is compliant with medications.    ECG - Afib V rate 95      Results for orders placed during the hospital encounter of 01/29/21    Echo Color Flow Doppler? Yes    Interpretation Summary  · Concentric remodeling and normal systolic function. The estimated ejection fraction is 60%  · Mild left atrial enlargement.  · Mild right atrial enlargement.  · Normal left ventricular diastolic function.  · With normal right ventricular systolic function.  · Mild mitral regurgitation.  · Normal central venous pressure (3 mmHg).  · The estimated PA systolic pressure is 32 mmHg.      Results for orders placed during the hospital encounter of 01/29/21    Nuclear Stress - Cardiology Interpreted    Interpretation Summary    Normal myocardial perfusion scan. There is no evidence of myocardial ischemia or infarction.    The gated perfusion images showed an ejection fraction of 58% at rest. The gated perfusion images showed an ejection fraction of 65% post stress. Normal ejection fraction is greater than %.    The EKG portion of this study is negative for ischemia.    Arrhythmias during stress: PVCs.      4/11/2016 [facility]    Patient has a right dominant coronary artery.      - Left Main Coronary Artery:             The LM is normal. There is NICOLE 3  flow.     - Left Anterior Descending Artery:             The LAD has luminal irregularities. There is NICOLE 3 flow.     - Left Circumflex Artery:             The LCX is normal. There is NICOLE 3 flow.     - Right Coronary Artery:             The RCA has luminal irregularities. There is NICOLE 3 flow.    D. SUMMARY/POST-OPERATIVE DIAGNOSIS:  1. Non-obstructive CAD.  2. Normal LVEF.    Past Medical History:   Diagnosis Date    *Atrial fibrillation     Abdominal wall seroma 1/16/2019    Seroma developed after her hernia repair in 2012.  Please review the notes in the Care everywhere section of the chart.  There is no luis evidence of infection at this time.  I would recommend checking a erythrocyte sedimentation rate and C reactive protein.  These are significant elevated then aspiration of the seroma fluid would be warranted.  If not the seroma does not require any intervention    Coronary artery disease involving native coronary artery of native heart with angina pectoris 4/10/2018    Diabetes mellitus     DM (diabetes mellitus) 2002     09/06/2017 no medication    Hyperlipidemia     Hypertension     Morbid (severe) obesity due to excess calories 7/23/2013    Obesity (BMI 30-39.9) 2/24/2015    Obstructive sleep apnea        Past Surgical History:   Procedure Laterality Date    CATARACT EXTRACTION Left 12/15/2021    distance    CHOLECYSTECTOMY      COLON SURGERY      HYSTERECTOMY         Social History     Tobacco Use    Smoking status: Never    Smokeless tobacco: Never   Substance Use Topics    Alcohol use: No    Drug use: No       Family History   Problem Relation Age of Onset    Diabetes Sister     Hypertension Sister     Hypertension Mother     Diabetes Sister     COPD Neg Hx     Cancer Neg Hx     Heart disease Neg Hx     Hyperlipidemia Neg Hx     Stroke Neg Hx        Patient's Medications   New Prescriptions    No medications on file   Previous Medications    ACCU-CHEK SOFTCLIX  LANCETS MISC        AMLODIPINE (NORVASC) 5 MG TABLET    TAKE 1 TABLET EVERY DAY    ATORVASTATIN (LIPITOR) 40 MG TABLET    Take 1 tablet (40 mg total) by mouth once daily.    DILTIAZEM (CARDIZEM CD) 180 MG 24 HR CAPSULE    Take 1 capsule (180 mg total) by mouth once daily.    ESOMEPRAZOLE (NEXIUM) 40 MG CAPSULE    Take 1 capsule (40 mg total) by mouth before breakfast.    FLUOCINOLONE ACETONIDE OIL (DERMOTIC OIL) 0.01 % DROP    Place 3 drops in ear(s) 2 (two) times daily.    HYDROCODONE-ACETAMINOPHEN (NORCO) 5-325 MG PER TABLET    Take 1 tablet by mouth nightly as needed for Pain.    INCONTINENCE PAD,LINER,DISP (BLADDER CONTROL PADS EX ABSORB MISC)        LOSARTAN (COZAAR) 100 MG TABLET    Take 1 tablet (100 mg total) by mouth once daily.    METOPROLOL SUCCINATE (TOPROL-XL) 100 MG 24 HR TABLET    Take 1 tablet (100 mg total) by mouth once daily.    NITROGLYCERIN (NITROSTAT) 0.4 MG SL TABLET    Place 1 tablet (0.4 mg total) under the tongue every 5 (five) minutes as needed for Chest pain. Take up to 3 times 5 min apart    TRIAMCINOLONE ACETONIDE 0.1% (KENALOG) 0.1 % CREAM    Apply topically 2 (two) times daily.    TRUE METRIX AIR GLUCOSE METER KIT        TRUE METRIX GLUCOSE TEST STRIP STRP    TEST BLOOD SUGAR TWICE DAILY    TRUE METRIX LEVEL 1 SOLN    1 each by Tube route once daily.    WARFARIN (COUMADIN) 6 MG TABLET    TAKE 1 TABLET EVERY DAY   Modified Medications    Modified Medication Previous Medication    INDAPAMIDE (LOZOL) 2.5 MG TAB indapamide (LOZOL) 1.25 MG Tab       Take 1 tablet (2.5 mg total) by mouth once daily.    TAKE 1 TABLET EVERY DAY   Discontinued Medications    No medications on file       Review of Systems   Constitutional:  Positive for malaise/fatigue.   HENT: Negative.     Eyes: Negative.    Respiratory:  Positive for shortness of breath.    Cardiovascular:  Positive for chest pain and leg swelling. Negative for palpitations.   Gastrointestinal: Negative.    Genitourinary: Negative.   "  Musculoskeletal: Negative.    Skin: Negative.    Neurological:  Positive for dizziness.   Endo/Heme/Allergies: Negative.    Psychiatric/Behavioral: Negative.     All 12 systems otherwise negative.    Wt Readings from Last 3 Encounters:   10/25/22 100.2 kg (220 lb 14.4 oz)   10/06/22 100.9 kg (222 lb 7.1 oz)   09/29/22 98.9 kg (217 lb 14.8 oz)     Temp Readings from Last 3 Encounters:   10/06/22 98.1 °F (36.7 °C) (Temporal)   09/29/22 97.9 °F (36.6 °C) (Temporal)   02/07/22 98 °F (36.7 °C) (Oral)     BP Readings from Last 3 Encounters:   10/25/22 126/72   09/29/22 110/64   09/08/22 128/72     Pulse Readings from Last 3 Encounters:   10/25/22 90   09/29/22 92   09/08/22 78       /72   Pulse 90   Ht 5' 5" (1.651 m)   Wt 100.2 kg (220 lb 14.4 oz)   LMP  (LMP Unknown)   SpO2 95%   BMI 36.76 kg/m²     Objective:   Physical Exam  Vitals and nursing note reviewed.   Constitutional:       General: She is not in acute distress.     Appearance: She is well-developed. She is not diaphoretic.   HENT:      Head: Normocephalic and atraumatic.      Nose: Nose normal.   Eyes:      General: No scleral icterus.     Conjunctiva/sclera: Conjunctivae normal.   Neck:      Thyroid: No thyromegaly.      Vascular: No JVD.   Cardiovascular:      Rate and Rhythm: Normal rate. Rhythm irregularly irregular.      Heart sounds: S1 normal and S2 normal. Murmur heard.     No friction rub. No gallop. No S3 or S4 sounds.   Pulmonary:      Effort: Pulmonary effort is normal. No respiratory distress.      Breath sounds: Normal breath sounds. No stridor. No wheezing or rales.   Chest:      Chest wall: No tenderness.   Abdominal:      General: Bowel sounds are normal. There is no distension.      Palpations: Abdomen is soft. There is no mass.      Tenderness: There is no abdominal tenderness. There is no rebound.   Genitourinary:     Comments: Deferred  Musculoskeletal:         General: No tenderness or deformity. Normal range of motion.     "  Cervical back: Normal range of motion and neck supple.   Lymphadenopathy:      Cervical: No cervical adenopathy.   Skin:     General: Skin is warm and dry.      Coloration: Skin is not pale.      Findings: No erythema or rash.   Neurological:      Mental Status: She is alert and oriented to person, place, and time.      Motor: No abnormal muscle tone.      Coordination: Coordination normal.   Psychiatric:         Behavior: Behavior normal.         Thought Content: Thought content normal.         Judgment: Judgment normal.       Lab Results   Component Value Date     09/29/2022    K 3.7 09/29/2022     09/29/2022    CO2 27 09/29/2022    BUN 14 09/29/2022    CREATININE 0.8 09/29/2022     (H) 09/29/2022    HGBA1C 6.7 (H) 09/29/2022    MG 1.6 01/19/2021    AST 29 09/29/2022    ALT 38 09/29/2022    ALBUMIN 4.0 09/29/2022    PROT 7.0 09/29/2022    BILITOT 0.7 09/29/2022    WBC 6.79 09/29/2022    HGB 12.6 09/29/2022    HCT 38.7 09/29/2022    MCV 94 09/29/2022     09/29/2022    INR 2.5 10/18/2022    INR 1.8 (H) 05/04/2020    TSH 1.519 09/29/2022    CHOL 156 09/29/2022    HDL 31 (L) 09/29/2022    LDLCALC 91.6 09/29/2022    TRIG 167 (H) 09/29/2022    BNP 65 01/19/2021     Assessment:      1. Long term (current) use of anticoagulants    2. Anticoagulated on Coumadin    3. Chronic a-fib    4. Coronary artery disease of native artery of native heart with stable angina pectoris    5. Aortic atherosclerosis    6. Hypertension, unspecified type    7. Hyperlipidemia associated with type 2 diabetes mellitus    8. Coronary artery disease with stable angina pectoris, unspecified vessel or lesion type, unspecified whether native or transplanted heart    9. Hyperlipidemia with target LDL less than 100    10. Other form of dyspnea    11. Varicose veins of both lower extremities, unspecified whether complicated    12. PLAZA (dyspnea on exertion)          Plan:     1. AF, chronic   - cont meds  - cont coumadin and  f/u coumadin clinic    2. LE edema sec to venous insufficiency   - rec stockings, elevate legs  - increase Lozol to 2.5 mg    3. DORA  - needs CPAP    4. Obesity BMI 33 - 216 --> BMI 36 - 220 lbs.   - rec weight loss with diet and exercise    5. HTN with non obs CAD  - cont meds  -- increase Lozol to 2.5 mg    6. HLD  - cont statin    7. Pulm HTN  - needs CPAP  - f/u with pulm    8.DM2  - A1c 6.8 -- 6.5 --> 6.7  - cont meds per PCP    9. Back pain/arm pain with weakness  - refer to PMR    10. Chest pain with PLAZA  - order pharm nuclear stress test, pt cannot walk on treadmill due to SOB, order ECHO  - refer to GI - may have esoph issues/GERD - saw ENT - Dr. Wheeler  - order BNP    Thank you for allowing me to participate in this patient's care. Please do not hesitate to contact me with any questions or concerns. Consult note has been forwarded to the referral physician.

## 2022-10-26 ENCOUNTER — LAB VISIT (OUTPATIENT)
Dept: LAB | Facility: HOSPITAL | Age: 82
End: 2022-10-26
Attending: INTERNAL MEDICINE
Payer: MEDICARE

## 2022-10-26 DIAGNOSIS — I10 HYPERTENSION, UNSPECIFIED TYPE: ICD-10-CM

## 2022-10-26 DIAGNOSIS — R06.09 DOE (DYSPNEA ON EXERTION): ICD-10-CM

## 2022-10-26 DIAGNOSIS — R06.09 OTHER FORM OF DYSPNEA: ICD-10-CM

## 2022-10-26 LAB
ALBUMIN SERPL BCP-MCNC: 3.9 G/DL (ref 3.5–5.2)
ALP SERPL-CCNC: 71 U/L (ref 55–135)
ALT SERPL W/O P-5'-P-CCNC: 30 U/L (ref 10–44)
ANION GAP SERPL CALC-SCNC: 10 MMOL/L (ref 8–16)
AST SERPL-CCNC: 32 U/L (ref 10–40)
BILIRUB SERPL-MCNC: 0.9 MG/DL (ref 0.1–1)
BNP SERPL-MCNC: 111 PG/ML (ref 0–99)
BUN SERPL-MCNC: 10 MG/DL (ref 8–23)
CALCIUM SERPL-MCNC: 9 MG/DL (ref 8.7–10.5)
CHLORIDE SERPL-SCNC: 100 MMOL/L (ref 95–110)
CO2 SERPL-SCNC: 27 MMOL/L (ref 23–29)
CREAT SERPL-MCNC: 0.8 MG/DL (ref 0.5–1.4)
EST. GFR  (NO RACE VARIABLE): >60 ML/MIN/1.73 M^2
GLUCOSE SERPL-MCNC: 141 MG/DL (ref 70–110)
MAGNESIUM SERPL-MCNC: 1.7 MG/DL (ref 1.6–2.6)
POTASSIUM SERPL-SCNC: 3.5 MMOL/L (ref 3.5–5.1)
PROT SERPL-MCNC: 7.3 G/DL (ref 6–8.4)
SODIUM SERPL-SCNC: 137 MMOL/L (ref 136–145)

## 2022-10-26 PROCEDURE — 36415 COLL VENOUS BLD VENIPUNCTURE: CPT | Mod: PO | Performed by: INTERNAL MEDICINE

## 2022-10-26 PROCEDURE — 80053 COMPREHEN METABOLIC PANEL: CPT | Performed by: INTERNAL MEDICINE

## 2022-10-26 PROCEDURE — 83735 ASSAY OF MAGNESIUM: CPT | Performed by: INTERNAL MEDICINE

## 2022-10-26 PROCEDURE — 83880 ASSAY OF NATRIURETIC PEPTIDE: CPT | Performed by: INTERNAL MEDICINE

## 2022-10-27 DIAGNOSIS — I50.32 CHRONIC HEART FAILURE WITH PRESERVED EJECTION FRACTION: Primary | ICD-10-CM

## 2022-10-27 RX ORDER — LANOLIN ALCOHOL/MO/W.PET/CERES
400 CREAM (GRAM) TOPICAL DAILY
Qty: 90 TABLET | Refills: 1 | Status: SHIPPED | OUTPATIENT
Start: 2022-10-27 | End: 2023-04-27 | Stop reason: SDUPTHER

## 2022-10-27 RX ORDER — POTASSIUM CHLORIDE 20 MEQ/1
20 TABLET, EXTENDED RELEASE ORAL DAILY
Qty: 90 TABLET | Refills: 1 | Status: SHIPPED | OUTPATIENT
Start: 2022-10-27 | End: 2023-01-09 | Stop reason: SDUPTHER

## 2022-11-22 ENCOUNTER — OFFICE VISIT (OUTPATIENT)
Dept: OTOLARYNGOLOGY | Facility: CLINIC | Age: 82
End: 2022-11-22
Payer: MEDICARE

## 2022-11-22 ENCOUNTER — ANTI-COAG VISIT (OUTPATIENT)
Dept: CARDIOLOGY | Facility: CLINIC | Age: 82
End: 2022-11-22
Payer: MEDICARE

## 2022-11-22 VITALS — TEMPERATURE: 97 F | WEIGHT: 219.38 LBS | BODY MASS INDEX: 36.5 KG/M2

## 2022-11-22 DIAGNOSIS — R05.9 COUGH, UNSPECIFIED TYPE: ICD-10-CM

## 2022-11-22 DIAGNOSIS — K21.9 LARYNGOPHARYNGEAL REFLUX (LPR): Primary | ICD-10-CM

## 2022-11-22 DIAGNOSIS — I48.91 ATRIAL FIBRILLATION, UNSPECIFIED TYPE: ICD-10-CM

## 2022-11-22 DIAGNOSIS — Z79.01 LONG TERM (CURRENT) USE OF ANTICOAGULANTS: Primary | ICD-10-CM

## 2022-11-22 DIAGNOSIS — H60.8X3 CHRONIC ECZEMATOID OTITIS EXTERNA OF BOTH EARS: ICD-10-CM

## 2022-11-22 LAB — INR PPP: 2.3 (ref 2–3)

## 2022-11-22 PROCEDURE — 85610 POCT INR: ICD-10-PCS | Mod: QW,S$GLB,, | Performed by: INTERNAL MEDICINE

## 2022-11-22 PROCEDURE — 1101F PR PT FALLS ASSESS DOC 0-1 FALLS W/OUT INJ PAST YR: ICD-10-PCS | Mod: CPTII,S$GLB,, | Performed by: OTOLARYNGOLOGY

## 2022-11-22 PROCEDURE — 3288F FALL RISK ASSESSMENT DOCD: CPT | Mod: CPTII,S$GLB,, | Performed by: OTOLARYNGOLOGY

## 2022-11-22 PROCEDURE — 99999 PR PBB SHADOW E&M-EST. PATIENT-LVL III: CPT | Mod: PBBFAC,,, | Performed by: OTOLARYNGOLOGY

## 2022-11-22 PROCEDURE — 85610 PROTHROMBIN TIME: CPT | Mod: QW,S$GLB,, | Performed by: INTERNAL MEDICINE

## 2022-11-22 PROCEDURE — 1101F PT FALLS ASSESS-DOCD LE1/YR: CPT | Mod: CPTII,S$GLB,, | Performed by: OTOLARYNGOLOGY

## 2022-11-22 PROCEDURE — 93793 ANTICOAG MGMT PT WARFARIN: CPT | Mod: S$GLB,,,

## 2022-11-22 PROCEDURE — 3072F PR LOW RISK FOR RETINOPATHY: ICD-10-PCS | Mod: CPTII,S$GLB,, | Performed by: OTOLARYNGOLOGY

## 2022-11-22 PROCEDURE — 99213 PR OFFICE/OUTPT VISIT, EST, LEVL III, 20-29 MIN: ICD-10-PCS | Mod: S$GLB,,, | Performed by: OTOLARYNGOLOGY

## 2022-11-22 PROCEDURE — 1126F AMNT PAIN NOTED NONE PRSNT: CPT | Mod: CPTII,S$GLB,, | Performed by: OTOLARYNGOLOGY

## 2022-11-22 PROCEDURE — 3072F LOW RISK FOR RETINOPATHY: CPT | Mod: CPTII,S$GLB,, | Performed by: OTOLARYNGOLOGY

## 2022-11-22 PROCEDURE — 1126F PR PAIN SEVERITY QUANTIFIED, NO PAIN PRESENT: ICD-10-PCS | Mod: CPTII,S$GLB,, | Performed by: OTOLARYNGOLOGY

## 2022-11-22 PROCEDURE — 3288F PR FALLS RISK ASSESSMENT DOCUMENTED: ICD-10-PCS | Mod: CPTII,S$GLB,, | Performed by: OTOLARYNGOLOGY

## 2022-11-22 PROCEDURE — 99213 OFFICE O/P EST LOW 20 MIN: CPT | Mod: S$GLB,,, | Performed by: OTOLARYNGOLOGY

## 2022-11-22 PROCEDURE — 99999 PR PBB SHADOW E&M-EST. PATIENT-LVL III: ICD-10-PCS | Mod: PBBFAC,,, | Performed by: OTOLARYNGOLOGY

## 2022-11-22 PROCEDURE — 93793 PR ANTICOAGULANT MGMT FOR PT TAKING WARFARIN: ICD-10-PCS | Mod: S$GLB,,,

## 2022-11-22 NOTE — PROGRESS NOTES
INR is therapeutic at 2.3.  Patient reports no recent changes.  Currently taking warfarin 6 mg daily as directed.  No change in dose.  Recheck in 1 month.  Dose calendar given - confirms understanding.

## 2022-11-22 NOTE — PROGRESS NOTES
Referring Provider:    No referring provider defined for this encounter.  Subjective:   Patient: Natalie Greene [id number], :1940   Visit date:2022 1:22 PM    Chief Complaint:  Follow-up (Pt states cough is a little better)    HPI:    Prior notes reviewed by myself.  Clinical documentation obtained by nursing staff reviewed.     81 y/o female with complaints of chronic cough, dry nonproductive.  She states that this cough happens more when she lays down at night.  She has frequent throat clearing throughout the day.  She denies any heartburn and she feels as if the cough may be due to postnasal drip.  She also reports bilateral ear pruritus.  No recent imaging or allergy testing.      22 update:  Feels that symptoms are controlled      Objective:     Physical Exam:  Vitals:  Temp 97 °F (36.1 °C) (Temporal)   Wt 99.5 kg (219 lb 5.7 oz)   LMP  (LMP Unknown)   BMI 36.50 kg/m²   General appearance:  Well developed, well nourished    Ears:  Otoscopy of external auditory canals and tympanic membranes was normal, clinical speech reception thresholds grossly intact, no mass/lesion of auricle.    Nose:  No masses/lesions of external nose, nasal mucosa, septum, and turbinates were within normal limits.    Mouth:  No mass/lesion of lips, teeth, gums, hard/soft palate, tongue, tonsils, or oropharynx.    Neck & Lymphatics:  No cervical lymphadenopathy, no neck mass/crepitus/ asymmetry, trachea is midline, no thyroid enlargement/tenderness/mass.        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  Agreed on a therapeutic trial for the next 6 weeks.  She was also given therapeutic  lifestyle change instructions as noted below.  She has chronic eczematoid changes of both ear canals, so we will treat this with derm otic oil.    11/22/22 update:  Symptoms resolved.  Discussed instructions for weaning off of medication.  RTC 6 months.    Laryngopharyngeal Reflux (LPR) Patient Information and Medication Instructions    LPR (laryngopharyngeal reflux) can be a challenging condition to control.  Some patients require once daily medication like a proton pump inhibitor to control their symptoms (such as Omeprazole, Pantoprazole, Esomeprazole).  HOWEVER, other patients will require taking this medication twice daily and even may be started on another medication called an H2 blocker (such as Pepcid, Zantac) at bedtime.    It is important that medication is not the only technique that you use to help control your LPR.  Therapeutic lifestyle changes are very important as well:     Weight Loss:  If you are overweight, losing weight can significantly reduce your reflux.  Diet Control:  It is important to determine what your Trigger foods for reflux are.  Limiting your intake of these foods will significantly improve your reflux.  Examples of foods known to increase reflux are listed below  Carbonated beverages  Caffeine (this includes Coffee, soda, iced tea, energy drinks etc.)  Alcohol  Fried/ fatty/ greasy foods  Acidic foods (citrus, tomato etc.)  Chocolate  Spearmint/Peppermint  Elevating the head of your bed:  This doesn't mean more pillows.  You need to actually elevate the head of your bed.  Some patients have found something to put under the legs of the head of their bed.  Other patients have employed a wedge or an air bladder of some sort to elevate the head of their bed.  This makes a significant difference with reflux and can also help with nasal congestion.  Eating before bedtime:  Try not to eat anything for at least 2 hours before bedtime.  This allows for less material to remain in your stomach  when you lay down at night which equals less severe reflux.  More information:  If you would like to read more about diet changes and lifestyle changes that you can employ that will help with your reflux, the books below may be helpful.  Dropping Acid:  The Reflux diet Cookbook & Cure by James Tai M.D. and Juan Carlos Baez M.D.  The Acid Watcher Diet:  A 28 Day Reflux Prevention and Healing Program by Aravind Moore M.D.      Weaning Instructions After Symptom Improvement    It can sometimes take up to 12 weeks to see symptom improvement in LPR.  The medications may reduce the amount of acid you are producing very quickly, but then the tissues of your voice box and upper throat have to heal themselves.  Your physician may have increased year proton pump inhibitor to twice daily dosing and even may have added an H2 blocker at bedtime.  Eventually, the goal is a complete resolution of your symptoms.  Hopefully you have been working on the lifestyle modifications listed above over this time period as well!    Once your symptoms have improved, our goal is to wean you back off of your reflux medication.  The instructions below will help guide you through this process.    Take all anti-reflux medications for at least 3 weeks beyond when your symptoms have improved/resolved  If you are on Twice daily dosing of a proton pump inhibitor (omeprazole, pantoprazole, esomeprazole etc.) and an H2 blocker at bedtime (pepcid, zantac) then you should first discontinue your night time dose of your proton pump inhibitor.  If your symptoms are still controlled after 3 weeks of taking your PPI in the morning and your H2 blocker at bedtime,  discontinue your bedtime H2 blocker  If your symptoms are still controlled after 3 more weeks of only taking your PPI in the morning, discontinue your morning PPI    If at any point your symptoms return during this weaning process, you can return to the previous step and let your physician know at the  next appointment.

## 2022-12-21 ENCOUNTER — ANTI-COAG VISIT (OUTPATIENT)
Dept: CARDIOLOGY | Facility: CLINIC | Age: 82
End: 2022-12-21
Payer: MEDICARE

## 2022-12-21 DIAGNOSIS — Z79.01 LONG TERM (CURRENT) USE OF ANTICOAGULANTS: Primary | ICD-10-CM

## 2022-12-21 DIAGNOSIS — I48.91 ATRIAL FIBRILLATION, UNSPECIFIED TYPE: ICD-10-CM

## 2022-12-21 LAB — INR PPP: 2.8 (ref 2–3)

## 2022-12-21 PROCEDURE — 85610 PROTHROMBIN TIME: CPT | Mod: QW,HCNC,S$GLB, | Performed by: INTERNAL MEDICINE

## 2022-12-21 PROCEDURE — 93793 PR ANTICOAGULANT MGMT FOR PT TAKING WARFARIN: ICD-10-PCS | Mod: HCNC,S$GLB,,

## 2022-12-21 PROCEDURE — 85610 POCT INR: ICD-10-PCS | Mod: QW,HCNC,S$GLB, | Performed by: INTERNAL MEDICINE

## 2022-12-21 PROCEDURE — 93793 ANTICOAG MGMT PT WARFARIN: CPT | Mod: HCNC,S$GLB,,

## 2022-12-21 NOTE — PROGRESS NOTES
INR is therapeutic at 2.8.  Patient reports no recent changes.  Currently taking warfarin 6 mg daily as directed.  No change in dose.  Follow up on 1/25/2023.  Patient confirms understanding.

## 2023-01-09 DIAGNOSIS — I50.32 CHRONIC HEART FAILURE WITH PRESERVED EJECTION FRACTION: ICD-10-CM

## 2023-01-09 RX ORDER — POTASSIUM CHLORIDE 20 MEQ/1
20 TABLET, EXTENDED RELEASE ORAL DAILY
Qty: 90 TABLET | Refills: 1 | Status: SHIPPED | OUTPATIENT
Start: 2023-01-09 | End: 2023-04-27 | Stop reason: SDUPTHER

## 2023-01-25 ENCOUNTER — ANTI-COAG VISIT (OUTPATIENT)
Dept: CARDIOLOGY | Facility: CLINIC | Age: 83
End: 2023-01-25
Payer: MEDICARE

## 2023-01-25 DIAGNOSIS — Z79.01 LONG TERM (CURRENT) USE OF ANTICOAGULANTS: Primary | ICD-10-CM

## 2023-01-25 DIAGNOSIS — I48.91 ATRIAL FIBRILLATION, UNSPECIFIED TYPE: ICD-10-CM

## 2023-01-25 LAB — INR PPP: 2.6 (ref 2–3)

## 2023-01-25 PROCEDURE — 85610 POCT INR: ICD-10-PCS | Mod: QW,S$GLB,, | Performed by: INTERNAL MEDICINE

## 2023-01-25 PROCEDURE — 85610 PROTHROMBIN TIME: CPT | Mod: QW,S$GLB,, | Performed by: INTERNAL MEDICINE

## 2023-01-25 PROCEDURE — 93793 ANTICOAG MGMT PT WARFARIN: CPT | Mod: S$GLB,,,

## 2023-01-25 PROCEDURE — 93793 PR ANTICOAGULANT MGMT FOR PT TAKING WARFARIN: ICD-10-PCS | Mod: S$GLB,,,

## 2023-01-25 NOTE — PROGRESS NOTES
INR is therapeutic at 2.6.  Patient reports warfarin 6 mg daily is taken as directed.  No recent changes reported.  Instructions given to continue same dose of warfarin.  Follow up in 5 weeks - 3/01/2023.  Patient confirms understanding.

## 2023-02-03 RX ORDER — INDAPAMIDE 2.5 MG/1
2.5 TABLET ORAL DAILY
Qty: 90 TABLET | Refills: 1 | Status: SHIPPED | OUTPATIENT
Start: 2023-02-03 | End: 2023-02-07 | Stop reason: SDUPTHER

## 2023-02-03 NOTE — TELEPHONE ENCOUNTER
No new care gaps identified.  [facility] Embedded Care Gaps. Reference number: [id number]. 2/03/2023   10:28:38 AM CST

## 2023-02-07 ENCOUNTER — OFFICE VISIT (OUTPATIENT)
Dept: PULMONOLOGY | Facility: CLINIC | Age: 83
End: 2023-02-07
Payer: MEDICARE

## 2023-02-07 VITALS
SYSTOLIC BLOOD PRESSURE: 132 MMHG | BODY MASS INDEX: 36.46 KG/M2 | HEIGHT: 65 IN | DIASTOLIC BLOOD PRESSURE: 60 MMHG | HEART RATE: 84 BPM | RESPIRATION RATE: 18 BRPM | OXYGEN SATURATION: 98 % | WEIGHT: 218.81 LBS

## 2023-02-07 DIAGNOSIS — G47.33 OSA (OBSTRUCTIVE SLEEP APNEA): Primary | ICD-10-CM

## 2023-02-07 DIAGNOSIS — I27.20 PULMONARY HYPERTENSION: ICD-10-CM

## 2023-02-07 DIAGNOSIS — E66.01 SEVERE OBESITY (BMI 35.0-39.9) WITH COMORBIDITY: ICD-10-CM

## 2023-02-07 DIAGNOSIS — I10 PRIMARY HYPERTENSION: ICD-10-CM

## 2023-02-07 DIAGNOSIS — I70.0 AORTIC ATHEROSCLEROSIS: ICD-10-CM

## 2023-02-07 DIAGNOSIS — R06.09 DOE (DYSPNEA ON EXERTION): ICD-10-CM

## 2023-02-07 PROCEDURE — 1159F PR MEDICATION LIST DOCUMENTED IN MEDICAL RECORD: ICD-10-PCS | Mod: HCNC,CPTII,S$GLB, | Performed by: PHYSICIAN ASSISTANT

## 2023-02-07 PROCEDURE — 3075F SYST BP GE 130 - 139MM HG: CPT | Mod: HCNC,CPTII,S$GLB, | Performed by: PHYSICIAN ASSISTANT

## 2023-02-07 PROCEDURE — 1101F PR PT FALLS ASSESS DOC 0-1 FALLS W/OUT INJ PAST YR: ICD-10-PCS | Mod: HCNC,CPTII,S$GLB, | Performed by: PHYSICIAN ASSISTANT

## 2023-02-07 PROCEDURE — 99204 PR OFFICE/OUTPT VISIT, NEW, LEVL IV, 45-59 MIN: ICD-10-PCS | Mod: HCNC,S$GLB,, | Performed by: PHYSICIAN ASSISTANT

## 2023-02-07 PROCEDURE — 99204 OFFICE O/P NEW MOD 45 MIN: CPT | Mod: HCNC,S$GLB,, | Performed by: PHYSICIAN ASSISTANT

## 2023-02-07 PROCEDURE — 1101F PT FALLS ASSESS-DOCD LE1/YR: CPT | Mod: HCNC,CPTII,S$GLB, | Performed by: PHYSICIAN ASSISTANT

## 2023-02-07 PROCEDURE — 1160F RVW MEDS BY RX/DR IN RCRD: CPT | Mod: HCNC,CPTII,S$GLB, | Performed by: PHYSICIAN ASSISTANT

## 2023-02-07 PROCEDURE — 99999 PR PBB SHADOW E&M-EST. PATIENT-LVL V: CPT | Mod: PBBFAC,HCNC,, | Performed by: PHYSICIAN ASSISTANT

## 2023-02-07 PROCEDURE — 99999 PR PBB SHADOW E&M-EST. PATIENT-LVL V: ICD-10-PCS | Mod: PBBFAC,HCNC,, | Performed by: PHYSICIAN ASSISTANT

## 2023-02-07 PROCEDURE — 3075F PR MOST RECENT SYSTOLIC BLOOD PRESS GE 130-139MM HG: ICD-10-PCS | Mod: HCNC,CPTII,S$GLB, | Performed by: PHYSICIAN ASSISTANT

## 2023-02-07 PROCEDURE — 1160F PR REVIEW ALL MEDS BY PRESCRIBER/CLIN PHARMACIST DOCUMENTED: ICD-10-PCS | Mod: HCNC,CPTII,S$GLB, | Performed by: PHYSICIAN ASSISTANT

## 2023-02-07 PROCEDURE — 1159F MED LIST DOCD IN RCRD: CPT | Mod: HCNC,CPTII,S$GLB, | Performed by: PHYSICIAN ASSISTANT

## 2023-02-07 PROCEDURE — 3288F FALL RISK ASSESSMENT DOCD: CPT | Mod: HCNC,CPTII,S$GLB, | Performed by: PHYSICIAN ASSISTANT

## 2023-02-07 PROCEDURE — 3288F PR FALLS RISK ASSESSMENT DOCUMENTED: ICD-10-PCS | Mod: HCNC,CPTII,S$GLB, | Performed by: PHYSICIAN ASSISTANT

## 2023-02-07 PROCEDURE — 3078F DIAST BP <80 MM HG: CPT | Mod: HCNC,CPTII,S$GLB, | Performed by: PHYSICIAN ASSISTANT

## 2023-02-07 PROCEDURE — 3078F PR MOST RECENT DIASTOLIC BLOOD PRESSURE < 80 MM HG: ICD-10-PCS | Mod: HCNC,CPTII,S$GLB, | Performed by: PHYSICIAN ASSISTANT

## 2023-02-07 RX ORDER — INDAPAMIDE 2.5 MG/1
2.5 TABLET ORAL DAILY
Qty: 90 TABLET | Refills: 1 | Status: SHIPPED | OUTPATIENT
Start: 2023-02-07 | End: 2023-04-27 | Stop reason: SDUPTHER

## 2023-02-07 NOTE — TELEPHONE ENCOUNTER
No new care gaps identified.  [facility] Embedded Care Gaps. Reference number: [id number]. 2/07/2023   10:55:26 AM CST

## 2023-02-07 NOTE — PROGRESS NOTES
Subjective:       Patient ID: Natalie Greene is a 82 y.o. female.    Chief Complaint:Sleep apnea      81yo female referred by Law Rod MD for DORA  She has history of sleep apnea diagnosed in 2019 but did not follow up to get CPAP  She does have disrupted sleep, snores, wakes up frequently, never feels rested, has daytime fatigue  Also with history of chronic Afib, anticoagulated, HTN, pulmonary HTN, obesity  She denies SOB but notes a chronic cough which she attributes to sinus drip  Never smoker  She is willing to start CPAP but says she needs written instructions on how to use the machine    BP Readings from Last 3 Encounters:   02/07/23 132/60   10/25/22 126/72   09/29/22 110/64     [place] Sleepiness Scale TOTAL =    (validated sleepiness questionnaire with a higher score indicating greater sleepiness; range 0-24)  EPWORTH SLEEPINESS SCALE 2/7/2023   Sitting and reading 3   Watching TV 3   Sitting, inactive in a public place (e.g. a theatre or a meeting) 2   As a passenger in a car for an hour without a break 3   Lying down to rest in the afternoon when circumstances permit 3   Sitting and talking to someone 0   Sitting quietly after a lunch without alcohol 3   In a car, while stopped for a few minutes in traffic 0   Total score 17     Immunization History   Administered Date(s) Administered    Influenza - High Dose - PF (65 years and older) 08/23/2017    Pneumococcal Conjugate - 13 Valent 08/23/2017      Tobacco Use: Low Risk     Smoking Tobacco Use: Never    Smokeless Tobacco Use: Never    Passive Exposure: Not on file      Past Medical History:   Diagnosis Date    *Atrial fibrillation     Abdominal wall seroma 1/16/2019    Seroma developed after her hernia repair in 2012.  Please review the notes in the Care everywhere section of the chart.  There is no luis evidence of infection at this time.  I would recommend checking a erythrocyte sedimentation rate and C reactive protein.  These are significant  elevated then aspiration of the seroma fluid would be warranted.  If not the seroma does not require any intervention    Coronary artery disease involving native coronary artery of native heart with angina pectoris 4/10/2018    Diabetes mellitus     DM (diabetes mellitus) 2002     09/06/2017 no medication    Hyperlipidemia     Hypertension     Morbid (severe) obesity due to excess calories 7/23/2013    Obesity (BMI 30-39.9) 2/24/2015    Obstructive sleep apnea       Current Outpatient Medications on File Prior to Visit   Medication Sig Dispense Refill    ACCU-CHEK SOFTCLIX LANCETS Misc       amLODIPine (NORVASC) 5 MG tablet TAKE 1 TABLET EVERY DAY 90 tablet 0    magnesium oxide (MAG-OX) 400 mg (241.3 mg magnesium) tablet Take 1 tablet (400 mg total) by mouth once daily. 90 tablet 1    metoprolol succinate (TOPROL-XL) 100 MG 24 hr tablet Take 1 tablet (100 mg total) by mouth once daily. 90 tablet 2    potassium chloride SA (K-DUR,KLOR-CON) 20 MEQ tablet Take 1 tablet (20 mEq total) by mouth once daily. 90 tablet 1    triamcinolone acetonide 0.1% (KENALOG) 0.1 % cream Apply topically 2 (two) times daily. 45 g 0    TRUE METRIX AIR GLUCOSE METER kit       TRUE METRIX GLUCOSE TEST STRIP Strp TEST BLOOD SUGAR TWICE DAILY 200 strip 11    warfarin (COUMADIN) 6 MG tablet TAKE 1 TABLET EVERY DAY 90 tablet 0    atorvastatin (LIPITOR) 40 MG tablet Take 1 tablet (40 mg total) by mouth once daily. (Patient not taking: Reported on 9/29/2022) 90 tablet 3    diltiaZEM (CARDIZEM CD) 180 MG 24 hr capsule Take 1 capsule (180 mg total) by mouth once daily. 90 capsule 3    esomeprazole (NEXIUM) 40 MG capsule Take 1 capsule (40 mg total) by mouth before breakfast. 90 capsule 5    fluocinolone acetonide oiL (DERMOTIC OIL) 0.01 % Drop Place 3 drops in ear(s) 2 (two) times daily. 20 mL 5    HYDROcodone-acetaminophen (NORCO) 5-325 mg per tablet Take 1 tablet by mouth nightly as needed for Pain. 12 tablet 0    INCONTINENCE PAD,LINER,DISP  "(BLADDER CONTROL PADS EX ABSORB MISC)       losartan (COZAAR) 100 MG tablet Take 1 tablet (100 mg total) by mouth once daily. 90 tablet 1    nitroGLYCERIN (NITROSTAT) 0.4 MG SL tablet Place 1 tablet (0.4 mg total) under the tongue every 5 (five) minutes as needed for Chest pain. Take up to 3 times 5 min apart 30 tablet 0    TRUE METRIX LEVEL 1 Soln 1 each by Tube route once daily. 1 each 11     No current facility-administered medications on file prior to visit.        Review of Systems   Constitutional:  Positive for fatigue. Negative for fever, weight loss, appetite change and weakness.   HENT:  Positive for postnasal drip and rhinorrhea. Negative for sinus pressure, trouble swallowing and congestion.    Respiratory:  Positive for apnea, snoring, cough and somnolence. Negative for sputum production, choking, chest tightness, shortness of breath, wheezing and dyspnea on extertion.    Cardiovascular:  Negative for chest pain and leg swelling.   Musculoskeletal:  Positive for arthralgias. Negative for gait problem and joint swelling.   Gastrointestinal:  Negative for nausea, vomiting and abdominal pain.   Neurological:  Negative for dizziness, weakness and headaches.   All other systems reviewed and are negative.    Objective:       Vitals:    02/07/23 1547   BP: 132/60   Pulse: 84   Resp: 18   SpO2: 98%   Weight: 99.2 kg (218 lb 12.9 oz)   Height: 5' 5" (1.651 m)       Physical Exam   Constitutional: She is oriented to person, place, and time. She appears well-developed and well-nourished. No distress.   HENT:   Head: Normocephalic.   Nose: Nose normal.   Mouth/Throat: Oropharynx is clear and moist.   Cardiovascular: Normal rate and regular rhythm.   Pulmonary/Chest: Effort normal. No respiratory distress. She has no wheezes. She has no rhonchi. She has no rales.   Musculoskeletal:         General: No edema.      Cervical back: Normal range of motion and neck supple.   Lymphadenopathy: No supraclavicular adenopathy " is present.     She has no cervical adenopathy.   Neurological: She is alert and oriented to person, place, and time. Gait normal.   Skin: Skin is warm and dry.   Psychiatric: She has a normal mood and affect.   Vitals reviewed.  Personal Diagnostic Review    X-Ray Chest 1 View  Narrative: EXAMINATION:  XR CHEST 1 VIEW    CLINICAL HISTORY:  Chest pain, unspecified    COMPARISON:  February 9, 2019    FINDINGS:  The study is kyphotic in position and rotated to the right.  Stable mild hyperinflation.  The lungs are free of new pulmonary opacities.  The cardiac silhouette size is enlarged.  The trachea is midline and the mediastinal width is normal. Negative for focal infiltrate, effusion or pneumothorax. Pulmonary vasculature is normal. Negative for osseous abnormalities. Convex right curvature of the midthoracic spine with marginal spondylosis.  Stable mildly tortuous aorta with aortic arch calcifications.  Stable eventration of the hemidiaphragms.  Cholecystectomy clips.  Degenerative changes of the shoulder girdles.  Impression: 1.  Negative for acute process involving the chest.    2.  Stable findings as noted above.    Electronically signed by: Tyson Donahue MD  Date:    02/07/2022  Time:    12:29          No flowsheet data found.      Assessment/Plan:       Problem List Items Addressed This Visit          Pulmonary    Pulmonary hypertension     Needs CPAP  Will discuss ordering pulmonary testing next visit            Cardiac/Vascular    HTN (hypertension)     Controlled, F/u regularly with cardiology           Aortic atherosclerosis     Heart healthy diet and exercise encouraged              Endocrine    Severe obesity (BMI 35.0-39.9) with comorbidity     Weight loss and exercise to improve overall health.              Other    DORA (obstructive sleep apnea) - Primary     PSG 2019, APNEA HYPOPNEA INDEX 18         Relevant Orders    CPAP FOR HOME USE    CPAP/BIPAP SUPPLIES     Other Visit Diagnoses       PLAZA  (dyspnea on exertion)              Follow up in about 3 months (around 5/7/2023) for cpap initial dl.    Discussed diagnosis, its evaluation, treatment and usual course. All questions answered.    Patient verbalized understanding of plan and left in no acute distress    Thank you for the courtesy of participating in the care of this patient    Annette Hutchison PA-C  [facility]

## 2023-02-22 ENCOUNTER — HOSPITAL ENCOUNTER (OUTPATIENT)
Dept: RADIOLOGY | Facility: HOSPITAL | Age: 83
Discharge: HOME | End: 2023-02-22
Attending: INTERNAL MEDICINE
Payer: MEDICARE

## 2023-02-22 ENCOUNTER — TELEPHONE (OUTPATIENT)
Dept: CARDIOLOGY | Facility: CLINIC | Age: 83
End: 2023-02-22
Payer: MEDICARE

## 2023-02-22 ENCOUNTER — HOSPITAL ENCOUNTER (OUTPATIENT)
Dept: PULMONOLOGY | Facility: HOSPITAL | Age: 83
Discharge: HOME | End: 2023-02-22
Attending: INTERNAL MEDICINE
Payer: MEDICARE

## 2023-02-22 ENCOUNTER — HOSPITAL ENCOUNTER (OUTPATIENT)
Dept: CARDIOLOGY | Facility: HOSPITAL | Age: 83
Discharge: HOME | End: 2023-02-22
Attending: INTERNAL MEDICINE
Payer: MEDICARE

## 2023-02-22 VITALS
BODY MASS INDEX: 36.32 KG/M2 | SYSTOLIC BLOOD PRESSURE: 132 MMHG | WEIGHT: 218 LBS | DIASTOLIC BLOOD PRESSURE: 62 MMHG | HEIGHT: 65 IN | HEART RATE: 80 BPM

## 2023-02-22 VITALS — SYSTOLIC BLOOD PRESSURE: 172 MMHG | DIASTOLIC BLOOD PRESSURE: 79 MMHG | HEART RATE: 82 BPM

## 2023-02-22 DIAGNOSIS — I25.118 CORONARY ARTERY DISEASE OF NATIVE ARTERY OF NATIVE HEART WITH STABLE ANGINA PECTORIS: ICD-10-CM

## 2023-02-22 DIAGNOSIS — I10 HYPERTENSION, UNSPECIFIED TYPE: ICD-10-CM

## 2023-02-22 DIAGNOSIS — Z79.01 ANTICOAGULATED ON COUMADIN: ICD-10-CM

## 2023-02-22 DIAGNOSIS — I48.20 CHRONIC A-FIB: ICD-10-CM

## 2023-02-22 LAB
AORTIC ROOT ANNULUS: 3.42 CM
AV INDEX (PROSTH): 0.66
AV MEAN GRADIENT: 3 MMHG
AV PEAK GRADIENT: 6 MMHG
AV VALVE AREA: 2.07 CM2
AV VELOCITY RATIO: 0.68
BSA FOR ECHO PROCEDURE: 2.13 M2
CV ECHO LV RWT: 0.5 CM
CV STRESS BASE HR: 82 BPM
DIASTOLIC BLOOD PRESSURE: 72 MMHG
DOP CALC AO PEAK VEL: 1.19 M/S
DOP CALC AO VTI: 29.8 CM
DOP CALC LVOT AREA: 3.1 CM2
DOP CALC LVOT DIAMETER: 2 CM
DOP CALC LVOT PEAK VEL: 0.81 M/S
DOP CALC LVOT STROKE VOLUME: 61.54 CM3
DOP CALC RVOT PEAK VEL: 0.77 M/S
DOP CALC RVOT VTI: 17.9 CM
DOP CALCLVOT PEAK VEL VTI: 19.6 CM
E WAVE DECELERATION TIME: 146.48 MSEC
E/A RATIO: 3.5
ECHO LV POSTERIOR WALL: 1.2 CM (ref 0.6–1.1)
EJECTION FRACTION- HIGH: 73 %
EJECTION FRACTION: 55 %
END DIASTOLIC INDEX-HIGH: 165 ML/M2
END DIASTOLIC INDEX-LOW: 101 ML/M2
END SYSTOLIC INDEX-HIGH: 64 ML/M2
END SYSTOLIC INDEX-LOW: 28 ML/M2
FRACTIONAL SHORTENING: 30 % (ref 28–44)
HCV RNA # SERPL NAA+PROBE: 252.8 MMHG/S
INTERVENTRICULAR SEPTUM: 1.32 CM (ref 0.6–1.1)
IVRT: 83.73 MSEC
LA MAJOR: 4.32 CM
LA MINOR: 5.42 CM
LA WIDTH: 4.5 CM
LEFT ATRIUM SIZE: 4.49 CM
LEFT ATRIUM VOLUME INDEX MOD: 42.8 ML/M2
LEFT ATRIUM VOLUME INDEX: 40.3 ML/M2
LEFT ATRIUM VOLUME MOD: 87.77 CM3
LEFT ATRIUM VOLUME: 82.57 CM3
LEFT INTERNAL DIMENSION IN SYSTOLE: 3.33 CM (ref 2.1–4)
LEFT VENTRICLE DIASTOLIC VOLUME INDEX: 51.99 ML/M2
LEFT VENTRICLE DIASTOLIC VOLUME: 106.57 ML
LEFT VENTRICLE MASS INDEX: 114 G/M2
LEFT VENTRICLE SYSTOLIC VOLUME INDEX: 22 ML/M2
LEFT VENTRICLE SYSTOLIC VOLUME: 45.13 ML
LEFT VENTRICULAR INTERNAL DIMENSION IN DIASTOLE: 4.78 CM (ref 3.5–6)
LEFT VENTRICULAR MASS: 233.39 G
LVOT MG: 1.53 MMHG
LVOT MV: 0.58 CM/S
MV PEAK A VEL: 0.26 M/S
MV PEAK E VEL: 0.91 M/S
MV STENOSIS PRESSURE HALF TIME: 42.48 MS
MV VALVE AREA P 1/2 METHOD: 5.18 CM2
NUC REST EJECTION FRACTION: 78
NUC STRESS EJECTION FRACTION: 81 %
OHS CV CPX 85 PERCENT MAX PREDICTED HEART RATE MALE: 115
OHS CV CPX MAX PREDICTED HEART RATE: 136
OHS CV CPX PATIENT IS FEMALE: 1
OHS CV CPX PATIENT IS MALE: 0
OHS CV CPX PEAK DIASTOLIC BLOOD PRESSURE: 79 MMHG
OHS CV CPX PEAK HEAR RATE: 101 BPM
OHS CV CPX PEAK RATE PRESSURE PRODUCT: NORMAL
OHS CV CPX PEAK SYSTOLIC BLOOD PRESSURE: 172 MMHG
OHS CV CPX PERCENT MAX PREDICTED HEART RATE ACHIEVED: 74
OHS CV CPX RATE PRESSURE PRODUCT PRESENTING: NORMAL
PISA TR MAX VEL: 2.73 M/S
PV MEAN GRADIENT: 1.62 MMHG
PV PEAK VELOCITY: 0.72 CM/S
RA MAJOR: 5.59 CM
RA PRESSURE: 3 MMHG
RETIRED EF AND QEF - SEE NOTES: 59 %
RIGHT VENTRICULAR END-DIASTOLIC DIMENSION: 4.63 CM
SINUS: 3 CM
STJ: 3 CM
SYSTOLIC BLOOD PRESSURE: 179 MMHG
TR MAX PG: 30 MMHG
TV REST PULMONARY ARTERY PRESSURE: 33 MMHG

## 2023-02-22 PROCEDURE — 93018 CV STRESS TEST I&R ONLY: CPT | Mod: HCNC,,, | Performed by: INTERNAL MEDICINE

## 2023-02-22 PROCEDURE — 93016 CV STRESS TEST SUPVJ ONLY: CPT | Mod: HCNC,,, | Performed by: INTERNAL MEDICINE

## 2023-02-22 PROCEDURE — 93016 NUCLEAR STRESS - CARDIOLOGY INTERPRETED (CUPID ONLY): ICD-10-PCS | Mod: HCNC,,, | Performed by: INTERNAL MEDICINE

## 2023-02-22 PROCEDURE — 93306 TTE W/DOPPLER COMPLETE: CPT | Mod: 26,HCNC,, | Performed by: INTERNAL MEDICINE

## 2023-02-22 PROCEDURE — 63600175 PHARM REV CODE 636 W HCPCS: Mod: HCNC | Performed by: INTERNAL MEDICINE

## 2023-02-22 PROCEDURE — 78452 HT MUSCLE IMAGE SPECT MULT: CPT | Mod: 26,HCNC,, | Performed by: INTERNAL MEDICINE

## 2023-02-22 PROCEDURE — A9502 TC99M TETROFOSMIN: HCPCS | Mod: HCNC

## 2023-02-22 PROCEDURE — 78452 NUCLEAR STRESS - CARDIOLOGY INTERPRETED (CUPID ONLY): ICD-10-PCS | Mod: 26,HCNC,, | Performed by: INTERNAL MEDICINE

## 2023-02-22 PROCEDURE — 93306 ECHO (CUPID ONLY): ICD-10-PCS | Mod: 26,HCNC,, | Performed by: INTERNAL MEDICINE

## 2023-02-22 PROCEDURE — 93306 TTE W/DOPPLER COMPLETE: CPT | Mod: HCNC

## 2023-02-22 PROCEDURE — 93018 NUCLEAR STRESS - CARDIOLOGY INTERPRETED (CUPID ONLY): ICD-10-PCS | Mod: HCNC,,, | Performed by: INTERNAL MEDICINE

## 2023-02-22 RX ORDER — REGADENOSON 0.08 MG/ML
0.4 INJECTION, SOLUTION INTRAVENOUS ONCE
Status: COMPLETED | OUTPATIENT
Start: 2023-02-22 | End: 2023-02-22

## 2023-02-22 RX ADMIN — REGADENOSON 0.4 MG: 0.08 INJECTION, SOLUTION INTRAVENOUS at 12:02

## 2023-02-22 NOTE — TELEPHONE ENCOUNTER
Left message.      ----- Message from Law Rod MD sent at 2/22/2023 12:58 PM CST -----  Please contact the patient and let them know that their results of echo reveal Overall normal heart function and mild valves leak which we will monitor.

## 2023-02-22 NOTE — TELEPHONE ENCOUNTER
Spoke with pt in regards to echo results. Per Dr. Rod   echo revealed Overall normal heart function and mild valves leak which we will be monitored. Pt verbalized she understood information. Instructed her to call with any f/u questions or concerns.         ----- Message from Law Rod MD sent at 2/22/2023 12:58 PM CST -----  Please contact the patient and let them know that their results of echo reveal Overall normal heart function and mild valves leak which we will monitor.

## 2023-03-01 ENCOUNTER — ANTI-COAG VISIT (OUTPATIENT)
Dept: CARDIOLOGY | Facility: CLINIC | Age: 83
End: 2023-03-01
Payer: MEDICARE

## 2023-03-01 DIAGNOSIS — Z79.01 LONG TERM (CURRENT) USE OF ANTICOAGULANTS: Primary | ICD-10-CM

## 2023-03-01 DIAGNOSIS — I48.91 ATRIAL FIBRILLATION, UNSPECIFIED TYPE: ICD-10-CM

## 2023-03-01 LAB — INR PPP: 2 (ref 2–3)

## 2023-03-01 PROCEDURE — 93793 PR ANTICOAGULANT MGMT FOR PT TAKING WARFARIN: ICD-10-PCS | Mod: HCNC,S$GLB,,

## 2023-03-01 PROCEDURE — 93793 ANTICOAG MGMT PT WARFARIN: CPT | Mod: HCNC,S$GLB,,

## 2023-03-01 PROCEDURE — 85610 PROTHROMBIN TIME: CPT | Mod: QW,HCNC,S$GLB, | Performed by: INTERNAL MEDICINE

## 2023-03-01 PROCEDURE — 85610 POCT INR: ICD-10-PCS | Mod: QW,HCNC,S$GLB, | Performed by: INTERNAL MEDICINE

## 2023-03-01 NOTE — PROGRESS NOTES
INR is therapeutic at 2.0.  Patient reports no recent changes.  Instructions given to continue warfarin 6 mg daily.  Follow up in 4 weeks.  Dose calendar given - confirms understanding.

## 2023-03-03 ENCOUNTER — OFFICE VISIT (OUTPATIENT)
Dept: GASTROENTEROLOGY | Facility: CLINIC | Age: 83
End: 2023-03-03
Payer: MEDICARE

## 2023-03-03 VITALS
DIASTOLIC BLOOD PRESSURE: 62 MMHG | SYSTOLIC BLOOD PRESSURE: 142 MMHG | WEIGHT: 217.5 LBS | BODY MASS INDEX: 36.24 KG/M2 | OXYGEN SATURATION: 96 % | HEIGHT: 65 IN | HEART RATE: 92 BPM

## 2023-03-03 DIAGNOSIS — K21.9 GASTROESOPHAGEAL REFLUX DISEASE, UNSPECIFIED WHETHER ESOPHAGITIS PRESENT: ICD-10-CM

## 2023-03-03 PROCEDURE — 99999 PR PBB SHADOW E&M-EST. PATIENT-LVL III: ICD-10-PCS | Mod: PBBFAC,HCNC,, | Performed by: PHYSICIAN ASSISTANT

## 2023-03-03 PROCEDURE — 3077F SYST BP >= 140 MM HG: CPT | Mod: HCNC,CPTII,S$GLB, | Performed by: PHYSICIAN ASSISTANT

## 2023-03-03 PROCEDURE — 99203 PR OFFICE/OUTPT VISIT, NEW, LEVL III, 30-44 MIN: ICD-10-PCS | Mod: HCNC,S$GLB,, | Performed by: PHYSICIAN ASSISTANT

## 2023-03-03 PROCEDURE — 1101F PR PT FALLS ASSESS DOC 0-1 FALLS W/OUT INJ PAST YR: ICD-10-PCS | Mod: HCNC,CPTII,S$GLB, | Performed by: PHYSICIAN ASSISTANT

## 2023-03-03 PROCEDURE — 3288F FALL RISK ASSESSMENT DOCD: CPT | Mod: HCNC,CPTII,S$GLB, | Performed by: PHYSICIAN ASSISTANT

## 2023-03-03 PROCEDURE — 3078F PR MOST RECENT DIASTOLIC BLOOD PRESSURE < 80 MM HG: ICD-10-PCS | Mod: HCNC,CPTII,S$GLB, | Performed by: PHYSICIAN ASSISTANT

## 2023-03-03 PROCEDURE — 99999 PR PBB SHADOW E&M-EST. PATIENT-LVL III: CPT | Mod: PBBFAC,HCNC,, | Performed by: PHYSICIAN ASSISTANT

## 2023-03-03 PROCEDURE — 1101F PT FALLS ASSESS-DOCD LE1/YR: CPT | Mod: HCNC,CPTII,S$GLB, | Performed by: PHYSICIAN ASSISTANT

## 2023-03-03 PROCEDURE — 3288F PR FALLS RISK ASSESSMENT DOCUMENTED: ICD-10-PCS | Mod: HCNC,CPTII,S$GLB, | Performed by: PHYSICIAN ASSISTANT

## 2023-03-03 PROCEDURE — 3078F DIAST BP <80 MM HG: CPT | Mod: HCNC,CPTII,S$GLB, | Performed by: PHYSICIAN ASSISTANT

## 2023-03-03 PROCEDURE — 3077F PR MOST RECENT SYSTOLIC BLOOD PRESSURE >= 140 MM HG: ICD-10-PCS | Mod: HCNC,CPTII,S$GLB, | Performed by: PHYSICIAN ASSISTANT

## 2023-03-03 PROCEDURE — 99203 OFFICE O/P NEW LOW 30 MIN: CPT | Mod: HCNC,S$GLB,, | Performed by: PHYSICIAN ASSISTANT

## 2023-03-03 NOTE — PROGRESS NOTES
Clinic Consult:  [facility] Consultation Note    Reason for Consult:  The encounter diagnosis was Gastroesophageal reflux disease, unspecified whether esophagitis present.    PCP: Annabella Fenton   [id number] [facility] / [place] [id number]    CC: Dysphagia (With cough)      HPI:  This is a 82 y.o. female here for evaluation of the above. Referred for GERD.  History of coughing spells. Worse with eating. Feels irritation in her throat. Dry cough and sneezing often. Does not feel heartburn. Rarely will need to drink in order to swallow - feels like it gets hung up near the base of her neck but digests with water. Feels like cough could be due to post nasal drip. Saw ENT - laryngoscopy completed with moderate to severe edema consistent with reflux. Was on Nexium for about 6 weeks - reports she did not notice a decrease in cough. Denies sensation of heartburn. No additional complaints today.    Review of Systems   Constitutional:  Negative for activity change, appetite change, chills, diaphoresis, fatigue, fever and unexpected weight change.   HENT:  Positive for trouble swallowing (rare occasion). Negative for sore throat and voice change.    Respiratory:  Negative for shortness of breath.    Cardiovascular:  Negative for chest pain.   Gastrointestinal:  Negative for abdominal pain, nausea and vomiting.   Neurological:  Negative for dizziness and weakness.   Psychiatric/Behavioral:  Negative for dysphoric mood. The patient is not nervous/anxious.       Medical History:   Past Medical History:   Diagnosis Date    *Atrial fibrillation     Abdominal wall seroma 1/16/2019    Seroma developed after her hernia repair in 2012.  Please review the notes in the Care everywhere section of the chart.  There is no luis evidence of infection at this time.  I would recommend checking a erythrocyte sedimentation rate and C reactive protein.  These are significant elevated then aspiration of the seroma fluid would be  warranted.  If not the seroma does not require any intervention    Coronary artery disease involving native coronary artery of native heart with angina pectoris 4/10/2018    Diabetes mellitus     DM (diabetes mellitus) 2002     09/06/2017 no medication    Hyperlipidemia     Hypertension     Morbid (severe) obesity due to excess calories 7/23/2013    Obesity (BMI 30-39.9) 2/24/2015    Obstructive sleep apnea        Surgical History:   Past Surgical History:   Procedure Laterality Date    CATARACT EXTRACTION Left 12/15/2021    distance    CHOLECYSTECTOMY      COLON SURGERY      HYSTERECTOMY         Family History:    Family History   Problem Relation Age of Onset    Diabetes Sister     Hypertension Sister     Hypertension Mother     Diabetes Sister     COPD Neg Hx     Cancer Neg Hx     Heart disease Neg Hx     Hyperlipidemia Neg Hx     Stroke Neg Hx        Social History:   Social History     Socioeconomic History    Marital status:     Number of children: 1    Highest education level: High school graduate   Occupational History    Occupation: Retired   Tobacco Use    Smoking status: Never    Smokeless tobacco: Never   Substance and Sexual Activity    Alcohol use: No    Drug use: No    Sexual activity: Yes     Partners: Male     Social Determinants of Health     Financial Resource Strain: Low Risk     Difficulty of Paying Living Expenses: Not hard at all   Food Insecurity: No Food Insecurity    Worried About Running Out of Food in the Last Year: Never true    Ran Out of Food in the Last Year: Never true   Transportation Needs: No Transportation Needs    Lack of Transportation (Medical): No    Lack of Transportation (Non-Medical): No   Physical Activity: Inactive    Days of Exercise per Week: 0 days    Minutes of Exercise per Session: 0 min   Stress: Stress Concern Present    Feeling of Stress : To some extent   Social Connections: Moderately Integrated    Frequency of Communication with Friends and  Family: More than three times a week    Frequency of Social Gatherings with Friends and Family: More than three times a week    Attends Jew Services: More than 4 times per year    Active Member of Clubs or Organizations: No    Marital Status:    Housing Stability: Low Risk     Unable to Pay for Housing in the Last Year: No    Number of Places Lived in the Last Year: 1    Unstable Housing in the Last Year: No       Allergies:   Review of patient's allergies indicates:  No Known Allergies    Home Medications:   Current Outpatient Medications on File Prior to Visit   Medication Sig Dispense Refill    ACCU-CHEK SOFTCLIX LANCETS Misc       amLODIPine (NORVASC) 5 MG tablet TAKE 1 TABLET EVERY DAY 90 tablet 0    diltiaZEM (CARDIZEM CD) 180 MG 24 hr capsule Take 1 capsule (180 mg total) by mouth once daily. 90 capsule 3    esomeprazole (NEXIUM) 40 MG capsule Take 1 capsule (40 mg total) by mouth before breakfast. 90 capsule 5    fluocinolone acetonide oiL (DERMOTIC OIL) 0.01 % Drop Place 3 drops in ear(s) 2 (two) times daily. 20 mL 5    HYDROcodone-acetaminophen (NORCO) 5-325 mg per tablet Take 1 tablet by mouth nightly as needed for Pain. 12 tablet 0    INCONTINENCE PAD,LINER,DISP (BLADDER CONTROL PADS EX ABSORB MISC)       indapamide (LOZOL) 2.5 MG Tab Take 1 tablet (2.5 mg total) by mouth once daily. 90 tablet 1    losartan (COZAAR) 100 MG tablet Take 1 tablet (100 mg total) by mouth once daily. 90 tablet 1    magnesium oxide (MAG-OX) 400 mg (241.3 mg magnesium) tablet Take 1 tablet (400 mg total) by mouth once daily. 90 tablet 1    metoprolol succinate (TOPROL-XL) 100 MG 24 hr tablet Take 1 tablet (100 mg total) by mouth once daily. 90 tablet 2    potassium chloride SA (K-DUR,KLOR-CON) 20 MEQ tablet Take 1 tablet (20 mEq total) by mouth once daily. 90 tablet 1    triamcinolone acetonide 0.1% (KENALOG) 0.1 % cream Apply topically 2 (two) times daily. 45 g 0    TRUE METRIX AIR GLUCOSE METER kit       TRUE  "METRIX GLUCOSE TEST STRIP Strp TEST BLOOD SUGAR TWICE DAILY 200 strip 11    warfarin (COUMADIN) 6 MG tablet TAKE 1 TABLET EVERY DAY 90 tablet 0    atorvastatin (LIPITOR) 40 MG tablet Take 1 tablet (40 mg total) by mouth once daily. (Patient not taking: Reported on 9/29/2022) 90 tablet 3    nitroGLYCERIN (NITROSTAT) 0.4 MG SL tablet Place 1 tablet (0.4 mg total) under the tongue every 5 (five) minutes as needed for Chest pain. Take up to 3 times 5 min apart 30 tablet 0    TRUE METRIX LEVEL 1 Soln 1 each by Tube route once daily. 1 each 11     No current facility-administered medications on file prior to visit.       Physical Exam:  BP (!) 142/62   Pulse 92   Ht 5' 5" (1.651 m)   Wt 98.6 kg (217 lb 7.7 oz)   LMP  (LMP Unknown)   SpO2 96%   BMI 36.19 kg/m²   Body mass index is 36.19 kg/m².  Physical Exam  Constitutional:       General: She is not in acute distress.     Appearance: Normal appearance. She is not ill-appearing, toxic-appearing or diaphoretic.   HENT:      Head: Normocephalic and atraumatic.   Eyes:      General: No scleral icterus.     Extraocular Movements: Extraocular movements intact.   Cardiovascular:      Rate and Rhythm: Normal rate.   Pulmonary:      Effort: Pulmonary effort is normal. No respiratory distress.   Abdominal:      Palpations: Abdomen is soft.      Tenderness: There is no abdominal tenderness.   Musculoskeletal:         General: Normal range of motion.      Cervical back: Normal range of motion.   Skin:     General: Skin is warm and dry.      Coloration: Skin is not pale.   Neurological:      Mental Status: She is alert and oriented to person, place, and time.         Labs: Pertinent labs reviewed.  Endoscopy: --  CRC Screening: --  Anticoagulation: --    Assessment:  1. Gastroesophageal reflux disease, unspecified whether esophagitis present         Recommendations:  -Discussed reflux with patient. Explained that laryngoscopy consistent with signs of reflux. This could easily be " cause of recurrent dry cough. Offered options of upper GI imaging vs. EGD with BRAVO. Patient declines any additional workup at this time. States she will contact GI if she is ready to move forward.    Gastroesophageal reflux disease, unspecified whether esophagitis present  -     Ambulatory referral/consult to Gastroenterology        No follow-ups on file.    Thank you so much for allowing me to participate in the care of Natalie Nath PA-C

## 2023-03-07 NOTE — NURSING
Patient briefly assessed for diabetes educational needs following chart review  She is preparing for discharge   is at the bedside for info and is also a Diabetes  She has a home glucose monitor and is consistent with taking her med's  No diabetes educational needs identified at this time   Attending Attestation (For Attendings USE Only)...

## 2023-03-29 ENCOUNTER — ANTI-COAG VISIT (OUTPATIENT)
Dept: CARDIOLOGY | Facility: CLINIC | Age: 83
End: 2023-03-29
Payer: MEDICARE

## 2023-03-29 DIAGNOSIS — I48.91 ATRIAL FIBRILLATION, UNSPECIFIED TYPE: ICD-10-CM

## 2023-03-29 DIAGNOSIS — Z79.01 LONG TERM (CURRENT) USE OF ANTICOAGULANTS: Primary | ICD-10-CM

## 2023-03-29 LAB — INR PPP: 2.5 (ref 2–3)

## 2023-03-29 PROCEDURE — 93793 PR ANTICOAGULANT MGMT FOR PT TAKING WARFARIN: ICD-10-PCS | Mod: HCNC,S$GLB,,

## 2023-03-29 PROCEDURE — 93793 ANTICOAG MGMT PT WARFARIN: CPT | Mod: HCNC,S$GLB,,

## 2023-03-29 PROCEDURE — 85610 PROTHROMBIN TIME: CPT | Mod: QW,HCNC,S$GLB, | Performed by: INTERNAL MEDICINE

## 2023-03-29 PROCEDURE — 85610 POCT INR: ICD-10-PCS | Mod: QW,HCNC,S$GLB, | Performed by: INTERNAL MEDICINE

## 2023-03-29 NOTE — PROGRESS NOTES
INR is therapeutic at 2.5.  Patient reports no recent changes or upcoming procedures.  Current dose verified - continue warfarin 6 mg daily.  Follow up in 1 month.  Patient confirms understanding.

## 2023-03-31 ENCOUNTER — LAB VISIT (OUTPATIENT)
Dept: LAB | Facility: HOSPITAL | Age: 83
End: 2023-03-31
Payer: MEDICARE

## 2023-03-31 ENCOUNTER — OFFICE VISIT (OUTPATIENT)
Dept: INTERNAL MEDICINE | Facility: CLINIC | Age: 83
End: 2023-03-31
Payer: MEDICARE

## 2023-03-31 VITALS
HEART RATE: 86 BPM | WEIGHT: 222 LBS | BODY MASS INDEX: 36.99 KG/M2 | HEIGHT: 65 IN | SYSTOLIC BLOOD PRESSURE: 136 MMHG | OXYGEN SATURATION: 96 % | TEMPERATURE: 98 F | DIASTOLIC BLOOD PRESSURE: 60 MMHG

## 2023-03-31 DIAGNOSIS — G47.33 OSA (OBSTRUCTIVE SLEEP APNEA): ICD-10-CM

## 2023-03-31 DIAGNOSIS — K21.9 GASTROESOPHAGEAL REFLUX DISEASE, UNSPECIFIED WHETHER ESOPHAGITIS PRESENT: ICD-10-CM

## 2023-03-31 DIAGNOSIS — I25.119 CORONARY ARTERY DISEASE INVOLVING NATIVE CORONARY ARTERY OF NATIVE HEART WITH ANGINA PECTORIS: ICD-10-CM

## 2023-03-31 DIAGNOSIS — E78.5 HYPERLIPIDEMIA ASSOCIATED WITH TYPE 2 DIABETES MELLITUS: ICD-10-CM

## 2023-03-31 DIAGNOSIS — I25.118 CORONARY ARTERY DISEASE OF NATIVE ARTERY OF NATIVE HEART WITH STABLE ANGINA PECTORIS: ICD-10-CM

## 2023-03-31 DIAGNOSIS — E66.01 SEVERE OBESITY (BMI 35.0-39.9) WITH COMORBIDITY: ICD-10-CM

## 2023-03-31 DIAGNOSIS — I10 PRIMARY HYPERTENSION: Primary | ICD-10-CM

## 2023-03-31 DIAGNOSIS — I48.91 ATRIAL FIBRILLATION, UNSPECIFIED TYPE: ICD-10-CM

## 2023-03-31 DIAGNOSIS — E78.5 HYPERLIPIDEMIA WITH TARGET LDL LESS THAN 100: ICD-10-CM

## 2023-03-31 DIAGNOSIS — R80.9 DIABETES MELLITUS WITH MICROALBUMINURIA: ICD-10-CM

## 2023-03-31 DIAGNOSIS — E11.29 DIABETES MELLITUS WITH MICROALBUMINURIA: ICD-10-CM

## 2023-03-31 DIAGNOSIS — M81.0 OSTEOPOROSIS, UNSPECIFIED OSTEOPOROSIS TYPE, UNSPECIFIED PATHOLOGICAL FRACTURE PRESENCE: ICD-10-CM

## 2023-03-31 DIAGNOSIS — Z79.01 ANTICOAGULATED ON COUMADIN: ICD-10-CM

## 2023-03-31 DIAGNOSIS — E11.69 HYPERLIPIDEMIA ASSOCIATED WITH TYPE 2 DIABETES MELLITUS: ICD-10-CM

## 2023-03-31 DIAGNOSIS — I70.0 AORTIC ATHEROSCLEROSIS: ICD-10-CM

## 2023-03-31 DIAGNOSIS — R13.10 DYSPHAGIA, UNSPECIFIED TYPE: ICD-10-CM

## 2023-03-31 PROBLEM — U07.1 COVID-19: Status: ACTIVE | Noted: 2022-07-07

## 2023-03-31 LAB
ALBUMIN SERPL BCP-MCNC: 3.7 G/DL (ref 3.5–5.2)
ALP SERPL-CCNC: 87 U/L (ref 55–135)
ALT SERPL W/O P-5'-P-CCNC: 20 U/L (ref 10–44)
ANION GAP SERPL CALC-SCNC: 7 MMOL/L (ref 8–16)
AST SERPL-CCNC: 26 U/L (ref 10–40)
BASOPHILS # BLD AUTO: 0.02 K/UL (ref 0–0.2)
BASOPHILS NFR BLD: 0.4 % (ref 0–1.9)
BILIRUB SERPL-MCNC: 0.7 MG/DL (ref 0.1–1)
BUN SERPL-MCNC: 12 MG/DL (ref 8–23)
CALCIUM SERPL-MCNC: 8.6 MG/DL (ref 8.7–10.5)
CHLORIDE SERPL-SCNC: 102 MMOL/L (ref 95–110)
CHOLEST SERPL-MCNC: 143 MG/DL (ref 120–199)
CHOLEST/HDLC SERPL: 4.8 {RATIO} (ref 2–5)
CO2 SERPL-SCNC: 29 MMOL/L (ref 23–29)
CREAT SERPL-MCNC: 0.7 MG/DL (ref 0.5–1.4)
DIFFERENTIAL METHOD: ABNORMAL
EOSINOPHIL # BLD AUTO: 0.1 K/UL (ref 0–0.5)
EOSINOPHIL NFR BLD: 1.3 % (ref 0–8)
ERYTHROCYTE [DISTWIDTH] IN BLOOD BY AUTOMATED COUNT: 14.2 % (ref 11.5–14.5)
EST. GFR  (NO RACE VARIABLE): >60 ML/MIN/1.73 M^2
ESTIMATED AVG GLUCOSE: 137 MG/DL (ref 68–131)
GLUCOSE SERPL-MCNC: 144 MG/DL (ref 70–110)
HBA1C MFR BLD: 6.4 % (ref 4–5.6)
HCT VFR BLD AUTO: 37.1 % (ref 37–48.5)
HDLC SERPL-MCNC: 30 MG/DL (ref 40–75)
HDLC SERPL: 21 % (ref 20–50)
HGB BLD-MCNC: 12 G/DL (ref 12–16)
IMM GRANULOCYTES # BLD AUTO: 0.01 K/UL (ref 0–0.04)
IMM GRANULOCYTES NFR BLD AUTO: 0.2 % (ref 0–0.5)
LDLC SERPL CALC-MCNC: 87.6 MG/DL (ref 63–159)
LYMPHOCYTES # BLD AUTO: 1.1 K/UL (ref 1–4.8)
LYMPHOCYTES NFR BLD: 19.4 % (ref 18–48)
MCH RBC QN AUTO: 30.5 PG (ref 27–31)
MCHC RBC AUTO-ENTMCNC: 32.3 G/DL (ref 32–36)
MCV RBC AUTO: 94 FL (ref 82–98)
MONOCYTES # BLD AUTO: 0.4 K/UL (ref 0.3–1)
MONOCYTES NFR BLD: 7.4 % (ref 4–15)
NEUTROPHILS # BLD AUTO: 3.9 K/UL (ref 1.8–7.7)
NEUTROPHILS NFR BLD: 71.3 % (ref 38–73)
NONHDLC SERPL-MCNC: 113 MG/DL
NRBC BLD-RTO: 0 /100 WBC
PLATELET # BLD AUTO: 208 K/UL (ref 150–450)
PMV BLD AUTO: 12 FL (ref 9.2–12.9)
POTASSIUM SERPL-SCNC: 4 MMOL/L (ref 3.5–5.1)
PROT SERPL-MCNC: 6.6 G/DL (ref 6–8.4)
RBC # BLD AUTO: 3.93 M/UL (ref 4–5.4)
SODIUM SERPL-SCNC: 138 MMOL/L (ref 136–145)
TRIGL SERPL-MCNC: 127 MG/DL (ref 30–150)
WBC # BLD AUTO: 5.52 K/UL (ref 3.9–12.7)

## 2023-03-31 PROCEDURE — 1160F RVW MEDS BY RX/DR IN RCRD: CPT | Mod: HCNC,CPTII,S$GLB,

## 2023-03-31 PROCEDURE — 3288F PR FALLS RISK ASSESSMENT DOCUMENTED: ICD-10-PCS | Mod: HCNC,CPTII,S$GLB,

## 2023-03-31 PROCEDURE — 1159F MED LIST DOCD IN RCRD: CPT | Mod: HCNC,CPTII,S$GLB,

## 2023-03-31 PROCEDURE — 1126F PR PAIN SEVERITY QUANTIFIED, NO PAIN PRESENT: ICD-10-PCS | Mod: HCNC,CPTII,S$GLB,

## 2023-03-31 PROCEDURE — 36415 COLL VENOUS BLD VENIPUNCTURE: CPT | Mod: HCNC

## 2023-03-31 PROCEDURE — 80053 COMPREHEN METABOLIC PANEL: CPT | Mod: HCNC

## 2023-03-31 PROCEDURE — 3075F SYST BP GE 130 - 139MM HG: CPT | Mod: HCNC,CPTII,S$GLB,

## 2023-03-31 PROCEDURE — 3075F PR MOST RECENT SYSTOLIC BLOOD PRESS GE 130-139MM HG: ICD-10-PCS | Mod: HCNC,CPTII,S$GLB,

## 2023-03-31 PROCEDURE — 99214 OFFICE O/P EST MOD 30 MIN: CPT | Mod: HCNC,S$GLB,,

## 2023-03-31 PROCEDURE — 80061 LIPID PANEL: CPT | Mod: HCNC

## 2023-03-31 PROCEDURE — 1101F PR PT FALLS ASSESS DOC 0-1 FALLS W/OUT INJ PAST YR: ICD-10-PCS | Mod: HCNC,CPTII,S$GLB,

## 2023-03-31 PROCEDURE — 1126F AMNT PAIN NOTED NONE PRSNT: CPT | Mod: HCNC,CPTII,S$GLB,

## 2023-03-31 PROCEDURE — 1160F PR REVIEW ALL MEDS BY PRESCRIBER/CLIN PHARMACIST DOCUMENTED: ICD-10-PCS | Mod: HCNC,CPTII,S$GLB,

## 2023-03-31 PROCEDURE — 3078F PR MOST RECENT DIASTOLIC BLOOD PRESSURE < 80 MM HG: ICD-10-PCS | Mod: HCNC,CPTII,S$GLB,

## 2023-03-31 PROCEDURE — 99999 PR PBB SHADOW E&M-EST. PATIENT-LVL V: CPT | Mod: PBBFAC,HCNC,,

## 2023-03-31 PROCEDURE — 3078F DIAST BP <80 MM HG: CPT | Mod: HCNC,CPTII,S$GLB,

## 2023-03-31 PROCEDURE — 1159F PR MEDICATION LIST DOCUMENTED IN MEDICAL RECORD: ICD-10-PCS | Mod: HCNC,CPTII,S$GLB,

## 2023-03-31 PROCEDURE — 3288F FALL RISK ASSESSMENT DOCD: CPT | Mod: HCNC,CPTII,S$GLB,

## 2023-03-31 PROCEDURE — 85025 COMPLETE CBC W/AUTO DIFF WBC: CPT | Mod: HCNC

## 2023-03-31 PROCEDURE — 99214 PR OFFICE/OUTPT VISIT, EST, LEVL IV, 30-39 MIN: ICD-10-PCS | Mod: HCNC,S$GLB,,

## 2023-03-31 PROCEDURE — 1101F PT FALLS ASSESS-DOCD LE1/YR: CPT | Mod: HCNC,CPTII,S$GLB,

## 2023-03-31 PROCEDURE — 83036 HEMOGLOBIN GLYCOSYLATED A1C: CPT | Mod: HCNC

## 2023-03-31 PROCEDURE — 99999 PR PBB SHADOW E&M-EST. PATIENT-LVL V: ICD-10-PCS | Mod: PBBFAC,HCNC,,

## 2023-03-31 RX ORDER — ATORVASTATIN CALCIUM 40 MG/1
40 TABLET, FILM COATED ORAL DAILY
Qty: 90 TABLET | Refills: 3 | Status: SHIPPED | OUTPATIENT
Start: 2023-03-31 | End: 2024-02-26 | Stop reason: SDUPTHER

## 2023-03-31 NOTE — PROGRESS NOTES
Natalie Wigginster  03/31/2023  [id number]    Annabella Fenton MD  Patient Care Team:  Annabella Fenton MD as PCP - General (Family Medicine)  Annabella Fenton MD as Consulting Physician (Family Medicine)  Lisbet Lopez LPN as Care Coordinator (Internal Medicine)  Law Rod MD as Consulting Physician (Cardiology)  Henry Rojas OD as Consulting Physician (Optometry)  Sarthak SchwartzD as Pharmacist (Pharmacist)          Visit Type:a scheduled routine follow-up visit    Chief Complaint:  Chief Complaint   Patient presents with    Follow-up       History of Present Illness:    82 year female presents today with her  for her 6 month follow up    DM  Lab Results   Component Value Date    HGBA1C 6.7 (H) 09/29/2022    States BG levels have been well controlled  Interested in taking mounjaro to help with weight loss  Grandson lost almost 100 pounds since stating the medication    Afib- on coumadin. INR checks.     DORA. Referred to pulm for CPAP supplies       She has not been using her Lipitor      Dysphagia/GERD  Declines further workup at this time  She does have intermittment trouble swallowing       History:  Past Medical History:   Diagnosis Date    *Atrial fibrillation     Abdominal wall seroma 1/16/2019    Seroma developed after her hernia repair in 2012.  Please review the notes in the Care everywhere section of the chart.  There is no luis evidence of infection at this time.  I would recommend checking a erythrocyte sedimentation rate and C reactive protein.  These are significant elevated then aspiration of the seroma fluid would be warranted.  If not the seroma does not require any intervention    Coronary artery disease involving native coronary artery of native heart with angina pectoris 4/10/2018    Diabetes mellitus     DM (diabetes mellitus) 2002     09/06/2017 no medication    Hyperlipidemia     Hypertension     Morbid (severe) obesity due to excess calories 7/23/2013    Obesity  (BMI 30-39.9) 2/24/2015    Obstructive sleep apnea      Past Surgical History:   Procedure Laterality Date    CATARACT EXTRACTION Left 12/15/2021    distance    CHOLECYSTECTOMY      COLON SURGERY      HYSTERECTOMY       Family History   Problem Relation Age of Onset    Diabetes Sister     Hypertension Sister     Hypertension Mother     Diabetes Sister     COPD Neg Hx     Cancer Neg Hx     Heart disease Neg Hx     Hyperlipidemia Neg Hx     Stroke Neg Hx      Social History     Socioeconomic History    Marital status:     Number of children: 1    Highest education level: High school graduate   Occupational History    Occupation: Retired   Tobacco Use    Smoking status: Never    Smokeless tobacco: Never   Substance and Sexual Activity    Alcohol use: No    Drug use: No    Sexual activity: Yes     Partners: Male     Social Determinants of Health     Financial Resource Strain: Low Risk     Difficulty of Paying Living Expenses: Not hard at all   Food Insecurity: No Food Insecurity    Worried About Running Out of Food in the Last Year: Never true    Ran Out of Food in the Last Year: Never true   Transportation Needs: No Transportation Needs    Lack of Transportation (Medical): No    Lack of Transportation (Non-Medical): No   Physical Activity: Inactive    Days of Exercise per Week: 0 days    Minutes of Exercise per Session: 0 min   Stress: Stress Concern Present    Feeling of Stress : To some extent   Social Connections: Moderately Integrated    Frequency of Communication with Friends and Family: More than three times a week    Frequency of Social Gatherings with Friends and Family: More than three times a week    Attends Zoroastrianism Services: More than 4 times per year    Active Member of Clubs or Organizations: No    Marital Status:    Housing Stability: Low Risk     Unable to Pay for Housing in the Last Year: No    Number of Places Lived in the Last Year: 1    Unstable Housing in the Last Year: No      Patient Active Problem List   Diagnosis    A-fib    HTN (hypertension)    Hyperlipidemia with target LDL less than 100    Hyperlipidemia associated with type 2 diabetes mellitus    Anticoagulated on Coumadin    History of malignant neoplasm of colon    Shoulder pain, left    Aortic atherosclerosis    Long term current use of anticoagulant therapy    DORA (obstructive sleep apnea)    Psychophysiological insomnia    PLMD (periodic limb movement disorder)    Inadequate sleep hygiene    Pulmonary hypertension    CAD (coronary artery disease)    Osteoporosis    Severe obesity (BMI 35.0-39.9) with comorbidity    Varicose veins of both lower extremities     Review of patient's allergies indicates:  No Known Allergies    The following were reviewed at this visit: active problem list, medication list, allergies, family history, social history, and health maintenance.    Medications:  Current Outpatient Medications on File Prior to Visit   Medication Sig Dispense Refill    ACCU-CHEK SOFTCLIX LANCETS Misc       amLODIPine (NORVASC) 5 MG tablet TAKE 1 TABLET EVERY DAY 90 tablet 0    esomeprazole (NEXIUM) 40 MG capsule Take 1 capsule (40 mg total) by mouth before breakfast. 90 capsule 5    fluocinolone acetonide oiL (DERMOTIC OIL) 0.01 % Drop Place 3 drops in ear(s) 2 (two) times daily. 20 mL 5    HYDROcodone-acetaminophen (NORCO) 5-325 mg per tablet Take 1 tablet by mouth nightly as needed for Pain. 12 tablet 0    INCONTINENCE PAD,LINER,DISP (BLADDER CONTROL PADS EX ABSORB MISC)       indapamide (LOZOL) 2.5 MG Tab Take 1 tablet (2.5 mg total) by mouth once daily. 90 tablet 1    losartan (COZAAR) 100 MG tablet Take 1 tablet (100 mg total) by mouth once daily. 90 tablet 1    magnesium oxide (MAG-OX) 400 mg (241.3 mg magnesium) tablet Take 1 tablet (400 mg total) by mouth once daily. 90 tablet 1    metoprolol succinate (TOPROL-XL) 100 MG 24 hr tablet Take 1 tablet (100 mg total) by mouth once daily. 90 tablet 2    potassium  chloride SA (K-DUR,KLOR-CON) 20 MEQ tablet Take 1 tablet (20 mEq total) by mouth once daily. 90 tablet 1    triamcinolone acetonide 0.1% (KENALOG) 0.1 % cream Apply topically 2 (two) times daily. 45 g 0    TRUE METRIX AIR GLUCOSE METER kit       TRUE METRIX GLUCOSE TEST STRIP Strp TEST BLOOD SUGAR TWICE DAILY 200 strip 11    warfarin (COUMADIN) 6 MG tablet TAKE 1 TABLET EVERY DAY 90 tablet 0    atorvastatin (LIPITOR) 40 MG tablet Take 1 tablet (40 mg total) by mouth once daily. (Patient not taking: Reported on 9/29/2022) 90 tablet 3    diltiaZEM (CARDIZEM CD) 180 MG 24 hr capsule Take 1 capsule (180 mg total) by mouth once daily. 90 capsule 3    nitroGLYCERIN (NITROSTAT) 0.4 MG SL tablet Place 1 tablet (0.4 mg total) under the tongue every 5 (five) minutes as needed for Chest pain. Take up to 3 times 5 min apart 30 tablet 0    TRUE METRIX LEVEL 1 Soln 1 each by Tube route once daily. 1 each 11     No current facility-administered medications on file prior to visit.       Medications have been reviewed and reconciled with patient at this visit.  Barriers to medications reviewed with patient.    Adverse reactions to current medications reviewed with patient..    Over the counter medications reviewed and reconciled with patient.    Exam:  Wt Readings from Last 3 Encounters:   03/31/23 100.7 kg (222 lb 0.1 oz)   03/03/23 98.6 kg (217 lb 7.7 oz)   02/22/23 98.9 kg (218 lb)     Temp Readings from Last 3 Encounters:   03/31/23 97.9 °F (36.6 °C) (Tympanic)   11/22/22 97 °F (36.1 °C) (Temporal)   10/06/22 98.1 °F (36.7 °C) (Temporal)     BP Readings from Last 3 Encounters:   03/31/23 136/60   03/03/23 (!) 142/62   02/22/23 132/62     Pulse Readings from Last 3 Encounters:   03/31/23 86   03/03/23 92   02/22/23 80     Body mass index is 36.94 kg/m².      Review of Systems   Respiratory:  Negative for cough and shortness of breath.    Cardiovascular:  Negative for chest pain and palpitations.   Gastrointestinal:  Positive for  heartburn.        Dysphagia   Physical Exam  Vitals and nursing note reviewed.   Constitutional:       General: She is not in acute distress.     Appearance: She is well-developed. She is obese. She is not diaphoretic.   HENT:      Head: Normocephalic and atraumatic.      Right Ear: External ear normal.      Left Ear: External ear normal.   Neck:      Thyroid: No thyromegaly.   Cardiovascular:      Rate and Rhythm: Normal rate and regular rhythm.      Heart sounds: Normal heart sounds. No murmur heard.  Pulmonary:      Effort: Pulmonary effort is normal. No respiratory distress.      Breath sounds: Normal breath sounds. No wheezing.   Abdominal:      General: Bowel sounds are normal.      Tenderness: There is no abdominal tenderness.   Neurological:      Mental Status: She is alert and oriented to person, place, and time.      Gait: Gait abnormal.   Psychiatric:         Behavior: Behavior normal.         Thought Content: Thought content normal.         Judgment: Judgment normal.       Laboratory Reviewed ({Yes)  Lab Results   Component Value Date    WBC 6.79 09/29/2022    HGB 12.6 09/29/2022    HCT 38.7 09/29/2022     09/29/2022    CHOL 156 09/29/2022    TRIG 167 (H) 09/29/2022    HDL 31 (L) 09/29/2022    ALT 30 10/26/2022    AST 32 10/26/2022     10/26/2022    K 3.5 10/26/2022     10/26/2022    CREATININE 0.8 10/26/2022    BUN 10 10/26/2022    CO2 27 10/26/2022    TSH 1.519 09/29/2022    INR 2.5 03/29/2023    HGBA1C 6.7 (H) 09/29/2022       Natalie was seen today for follow-up.    Diagnoses and all orders for this visit:    Primary hypertension  At visit, Blood pressure is at goal. Continue current medications      Atrial fibrillation, unspecified type    Hyperlipidemia associated with type 2 diabetes mellitus  -     atorvastatin (LIPITOR) 40 MG tablet; Take 1 tablet (40 mg total) by mouth once daily.    Anticoagulated on Coumadin    Aortic atherosclerosis  -     atorvastatin (LIPITOR) 40 MG  tablet; Take 1 tablet (40 mg total) by mouth once daily.    Hyperlipidemia with target LDL less than 100  -     Lipid Panel; Future    Severe obesity (BMI 35.0-39.9) with comorbidity  -     COMPREHENSIVE METABOLIC PANEL; Future  -     CBC Auto Differential; Future    Osteoporosis, unspecified osteoporosis type, unspecified pathological fracture presence    Diabetes mellitus with microalbuminuria  -     HEMOGLOBIN A1C; Future    Coronary artery disease of native artery of native heart with stable angina pectoris  -     atorvastatin (LIPITOR) 40 MG tablet; Take 1 tablet (40 mg total) by mouth once daily.    Coronary artery disease involving native coronary artery of native heart with angina pectoris  -     atorvastatin (LIPITOR) 40 MG tablet; Take 1 tablet (40 mg total) by mouth once daily.    DORA (obstructive sleep apnea)    Gastroesophageal reflux disease, unspecified whether esophagitis present    Dysphagia, unspecified type      Pt is taking Coumadin   Informed cannot use the Mounjaro while prescribed Coumadin   Cont working on diet to help with BG and Weight loss    DORA  Saw pulm for CPAP supplies  Has not starting using nightly    GERD  Not interested in further testing  Frequent drinks while eating  Eating soft food     Declined DEXA  Labs today  Appt with Dr. Fenotn in 6 months  Labs prior to visit   Schedule eye exam    Care Plan/Goals: Reviewed    Goals        Take at least one BP reading per week at various times of the day      Hypertension Goals/Individual Care Plan    Hypertension Goal Care Plan    1. Blood pressure goal is to be below 140/90. Normal Blood pressure is 120/80.  Patient's blood pressure is at goal today. (Yes)    2. Goal for self management is to check Blood Pressure daily. Record on Individual log. Patient is agreeable to self management plan. blood pressure log.   Patient will bring logs at next office visit, or communicate to /Care Management team for review for interval  follow up.     3. Moderately intense physical activity, such as brisk walking, is beneficial when done regularly. Aim for a total of 40 minutes of moderate to vigorous activity three to four times a week to help lower blood pressure. Exercise of patients choice was recommended at this office visit. walking    4. Eating Healthy Diet is important in Blood Pressure control. As its name implies, the DASH (Dietary Approaches to Stop Hypertension) eating plan is designed to help you manage blood pressure. Emphasizing healthy food sources, it also limits:  Red meat   Sodium (salt)   Sweets, added sugars and sugar-containing beverages.     5. Barriers to goals reviewed and addressed with patient at visit. (Yes)    6. Adherence and Medication Side effects discussed (Yes)    Referral to on-site Pharmacy for Co-management of hypertension (No)  Patient enrolled in Tele-health Hypertension Management. (No)    Patient is under care of /Care management (No) Referral Sent (No)                    Follow up: No follow-ups on file.    After visit summary was printed and given to patient upon discharge today.  Patient goals and care plan are included in After Visit Summary.

## 2023-04-03 DIAGNOSIS — R80.9 DIABETES MELLITUS WITH MICROALBUMINURIA: Primary | ICD-10-CM

## 2023-04-03 DIAGNOSIS — E11.29 DIABETES MELLITUS WITH MICROALBUMINURIA: Primary | ICD-10-CM

## 2023-04-11 RX ORDER — METOPROLOL SUCCINATE 100 MG/1
100 TABLET, EXTENDED RELEASE ORAL DAILY
Qty: 90 TABLET | Refills: 2 | Status: SHIPPED | OUTPATIENT
Start: 2023-04-11

## 2023-04-18 ENCOUNTER — TELEPHONE (OUTPATIENT)
Dept: ADMINISTRATIVE | Facility: HOSPITAL | Age: 83
End: 2023-04-18
Payer: MEDICARE

## 2023-04-27 ENCOUNTER — OFFICE VISIT (OUTPATIENT)
Dept: CARDIOLOGY | Facility: CLINIC | Age: 83
End: 2023-04-27
Payer: MEDICARE

## 2023-04-27 ENCOUNTER — ANTI-COAG VISIT (OUTPATIENT)
Dept: CARDIOLOGY | Facility: CLINIC | Age: 83
End: 2023-04-27
Payer: MEDICARE

## 2023-04-27 VITALS
HEIGHT: 65 IN | SYSTOLIC BLOOD PRESSURE: 128 MMHG | WEIGHT: 218.69 LBS | BODY MASS INDEX: 36.44 KG/M2 | DIASTOLIC BLOOD PRESSURE: 75 MMHG

## 2023-04-27 DIAGNOSIS — I10 HYPERTENSION, UNSPECIFIED TYPE: ICD-10-CM

## 2023-04-27 DIAGNOSIS — I83.93 VARICOSE VEINS OF BOTH LOWER EXTREMITIES, UNSPECIFIED WHETHER COMPLICATED: ICD-10-CM

## 2023-04-27 DIAGNOSIS — E78.5 HYPERLIPIDEMIA ASSOCIATED WITH TYPE 2 DIABETES MELLITUS: ICD-10-CM

## 2023-04-27 DIAGNOSIS — E78.5 HYPERLIPIDEMIA WITH TARGET LDL LESS THAN 100: ICD-10-CM

## 2023-04-27 DIAGNOSIS — R06.09 DOE (DYSPNEA ON EXERTION): ICD-10-CM

## 2023-04-27 DIAGNOSIS — Z79.01 LONG TERM (CURRENT) USE OF ANTICOAGULANTS: Primary | ICD-10-CM

## 2023-04-27 DIAGNOSIS — I50.32 CHRONIC HEART FAILURE WITH PRESERVED EJECTION FRACTION: ICD-10-CM

## 2023-04-27 DIAGNOSIS — G47.33 OSA (OBSTRUCTIVE SLEEP APNEA): ICD-10-CM

## 2023-04-27 DIAGNOSIS — R06.09 OTHER FORM OF DYSPNEA: ICD-10-CM

## 2023-04-27 DIAGNOSIS — I70.0 AORTIC ATHEROSCLEROSIS: ICD-10-CM

## 2023-04-27 DIAGNOSIS — I25.118 CORONARY ARTERY DISEASE WITH STABLE ANGINA PECTORIS, UNSPECIFIED VESSEL OR LESION TYPE, UNSPECIFIED WHETHER NATIVE OR TRANSPLANTED HEART: ICD-10-CM

## 2023-04-27 DIAGNOSIS — E11.69 HYPERLIPIDEMIA ASSOCIATED WITH TYPE 2 DIABETES MELLITUS: ICD-10-CM

## 2023-04-27 DIAGNOSIS — I48.91 ATRIAL FIBRILLATION, UNSPECIFIED TYPE: ICD-10-CM

## 2023-04-27 DIAGNOSIS — I25.118 CORONARY ARTERY DISEASE OF NATIVE ARTERY OF NATIVE HEART WITH STABLE ANGINA PECTORIS: ICD-10-CM

## 2023-04-27 DIAGNOSIS — I48.20 CHRONIC A-FIB: Primary | ICD-10-CM

## 2023-04-27 LAB — INR PPP: 2 (ref 2–3)

## 2023-04-27 PROCEDURE — 99214 OFFICE O/P EST MOD 30 MIN: CPT | Mod: HCNC,S$GLB,, | Performed by: INTERNAL MEDICINE

## 2023-04-27 PROCEDURE — 3074F PR MOST RECENT SYSTOLIC BLOOD PRESSURE < 130 MM HG: ICD-10-PCS | Mod: HCNC,CPTII,S$GLB, | Performed by: INTERNAL MEDICINE

## 2023-04-27 PROCEDURE — 3078F PR MOST RECENT DIASTOLIC BLOOD PRESSURE < 80 MM HG: ICD-10-PCS | Mod: HCNC,CPTII,S$GLB, | Performed by: INTERNAL MEDICINE

## 2023-04-27 PROCEDURE — 99214 PR OFFICE/OUTPT VISIT, EST, LEVL IV, 30-39 MIN: ICD-10-PCS | Mod: HCNC,S$GLB,, | Performed by: INTERNAL MEDICINE

## 2023-04-27 PROCEDURE — 1126F PR PAIN SEVERITY QUANTIFIED, NO PAIN PRESENT: ICD-10-PCS | Mod: HCNC,CPTII,S$GLB, | Performed by: INTERNAL MEDICINE

## 2023-04-27 PROCEDURE — 1160F PR REVIEW ALL MEDS BY PRESCRIBER/CLIN PHARMACIST DOCUMENTED: ICD-10-PCS | Mod: HCNC,CPTII,S$GLB, | Performed by: INTERNAL MEDICINE

## 2023-04-27 PROCEDURE — 1126F AMNT PAIN NOTED NONE PRSNT: CPT | Mod: HCNC,CPTII,S$GLB, | Performed by: INTERNAL MEDICINE

## 2023-04-27 PROCEDURE — 1160F RVW MEDS BY RX/DR IN RCRD: CPT | Mod: HCNC,CPTII,S$GLB, | Performed by: INTERNAL MEDICINE

## 2023-04-27 PROCEDURE — 1159F MED LIST DOCD IN RCRD: CPT | Mod: HCNC,CPTII,S$GLB, | Performed by: INTERNAL MEDICINE

## 2023-04-27 PROCEDURE — 1159F PR MEDICATION LIST DOCUMENTED IN MEDICAL RECORD: ICD-10-PCS | Mod: HCNC,CPTII,S$GLB, | Performed by: INTERNAL MEDICINE

## 2023-04-27 PROCEDURE — 3288F FALL RISK ASSESSMENT DOCD: CPT | Mod: HCNC,CPTII,S$GLB, | Performed by: INTERNAL MEDICINE

## 2023-04-27 PROCEDURE — 1101F PT FALLS ASSESS-DOCD LE1/YR: CPT | Mod: HCNC,CPTII,S$GLB, | Performed by: INTERNAL MEDICINE

## 2023-04-27 PROCEDURE — 3288F PR FALLS RISK ASSESSMENT DOCUMENTED: ICD-10-PCS | Mod: HCNC,CPTII,S$GLB, | Performed by: INTERNAL MEDICINE

## 2023-04-27 PROCEDURE — 85610 PROTHROMBIN TIME: CPT | Mod: QW,HCNC,S$GLB, | Performed by: INTERNAL MEDICINE

## 2023-04-27 PROCEDURE — 3074F SYST BP LT 130 MM HG: CPT | Mod: HCNC,CPTII,S$GLB, | Performed by: INTERNAL MEDICINE

## 2023-04-27 PROCEDURE — 99999 PR PBB SHADOW E&M-EST. PATIENT-LVL III: ICD-10-PCS | Mod: PBBFAC,HCNC,, | Performed by: INTERNAL MEDICINE

## 2023-04-27 PROCEDURE — 1101F PR PT FALLS ASSESS DOC 0-1 FALLS W/OUT INJ PAST YR: ICD-10-PCS | Mod: HCNC,CPTII,S$GLB, | Performed by: INTERNAL MEDICINE

## 2023-04-27 PROCEDURE — 85610 POCT INR: ICD-10-PCS | Mod: QW,HCNC,S$GLB, | Performed by: INTERNAL MEDICINE

## 2023-04-27 PROCEDURE — 3078F DIAST BP <80 MM HG: CPT | Mod: HCNC,CPTII,S$GLB, | Performed by: INTERNAL MEDICINE

## 2023-04-27 PROCEDURE — 99999 PR PBB SHADOW E&M-EST. PATIENT-LVL III: CPT | Mod: PBBFAC,HCNC,, | Performed by: INTERNAL MEDICINE

## 2023-04-27 RX ORDER — DILTIAZEM HYDROCHLORIDE 180 MG/1
180 CAPSULE, COATED, EXTENDED RELEASE ORAL DAILY
Qty: 90 CAPSULE | Refills: 3 | Status: SHIPPED | OUTPATIENT
Start: 2023-04-27 | End: 2024-04-26

## 2023-04-27 RX ORDER — LANOLIN ALCOHOL/MO/W.PET/CERES
400 CREAM (GRAM) TOPICAL DAILY
Qty: 90 TABLET | Refills: 1 | Status: SHIPPED | OUTPATIENT
Start: 2023-04-27 | End: 2024-03-14 | Stop reason: SDUPTHER

## 2023-04-27 RX ORDER — POTASSIUM CHLORIDE 20 MEQ/1
20 TABLET, EXTENDED RELEASE ORAL DAILY
Qty: 90 TABLET | Refills: 1 | Status: SHIPPED | OUTPATIENT
Start: 2023-04-27 | End: 2024-03-14 | Stop reason: SDUPTHER

## 2023-04-27 RX ORDER — INDAPAMIDE 2.5 MG/1
2.5 TABLET ORAL DAILY
Qty: 90 TABLET | Refills: 1 | Status: SHIPPED | OUTPATIENT
Start: 2023-04-27 | End: 2023-12-15 | Stop reason: SDUPTHER

## 2023-04-27 NOTE — PROGRESS NOTES
Subjective:   Patient ID:  Natalie Greene is a 82 y.o. female who presents for cardiac consult of No chief complaint on file.          The patient came in today for cardiac consult of No chief complaint on file.      Natalie Greene is a 82 y.o. female  HTN, non-obs CAD, atrial fibrillation (on Coumadin), pulm HTN,  hyperlipidemia, obesity, and DM, DORA presents for CV follow up.    4/10/18  Pt has left sided chest pain described as ache. Feels like it goes to left shoulder,arm,fingers. Feels like it goes straight through. The pain is intermittent, occurred the night before yesterday, feels more tired now due to it. Pain can occur at rest but not really exertional. Pt also has mild SOB but not necessary with ache, no diaphoresis but does have mild nausea. No dizziness/lightheaded. Does feel palpitations with Afib. Pt also has significant fatigue, thinks she snores a lot, has a dry mouth always and has not been tested for sleep apnea. Prior echo and cath results below - negative for signif CAD.     11/27/18  Overall has been doing well. Occ palpitations. She had neg pharm stress test 6/18. Has not gotten sleep study yet but still symptomatic with snoring, dry mouth, fatigue.  present who was diagnosed with DORA but has not gotten CPAP yet. No CP or SOB now. Has varicose veins, has not gotten compression stockings yet.     8/13/19  She was diagnosed with DORA since last visit has been on CPAP. She has been feeling overall bad lately, feels L arm pain, dizzy at times with more fatigue. She has a knot on the back next to her spine.  Is doing ok with coumadin. Has been checking blood sugars overall ok. No CP. IF she squeezes her arm feels better at times.     2/25/20  INR 2.1 3 weeks ago, prior levels in range. She has been having nausea/vomiting due to stomach pains. Her L arm has started to hurt and kept her awake all night. Her left armpit and shoulder has intermittent pain, along with PLAZA. Pt concerned about  blockages, discussed will get pharm stress and eval if further workup needed.     1/19/21  Has not followed up since last Feb, nuclear stress pending. She continues to have chest pain, L sided usually at night but yesterday happened earlier. Neg for COVID recently, does not want vaccine.     3/23/22  Follow up since 1/2021. ECHO and Nuclear stress neg 1/2021. BP today 140s/80s. HR 80s. She feels overall ok but she occ feels strange sensation in chest.     10/25/22  BP and HR well controlled. BMI 36 - 229 lbs. She had chest pain on Sunday felt like pressure, has not used NTG.   ECG - Afib V rate 95, anterior TWI    4/27/23  Nuclear stress neg for ischemia 2/2023. ECHO with normal bi V function, mild TR, mild MR, PASP 33mmHg 2/2023. From labs - of labs reveal mildly elevated BNP due to extra fluid, the increase in the fluid pill Lozol will improve that but also need to have a strict low salt diet and monitor BP and weights daily. The potassium is borderline low and magnesium is also low start potassium and magnesium supplements daily, sent to your local pharmacy.     She is taking K and MG. BP and HR stable. BMI 36 - 218 lbs.     Patient feels  no PND, no dizziness, no syncope, no CNS symptoms.    Patient is compliant with medications.    ECG - Afib V rate 95    Results for orders placed during the hospital encounter of 02/22/23    Echo    Interpretation Summary  · The left ventricle is normal in size with concentric hypertrophy and normal systolic function.  · Mild left atrial enlargement.  · The estimated ejection fraction is 55%.  · Atrial fibrillation observed.  · Normal right ventricular size with normal right ventricular systolic function.  · Mild right atrial enlargement.  · Mild mitral regurgitation.  · Mild tricuspid regurgitation.  · Normal central venous pressure (3 mmHg).  · The estimated PA systolic pressure is 33 mmHg.      Results for orders placed during the hospital encounter of 02/22/23    Nuclear  Stress - Cardiology Interpreted    Interpretation Summary    Normal myocardial perfusion scan. There is no evidence of myocardial ischemia or infarction.    There is a mild intensity fixed perfusion abnormality in the apical wall of the left ventricle, scar vs attenuation defect.    The gated perfusion images showed an ejection fraction of 78% at rest. The gated perfusion images showed an ejection fraction of 81% post stress. Normal ejection fraction is greater than 59%.    The ECG portion of the study is negative for ischemia.    There were no arrhythmias during stress.      Results for orders placed during the hospital encounter of 01/29/21    Echo Color Flow Doppler? Yes    Interpretation Summary  · Concentric remodeling and normal systolic function. The estimated ejection fraction is 60%  · Mild left atrial enlargement.  · Mild right atrial enlargement.  · Normal left ventricular diastolic function.  · With normal right ventricular systolic function.  · Mild mitral regurgitation.  · Normal central venous pressure (3 mmHg).  · The estimated PA systolic pressure is 32 mmHg.      Results for orders placed during the hospital encounter of 01/29/21    Nuclear Stress - Cardiology Interpreted    Interpretation Summary    Normal myocardial perfusion scan. There is no evidence of myocardial ischemia or infarction.    The gated perfusion images showed an ejection fraction of 58% at rest. The gated perfusion images showed an ejection fraction of 65% post stress. Normal ejection fraction is greater than %.    The EKG portion of this study is negative for ischemia.    Arrhythmias during stress: PVCs.      4/11/2016 [facility] [place]    Patient has a right dominant coronary artery.      - Left Main Coronary Artery:             The LM is normal. There is NICOLE 3 flow.     - Left Anterior Descending Artery:             The LAD has luminal irregularities. There is NICOLE 3 flow.     - Left Circumflex Artery:             The LCX is normal. There  is NICOLE 3 flow.     - Right Coronary Artery:             The RCA has luminal irregularities. There is NICOLE 3 flow.    D. SUMMARY/POST-OPERATIVE DIAGNOSIS:  1. Non-obstructive CAD.  2. Normal LVEF.    Past Medical History:   Diagnosis Date    *Atrial fibrillation     Abdominal wall seroma 1/16/2019    Seroma developed after her hernia repair in 2012.  Please review the notes in the Care everywhere section of the chart.  There is no luis evidence of infection at this time.  I would recommend checking a erythrocyte sedimentation rate and C reactive protein.  These are significant elevated then aspiration of the seroma fluid would be warranted.  If not the seroma does not require any intervention    Coronary artery disease involving native coronary artery of native heart with angina pectoris 4/10/2018    Diabetes mellitus     DM (diabetes mellitus) 2002     09/06/2017 no medication    Hyperlipidemia     Hypertension     Morbid (severe) obesity due to excess calories 7/23/2013    Obesity (BMI 30-39.9) 2/24/2015    Obstructive sleep apnea        Past Surgical History:   Procedure Laterality Date    CATARACT EXTRACTION Left 12/15/2021    distance    CHOLECYSTECTOMY      COLON SURGERY      HYSTERECTOMY         Social History     Tobacco Use    Smoking status: Never    Smokeless tobacco: Never   Substance Use Topics    Alcohol use: No    Drug use: No       Family History   Problem Relation Age of Onset    Diabetes Sister     Hypertension Sister     Hypertension Mother     Diabetes Sister     COPD Neg Hx     Cancer Neg Hx     Heart disease Neg Hx     Hyperlipidemia Neg Hx     Stroke Neg Hx        Patient's Medications   New Prescriptions    No medications on file   Previous Medications    ACCU-CHEK SOFTCLIX LANCETS MISC        AMLODIPINE (NORVASC) 5 MG TABLET    TAKE 1 TABLET EVERY DAY    ATORVASTATIN (LIPITOR) 40 MG TABLET    Take 1 tablet (40 mg total) by mouth once daily.    DILTIAZEM (CARDIZEM CD) 180 MG 24 HR  CAPSULE    Take 1 capsule (180 mg total) by mouth once daily.    ESOMEPRAZOLE (NEXIUM) 40 MG CAPSULE    Take 1 capsule (40 mg total) by mouth before breakfast.    FLUOCINOLONE ACETONIDE OIL (DERMOTIC OIL) 0.01 % DROP    Place 3 drops in ear(s) 2 (two) times daily.    HYDROCODONE-ACETAMINOPHEN (NORCO) 5-325 MG PER TABLET    Take 1 tablet by mouth nightly as needed for Pain.    INCONTINENCE PAD,LINER,DISP (BLADDER CONTROL PADS EX ABSORB MISC)        INDAPAMIDE (LOZOL) 2.5 MG TAB    Take 1 tablet (2.5 mg total) by mouth once daily.    LOSARTAN (COZAAR) 100 MG TABLET    Take 1 tablet (100 mg total) by mouth once daily.    MAGNESIUM OXIDE (MAG-OX) 400 MG (241.3 MG MAGNESIUM) TABLET    Take 1 tablet (400 mg total) by mouth once daily.    METOPROLOL SUCCINATE (TOPROL-XL) 100 MG 24 HR TABLET    Take 1 tablet (100 mg total) by mouth once daily.    NITROGLYCERIN (NITROSTAT) 0.4 MG SL TABLET    Place 1 tablet (0.4 mg total) under the tongue every 5 (five) minutes as needed for Chest pain. Take up to 3 times 5 min apart    POTASSIUM CHLORIDE SA (K-DUR,KLOR-CON) 20 MEQ TABLET    Take 1 tablet (20 mEq total) by mouth once daily.    TRIAMCINOLONE ACETONIDE 0.1% (KENALOG) 0.1 % CREAM    Apply topically 2 (two) times daily.    TRUE METRIX AIR GLUCOSE METER KIT        TRUE METRIX GLUCOSE TEST STRIP STRP    TEST BLOOD SUGAR TWICE DAILY    TRUE METRIX LEVEL 1 SOLN    1 each by Tube route once daily.    WARFARIN (COUMADIN) 6 MG TABLET    TAKE 1 TABLET EVERY DAY   Modified Medications    No medications on file   Discontinued Medications    No medications on file       Review of Systems   Constitutional:  Positive for malaise/fatigue.   HENT: Negative.     Eyes: Negative.    Respiratory:  Positive for shortness of breath.    Cardiovascular:  Positive for chest pain and leg swelling. Negative for palpitations.   Gastrointestinal: Negative.    Genitourinary: Negative.    Musculoskeletal: Negative.    Skin: Negative.    Neurological:   Positive for dizziness.   Endo/Heme/Allergies: Negative.    Psychiatric/Behavioral: Negative.     All 12 systems otherwise negative.    Wt Readings from Last 3 Encounters:   03/31/23 100.7 kg (222 lb 0.1 oz)   03/03/23 98.6 kg (217 lb 7.7 oz)   02/22/23 98.9 kg (218 lb)     Temp Readings from Last 3 Encounters:   03/31/23 97.9 °F (36.6 °C) (Tympanic)   11/22/22 97 °F (36.1 °C) (Temporal)   10/06/22 98.1 °F (36.7 °C) (Temporal)     BP Readings from Last 3 Encounters:   03/31/23 136/60   03/03/23 (!) 142/62   02/22/23 132/62     Pulse Readings from Last 3 Encounters:   03/31/23 86   03/03/23 92   02/22/23 80       LMP  (LMP Unknown)     Objective:   Physical Exam  Vitals and nursing note reviewed.   Constitutional:       General: She is not in acute distress.     Appearance: She is well-developed. She is not diaphoretic.   HENT:      Head: Normocephalic and atraumatic.      Nose: Nose normal.   Eyes:      General: No scleral icterus.     Conjunctiva/sclera: Conjunctivae normal.   Neck:      Thyroid: No thyromegaly.      Vascular: No JVD.   Cardiovascular:      Rate and Rhythm: Normal rate. Rhythm irregularly irregular.      Heart sounds: S1 normal and S2 normal. Murmur heard.     No friction rub. No gallop. No S3 or S4 sounds.   Pulmonary:      Effort: Pulmonary effort is normal. No respiratory distress.      Breath sounds: Normal breath sounds. No stridor. No wheezing or rales.   Chest:      Chest wall: No tenderness.   Abdominal:      General: Bowel sounds are normal. There is no distension.      Palpations: Abdomen is soft. There is no mass.      Tenderness: There is no abdominal tenderness. There is no rebound.   Genitourinary:     Comments: Deferred  Musculoskeletal:         General: No tenderness or deformity. Normal range of motion.      Cervical back: Normal range of motion and neck supple.   Lymphadenopathy:      Cervical: No cervical adenopathy.   Skin:     General: Skin is warm and dry.      Coloration:  Skin is not pale.      Findings: No erythema or rash.   Neurological:      Mental Status: She is alert and oriented to person, place, and time.      Motor: No abnormal muscle tone.      Coordination: Coordination normal.   Psychiatric:         Behavior: Behavior normal.         Thought Content: Thought content normal.         Judgment: Judgment normal.       Lab Results   Component Value Date     03/31/2023    K 4.0 03/31/2023     03/31/2023    CO2 29 03/31/2023    BUN 12 03/31/2023    CREATININE 0.7 03/31/2023     (H) 03/31/2023    HGBA1C 6.4 (H) 03/31/2023    MG 1.7 10/26/2022    AST 26 03/31/2023    ALT 20 03/31/2023    ALBUMIN 3.7 03/31/2023    PROT 6.6 03/31/2023    BILITOT 0.7 03/31/2023    WBC 5.52 03/31/2023    HGB 12.0 03/31/2023    HCT 37.1 03/31/2023    MCV 94 03/31/2023     03/31/2023    INR 2.0 04/27/2023    INR 1.8 (H) 05/04/2020    TSH 1.519 09/29/2022    CHOL 143 03/31/2023    HDL 30 (L) 03/31/2023    LDLCALC 87.6 03/31/2023    TRIG 127 03/31/2023     (H) 10/26/2022     Assessment:      No diagnosis found.        Plan:     1. AF, chronic   - cont meds  - cont coumadin and f/u coumadin clinic    2. LE edema sec to venous insufficiency   - rec stockings, elevate legs  - increase Lozol to 2.5 mg    3. DORA  - needs CPAP    4. Obesity BMI 33 - 216 --> BMI 36 - 220 lbs --> 218 lbs  - rec weight loss with diet and exercise    5. HTN with non obs CAD  - cont meds  -- increase Lozol to 2.5 mg    6. HLD  - cont statin    7. Pulm HTN  - needs CPAP  - f/u with pulm    8.DM2  - A1c 6.8 -- 6.5 --> 6.7  - cont meds per PCP    9. Back pain/arm pain with weakness  - refer to PMR    10. Chest pain with PLAZA;   - Nuclear stress neg for ischemia 2/2023.   - ECHO with normal bi V function, mild TR, mild MR, PASP 33mmHg 2/2023.   - refer to GI - may have esoph issues/GERD - saw ENT - Dr. Wheeler  - BNP mildly elevated     Thank you for allowing me to participate in this patient's care.  Please do not hesitate to contact me with any questions or concerns. Consult note has been forwarded to the referral physician.

## 2023-04-27 NOTE — PROGRESS NOTES
INR is therapeutic at 2.0 - lower end of goal.  Patient reports no recent changes.  Current warfarin dose verified and followed.  Instructions given to continue 6 mg daily.  Advised to maintain a consistent diet.  Follow up in 1 month.  Patient confirms understanding.

## 2023-05-23 ENCOUNTER — ANTI-COAG VISIT (OUTPATIENT)
Dept: CARDIOLOGY | Facility: CLINIC | Age: 83
End: 2023-05-23
Payer: MEDICARE

## 2023-05-23 DIAGNOSIS — Z79.01 LONG TERM (CURRENT) USE OF ANTICOAGULANTS: Primary | ICD-10-CM

## 2023-05-23 DIAGNOSIS — I48.91 ATRIAL FIBRILLATION, UNSPECIFIED TYPE: ICD-10-CM

## 2023-05-23 LAB — INR PPP: 2.1 (ref 2–3)

## 2023-05-23 PROCEDURE — 93793 PR ANTICOAGULANT MGMT FOR PT TAKING WARFARIN: ICD-10-PCS | Mod: S$GLB,,,

## 2023-05-23 PROCEDURE — 85610 PROTHROMBIN TIME: CPT | Mod: QW,S$GLB,, | Performed by: INTERNAL MEDICINE

## 2023-05-23 PROCEDURE — 93793 ANTICOAG MGMT PT WARFARIN: CPT | Mod: S$GLB,,,

## 2023-05-23 PROCEDURE — 85610 POCT INR: ICD-10-PCS | Mod: QW,S$GLB,, | Performed by: INTERNAL MEDICINE

## 2023-05-23 NOTE — PROGRESS NOTES
INR is therapeutic at 2.1.  Patient reports no recent changes or upcoming procedures.  Instructions given to continue warfarin 6 mg daily as directed.  Follow up in 1 month - 6/27/2023 at 2:00p.  Patient confirms understanding.

## 2023-06-22 ENCOUNTER — PES CALL (OUTPATIENT)
Dept: ADMINISTRATIVE | Facility: CLINIC | Age: 83
End: 2023-06-22
Payer: MEDICARE

## 2023-06-27 ENCOUNTER — ANTI-COAG VISIT (OUTPATIENT)
Dept: CARDIOLOGY | Facility: CLINIC | Age: 83
End: 2023-06-27
Payer: MEDICARE

## 2023-06-27 DIAGNOSIS — Z79.01 LONG TERM (CURRENT) USE OF ANTICOAGULANTS: Primary | ICD-10-CM

## 2023-06-27 DIAGNOSIS — I48.91 ATRIAL FIBRILLATION, UNSPECIFIED TYPE: ICD-10-CM

## 2023-06-27 LAB — INR PPP: 3.2 (ref 2–3)

## 2023-06-27 PROCEDURE — 93793 PR ANTICOAGULANT MGMT FOR PT TAKING WARFARIN: ICD-10-PCS | Mod: S$GLB,,,

## 2023-06-27 PROCEDURE — 85610 POCT INR: ICD-10-PCS | Mod: QW,S$GLB,, | Performed by: INTERNAL MEDICINE

## 2023-06-27 PROCEDURE — 85610 PROTHROMBIN TIME: CPT | Mod: QW,S$GLB,, | Performed by: INTERNAL MEDICINE

## 2023-06-27 PROCEDURE — 93793 ANTICOAG MGMT PT WARFARIN: CPT | Mod: S$GLB,,,

## 2023-06-27 NOTE — PROGRESS NOTES
INR is above goal at 3.2.  Patient reports no medication, health or dietary changes.  Current warfarin dose verified and followed.  No bleeding issues reported.  Instructions given: Will lower today's (Tuesday) dose to 3 mg, then re-challenge current dose of 6 mg daily.  Bleeding precautions explained - ED for any abnormal bleeding issues.  Follow up in 1 month.  Dose calendar given and reviewed with patient.  Patient verbalizes understanding of instructions given.

## 2023-07-25 ENCOUNTER — ANTI-COAG VISIT (OUTPATIENT)
Dept: CARDIOLOGY | Facility: CLINIC | Age: 83
End: 2023-07-25
Payer: MEDICARE

## 2023-07-25 DIAGNOSIS — Z79.01 LONG TERM (CURRENT) USE OF ANTICOAGULANTS: Primary | ICD-10-CM

## 2023-07-25 DIAGNOSIS — I48.91 ATRIAL FIBRILLATION, UNSPECIFIED TYPE: ICD-10-CM

## 2023-07-25 LAB — INR PPP: 2.6 (ref 2–3)

## 2023-07-25 PROCEDURE — 85610 POCT INR: ICD-10-PCS | Mod: QW,HCNC,S$GLB, | Performed by: INTERNAL MEDICINE

## 2023-07-25 PROCEDURE — 93793 PR ANTICOAGULANT MGMT FOR PT TAKING WARFARIN: ICD-10-PCS | Mod: HCNC,S$GLB,,

## 2023-07-25 PROCEDURE — 85610 PROTHROMBIN TIME: CPT | Mod: QW,HCNC,S$GLB, | Performed by: INTERNAL MEDICINE

## 2023-07-25 PROCEDURE — 93793 ANTICOAG MGMT PT WARFARIN: CPT | Mod: HCNC,S$GLB,,

## 2023-07-25 NOTE — PROGRESS NOTES
INR is back in goal range at 2.6.  Patient reports no recent changes.  Current warfarin dose verified and followed - continue 6 mg daily.  Follow up in 1 month - 8/22/2023 at 2:20pm.  Patient confirms understanding.

## 2023-08-12 ENCOUNTER — TELEPHONE (OUTPATIENT)
Dept: ADMINISTRATIVE | Facility: HOSPITAL | Age: 83
End: 2023-08-12
Payer: MEDICARE

## 2023-08-22 ENCOUNTER — ANTI-COAG VISIT (OUTPATIENT)
Dept: CARDIOLOGY | Facility: CLINIC | Age: 83
End: 2023-08-22
Payer: MEDICARE

## 2023-08-22 DIAGNOSIS — Z79.01 LONG TERM (CURRENT) USE OF ANTICOAGULANTS: Primary | ICD-10-CM

## 2023-08-22 DIAGNOSIS — I48.91 ATRIAL FIBRILLATION, UNSPECIFIED TYPE: ICD-10-CM

## 2023-08-22 LAB — INR PPP: 3 (ref 2–3)

## 2023-08-22 PROCEDURE — 93793 PR ANTICOAGULANT MGMT FOR PT TAKING WARFARIN: ICD-10-PCS | Mod: HCNC,S$GLB,,

## 2023-08-22 PROCEDURE — 85610 POCT INR: ICD-10-PCS | Mod: QW,HCNC,S$GLB, | Performed by: INTERNAL MEDICINE

## 2023-08-22 PROCEDURE — 93793 ANTICOAG MGMT PT WARFARIN: CPT | Mod: HCNC,S$GLB,,

## 2023-08-22 PROCEDURE — 85610 PROTHROMBIN TIME: CPT | Mod: QW,HCNC,S$GLB, | Performed by: INTERNAL MEDICINE

## 2023-08-22 NOTE — PROGRESS NOTES
INR is therapeutic at 3.0.  patient reports no recent changes.  Current warfarin dose verified and followed - continue 6 mg daily.  Follow up in 1 month.  Patent confirms understanding.

## 2023-09-11 DIAGNOSIS — I48.91 ATRIAL FIBRILLATION, UNSPECIFIED TYPE: ICD-10-CM

## 2023-09-12 RX ORDER — WARFARIN 6 MG/1
TABLET ORAL
Qty: 90 TABLET | Refills: 0 | Status: SHIPPED | OUTPATIENT
Start: 2023-09-12 | End: 2024-03-05

## 2023-09-19 ENCOUNTER — ANTI-COAG VISIT (OUTPATIENT)
Dept: CARDIOLOGY | Facility: CLINIC | Age: 83
End: 2023-09-19
Payer: MEDICARE

## 2023-09-19 DIAGNOSIS — Z79.01 LONG TERM (CURRENT) USE OF ANTICOAGULANTS: Primary | ICD-10-CM

## 2023-09-19 DIAGNOSIS — I48.91 ATRIAL FIBRILLATION, UNSPECIFIED TYPE: ICD-10-CM

## 2023-09-19 LAB — INR PPP: 2.8 (ref 2–3)

## 2023-09-19 PROCEDURE — 85610 POCT INR: ICD-10-PCS | Mod: QW,HCNC,S$GLB, | Performed by: INTERNAL MEDICINE

## 2023-09-19 PROCEDURE — 93793 ANTICOAG MGMT PT WARFARIN: CPT | Mod: HCNC,S$GLB,,

## 2023-09-19 PROCEDURE — 93793 PR ANTICOAGULANT MGMT FOR PT TAKING WARFARIN: ICD-10-PCS | Mod: HCNC,S$GLB,,

## 2023-09-19 PROCEDURE — 85610 PROTHROMBIN TIME: CPT | Mod: QW,HCNC,S$GLB, | Performed by: INTERNAL MEDICINE

## 2023-09-19 NOTE — PROGRESS NOTES
INR is therapeutic at 2.8.  Patient reports no recent changes.  Confirms current warfarin dose - continue 6 mg daily.  Follow up in 1 month.  Patient confirms understanding.

## 2023-10-12 ENCOUNTER — LAB VISIT (OUTPATIENT)
Dept: LAB | Facility: HOSPITAL | Age: 83
End: 2023-10-12
Attending: INTERNAL MEDICINE
Payer: MEDICARE

## 2023-10-12 DIAGNOSIS — R80.9 DIABETES MELLITUS WITH MICROALBUMINURIA: ICD-10-CM

## 2023-10-12 DIAGNOSIS — E11.29 DIABETES MELLITUS WITH MICROALBUMINURIA: ICD-10-CM

## 2023-10-12 PROCEDURE — 36415 COLL VENOUS BLD VENIPUNCTURE: CPT | Mod: HCNC

## 2023-10-12 PROCEDURE — 83036 HEMOGLOBIN GLYCOSYLATED A1C: CPT | Mod: HCNC

## 2023-10-13 LAB
ESTIMATED AVG GLUCOSE: 157 MG/DL (ref 68–131)
HBA1C MFR BLD: 7.1 % (ref 4–5.6)

## 2023-10-20 ENCOUNTER — OFFICE VISIT (OUTPATIENT)
Dept: INTERNAL MEDICINE | Facility: CLINIC | Age: 83
End: 2023-10-20
Payer: MEDICARE

## 2023-10-20 VITALS
HEIGHT: 65 IN | BODY MASS INDEX: 36 KG/M2 | RESPIRATION RATE: 18 BRPM | TEMPERATURE: 98 F | WEIGHT: 216.06 LBS | SYSTOLIC BLOOD PRESSURE: 124 MMHG | OXYGEN SATURATION: 96 % | DIASTOLIC BLOOD PRESSURE: 60 MMHG | HEART RATE: 95 BPM

## 2023-10-20 DIAGNOSIS — I10 PRIMARY HYPERTENSION: ICD-10-CM

## 2023-10-20 DIAGNOSIS — Z79.01 ANTICOAGULATED ON COUMADIN: ICD-10-CM

## 2023-10-20 DIAGNOSIS — E78.5 HYPERLIPIDEMIA ASSOCIATED WITH TYPE 2 DIABETES MELLITUS: ICD-10-CM

## 2023-10-20 DIAGNOSIS — E11.65 TYPE 2 DIABETES MELLITUS WITH HYPERGLYCEMIA, WITHOUT LONG-TERM CURRENT USE OF INSULIN: Primary | ICD-10-CM

## 2023-10-20 DIAGNOSIS — H91.90 DECREASED HEARING, UNSPECIFIED LATERALITY: ICD-10-CM

## 2023-10-20 DIAGNOSIS — I48.91 ATRIAL FIBRILLATION, UNSPECIFIED TYPE: ICD-10-CM

## 2023-10-20 DIAGNOSIS — E11.69 HYPERLIPIDEMIA ASSOCIATED WITH TYPE 2 DIABETES MELLITUS: ICD-10-CM

## 2023-10-20 DIAGNOSIS — E11.9 ENCOUNTER FOR DIABETIC FOOT EXAM: ICD-10-CM

## 2023-10-20 DIAGNOSIS — E66.01 SEVERE OBESITY (BMI 35.0-39.9) WITH COMORBIDITY: ICD-10-CM

## 2023-10-20 PROCEDURE — 99214 OFFICE O/P EST MOD 30 MIN: CPT | Mod: HCNC,S$GLB,,

## 2023-10-20 PROCEDURE — 1101F PR PT FALLS ASSESS DOC 0-1 FALLS W/OUT INJ PAST YR: ICD-10-PCS | Mod: HCNC,CPTII,S$GLB,

## 2023-10-20 PROCEDURE — 3288F PR FALLS RISK ASSESSMENT DOCUMENTED: ICD-10-PCS | Mod: HCNC,CPTII,S$GLB,

## 2023-10-20 PROCEDURE — 99214 PR OFFICE/OUTPT VISIT, EST, LEVL IV, 30-39 MIN: ICD-10-PCS | Mod: HCNC,S$GLB,,

## 2023-10-20 PROCEDURE — 1160F PR REVIEW ALL MEDS BY PRESCRIBER/CLIN PHARMACIST DOCUMENTED: ICD-10-PCS | Mod: HCNC,CPTII,S$GLB,

## 2023-10-20 PROCEDURE — 1101F PT FALLS ASSESS-DOCD LE1/YR: CPT | Mod: HCNC,CPTII,S$GLB,

## 2023-10-20 PROCEDURE — 3078F PR MOST RECENT DIASTOLIC BLOOD PRESSURE < 80 MM HG: ICD-10-PCS | Mod: HCNC,CPTII,S$GLB,

## 2023-10-20 PROCEDURE — 3078F DIAST BP <80 MM HG: CPT | Mod: HCNC,CPTII,S$GLB,

## 2023-10-20 PROCEDURE — 3074F PR MOST RECENT SYSTOLIC BLOOD PRESSURE < 130 MM HG: ICD-10-PCS | Mod: HCNC,CPTII,S$GLB,

## 2023-10-20 PROCEDURE — 1159F PR MEDICATION LIST DOCUMENTED IN MEDICAL RECORD: ICD-10-PCS | Mod: HCNC,CPTII,S$GLB,

## 2023-10-20 PROCEDURE — 1159F MED LIST DOCD IN RCRD: CPT | Mod: HCNC,CPTII,S$GLB,

## 2023-10-20 PROCEDURE — 99999 PR PBB SHADOW E&M-EST. PATIENT-LVL V: ICD-10-PCS | Mod: PBBFAC,HCNC,,

## 2023-10-20 PROCEDURE — 1160F RVW MEDS BY RX/DR IN RCRD: CPT | Mod: HCNC,CPTII,S$GLB,

## 2023-10-20 PROCEDURE — 1126F AMNT PAIN NOTED NONE PRSNT: CPT | Mod: HCNC,CPTII,S$GLB,

## 2023-10-20 PROCEDURE — 3074F SYST BP LT 130 MM HG: CPT | Mod: HCNC,CPTII,S$GLB,

## 2023-10-20 PROCEDURE — 99999 PR PBB SHADOW E&M-EST. PATIENT-LVL V: CPT | Mod: PBBFAC,HCNC,,

## 2023-10-20 PROCEDURE — 3288F FALL RISK ASSESSMENT DOCD: CPT | Mod: HCNC,CPTII,S$GLB,

## 2023-10-20 PROCEDURE — 1126F PR PAIN SEVERITY QUANTIFIED, NO PAIN PRESENT: ICD-10-PCS | Mod: HCNC,CPTII,S$GLB,

## 2023-10-20 NOTE — PROGRESS NOTES
Natalie Greene  10/20/2023  [id number]    Annabella Fenton MD  Patient Care Team:  Annabella Fenton MD as PCP - General (Family Medicine)  Annabella Fenton MD as Consulting Physician (Family Medicine)  Lisbet Lopez LPN as Care Coordinator (Internal Medicine)  Law Rod MD as Consulting Physician (Cardiology)  Henry Rojas OD as Consulting Physician (Optometry)  Lizeth Cain, PharmD as Pharmacist (Pharmacist)          Visit Type:a scheduled routine follow-up visit    Chief Complaint:  Chief Complaint   Patient presents with    Follow-up        History of Present Illness:   83 y.o. female  HTN, non-obs CAD, atrial fibrillation (on Coumadin), pulm HTN,  hyperlipidemia, obesity, and DM, DORA     DM  Lab Results   Component Value Date    HGBA1C 7.1 (H) 10/12/2023          History:  Past Medical History:   Diagnosis Date    *Atrial fibrillation     Abdominal wall seroma 1/16/2019    Seroma developed after her hernia repair in 2012.  Please review the notes in the Care everywhere section of the chart.  There is no luis evidence of infection at this time.  I would recommend checking a erythrocyte sedimentation rate and C reactive protein.  These are significant elevated then aspiration of the seroma fluid would be warranted.  If not the seroma does not require any intervention    Coronary artery disease involving native coronary artery of native heart with angina pectoris 4/10/2018    Diabetes mellitus     DM (diabetes mellitus) 2002     09/06/2017 no medication    Hyperlipidemia     Hypertension     Morbid (severe) obesity due to excess calories 7/23/2013    Obesity (BMI 30-39.9) 2/24/2015    Obstructive sleep apnea      Past Surgical History:   Procedure Laterality Date    CATARACT EXTRACTION Left 12/15/2021    distance    CHOLECYSTECTOMY      COLON SURGERY      HYSTERECTOMY       Family History   Problem Relation Age of Onset    Diabetes Sister     Hypertension Sister     Hypertension Mother      Diabetes Sister     COPD Neg Hx     Cancer Neg Hx     Heart disease Neg Hx     Hyperlipidemia Neg Hx     Stroke Neg Hx      Social History     Socioeconomic History    Marital status:     Number of children: 1    Highest education level: High school graduate   Occupational History    Occupation: Retired   Tobacco Use    Smoking status: Never    Smokeless tobacco: Never   Substance and Sexual Activity    Alcohol use: No    Drug use: No    Sexual activity: Yes     Partners: Male     Social Determinants of Health     Financial Resource Strain: Low Risk  (9/8/2022)    Overall Financial Resource Strain (CARDIA)     Difficulty of Paying Living Expenses: Not hard at all   Food Insecurity: No Food Insecurity (9/8/2022)    Hunger Vital Sign     Worried About Running Out of Food in the Last Year: Never true     Ran Out of Food in the Last Year: Never true   Transportation Needs: No Transportation Needs (9/8/2022)    PRAPARE - Transportation     Lack of Transportation (Medical): No     Lack of Transportation (Non-Medical): No   Physical Activity: Inactive (9/8/2022)    Exercise Vital Sign     Days of Exercise per Week: 0 days     Minutes of Exercise per Session: 0 min   Stress: Stress Concern Present (9/8/2022)    [employer] - Occupational Stress Questionnaire     Feeling of Stress : To some extent   Social Connections: Moderately Integrated (9/8/2022)    Social Connection and Isolation Panel [NHANES]     Frequency of Communication with Friends and Family: More than three times a week     Frequency of Social Gatherings with Friends and Family: More than three times a week     Attends [employer] Services: More than 4 times per year     Active Member of Clubs or Organizations: No     Marital Status:    Housing Stability: Low Risk  (9/8/2022)    Housing Stability Vital Sign     Unable to Pay for Housing in the Last Year: No     Number of Places Lived in the Last Year: 1     Unstable  Housing in the Last Year: No     Patient Active Problem List   Diagnosis    A-fib    HTN (hypertension)    Hyperlipidemia with target LDL less than 100    Hyperlipidemia associated with type 2 diabetes mellitus    Anticoagulated on Coumadin    History of malignant neoplasm of colon    Shoulder pain, left    Aortic atherosclerosis    Long term current use of anticoagulant therapy    DORA (obstructive sleep apnea)    Psychophysiological insomnia    PLMD (periodic limb movement disorder)    Inadequate sleep hygiene    Pulmonary hypertension    CAD (coronary artery disease)    Osteoporosis    Severe obesity (BMI 35.0-39.9) with comorbidity    Varicose veins of both lower extremities    COVID-19     Review of patient's allergies indicates:  No Known Allergies    The following were reviewed at this visit: active problem list, medication list, allergies, family history, social history, and health maintenance.    Medications:  Current Outpatient Medications on File Prior to Visit   Medication Sig Dispense Refill    ACCU-CHEK SOFTCLIX LANCETS Misc       amLODIPine (NORVASC) 5 MG tablet TAKE 1 TABLET EVERY DAY 90 tablet 0    atorvastatin (LIPITOR) 40 MG tablet Take 1 tablet (40 mg total) by mouth once daily. 90 tablet 3    diltiaZEM (CARDIZEM CD) 180 MG 24 hr capsule Take 1 capsule (180 mg total) by mouth once daily. 90 capsule 3    esomeprazole (NEXIUM) 40 MG capsule Take 1 capsule (40 mg total) by mouth before breakfast. 90 capsule 5    fluocinolone acetonide oiL (DERMOTIC OIL) 0.01 % Drop Place 3 drops in ear(s) 2 (two) times daily. 20 mL 5    HYDROcodone-acetaminophen (NORCO) 5-325 mg per tablet Take 1 tablet by mouth nightly as needed for Pain. 12 tablet 0    INCONTINENCE PAD,LINER,DISP (BLADDER CONTROL PADS EX ABSORB MISC)       indapamide (LOZOL) 2.5 MG Tab Take 1 tablet (2.5 mg total) by mouth once daily. 90 tablet 1    losartan (COZAAR) 100 MG tablet Take 1 tablet (100 mg total) by mouth once daily. 90 tablet 1     magnesium oxide (MAG-OX) 400 mg (241.3 mg magnesium) tablet Take 1 tablet (400 mg total) by mouth once daily. 90 tablet 1    metoprolol succinate (TOPROL-XL) 100 MG 24 hr tablet Take 1 tablet (100 mg total) by mouth once daily. 90 tablet 2    nitroGLYCERIN (NITROSTAT) 0.4 MG SL tablet Place 1 tablet (0.4 mg total) under the tongue every 5 (five) minutes as needed for Chest pain. Take up to 3 times 5 min apart 30 tablet 0    potassium chloride SA (K-DUR,KLOR-CON) 20 MEQ tablet Take 1 tablet (20 mEq total) by mouth once daily. 90 tablet 1    triamcinolone acetonide 0.1% (KENALOG) 0.1 % cream Apply topically 2 (two) times daily. 45 g 0    TRUE METRIX AIR GLUCOSE METER kit       TRUE METRIX GLUCOSE TEST STRIP Strp TEST BLOOD SUGAR TWICE DAILY 200 strip 11    TRUE METRIX LEVEL 1 Soln 1 each by Tube route once daily. 1 each 11    warfarin (COUMADIN) 6 MG tablet TAKE 1 TABLET EVERY DAY 90 tablet 0     No current facility-administered medications on file prior to visit.       Medications have been reviewed and reconciled with patient at this visit.  Barriers to medications reviewed with patient.    Adverse reactions to current medications reviewed with patient..    Over the counter medications reviewed and reconciled with patient.    Exam:  Wt Readings from Last 3 Encounters:   04/27/23 99.2 kg (218 lb 11.1 oz)   03/31/23 100.7 kg (222 lb 0.1 oz)   03/03/23 98.6 kg (217 lb 7.7 oz)     Temp Readings from Last 3 Encounters:   03/31/23 97.9 °F (36.6 °C) (Tympanic)   11/22/22 97 °F (36.1 °C) (Temporal)   10/06/22 98.1 °F (36.7 °C) (Temporal)     BP Readings from Last 3 Encounters:   04/27/23 128/75   03/31/23 136/60   03/03/23 (!) 142/62     Pulse Readings from Last 3 Encounters:   03/31/23 86   03/03/23 92   02/22/23 80     There is no height or weight on file to calculate BMI.      Review of Systems   HENT:  Positive for hearing loss.    Cardiovascular:  Negative for chest pain and palpitations.     Physical Exam  Vitals and  nursing note reviewed.   Constitutional:       General: She is not in acute distress.     Appearance: She is well-developed. She is not diaphoretic.   HENT:      Head: Normocephalic and atraumatic.      Right Ear: External ear normal.      Left Ear: External ear normal.   Eyes:      General:         Right eye: No discharge.         Left eye: No discharge.      Conjunctiva/sclera: Conjunctivae normal.      Pupils: Pupils are equal, round, and reactive to light.   Cardiovascular:      Rate and Rhythm: Normal rate and regular rhythm.      Pulses:           Dorsalis pedis pulses are 1+ on the right side and 1+ on the left side.      Heart sounds: Normal heart sounds. No murmur heard.  Pulmonary:      Effort: Pulmonary effort is normal. No respiratory distress.      Breath sounds: Normal breath sounds. No wheezing.   Musculoskeletal:         General: Tenderness present.      Left shoulder: Tenderness present.   Feet:      Right foot:      Protective Sensation: 10 sites tested.  0 sites sensed.      Toenail Condition: Right toenails are abnormally thick.      Left foot:      Protective Sensation: 10 sites tested.  0 sites sensed.      Toenail Condition: Left toenails are abnormally thick.   Neurological:      Mental Status: She is alert.      Gait: Gait abnormal.         Laboratory Reviewed ({Yes)  Lab Results   Component Value Date    WBC 5.52 03/31/2023    HGB 12.0 03/31/2023    HCT 37.1 03/31/2023     03/31/2023    CHOL 143 03/31/2023    TRIG 127 03/31/2023    HDL 30 (L) 03/31/2023    ALT 20 03/31/2023    AST 26 03/31/2023     03/31/2023    K 4.0 03/31/2023     03/31/2023    CREATININE 0.7 03/31/2023    BUN 12 03/31/2023    CO2 29 03/31/2023    TSH 1.519 09/29/2022    INR 2.8 09/19/2023    HGBA1C 7.1 (H) 10/12/2023     Natalie was seen today for follow-up.    Diagnoses and all orders for this visit:    Type 2 diabetes mellitus with hyperglycemia, without long-term current use of insulin    Severe  obesity (BMI 35.0-39.9) with comorbidity  Encouraged healthy diet and exercise as tolerated to help bring BMI into normal range.      Atrial fibrillation, unspecified type  Continue current treatment plan as previously prescribed with your  cardiologist     Anticoagulated on Coumadin  Continue current treatment plan as previously prescribed with your  cardiologist     Encounter for diabetic foot exam  Discussed wearing proper fitting shoes  Wearing shoes at all time  Checking feet daily   Declines appt with podiatry at this time     Decreased hearing, unspecified laterality    Hyperlipidemia associated with type 2 diabetes mellitus    Primary hypertension  At visit, Blood pressure is at goal. Continue current medications        Labs were done prior to appt and discussed at visit     Pt declines vaccines   Schedule eye exam   Declines DEXA scan     Will schedule a follow up exam with Dr. Fenton in 6 months       Care Plan/Goals: Reviewed    Goals        Take at least one BP reading per week at various times of the day      Hypertension Goals/Individual Care Plan    Hypertension Goal Care Plan    1. Blood pressure goal is to be below 140/90. Normal Blood pressure is 120/80.  Patient's blood pressure is at goal today. (Yes)    2. Goal for self management is to check Blood Pressure daily. Record on Individual log. Patient is agreeable to self management plan. blood pressure log.   Patient will bring logs at next office visit, or communicate to /Care Management team for review for interval follow up.     3. Moderately intense physical activity, such as brisk walking, is beneficial when done regularly. Aim for a total of 40 minutes of moderate to vigorous activity three to four times a week to help lower blood pressure. Exercise of patients choice was recommended at this office visit. walking    4. Eating Healthy Diet is important in Blood Pressure control. As its name implies, the DASH (Dietary Approaches to  Stop Hypertension) eating plan is designed to help you manage blood pressure. Emphasizing healthy food sources, it also limits:  Red meat   Sodium (salt)   Sweets, added sugars and sugar-containing beverages.     5. Barriers to goals reviewed and addressed with patient at visit. (Yes)    6. Adherence and Medication Side effects discussed (Yes)    Referral to on-site Pharmacy for Co-management of hypertension (No)  Patient enrolled in Tele-health Hypertension Management. (No)    Patient is under care of /Care management (No) Referral Sent (No)                    Follow up: No follow-ups on file.    After visit summary was printed and given to patient upon discharge today.  Patient goals and care plan are included in After Visit Summary.

## 2023-10-24 ENCOUNTER — ANTI-COAG VISIT (OUTPATIENT)
Dept: CARDIOLOGY | Facility: CLINIC | Age: 83
End: 2023-10-24
Payer: MEDICARE

## 2023-10-24 DIAGNOSIS — I48.91 ATRIAL FIBRILLATION, UNSPECIFIED TYPE: ICD-10-CM

## 2023-10-24 DIAGNOSIS — Z79.01 LONG TERM (CURRENT) USE OF ANTICOAGULANTS: Primary | ICD-10-CM

## 2023-10-24 LAB — INR PPP: 3.7 (ref 2–3)

## 2023-10-24 PROCEDURE — 85610 POCT INR: ICD-10-PCS | Mod: QW,HCNC,S$GLB, | Performed by: INTERNAL MEDICINE

## 2023-10-24 PROCEDURE — 93793 PR ANTICOAGULANT MGMT FOR PT TAKING WARFARIN: ICD-10-PCS | Mod: HCNC,S$GLB,,

## 2023-10-24 PROCEDURE — 85610 PROTHROMBIN TIME: CPT | Mod: QW,HCNC,S$GLB, | Performed by: INTERNAL MEDICINE

## 2023-10-24 PROCEDURE — 93793 ANTICOAG MGMT PT WARFARIN: CPT | Mod: HCNC,S$GLB,,

## 2023-10-24 NOTE — PROGRESS NOTES
INR is above goal at 3.7.  Patient reports a mail order medication, Circulating 20 for lower ankle/leg edema started 1 week ago.  Unclear, if this is causing INR elevation.  Confirms current warfarin dose.  No dietary changes or bleeding issues reported.  Instructed to hold warfarin dose today, then will re-challenge current dose of 6 mg daily.  Advised to d/c Circulating 20 and discuss with her Cardiologist (Dr. Rod) on 11/08/2023 appt.  INR will be recheck on 11/08/2023 as well.  Dose calendar given and reviewed with patient.  ED for any abnormal bleeding.  Patient confirms understanding of instructions given.

## 2023-11-03 DIAGNOSIS — I48.91 ATRIAL FIBRILLATION, UNSPECIFIED TYPE: Primary | ICD-10-CM

## 2023-11-07 ENCOUNTER — TELEPHONE (OUTPATIENT)
Dept: CARDIOLOGY | Facility: CLINIC | Age: 83
End: 2023-11-07
Payer: MEDICARE

## 2023-11-07 NOTE — TELEPHONE ENCOUNTER
----- Message from Rose Smiley sent at 11/7/2023 12:30 PM CST -----  Contact: Natalie Estrada is needing a call back in regards to rescheduling her appt. Please give her a call back at [phone]

## 2023-11-14 ENCOUNTER — ANTI-COAG VISIT (OUTPATIENT)
Dept: CARDIOLOGY | Facility: CLINIC | Age: 83
End: 2023-11-14
Payer: MEDICARE

## 2023-11-14 DIAGNOSIS — I48.91 ATRIAL FIBRILLATION, UNSPECIFIED TYPE: ICD-10-CM

## 2023-11-14 DIAGNOSIS — Z79.01 LONG TERM (CURRENT) USE OF ANTICOAGULANTS: Primary | ICD-10-CM

## 2023-11-14 LAB — INR PPP: 2.4 (ref 2–3)

## 2023-11-14 PROCEDURE — 93793 PR ANTICOAGULANT MGMT FOR PT TAKING WARFARIN: ICD-10-PCS | Mod: HCNC,S$GLB,,

## 2023-11-14 PROCEDURE — 93793 ANTICOAG MGMT PT WARFARIN: CPT | Mod: HCNC,S$GLB,,

## 2023-11-14 PROCEDURE — 85610 PROTHROMBIN TIME: CPT | Mod: QW,HCNC,S$GLB, | Performed by: INTERNAL MEDICINE

## 2023-11-14 PROCEDURE — 85610 POCT INR: ICD-10-PCS | Mod: QW,HCNC,S$GLB, | Performed by: INTERNAL MEDICINE

## 2023-11-14 NOTE — PROGRESS NOTES
INR is therapeutic at 2.4.  Previous warfarin instructions were verified and followed.  No other changes reported.  No change in dose - continue 6 mg daily.  Will recheck INR in 1 month.  Patient confirms understanding.

## 2023-12-07 ENCOUNTER — OFFICE VISIT (OUTPATIENT)
Dept: OPHTHALMOLOGY | Facility: CLINIC | Age: 83
End: 2023-12-07
Payer: MEDICARE

## 2023-12-07 DIAGNOSIS — H25.11 AGE-RELATED NUCLEAR CATARACT OF RIGHT EYE: Primary | ICD-10-CM

## 2023-12-07 PROCEDURE — 92014 PR EYE EXAM, EST PATIENT,COMPREHESV: ICD-10-PCS | Mod: HCNC,S$GLB,, | Performed by: OPTOMETRIST

## 2023-12-07 PROCEDURE — 92015 PR REFRACTION: ICD-10-PCS | Mod: HCNC,S$GLB,, | Performed by: OPTOMETRIST

## 2023-12-07 PROCEDURE — 92014 COMPRE OPH EXAM EST PT 1/>: CPT | Mod: HCNC,S$GLB,, | Performed by: OPTOMETRIST

## 2023-12-07 PROCEDURE — 2023F DILAT RTA XM W/O RTNOPTHY: CPT | Mod: HCNC,CPTII,S$GLB, | Performed by: OPTOMETRIST

## 2023-12-07 PROCEDURE — 1159F MED LIST DOCD IN RCRD: CPT | Mod: HCNC,CPTII,S$GLB, | Performed by: OPTOMETRIST

## 2023-12-07 PROCEDURE — 2023F PR DILATED RETINAL EXAM W/O EVID OF RETINOPATHY: ICD-10-PCS | Mod: HCNC,CPTII,S$GLB, | Performed by: OPTOMETRIST

## 2023-12-07 PROCEDURE — 92015 DETERMINE REFRACTIVE STATE: CPT | Mod: HCNC,S$GLB,, | Performed by: OPTOMETRIST

## 2023-12-07 PROCEDURE — 99999 PR PBB SHADOW E&M-EST. PATIENT-LVL III: ICD-10-PCS | Mod: PBBFAC,HCNC,, | Performed by: OPTOMETRIST

## 2023-12-07 PROCEDURE — 99999 PR PBB SHADOW E&M-EST. PATIENT-LVL III: CPT | Mod: PBBFAC,HCNC,, | Performed by: OPTOMETRIST

## 2023-12-07 PROCEDURE — 1159F PR MEDICATION LIST DOCUMENTED IN MEDICAL RECORD: ICD-10-PCS | Mod: HCNC,CPTII,S$GLB, | Performed by: OPTOMETRIST

## 2023-12-07 NOTE — PROGRESS NOTES
SUBJECTIVE  Natalie Greene is 83 y.o. female  Uncorrected distance visual acuity was 20/70 in the right eye and 20/25 in the left eye. Corrected near visual acuity was J4 in the right eye and J1 in the left eye. Comments: Checked near vision with otc readers +2.75.   Chief Complaint   Patient presents with    Diabetic Eye Exam          HPI    Last visit with [facility] on 01/25/2022    Lab Results       Component                Value               Date                       HGBA1C                   7.1 (H)             10/12/2023              Patient notice change with the right eye vision.  Trouble with dry eyes   Using otc drops prn   Wear otc readers +2.75  Last edited by Shaniqua Perez on 12/7/2023  2:09 PM.         Assessment /Plan :  1. Age-related nuclear cataract of right eye   Significant cataracts OU, discussed cataract surgery including Specialty IOL's  Refer back to Dr Rome  Consult Ophthalmology for cataract evaluation at next available appointment.

## 2023-12-12 ENCOUNTER — ANTI-COAG VISIT (OUTPATIENT)
Dept: CARDIOLOGY | Facility: CLINIC | Age: 83
End: 2023-12-12
Payer: MEDICARE

## 2023-12-12 DIAGNOSIS — Z79.01 LONG TERM (CURRENT) USE OF ANTICOAGULANTS: Primary | ICD-10-CM

## 2023-12-12 DIAGNOSIS — I48.91 ATRIAL FIBRILLATION, UNSPECIFIED TYPE: ICD-10-CM

## 2023-12-12 LAB — INR PPP: 3.9 (ref 2–3)

## 2023-12-12 PROCEDURE — 93793 ANTICOAG MGMT PT WARFARIN: CPT | Mod: HCNC,S$GLB,,

## 2023-12-12 PROCEDURE — 85610 PROTHROMBIN TIME: CPT | Mod: QW,HCNC,S$GLB, | Performed by: INTERNAL MEDICINE

## 2023-12-12 PROCEDURE — 93793 PR ANTICOAGULANT MGMT FOR PT TAKING WARFARIN: ICD-10-PCS | Mod: HCNC,S$GLB,,

## 2023-12-12 PROCEDURE — 85610 POCT INR: ICD-10-PCS | Mod: QW,HCNC,S$GLB, | Performed by: INTERNAL MEDICINE

## 2023-12-12 NOTE — PROGRESS NOTES
INR is above goal at 3.9.  Patient reports no medication changes, cranberry or grapefruit.  Confirms current warfarin dose.  No bleeding issues reported.  Instructions given:  Will hold warfarin dose today, then re-challenge current dose of 6 mg daily.  Recheck INR in 2 weeks.  May need a dose adjustment, pending next INR.  Bleeding precautions given - ED for any abnormal bleeding.  Dose calendar given and reviewed - patient confirms understanding.

## 2023-12-13 ENCOUNTER — TELEPHONE (OUTPATIENT)
Dept: OPHTHALMOLOGY | Facility: CLINIC | Age: 83
End: 2023-12-13
Payer: MEDICARE

## 2023-12-15 RX ORDER — INDAPAMIDE 2.5 MG/1
2.5 TABLET ORAL DAILY
Qty: 90 TABLET | Refills: 0 | Status: SHIPPED | OUTPATIENT
Start: 2023-12-15 | End: 2024-03-05 | Stop reason: SDUPTHER

## 2023-12-15 RX ORDER — AMLODIPINE BESYLATE 5 MG/1
TABLET ORAL
Qty: 90 TABLET | Refills: 0 | OUTPATIENT
Start: 2023-12-15

## 2023-12-15 NOTE — TELEPHONE ENCOUNTER
No care due was identified.  [facility] Embedded Care Due Messages. Reference number: [id number].   12/15/2023 12:11:15 PM CST

## 2023-12-15 NOTE — TELEPHONE ENCOUNTER
Refill Decision Note   Natalie Greene  is requesting a refill authorization.  Brief Assessment and Rationale for Refill:  Quick Discontinue     Medication Therapy Plan:    Pharmacy is requesting new scripts for the following medications without required information, (sig/ frequency/qty/etc)      Medication Reconciliation Completed: No     Comments: Pharmacies have been requesting medications for patients without required information, (sig, frequency, qty, etc.). In addition, requests are sent for medication(s) pt. are currently not taking, and medications patients have never taken.    We have spoken to the pharmacies about these request types and advised their teams previously that we are unable to assess these New Script requests and require all details for these requests. This is a known issue and has been reported.     Note composed:12:15 PM 12/15/2023

## 2023-12-28 ENCOUNTER — ANTI-COAG VISIT (OUTPATIENT)
Dept: CARDIOLOGY | Facility: CLINIC | Age: 83
End: 2023-12-28
Payer: MEDICARE

## 2023-12-28 DIAGNOSIS — I48.91 ATRIAL FIBRILLATION, UNSPECIFIED TYPE: ICD-10-CM

## 2023-12-28 DIAGNOSIS — Z79.01 LONG TERM (CURRENT) USE OF ANTICOAGULANTS: Primary | ICD-10-CM

## 2023-12-28 LAB — INR PPP: 1.9 (ref 2–3)

## 2023-12-28 PROCEDURE — 85610 PROTHROMBIN TIME: CPT | Mod: QW,HCNC,S$GLB, | Performed by: INTERNAL MEDICINE

## 2023-12-28 PROCEDURE — 93793 ANTICOAG MGMT PT WARFARIN: CPT | Mod: HCNC,S$GLB,,

## 2023-12-28 PROCEDURE — 85610 POCT INR: ICD-10-PCS | Mod: QW,HCNC,S$GLB, | Performed by: INTERNAL MEDICINE

## 2023-12-28 PROCEDURE — 93793 PR ANTICOAGULANT MGMT FOR PT TAKING WARFARIN: ICD-10-PCS | Mod: HCNC,S$GLB,,

## 2023-12-28 NOTE — PROGRESS NOTES
INR is just below goal at 1.9.  Previous one day hold instruction was verified and followed.  No dietary changes or s/s reported.  Instructions given to continue 6 mg daily.  Will recheck INR in 2 weeks.  Dose calendar given - confirms understanding.

## 2024-01-04 ENCOUNTER — OFFICE VISIT (OUTPATIENT)
Dept: HOME HEALTH SERVICES | Facility: CLINIC | Age: 84
End: 2024-01-04
Payer: MEDICARE

## 2024-01-04 VITALS
HEART RATE: 80 BPM | HEIGHT: 65 IN | BODY MASS INDEX: 36.65 KG/M2 | WEIGHT: 220 LBS | DIASTOLIC BLOOD PRESSURE: 55 MMHG | SYSTOLIC BLOOD PRESSURE: 143 MMHG | OXYGEN SATURATION: 96 % | TEMPERATURE: 98 F

## 2024-01-04 DIAGNOSIS — E66.01 SEVERE OBESITY (BMI 35.0-39.9) WITH COMORBIDITY: ICD-10-CM

## 2024-01-04 DIAGNOSIS — E78.5 HYPERLIPIDEMIA ASSOCIATED WITH TYPE 2 DIABETES MELLITUS: ICD-10-CM

## 2024-01-04 DIAGNOSIS — I25.118 CORONARY ARTERY DISEASE OF NATIVE ARTERY OF NATIVE HEART WITH STABLE ANGINA PECTORIS: ICD-10-CM

## 2024-01-04 DIAGNOSIS — I10 PRIMARY HYPERTENSION: ICD-10-CM

## 2024-01-04 DIAGNOSIS — F51.04 PSYCHOPHYSIOLOGICAL INSOMNIA: ICD-10-CM

## 2024-01-04 DIAGNOSIS — M81.0 OSTEOPOROSIS, UNSPECIFIED OSTEOPOROSIS TYPE, UNSPECIFIED PATHOLOGICAL FRACTURE PRESENCE: ICD-10-CM

## 2024-01-04 DIAGNOSIS — Z79.01 LONG TERM CURRENT USE OF ANTICOAGULANT THERAPY: ICD-10-CM

## 2024-01-04 DIAGNOSIS — Z72.821 INADEQUATE SLEEP HYGIENE: ICD-10-CM

## 2024-01-04 DIAGNOSIS — I48.91 ATRIAL FIBRILLATION, UNSPECIFIED TYPE: ICD-10-CM

## 2024-01-04 DIAGNOSIS — E11.69 HYPERLIPIDEMIA ASSOCIATED WITH TYPE 2 DIABETES MELLITUS: ICD-10-CM

## 2024-01-04 DIAGNOSIS — G89.29 CHRONIC LEFT SHOULDER PAIN: ICD-10-CM

## 2024-01-04 DIAGNOSIS — M25.512 CHRONIC LEFT SHOULDER PAIN: ICD-10-CM

## 2024-01-04 DIAGNOSIS — Z78.0 POSTMENOPAUSAL: ICD-10-CM

## 2024-01-04 DIAGNOSIS — G47.33 OSA (OBSTRUCTIVE SLEEP APNEA): ICD-10-CM

## 2024-01-04 DIAGNOSIS — I50.32 CHRONIC HEART FAILURE WITH PRESERVED EJECTION FRACTION: ICD-10-CM

## 2024-01-04 DIAGNOSIS — R26.9 ABNORMALITY OF GAIT AND MOBILITY: ICD-10-CM

## 2024-01-04 DIAGNOSIS — I83.93 VARICOSE VEINS OF BOTH LOWER EXTREMITIES, UNSPECIFIED WHETHER COMPLICATED: ICD-10-CM

## 2024-01-04 DIAGNOSIS — Z00.00 ENCOUNTER FOR PREVENTIVE HEALTH EXAMINATION: Primary | ICD-10-CM

## 2024-01-04 DIAGNOSIS — I70.0 AORTIC ATHEROSCLEROSIS: ICD-10-CM

## 2024-01-04 DIAGNOSIS — E11.42 TYPE 2 DIABETES MELLITUS WITH DIABETIC POLYNEUROPATHY, WITHOUT LONG-TERM CURRENT USE OF INSULIN: ICD-10-CM

## 2024-01-04 DIAGNOSIS — I27.20 PULMONARY HYPERTENSION: ICD-10-CM

## 2024-01-04 PROBLEM — U07.1 COVID-19: Status: RESOLVED | Noted: 2022-07-07 | Resolved: 2024-01-04

## 2024-01-04 PROCEDURE — 1159F MED LIST DOCD IN RCRD: CPT | Mod: CPTII,S$GLB,, | Performed by: NURSE PRACTITIONER

## 2024-01-04 PROCEDURE — 1126F AMNT PAIN NOTED NONE PRSNT: CPT | Mod: CPTII,S$GLB,, | Performed by: NURSE PRACTITIONER

## 2024-01-04 PROCEDURE — 1160F RVW MEDS BY RX/DR IN RCRD: CPT | Mod: CPTII,S$GLB,, | Performed by: NURSE PRACTITIONER

## 2024-01-04 PROCEDURE — 3288F FALL RISK ASSESSMENT DOCD: CPT | Mod: CPTII,S$GLB,, | Performed by: NURSE PRACTITIONER

## 2024-01-04 PROCEDURE — 1170F FXNL STATUS ASSESSED: CPT | Mod: CPTII,S$GLB,, | Performed by: NURSE PRACTITIONER

## 2024-01-04 PROCEDURE — G0439 PPPS, SUBSEQ VISIT: HCPCS | Mod: S$GLB,,, | Performed by: NURSE PRACTITIONER

## 2024-01-04 PROCEDURE — 3078F DIAST BP <80 MM HG: CPT | Mod: CPTII,S$GLB,, | Performed by: NURSE PRACTITIONER

## 2024-01-04 PROCEDURE — 3077F SYST BP >= 140 MM HG: CPT | Mod: CPTII,S$GLB,, | Performed by: NURSE PRACTITIONER

## 2024-01-04 PROCEDURE — 3072F LOW RISK FOR RETINOPATHY: CPT | Mod: CPTII,S$GLB,, | Performed by: NURSE PRACTITIONER

## 2024-01-04 PROCEDURE — 1101F PT FALLS ASSESS-DOCD LE1/YR: CPT | Mod: CPTII,S$GLB,, | Performed by: NURSE PRACTITIONER

## 2024-01-04 RX ORDER — METFORMIN HYDROCHLORIDE 500 MG/1
500 TABLET ORAL
COMMUNITY

## 2024-01-04 NOTE — PROGRESS NOTES
"Natalie Greene presented for a follow-up Medicare AWV today. The following components were reviewed and updated:    Medical history  Family History  Social history  Allergies and Current Medications  Health Risk Assessment  Health Maintenance  Care Team    **See Completed Assessments for Annual Wellness visit with in the encounter summary    The following assessments were completed:  Depression Screening  Cognitive function Screening    Timed Get Up Test  Whisper Test      Opioid documentation:      Patient does not have a current opioid prescription.          Vitals:    01/04/24 1315   BP: (!) 143/55   Pulse: 80   Temp: 97.9 °F (36.6 °C)   TempSrc: Temporal   SpO2: 96%   Weight: 99.8 kg (220 lb)   Height: 5' 5" (1.651 m)     Body mass index is 36.61 kg/m².       Physical Exam  Constitutional:       Appearance: She is obese.   HENT:      Ears:      Comments: Decreased hearing bilaterally     Mouth/Throat:      Mouth: Mucous membranes are moist.   Cardiovascular:      Rate and Rhythm: Normal rate. Rhythm irregular.      Pulses: Normal pulses.      Heart sounds: Normal heart sounds.   Pulmonary:      Effort: Pulmonary effort is normal.      Breath sounds: Normal breath sounds.   Skin:     General: Skin is warm and dry.   Neurological:      General: No focal deficit present.      Mental Status: She is alert and oriented to person, place, and time.      Gait: Gait abnormal.   Psychiatric:         Mood and Affect: Mood normal.         Behavior: Behavior normal.         Diagnoses and health risks identified today and associated recommendations/orders:  1. Encounter for preventive health examination  Medicare aw complete. Health maintenance:  pt declined vaccines that are due. Dexa scan ordered and message sent to ma to call pt to schedule.     2. Type 2 diabetes mellitus with diabetic polyneuropathy, without long-term current use of insulin   Latest Reference Range & Units 08/01/05 08:31 02/20/06 07:55 08/28/06 08:12 " 10/07/08 09:20 04/29/10 10:44 05/24/11 10:04 09/26/11 12:03 10/19/11 09:08 10/09/12 12:22 05/02/13 11:31 01/21/14 09:01 10/01/14 14:20 03/31/16 09:33 08/05/16 10:00 06/28/17 10:56 08/23/17 11:24 11/29/17 10:40 04/23/18 10:44 11/12/18 12:50 04/22/19 13:55 11/07/19 08:03 01/20/22 15:07 09/29/22 15:12 03/31/23 14:34 10/12/23 09:46   Hemoglobin A1C External 4.0 - 5.6 % 6.8 (H) 5.9 6.6 (H) 6.4 (H) 6.4 (H) 6.7 (H) 7.0 (H) 6.4 (H) 6.1 6.0 6.4 (H) 6.4 (H) 6.6 (H) 6.4 (H) 6.7 (H) 6.5 (H) 6.6 (H) 7.4 (H) 6.5 (H) 6.8 (H) 6.5 (H) 6.7 (H) 6.7 (H) 6.4 (H) 7.1 (H)   Estimated Avg Glucose 68 - 131 mg/dL     137 (H) 146 (H) 154 (H) 137 (H) 128 126 137 (H) 137 (H) 143 (H) 137 (H) 146 (H) 140 (H) 143 (H) 166 (H) 140 (H) 148 (H) 140 (H) 146 (H) 146 (H) 137 (H) 157 (H)   (H): Data is abnormally high  Controlled.  She is supposed to take metformin but has been noncompliant. She states she will restart it. Patient states she checks her bs once a week and ranges Reviewed diabetic diet, diabetic foot care, preferred BS, and HgbA1C levels. She states she needs papers on the diabetic diet. Referral to nutritionist placed. Follow up with your PCP as instructed, podiatry and ophthalmology yearly.    - Ambulatory referral/consult to Nutrition Services; Future    3. Chronic heart failure with preserved ejection fraction  Chronic and stable. Pt states she has not been taking diltiazem and losartan. Informed her to take them. She agreed. See med list. Follow up with cardiology.      4. Atrial fibrillation, unspecified type  Chronic and stable. Continue current management. On coumadin. See med list. Follow up with cardiology.      5. Aortic atherosclerosis  Chronic and stable on statin medication. Continue current. F/u with pcp.      6. Coronary artery disease of native artery of native heart with stable angina pectoris  Chronic and stable. Continue current management. See med list. Follow up with cardiology.      7. Hyperlipidemia associated with  type 2 diabetes mellitus  Lab Results   Component Value Date    CHOL 143 03/31/2023    CHOL 156 09/29/2022    CHOL 163 01/20/2022     Lab Results   Component Value Date    HDL 30 (L) 03/31/2023    HDL 31 (L) 09/29/2022    HDL 36 (L) 01/20/2022     Lab Results   Component Value Date    LDLCALC 87.6 03/31/2023    LDLCALC 91.6 09/29/2022    LDLCALC 89.0 01/20/2022     Lab Results   Component Value Date    TRIG 127 03/31/2023    TRIG 167 (H) 09/29/2022    TRIG 190 (H) 01/20/2022       Lab Results   Component Value Date    CHOLHDL 21.0 03/31/2023    CHOLHDL 19.9 (L) 09/29/2022    CHOLHDL 22.1 01/20/2022    Chronic and stable on statin medication. Continue current. F/u with pcp.      8. Pulmonary hypertension  Chronic and stable. Continue current management. See med list. Follow up with cardiology.      9. Varicose veins of both lower extremities, unspecified whether complicated  Chronic and stable. Continue current management. See med list. Follow up with PCP.     10. Severe obesity (BMI 35.0-39.9) with comorbidity  Chronic. Recommend diet and exercise to lose weight. Follow up with your PCP as planned to discuss adjustments to your treatment plan.      11. Primary hypertension  Chronic and stable on BP medication.  Continue current management.  See med list above. Recommend low sodium diet. Follow up with PCP.       12. Long term current use of anticoagulant therapy  On coumadin. Goes to coumadin clinic.     13. Osteoporosis, unspecified osteoporosis type, unspecified pathological fracture presence  Chronic and stable on current management. Recommend vitamin d, calcium in the diet, and weight bearing exercise. Referral previously placed to rheumatology but never went. Discussed the need for medications for osteoporosis. See med list above. Follow up with PCP.       14. Chronic left shoulder pain  Chronic and stable. Continue current management. See med list. Follow up with PCP.     15. Psychophysiological  insomnia  Chronic and stable. Continue current management. See med list. Follow up with PCP.     16. Inadequate sleep hygiene  Chronic and stable. Continue current management. See med list. Follow up with PCP.     17. DORA (obstructive sleep apnea)  Noncompliant with cpap. Discussed at least trying.     18. Abnormality of gait and mobility  Chronic. Fall precautions recommended and discussed. Follow up with PCP.      19. Postmenopausal  - DXA Bone Density Axial Skeleton 1 or more sites; Future          Provided Natalie with a 5-10 year written screening schedule and personal prevention plan. Recommendations were developed using the USPSTF age appropriate recommendations. Education, counseling, and referrals were provided as needed.  After Visit Summary printed and given to patient which includes a list of additional screenings\tests needed.    Follow up in about 1 year (around 1/4/2025) for annual wellness visit.      SOFIA Woodson      I offered to discuss advanced care planning, including how to pick a person who would make decisions for you if you were unable to make them for yourself, called a health care power of , and what kind of decisions you might make such as use of life sustaining treatments such as ventilators and tube feeding when faced with a life limiting illness recorded on a living will that they will need to know. (How you want to be cared for as you near the end of your natural life)     X  Patient has advanced directive written but has opted not to place them on file with the institution.

## 2024-01-04 NOTE — Clinical Note
Medicare awv complete. Health maintenance:  pt declined vaccines that are due. Dexa scan ordered and message sent to ma to call pt to schedule.   She is supposed to take metformin but has been noncompliant. She states she will restart it.   She is supposed to take metformin but has been noncompliant. She states she will restart it.   Osteoporosis Referral previously placed by pcp to rheumatology but pt never went. Discussed the need for medications for osteoporosis and seeing rheumatology. She states she will f/u with pcp.

## 2024-01-04 NOTE — PATIENT INSTRUCTIONS
Counseling and Referral of Other Preventative  (Italic type indicates deductible and co-insurance are waived)    Patient Name: Natalie Greene  Today's Date: 1/4/2024    Health Maintenance       Date Due Completion Date    DEXA Scan 11/07/2022 11/7/2019    RSV Vaccine (Age 60+ and Pregnant patients) (1 - 1-dose 60+ series) 01/04/2024 (Originally 9/23/2002) ---    Influenza Vaccine (1) 06/30/2024 (Originally 9/1/2023) 10/23/2019 (Declined)    Override on 10/23/2019: Declined    Shingles Vaccine (1 of 2) 01/04/2025 (Originally 9/23/1992) ---    COVID-19 Vaccine (1) 01/04/2025 (Originally 3/23/1941) ---    Pneumococcal Vaccines (Age 65+) (2 - PPSV23 or PCV20) 01/04/2025 (Originally 10/18/2017) 8/23/2017    Lipid Panel 03/31/2024 3/31/2023    Hemoglobin A1c 04/12/2024 10/12/2023    Diabetes Urine Screening 10/12/2024 10/12/2023    Foot Exam 10/20/2024 10/20/2023 (Done)    Override on 10/20/2023: Done    Eye Exam 12/07/2024 12/7/2023    TETANUS VACCINE 11/29/2027 11/29/2017 (Declined)    Override on 11/29/2017: Declined        No orders of the defined types were placed in this encounter.    The following information is provided to all patients.  This information is to help you find resources for any of the problems found today that may be affecting your health:                  Living healthy guide: www.[facility].[place].gov      Understanding Diabetes: www.diabetes.org      Eating healthy: www.cdc.gov/healthyweight      CDC home safety checklist: www.cdc.gov/steadi/patient.html      Agency on Aging: www.goea.[place].gov      Alcoholics anonymous (AA): www.aa.org      Physical Activity: www.junior.nih.gov/mh5hpfi      Tobacco use: www.quitwithusla.org

## 2024-01-05 ENCOUNTER — TELEPHONE (OUTPATIENT)
Dept: ADMINISTRATIVE | Facility: CLINIC | Age: 84
End: 2024-01-05
Payer: MEDICARE

## 2024-01-05 NOTE — TELEPHONE ENCOUNTER
Called pt; no answer; could not leave a message due to line kept ringing; I was calling to schedule pt an appt for her dexa scan per provider's message

## 2024-01-05 NOTE — TELEPHONE ENCOUNTER
----- Message from SOFIA Woodson sent at 1/4/2024  1:55 PM CST -----  Please call pt to schedule dexa scan.

## 2024-01-09 ENCOUNTER — TELEPHONE (OUTPATIENT)
Dept: ADMINISTRATIVE | Facility: CLINIC | Age: 84
End: 2024-01-09
Payer: MEDICARE

## 2024-01-10 NOTE — TELEPHONE ENCOUNTER
----- Message from Cipriano Barrow sent at 1/10/2024 11:17 AM CST -----  Contact: Natalie Willingham is calling to get a PA for medication amLODIPine (NORVASC) 5 MG tablet. Please call back at [phone]                [facility] - [place], [place] - [address]  [address] [facility] [address]  Phone: [phone] Fax: [phone]  Hours: Not open 24 hours                Thanks  SW

## 2024-01-10 NOTE — TELEPHONE ENCOUNTER
Refill Routing Note   Medication(s) are not appropriate for processing by [facility] for the following reason(s):        Patient not seen by provider within 15 months  Required vitals abnormal    [facility] action(s):  Defer        Medication Therapy Plan: LOV was not with PCP      Appointments  past 12m or future 3m with PCP    Date Provider   Last Visit   9/29/2022 Annabella Fenton MD   Next Visit   4/22/2024 Annabella Fenton MD   ED visits in past 90 days: 0        Note composed:2:16 PM 01/10/2024

## 2024-01-10 NOTE — TELEPHONE ENCOUNTER
Called and spoke with patient. Patient would like a refill of amlodipine sent to [facility]. Says she received a notice that her prescription had  and it was time for her physician to renew.    LOV 10/20/23  NOV 24

## 2024-01-10 NOTE — TELEPHONE ENCOUNTER
No care due was identified.  Health [facility] Embedded Care Due Messages. Reference number: [id number].   1/10/2024 11:38:44 AM CST

## 2024-01-11 ENCOUNTER — ANTI-COAG VISIT (OUTPATIENT)
Dept: CARDIOLOGY | Facility: CLINIC | Age: 84
End: 2024-01-11
Payer: MEDICARE

## 2024-01-11 DIAGNOSIS — I48.91 ATRIAL FIBRILLATION, UNSPECIFIED TYPE: ICD-10-CM

## 2024-01-11 DIAGNOSIS — Z79.01 LONG TERM (CURRENT) USE OF ANTICOAGULANTS: Primary | ICD-10-CM

## 2024-01-11 LAB — INR PPP: 3.2 (ref 2–3)

## 2024-01-11 PROCEDURE — 93793 ANTICOAG MGMT PT WARFARIN: CPT | Mod: HCNC,S$GLB,,

## 2024-01-11 PROCEDURE — 85610 PROTHROMBIN TIME: CPT | Mod: QW,HCNC,S$GLB, | Performed by: INTERNAL MEDICINE

## 2024-01-11 RX ORDER — AMLODIPINE BESYLATE 5 MG/1
5 TABLET ORAL DAILY
Qty: 90 TABLET | Refills: 3 | Status: SHIPPED | OUTPATIENT
Start: 2024-01-11

## 2024-01-11 NOTE — PROGRESS NOTES
INR returned back above goal at 3.2.  Warfarin 6 mg daily reports taken as directed.  No medication or dietary changes reported.  No bleeding issues reported.  Instructions given:  Will lower dose to 3 mg every Thursday and 6 mg on all other days.  Recheck INR in 2 weeks.  Dose calendar given and reviewed with patient.  ED for any abnormal bleeding.  Patient confirms understanding of instructions given.

## 2024-01-25 ENCOUNTER — ANTI-COAG VISIT (OUTPATIENT)
Dept: CARDIOLOGY | Facility: CLINIC | Age: 84
End: 2024-01-25
Payer: MEDICARE

## 2024-01-25 DIAGNOSIS — I48.91 ATRIAL FIBRILLATION, UNSPECIFIED TYPE: ICD-10-CM

## 2024-01-25 DIAGNOSIS — Z79.01 LONG TERM (CURRENT) USE OF ANTICOAGULANTS: Primary | ICD-10-CM

## 2024-01-25 LAB — INR PPP: 2.8 (ref 2–3)

## 2024-01-25 PROCEDURE — 85610 PROTHROMBIN TIME: CPT | Mod: QW,HCNC,S$GLB, | Performed by: INTERNAL MEDICINE

## 2024-01-25 PROCEDURE — 93793 ANTICOAG MGMT PT WARFARIN: CPT | Mod: HCNC,S$GLB,,

## 2024-01-25 NOTE — PROGRESS NOTES
INR is therapeutic at 2.8.  Patient confirms current warfarin dose.  No other changes reported.  Instructions given to continue 3 mg every Thursday and 6 mg on all other days.  Will recheck INR in 2 weeks - [facility].  Dose calendar given - confirms understanding.

## 2024-01-31 ENCOUNTER — TELEPHONE (OUTPATIENT)
Dept: INTERNAL MEDICINE | Facility: CLINIC | Age: 84
End: 2024-01-31
Payer: MEDICARE

## 2024-01-31 RX ORDER — LANCING DEVICE
EACH MISCELLANEOUS
Refills: 0 | OUTPATIENT
Start: 2024-01-31

## 2024-01-31 NOTE — TELEPHONE ENCOUNTER
Refill Decision Note   Natalie Greene  is requesting a refill authorization.  Brief Assessment and Rationale for Refill:  Quick Discontinue     Medication Therapy Plan: Pharmacy is requesting new scripts for the following medications without required information, (sig/ frequency/qty/etc)       Medication Reconciliation Completed: No   Comments: Pharmacies have been requesting medications for patients without required information, (sig, frequency, qty, etc.). In addition, requests are sent for medication(s) pt. are currently not taking, and medications patients have never taken.    We have spoken to the pharmacies about these request types and advised their teams previously that we are unable to assess these New Script requests and require all details for these requests. This is a known issue and has been reported.     No Care Gaps recommended.     Note composed:3:25 PM 01/31/2024

## 2024-01-31 NOTE — TELEPHONE ENCOUNTER
----- Message from Jimbo Smiley sent at 1/31/2024  3:00 PM CST -----  Contact: Emma ( [facility] )  Emma ( [facility] ) is requesting a call to get a prescription for the True draw lancets. Please call Emma ( [facility] ) [phone]

## 2024-01-31 NOTE — TELEPHONE ENCOUNTER
No care due was identified.  Health [facility] Embedded Care Due Messages. Reference number: [id number].   1/31/2024 12:39:36 PM CST

## 2024-02-06 ENCOUNTER — ANTI-COAG VISIT (OUTPATIENT)
Dept: CARDIOLOGY | Facility: CLINIC | Age: 84
End: 2024-02-06
Payer: MEDICARE

## 2024-02-06 DIAGNOSIS — Z79.01 LONG TERM (CURRENT) USE OF ANTICOAGULANTS: Primary | ICD-10-CM

## 2024-02-06 DIAGNOSIS — I48.91 ATRIAL FIBRILLATION, UNSPECIFIED TYPE: ICD-10-CM

## 2024-02-06 LAB — INR PPP: 2.3 (ref 2–3)

## 2024-02-06 PROCEDURE — 93793 ANTICOAG MGMT PT WARFARIN: CPT | Mod: HCNC,S$GLB,,

## 2024-02-06 PROCEDURE — 85610 PROTHROMBIN TIME: CPT | Mod: QW,HCNC,S$GLB, | Performed by: INTERNAL MEDICINE

## 2024-02-06 NOTE — PROGRESS NOTES
INR is therapeutic at 2.3.  Patient confirms current warfarin dose.  No recent changes reported.  Instructions given to continue 3 mg every Thursday and 6 mg on all other days.  Follow up in 1 month.  Dose calendar given - confirms understanding.

## 2024-02-08 ENCOUNTER — DOCUMENTATION ONLY (OUTPATIENT)
Dept: OPHTHALMOLOGY | Facility: CLINIC | Age: 84
End: 2024-02-08
Payer: MEDICARE

## 2024-02-08 ENCOUNTER — OFFICE VISIT (OUTPATIENT)
Dept: OPHTHALMOLOGY | Facility: CLINIC | Age: 84
End: 2024-02-08
Payer: MEDICARE

## 2024-02-08 DIAGNOSIS — H25.11 NUCLEAR SCLEROSIS OF RIGHT EYE: Primary | ICD-10-CM

## 2024-02-08 DIAGNOSIS — E11.9 DIABETES MELLITUS TYPE 2 WITHOUT RETINOPATHY: ICD-10-CM

## 2024-02-08 DIAGNOSIS — Z96.1 PSEUDOPHAKIA OF LEFT EYE: ICD-10-CM

## 2024-02-08 PROCEDURE — 92014 COMPRE OPH EXAM EST PT 1/>: CPT | Mod: HCNC,S$GLB,, | Performed by: OPHTHALMOLOGY

## 2024-02-08 PROCEDURE — 99999 PR PBB SHADOW E&M-EST. PATIENT-LVL III: CPT | Mod: PBBFAC,HCNC,, | Performed by: OPHTHALMOLOGY

## 2024-02-08 PROCEDURE — 1160F RVW MEDS BY RX/DR IN RCRD: CPT | Mod: HCNC,CPTII,S$GLB, | Performed by: OPHTHALMOLOGY

## 2024-02-08 PROCEDURE — 1159F MED LIST DOCD IN RCRD: CPT | Mod: HCNC,CPTII,S$GLB, | Performed by: OPHTHALMOLOGY

## 2024-02-08 PROCEDURE — 2023F DILAT RTA XM W/O RTNOPTHY: CPT | Mod: HCNC,CPTII,S$GLB, | Performed by: OPHTHALMOLOGY

## 2024-02-08 RX ORDER — PREDNISOLONE ACETATE 10 MG/ML
1 SUSPENSION/ DROPS OPHTHALMIC 4 TIMES DAILY
Qty: 5 ML | Refills: 2 | Status: SHIPPED | OUTPATIENT
Start: 2024-02-08

## 2024-02-08 RX ORDER — KETOROLAC TROMETHAMINE 5 MG/ML
1 SOLUTION OPHTHALMIC 4 TIMES DAILY
Qty: 5 ML | Refills: 2 | Status: SHIPPED | OUTPATIENT
Start: 2024-02-08

## 2024-02-08 RX ORDER — GATIFLOXACIN 5 MG/ML
1 SOLUTION/ DROPS OPHTHALMIC 2 TIMES DAILY
Qty: 5 ML | Refills: 2 | Status: SHIPPED | OUTPATIENT
Start: 2024-02-08

## 2024-02-08 NOTE — PROGRESS NOTES
SUBJECTIVE  Natalie Greene is 83 y.o. female  Uncorrected distance visual acuity was 20/40 -2 in the right eye and 20/20 -2 in the left eye.   Chief Complaint   Patient presents with    Cataract     Cataract eval OD per TRF    Patient           HPI     Cataract     Additional comments: Cataract eval OD per [facility]    Patient            Comments    Pt and 2 sisters have a iDevices,.  DM x2010  1. Diabetic Cataract OD  PCIOL OS 12/15/2021 (+21.0 SN60WF)  2. DM without retinopathy             Last edited by Chrissy Rodriguez, Patient Care Assistant on 2/8/2024 10:19   AM.         Assessment /Plan :  1. Nuclear sclerosis of right eye   Patient reports decreased vision in the fellow eye consistent with the clinical amount of lenticular opacity, which reaches the level of visual significance and affects activities of daily living including reading and glare. Risks, benefits, and alternatives to cataract surgery were discussed and pt desired to schedule cataract surgery. Pt was consented and the biometry and lens options were reviewed.    Schedule cataract surgery in OD       Pt is interested in traditional monofocal IOL aiming for:    Distance OU and understands that glasses will be generally needed at all times for near vision and often for finer distance correction especially for higher degrees of astigmatism.       Final visual acuity may be limited by astigmatism and diabetes    Pt wishes to have Phaco/IOL  OD     Requests a Monofocal IOL.    Will aim for distance    Other considerations: Corneal Precautions    Medications sent:  Prednisolone  Ketorolac  Gatifloxacin/ Moxifloxacin      2. Pseudophakia of left eye  -- Condition stable, no therapeutic change required. Monitoring routinely.     3. Diabetes mellitus type 2 without retinopathy - No diabetic retinopathy at this time. Reviewed diabetic eye precautions including avoiding tobacco use,  Good glucose control, and importance of regular follow up.

## 2024-02-08 NOTE — PROGRESS NOTES
Short Stay Record    CC: Blurry Vision OD    HPI:  Natalie Greene is a 83 y.o. female who presents c/o symptomatic blurring of vision in the right eye.    Past Medical History:   Diagnosis Date    *Atrial fibrillation     Abdominal wall seroma 1/16/2019    Seroma developed after her hernia repair in 2012.  Please review the notes in the Care everywhere section of the chart.  There is no luis evidence of infection at this time.  I would recommend checking a erythrocyte sedimentation rate and C reactive protein.  These are significant elevated then aspiration of the seroma fluid would be warranted.  If not the seroma does not require any intervention    Coronary artery disease involving native coronary artery of native heart with angina pectoris 4/10/2018    Diabetes mellitus     DM (diabetes mellitus) 2002     09/06/2017 no medication    Hyperlipidemia     Hypertension     Morbid (severe) obesity due to excess calories 7/23/2013    Obesity (BMI 30-39.9) 2/24/2015    Obstructive sleep apnea      Past Surgical History:   Procedure Laterality Date    CATARACT EXTRACTION Left 12/15/2021    distance    CHOLECYSTECTOMY      COLON SURGERY      HYSTERECTOMY       Review of patient's allergies indicates:  No Known Allergies    Current Outpatient Medications:     ACCU-CHEK SOFTCLIX LANCETS Misc, , Disp: , Rfl:     amLODIPine (NORVASC) 5 MG tablet, Take 1 tablet (5 mg total) by mouth once daily., Disp: 90 tablet, Rfl: 3    atorvastatin (LIPITOR) 40 MG tablet, Take 1 tablet (40 mg total) by mouth once daily., Disp: 90 tablet, Rfl: 3    diltiaZEM (CARDIZEM CD) 180 MG 24 hr capsule, Take 1 capsule (180 mg total) by mouth once daily., Disp: 90 capsule, Rfl: 3    gatifloxacin 0.5 % Drop drops, Place 1 drop into the right eye 2 (two) times daily. Eyedrops to start one day before surgery, Disp: 5 mL, Rfl: 2    indapamide (LOZOL) 2.5 MG Tab, Take 1 tablet (2.5 mg total) by mouth once daily., Disp: 90 tablet, Rfl: 0    ketorolac  0.5% (ACULAR) 0.5 % Drop, Place 1 drop into the right eye 4 (four) times daily. Eyedrops to start one day before surgery, Disp: 5 mL, Rfl: 2    losartan (COZAAR) 100 MG tablet, Take 1 tablet (100 mg total) by mouth once daily., Disp: 90 tablet, Rfl: 1    magnesium oxide (MAG-OX) 400 mg (241.3 mg magnesium) tablet, Take 1 tablet (400 mg total) by mouth once daily., Disp: 90 tablet, Rfl: 1    metFORMIN (GLUCOPHAGE) 500 MG tablet, Take 500 mg by mouth daily with breakfast., Disp: , Rfl:     metoprolol succinate (TOPROL-XL) 100 MG 24 hr tablet, Take 1 tablet (100 mg total) by mouth once daily., Disp: 90 tablet, Rfl: 2    nitroGLYCERIN (NITROSTAT) 0.4 MG SL tablet, Place 1 tablet (0.4 mg total) under the tongue every 5 (five) minutes as needed for Chest pain. Take up to 3 times 5 min apart (Patient not taking: Reported on 1/4/2024), Disp: 30 tablet, Rfl: 0    potassium chloride SA (K-DUR,KLOR-CON) 20 MEQ tablet, Take 1 tablet (20 mEq total) by mouth once daily., Disp: 90 tablet, Rfl: 1    prednisoLONE acetate (PRED FORTE) 1 % DrpS, Place 1 drop into the right eye 4 (four) times daily. Eyedrops to start one day before surgery, Disp: 5 mL, Rfl: 2    TRUE METRIX AIR GLUCOSE METER kit, , Disp: , Rfl:     TRUE METRIX GLUCOSE TEST STRIP Strp, TEST BLOOD SUGAR TWICE DAILY, Disp: 200 strip, Rfl: 11    TRUE METRIX LEVEL 1 Soln, 1 each by Tube route once daily., Disp: 1 each, Rfl: 11    warfarin (COUMADIN) 6 MG tablet, TAKE 1 TABLET EVERY DAY (Patient taking differently: Take 6 mg by mouth Daily. TAKE ONE HALF TABLET (3 MG) EVERY THURSDAY AS DIRECTED BY THE CC.), Disp: 90 tablet, Rfl: 0    Review of Systems:  Pertinent items are noted in the chief complaint and history of present illness.    Physical Exam:  General Appearance:    A&Ox3, no distress, appears stated age   Head:    Normocephalic, without obvious abnormality, atraumatic   Lungs:     respirations unlabored   Chest Wall:    No tenderness or deformity     Heart:  Extremities:  Skin:    S1 and S2 present    Extremities normal, atraumatic    Skin color, texture, turgor normal     Base Eye Exam    Visual Acuity (Snellen - Linear)     Right Left   Dist sc 20/40 -2 20/20 -2     Tonometry (Applanation, 10:27 AM)     Right Left   Pressure 18 18    Pupils     Pupils Dark Light Shape React APD   Right PERRL 3.5 3 Round Minimal None   Left PERRL 3.5 3 Round Minimal None     Visual Fields (Counting fingers)     Right Left    Full Full     Extraocular Movement     Right Left    Full Full     Neuro/Psych    Oriented x3: Yes   Mood/Affect: Normal     Dilation    Both eyes: 1% Mydriacyl, 2.5% Phenylephrine @ 10:27 AM      Edited by: Chrissy Rodriguez, Patient Care Assistant        Additional Tests      Glare Testing (room lighting)     Medium   Right 20/100   Left 20/60   Edited by: Chrissy Rodriguez, Patient Care Assistant        Slit Lamp and Fundus Exam      External Exam     Right Left   External Normal Normal     Slit Lamp Exam     Right Left   Lids/Lashes Normal Normal   Conjunctiva/Sclera White and quiet White and quiet   Cornea 1+ Guttata 1+ Guttata   Anterior Chamber Deep and quiet Deep and quiet   Iris Round and reactive Round and reactive   Lens 3+ NSC, Vacuoles: Central PC IOL   Vitreous PVD, Burleson ring PVD     Fundus Exam     Right Left   Disc Normal Normal   C/D Ratio 0.5 0.55 x 0.5   Macula No Diabetic Retinopathy No Diabetic Retinopathy   Vessels Normal Normal   Periphery No Diabetic Retinopathy No Diabetic Retinopathy   Edited by: Zander Rome MD        Refraction      Wearing Rx     Sphere Cylinder   Right +2.25 Sphere   Left +2.25 Sphere   Type: OTC readers     Manifest Refraction     Sphere Cylinder Cory Dist VA   Right -1.25 +1.00 180 20/40   Left -0.75 Sphere  20/20   Edited by: Chrissy Rodriguez, Patient Care Assistant       Impression: Visually significant cataract in the right eye.    Planned Procedure: Cataract extraction with intraocular lens implant  OD    Final visual acuity may be limited by astigmatism and diabetes    Pt wishes to have Phaco/IOL  OD     Requests a Monofocal IOL.    Will aim for distance    Other considerations: Cornea precautions     Medications sent:  Prednisolone  Ketorolac  Gatifloxacin/ Moxifloxacin     Lens Selection OD        +_______  SY60WF    Vivity    Toric           Discharge Summary:    Admitting Diagnosis: Visually significant cataract OD    Discharge Diagnosis: Pseudophakia OD    Procedure: Phacoemulsification of cataract with intraocular lens implant OD    Complications: None    Discharge Condition: Stable    Discharge instructions: Follow post-operative discharge instruction sheet per provider.    Follow-up: Return to clinic next day or as scheduled.

## 2024-02-13 ENCOUNTER — CLINICAL SUPPORT (OUTPATIENT)
Dept: INTERNAL MEDICINE | Facility: CLINIC | Age: 84
End: 2024-02-13
Payer: MEDICARE

## 2024-02-13 DIAGNOSIS — E11.42 TYPE 2 DIABETES MELLITUS WITH DIABETIC POLYNEUROPATHY, WITHOUT LONG-TERM CURRENT USE OF INSULIN: ICD-10-CM

## 2024-02-13 PROCEDURE — 97802 MEDICAL NUTRITION INDIV IN: CPT | Mod: 95,,, | Performed by: DIETITIAN, REGISTERED

## 2024-02-13 NOTE — PROGRESS NOTES
The patient location is: [place]  The chief complaint leading to consultation is: nutrition referral    Visit type: audio only    Face to Face time with patient: 20 minutes  25 minutes of total time spent on the encounter, which includes face to face time and non-face to face time preparing to see the patient (eg, review of tests), Obtaining and/or reviewing separately obtained history, Documenting clinical information in the electronic or other health record, Independently interpreting results (not separately reported) and communicating results to the patient/family/caregiver, or Care coordination (not separately reported).         Each patient to whom he or she provides medical services by telemedicine is:  (1) informed of the relationship between the physician and patient and the respective role of any other health care provider with respect to management of the patient; and (2) notified that he or she may decline to receive medical services by telemedicine and may withdraw from such care at any time.    Notes:   Nutrition Assessment      Client name:  Natalie Greene  :  1940  Age:  83 y.o.  Gender:  female    Client states:  referred by SOFIA Stuart with a diagnosis of:   -Type 2 diabetes mellitus with diabetic polyneuropathy, without long-term current use of insulin     Reports elevated A1c.  For a period of time was not taking diabetic medication (Metformin) as prescribed but recently resumed.  Reports doing better with checking glucose. Reports readings lately are 150-200 after meals.    Sample intake:  Breakfast: biscuit with sausage and eggs + coffee (stevia)  Snack: candy  Lunch: soup// sandwich (variety of bread choices + reilly + lettuce + meat)// leftovers  Snack: may skip// candy bar  Dinner: same as lunch  Drinks: diet cokes or diet root beer, lemonade made with stevia, tea with stevia    Exercise: no intentional exercise// does daily movement around the house such as cooking,  "cleaning, laundry, checking mail    Reports needing to lose weight.    Anthropometrics  Height:  65"          RECENT WEIGHTS      Weight (lb) Weight (kg) BMI (Calculated)   1/20/2022 217.15  98.5  34    2/7/2022 218  98.884     3/23/2022 216.49  98.2  33.9    9/8/2022 224.65  101.9  37.4    9/29/2022 217.93  98.85  36.3    10/6/2022 222.44  100.9     10/25/2022 220.9  100.2  36.8    11/22/2022 219.36  99.5     2/7/2023 218.81  99.25  36.4    2/22/2023 218  98.884  36.3    3/3/2023 217.48  98.65  36.2    3/31/2023 222  100.7  36.9    4/27/2023 218.7  99.2  36.4    10/20/2023 216.05  98  36    1/4/2024 220  99.791  36.6          Clinical Signs/Symptoms  N/V/D:  none noted  Appetite:  good       Past Medical History:   Diagnosis Date    *Atrial fibrillation     Abdominal wall seroma 1/16/2019    Seroma developed after her hernia repair in 2012.  Please review the notes in the Care everywhere section of the chart.  There is no luis evidence of infection at this time.  I would recommend checking a erythrocyte sedimentation rate and C reactive protein.  These are significant elevated then aspiration of the seroma fluid would be warranted.  If not the seroma does not require any intervention    Coronary artery disease involving native coronary artery of native heart with angina pectoris 4/10/2018    Diabetes mellitus     DM (diabetes mellitus) 2002     09/06/2017 no medication    Hyperlipidemia     Hypertension     Morbid (severe) obesity due to excess calories 7/23/2013    Obesity (BMI 30-39.9) 2/24/2015    Obstructive sleep apnea        Past Surgical History:   Procedure Laterality Date    CATARACT EXTRACTION Left 12/15/2021    distance    CHOLECYSTECTOMY      COLON SURGERY      HYSTERECTOMY         Medications    has a current medication list which includes the following prescription(s): accu-chek softclix lancets, amlodipine, atorvastatin, diltiazem, gatifloxacin, indapamide, ketorolac 0.5%, losartan, magnesium " oxide, metformin, metoprolol succinate, nitroglycerin, potassium chloride sa, prednisolone acetate, true metrix air glucose meter, true metrix glucose test strip, true metrix level 1, and warfarin.    Vitamins, Minerals, and/or Supplements:  not discussed     Food/Medication Interactions:  Reviewed     Food Allergies or Intolerances:  NKFA noted in chart     Social History    Marital status:    Employment:  none    Social History     Tobacco Use    Smoking status: Never    Smokeless tobacco: Never   Substance Use Topics    Alcohol use: No        Lab Reports   Sodium   Date Value Ref Range Status   03/31/2023 138 136 - 145 mmol/L Final     Potassium   Date Value Ref Range Status   03/31/2023 4.0 3.5 - 5.1 mmol/L Final     Chloride   Date Value Ref Range Status   03/31/2023 102 95 - 110 mmol/L Final     CO2   Date Value Ref Range Status   03/31/2023 29 23 - 29 mmol/L Final     Glucose   Date Value Ref Range Status   03/31/2023 144 (H) 70 - 110 mg/dL Final     BUN   Date Value Ref Range Status   03/31/2023 12 8 - 23 mg/dL Final     Creatinine   Date Value Ref Range Status   03/31/2023 0.7 0.5 - 1.4 mg/dL Final     Calcium   Date Value Ref Range Status   03/31/2023 8.6 (L) 8.7 - 10.5 mg/dL Final     Total Protein   Date Value Ref Range Status   03/31/2023 6.6 6.0 - 8.4 g/dL Final     Albumin   Date Value Ref Range Status   03/31/2023 3.7 3.5 - 5.2 g/dL Final     Total Bilirubin   Date Value Ref Range Status   03/31/2023 0.7 0.1 - 1.0 mg/dL Final     Comment:     For infants and newborns, interpretation of results should be based  on gestational age, weight and in agreement with clinical  observations.    Premature Infant recommended reference ranges:  Up to 24 hours.............<8.0 mg/dL  Up to 48 hours............<12.0 mg/dL  3-5 days..................<15.0 mg/dL  6-29 days.................<15.0 mg/dL       Alkaline Phosphatase   Date Value Ref Range Status   03/31/2023 87 55 - 135 U/L Final     AST   Date  Value Ref Range Status   03/31/2023 26 10 - 40 U/L Final     ALT   Date Value Ref Range Status   03/31/2023 20 10 - 44 U/L Final     Anion Gap   Date Value Ref Range Status   03/31/2023 7 (L) 8 - 16 mmol/L Final     eGFR if    Date Value Ref Range Status   02/07/2022 >60 >60 mL/min/1.73 m^2 Final     eGFR if non    Date Value Ref Range Status   02/07/2022 >60 >60 mL/min/1.73 m^2 Final     Comment:     Calculation used to obtain the estimated glomerular filtration  rate (eGFR) is the CKD-EPI equation.         Lab Results   Component Value Date    WBC 5.52 03/31/2023    HGB 12.0 03/31/2023    HCT 37.1 03/31/2023    MCV 94 03/31/2023     03/31/2023        Lab Results   Component Value Date    CHOL 143 03/31/2023     Lab Results   Component Value Date    HDL 30 (L) 03/31/2023     Lab Results   Component Value Date    LDLCALC 87.6 03/31/2023     Lab Results   Component Value Date    TRIG 127 03/31/2023     Lab Results   Component Value Date    CHOLHDL 21.0 03/31/2023     Lab Results   Component Value Date    HGBA1C 7.1 (H) 10/12/2023     BP Readings from Last 1 Encounters:   01/04/24 (!) 143/55       Food History  Provided above    Exercise History:  provided above    Cultural/Spiritual/Personal Preferences:  None identified    Support System:  family/friends    State of Change:  Contemplation    Barriers to Change:  age    Diagnosis    Food and nutrition related knowledge deficit related to no recent nutrition counseling  as evidenced by seeking nutrition therapy for diabetes and weight loss.    Intervention    RMR (Method:  [place] St. Jeor):  1471 kcal  Activity Factor:  1.2    SHEKHAR:  1765 - 250 = 1515 kcal    Goals:  1.  Follow plate method at meals  2.  Increase fiber intake at meals  3.  Include source of protein with snacks  4. Participate in short walks after meals, as able        Nutrition Education  The following education was provided to the patient:  Discussed meal  planning/[employer] My Plate design.  Discussed healthy snacking.   Discussed weight management.  Suggested dietary modifications based on current dietary behaviors and individual food preferences.  Discussed nutrition therapy for diabetes  Discussed food and Coumadin interactions    Patient verbalized understanding of nutrition education and recommendations received.    Handouts Provided, mailed  Balanced Diabetes Plate  Snack List  Vitamin K Food List      Monitoring/Evaluation    Monitor the following:  Weight  BMI  Caloric intake  Labs:  HgbA1c    Follow Up Plan:  as needed

## 2024-02-21 ENCOUNTER — TELEPHONE (OUTPATIENT)
Dept: INTERNAL MEDICINE | Facility: CLINIC | Age: 84
End: 2024-02-21
Payer: MEDICARE

## 2024-02-21 NOTE — TELEPHONE ENCOUNTER
Called and spoke with patient. Patient will be having eye surgery on March 6 and is in need of a pre op clearance. Scheduled patient with Dr. Fenton on 2/26 at 1:40pm

## 2024-02-21 NOTE — TELEPHONE ENCOUNTER
----- Message from Lisa Fenton sent at 2/21/2024 12:55 PM CST -----  Contact: FRED GUTIERREZ [[id number]]  ..Type:  Patient Requesting Call    Who Called: FRED GUTIERREZ [[id number]]  Does the patient know what this is regarding?: surgery clearance scheduling   Would the patient rather a call back or a response via [employer]? call  Best Call Back Number: .[phone] (home)   Additional Information:

## 2024-02-26 ENCOUNTER — OFFICE VISIT (OUTPATIENT)
Dept: INTERNAL MEDICINE | Facility: CLINIC | Age: 84
End: 2024-02-26
Payer: MEDICARE

## 2024-02-26 ENCOUNTER — LAB VISIT (OUTPATIENT)
Dept: LAB | Facility: HOSPITAL | Age: 84
End: 2024-02-26
Attending: FAMILY MEDICINE
Payer: MEDICARE

## 2024-02-26 VITALS
WEIGHT: 221.56 LBS | SYSTOLIC BLOOD PRESSURE: 110 MMHG | HEART RATE: 104 BPM | DIASTOLIC BLOOD PRESSURE: 62 MMHG | TEMPERATURE: 97 F | OXYGEN SATURATION: 97 % | BODY MASS INDEX: 36.91 KG/M2 | HEIGHT: 65 IN

## 2024-02-26 DIAGNOSIS — E11.69 HYPERLIPIDEMIA ASSOCIATED WITH TYPE 2 DIABETES MELLITUS: ICD-10-CM

## 2024-02-26 DIAGNOSIS — I48.91 ATRIAL FIBRILLATION, UNSPECIFIED TYPE: ICD-10-CM

## 2024-02-26 DIAGNOSIS — E11.42 TYPE 2 DIABETES MELLITUS WITH DIABETIC POLYNEUROPATHY, WITHOUT LONG-TERM CURRENT USE OF INSULIN: ICD-10-CM

## 2024-02-26 DIAGNOSIS — E78.5 HYPERLIPIDEMIA ASSOCIATED WITH TYPE 2 DIABETES MELLITUS: ICD-10-CM

## 2024-02-26 DIAGNOSIS — I48.91 ATRIAL FIBRILLATION, UNSPECIFIED TYPE: Primary | ICD-10-CM

## 2024-02-26 DIAGNOSIS — G47.33 OSA (OBSTRUCTIVE SLEEP APNEA): ICD-10-CM

## 2024-02-26 DIAGNOSIS — Z79.01 ANTICOAGULATED ON COUMADIN: ICD-10-CM

## 2024-02-26 DIAGNOSIS — I25.118 CORONARY ARTERY DISEASE OF NATIVE ARTERY OF NATIVE HEART WITH STABLE ANGINA PECTORIS: ICD-10-CM

## 2024-02-26 DIAGNOSIS — I70.0 AORTIC ATHEROSCLEROSIS: ICD-10-CM

## 2024-02-26 DIAGNOSIS — I10 PRIMARY HYPERTENSION: ICD-10-CM

## 2024-02-26 DIAGNOSIS — I25.119 CORONARY ARTERY DISEASE INVOLVING NATIVE CORONARY ARTERY OF NATIVE HEART WITH ANGINA PECTORIS: ICD-10-CM

## 2024-02-26 DIAGNOSIS — Z01.818 PRE-OPERATIVE CLEARANCE: ICD-10-CM

## 2024-02-26 LAB
ALBUMIN SERPL BCP-MCNC: 3.8 G/DL (ref 3.5–5.2)
ALP SERPL-CCNC: 83 U/L (ref 55–135)
ALT SERPL W/O P-5'-P-CCNC: 22 U/L (ref 10–44)
ANION GAP SERPL CALC-SCNC: 12 MMOL/L (ref 8–16)
AST SERPL-CCNC: 26 U/L (ref 10–40)
BASOPHILS # BLD AUTO: 0.03 K/UL (ref 0–0.2)
BASOPHILS NFR BLD: 0.5 % (ref 0–1.9)
BILIRUB SERPL-MCNC: 1 MG/DL (ref 0.1–1)
BUN SERPL-MCNC: 12 MG/DL (ref 8–23)
CALCIUM SERPL-MCNC: 9.5 MG/DL (ref 8.7–10.5)
CHLORIDE SERPL-SCNC: 98 MMOL/L (ref 95–110)
CHOLEST SERPL-MCNC: 147 MG/DL (ref 120–199)
CHOLEST/HDLC SERPL: 4.2 {RATIO} (ref 2–5)
CO2 SERPL-SCNC: 27 MMOL/L (ref 23–29)
CREAT SERPL-MCNC: 0.7 MG/DL (ref 0.5–1.4)
DIFFERENTIAL METHOD BLD: ABNORMAL
EOSINOPHIL # BLD AUTO: 0 K/UL (ref 0–0.5)
EOSINOPHIL NFR BLD: 0.7 % (ref 0–8)
ERYTHROCYTE [DISTWIDTH] IN BLOOD BY AUTOMATED COUNT: 14.6 % (ref 11.5–14.5)
EST. GFR  (NO RACE VARIABLE): >60 ML/MIN/1.73 M^2
ESTIMATED AVG GLUCOSE: 154 MG/DL (ref 68–131)
GLUCOSE SERPL-MCNC: 114 MG/DL (ref 70–110)
HBA1C MFR BLD: 7 % (ref 4–5.6)
HCT VFR BLD AUTO: 37.8 % (ref 37–48.5)
HDLC SERPL-MCNC: 35 MG/DL (ref 40–75)
HDLC SERPL: 23.8 % (ref 20–50)
HGB BLD-MCNC: 12.8 G/DL (ref 12–16)
IMM GRANULOCYTES # BLD AUTO: 0.02 K/UL (ref 0–0.04)
IMM GRANULOCYTES NFR BLD AUTO: 0.3 % (ref 0–0.5)
LDLC SERPL CALC-MCNC: 95.6 MG/DL (ref 63–159)
LYMPHOCYTES # BLD AUTO: 1.1 K/UL (ref 1–4.8)
LYMPHOCYTES NFR BLD: 18.6 % (ref 18–48)
MCH RBC QN AUTO: 31.5 PG (ref 27–31)
MCHC RBC AUTO-ENTMCNC: 33.9 G/DL (ref 32–36)
MCV RBC AUTO: 93 FL (ref 82–98)
MONOCYTES # BLD AUTO: 0.6 K/UL (ref 0.3–1)
MONOCYTES NFR BLD: 10 % (ref 4–15)
NEUTROPHILS # BLD AUTO: 4.3 K/UL (ref 1.8–7.7)
NEUTROPHILS NFR BLD: 69.9 % (ref 38–73)
NONHDLC SERPL-MCNC: 112 MG/DL
NRBC BLD-RTO: 0 /100 WBC
PLATELET # BLD AUTO: 208 K/UL (ref 150–450)
PMV BLD AUTO: 11.8 FL (ref 9.2–12.9)
POTASSIUM SERPL-SCNC: 3.3 MMOL/L (ref 3.5–5.1)
PROT SERPL-MCNC: 7.2 G/DL (ref 6–8.4)
RBC # BLD AUTO: 4.06 M/UL (ref 4–5.4)
SODIUM SERPL-SCNC: 137 MMOL/L (ref 136–145)
TRIGL SERPL-MCNC: 82 MG/DL (ref 30–150)
WBC # BLD AUTO: 6.09 K/UL (ref 3.9–12.7)

## 2024-02-26 PROCEDURE — 85025 COMPLETE CBC W/AUTO DIFF WBC: CPT | Mod: HCNC | Performed by: FAMILY MEDICINE

## 2024-02-26 PROCEDURE — 80061 LIPID PANEL: CPT | Mod: HCNC | Performed by: FAMILY MEDICINE

## 2024-02-26 PROCEDURE — 36415 COLL VENOUS BLD VENIPUNCTURE: CPT | Mod: HCNC | Performed by: FAMILY MEDICINE

## 2024-02-26 PROCEDURE — 80053 COMPREHEN METABOLIC PANEL: CPT | Mod: HCNC | Performed by: FAMILY MEDICINE

## 2024-02-26 PROCEDURE — 99214 OFFICE O/P EST MOD 30 MIN: CPT | Mod: HCNC,S$GLB,, | Performed by: FAMILY MEDICINE

## 2024-02-26 PROCEDURE — 1126F AMNT PAIN NOTED NONE PRSNT: CPT | Mod: HCNC,CPTII,S$GLB, | Performed by: FAMILY MEDICINE

## 2024-02-26 PROCEDURE — 99999 PR PBB SHADOW E&M-EST. PATIENT-LVL IV: CPT | Mod: PBBFAC,HCNC,, | Performed by: FAMILY MEDICINE

## 2024-02-26 PROCEDURE — G2211 COMPLEX E/M VISIT ADD ON: HCPCS | Mod: HCNC,S$GLB,, | Performed by: FAMILY MEDICINE

## 2024-02-26 PROCEDURE — 1159F MED LIST DOCD IN RCRD: CPT | Mod: HCNC,CPTII,S$GLB, | Performed by: FAMILY MEDICINE

## 2024-02-26 PROCEDURE — 83036 HEMOGLOBIN GLYCOSYLATED A1C: CPT | Mod: HCNC | Performed by: FAMILY MEDICINE

## 2024-02-26 PROCEDURE — 3078F DIAST BP <80 MM HG: CPT | Mod: HCNC,CPTII,S$GLB, | Performed by: FAMILY MEDICINE

## 2024-02-26 PROCEDURE — 3074F SYST BP LT 130 MM HG: CPT | Mod: HCNC,CPTII,S$GLB, | Performed by: FAMILY MEDICINE

## 2024-02-26 RX ORDER — ATORVASTATIN CALCIUM 40 MG/1
40 TABLET, FILM COATED ORAL DAILY
Qty: 90 TABLET | Refills: 3 | Status: SHIPPED | OUTPATIENT
Start: 2024-02-26 | End: 2025-02-25

## 2024-02-26 NOTE — PROGRESS NOTES
Natalie Greene  02/26/2024  [id number]    Annabella Fenton MD  Patient Care Team:  Annabella Fenton MD as PCP - General (Family Medicine)  Annabella Fenton MD as Consulting Physician (Family Medicine)  Lisbet Lopez LPN as Care Coordinator (Internal Medicine)  Law Rod MD as Consulting Physician (Cardiology)  Henry Rojas OD as Consulting Physician (Optometry)  Lizeth Cain, PharmD as Pharmacist (Pharmacist)          Visit Type:a scheduled routine follow-up visit    Chief Complaint:  Chief Complaint   Patient presents with    Pre-op Exam       History of Present Illness:    Patient needs pre op for her eye surgery  The last time I saw her in office was in 2022.    History of a fib. On coumadin.  Maida has her on Norvasc and Cardizem, Cozaar, Metoprolol    She has HLD.  Lab Results   Component Value Date    LDLCALC 87.6 03/31/2023   On statin  Lipitor 40    She has known Aortic Athersclerosis.   CAD  She hasn't had recent follow up with Cards    DM2  Lab Results   Component Value Date    HGBA1C 7.1 (H) 10/12/2023   Taking Metformin    Health Maintenance Due   Topic Date Due    RSV Vaccine (Age 60+ and Pregnant patients) (1 - 1-dose 60+ series) Never done    DEXA Scan  11/07/2022 4/27/23  Nuclear stress neg for ischemia 2/2023. ECHO with normal bi V function, mild TR, mild MR, PASP 33mmHg 2/2023.    History:  Past Medical History:   Diagnosis Date    *Atrial fibrillation     Abdominal wall seroma 1/16/2019    Seroma developed after her hernia repair in 2012.  Please review the notes in the Care everywhere section of the chart.  There is no luis evidence of infection at this time.  I would recommend checking a erythrocyte sedimentation rate and C reactive protein.  These are significant elevated then aspiration of the seroma fluid would be warranted.  If not the seroma does not require any intervention    Coronary artery disease involving native coronary artery of native heart with angina  pectoris 4/10/2018    Diabetes mellitus     DM (diabetes mellitus) 2002     09/06/2017 no medication    Hyperlipidemia     Hypertension     Morbid (severe) obesity due to excess calories 7/23/2013    Obesity (BMI 30-39.9) 2/24/2015    Obstructive sleep apnea      Past Surgical History:   Procedure Laterality Date    CATARACT EXTRACTION Left 12/15/2021    distance    CHOLECYSTECTOMY      COLON SURGERY      HYSTERECTOMY       Family History   Problem Relation Age of Onset    Diabetes Sister     Hypertension Sister     Hypertension Mother     Diabetes Sister     COPD Neg Hx     Cancer Neg Hx     Heart disease Neg Hx     Hyperlipidemia Neg Hx     Stroke Neg Hx      Social History     Socioeconomic History    Marital status:     Number of children: 1    Highest education level: High school graduate   Occupational History    Occupation: Retired   Tobacco Use    Smoking status: Never    Smokeless tobacco: Never   Substance and Sexual Activity    Alcohol use: No    Drug use: No    Sexual activity: Yes     Partners: Male     Social Determinants of Health     Financial Resource Strain: Low Risk  (1/4/2024)    Overall Financial Resource Strain (CARDIA)     Difficulty of Paying Living Expenses: Not hard at all   Food Insecurity: No Food Insecurity (1/4/2024)    Hunger Vital Sign     Worried About Running Out of Food in the Last Year: Never true     Ran Out of Food in the Last Year: Never true   Transportation Needs: No Transportation Needs (1/4/2024)    PRAPARE - Transportation     Lack of Transportation (Medical): No     Lack of Transportation (Non-Medical): No   Physical Activity: Inactive (1/4/2024)    Exercise Vital Sign     Days of Exercise per Week: 0 days     Minutes of Exercise per Session: 0 min   Stress: Stress Concern Present (1/4/2024)    [employer] - Occupational Stress Questionnaire     Feeling of Stress : To some extent   Social Connections: Moderately Integrated  (1/4/2024)    Social Connection and Isolation Panel [NHANES]     Frequency of Communication with Friends and Family: More than three times a week     Frequency of Social Gatherings with Friends and Family: More than three times a week     Attends Sikhism Services: More than 4 times per year     Active Member of Clubs or Organizations: No     Attends Club or Organization Meetings: Never     Marital Status:    Housing Stability: Low Risk  (1/4/2024)    Housing Stability Vital Sign     Unable to Pay for Housing in the Last Year: No     Number of Places Lived in the Last Year: 1     Unstable Housing in the Last Year: No     Patient Active Problem List   Diagnosis    A-fib    HTN (hypertension)    Hyperlipidemia with target LDL less than 100    Hyperlipidemia associated with type 2 diabetes mellitus    History of malignant neoplasm of colon    Shoulder pain, left    Aortic atherosclerosis    Long term current use of anticoagulant therapy    DORA (obstructive sleep apnea)    Psychophysiological insomnia    PLMD (periodic limb movement disorder)    Inadequate sleep hygiene    Pulmonary hypertension    CAD (coronary artery disease)    Osteoporosis    Severe obesity (BMI 35.0-39.9) with comorbidity    Varicose veins of both lower extremities    Type 2 diabetes mellitus with diabetic polyneuropathy, without long-term current use of insulin     Review of patient's allergies indicates:  No Known Allergies    The following were reviewed at this visit: active problem list, medication list, allergies, family history, social history, and health maintenance.    Medications:  Current Outpatient Medications on File Prior to Visit   Medication Sig Dispense Refill    amLODIPine (NORVASC) 5 MG tablet Take 1 tablet (5 mg total) by mouth once daily. 90 tablet 3    indapamide (LOZOL) 2.5 MG Tab Take 1 tablet (2.5 mg total) by mouth once daily. 90 tablet 0    magnesium oxide (MAG-OX) 400 mg (241.3 mg magnesium) tablet Take 1 tablet  (400 mg total) by mouth once daily. 90 tablet 1    metoprolol succinate (TOPROL-XL) 100 MG 24 hr tablet Take 1 tablet (100 mg total) by mouth once daily. 90 tablet 2    potassium chloride SA (K-DUR,KLOR-CON) 20 MEQ tablet Take 1 tablet (20 mEq total) by mouth once daily. 90 tablet 1    TRUE METRIX AIR GLUCOSE METER kit       TRUE METRIX GLUCOSE TEST STRIP Strp TEST BLOOD SUGAR TWICE DAILY 200 strip 11    warfarin (COUMADIN) 6 MG tablet TAKE 1 TABLET EVERY DAY (Patient taking differently: Take 6 mg by mouth Daily. TAKE ONE HALF TABLET (3 MG) EVERY THURSDAY AS DIRECTED BY THE CC.) 90 tablet 0    ACCU-CHEK SOFTCLIX LANCETS Misc       atorvastatin (LIPITOR) 40 MG tablet Take 1 tablet (40 mg total) by mouth once daily. 90 tablet 3    diltiaZEM (CARDIZEM CD) 180 MG 24 hr capsule Take 1 capsule (180 mg total) by mouth once daily. 90 capsule 3    gatifloxacin 0.5 % Drop drops Place 1 drop into the right eye 2 (two) times daily. Eyedrops to start one day before surgery 5 mL 2    ketorolac 0.5% (ACULAR) 0.5 % Drop Place 1 drop into the right eye 4 (four) times daily. Eyedrops to start one day before surgery 5 mL 2    losartan (COZAAR) 100 MG tablet Take 1 tablet (100 mg total) by mouth once daily. 90 tablet 1    metFORMIN (GLUCOPHAGE) 500 MG tablet Take 500 mg by mouth daily with breakfast.      nitroGLYCERIN (NITROSTAT) 0.4 MG SL tablet Place 1 tablet (0.4 mg total) under the tongue every 5 (five) minutes as needed for Chest pain. Take up to 3 times 5 min apart (Patient not taking: Reported on 1/4/2024) 30 tablet 0    prednisoLONE acetate (PRED FORTE) 1 % DrpS Place 1 drop into the right eye 4 (four) times daily. Eyedrops to start one day before surgery 5 mL 2    TRUE METRIX LEVEL 1 Soln 1 each by Tube route once daily. 1 each 11     No current facility-administered medications on file prior to visit.       Medications have been reviewed and reconciled with patient at this visit.  Barriers to medications reviewed with  patient.    Adverse reactions to current medications reviewed with patient..    Over the counter medications reviewed and reconciled with patient.    Exam:  Wt Readings from Last 3 Encounters:   02/26/24 100.5 kg (221 lb 9 oz)   01/04/24 99.8 kg (220 lb)   10/20/23 98 kg (216 lb 0.8 oz)     Temp Readings from Last 3 Encounters:   02/26/24 97.1 °F (36.2 °C) (Tympanic)   01/04/24 97.9 °F (36.6 °C) (Temporal)   10/20/23 98.3 °F (36.8 °C) (Tympanic)     BP Readings from Last 3 Encounters:   02/26/24 110/62   01/04/24 (!) 143/55   10/20/23 124/60     Pulse Readings from Last 3 Encounters:   02/26/24 104   01/04/24 80   10/20/23 95     Body mass index is 36.87 kg/m².      Review of Systems   Constitutional: Negative.  Negative for chills and fever.   HENT: Negative.  Negative for congestion, sinus pain and sore throat.    Eyes:  Negative for blurred vision and double vision.   Respiratory:  Negative for cough, sputum production, shortness of breath and wheezing.    Cardiovascular:  Negative for chest pain, palpitations and leg swelling.   Gastrointestinal:  Negative for abdominal pain, constipation, diarrhea, heartburn, nausea and vomiting.   Genitourinary: Negative.    Musculoskeletal: Negative.    Skin: Negative.  Negative for rash.   Neurological: Negative.    Endo/Heme/Allergies: Negative.  Negative for polydipsia. Does not bruise/bleed easily.   Psychiatric/Behavioral:  Negative for depression and substance abuse.      Physical Exam  Nursing note reviewed.   Cardiovascular:      Rate and Rhythm: Rhythm irregular.      Heart sounds: Murmur heard.   Pulmonary:      Effort: Pulmonary effort is normal. No respiratory distress.   Neurological:      Mental Status: She is alert and oriented to person, place, and time.   Psychiatric:         Mood and Affect: Mood normal.         Behavior: Behavior normal.         Thought Content: Thought content normal.         Judgment: Judgment normal.         Laboratory Reviewed  ({Yes)  Lab Results   Component Value Date    WBC 5.52 03/31/2023    HGB 12.0 03/31/2023    HCT 37.1 03/31/2023     03/31/2023    CHOL 143 03/31/2023    TRIG 127 03/31/2023    HDL 30 (L) 03/31/2023    ALT 20 03/31/2023    AST 26 03/31/2023     03/31/2023    K 4.0 03/31/2023     03/31/2023    CREATININE 0.7 03/31/2023    BUN 12 03/31/2023    CO2 29 03/31/2023    TSH 1.519 09/29/2022    INR 2.3 02/06/2024    HGBA1C 7.1 (H) 10/12/2023       Natalie was seen today for pre-op exam.    Diagnoses and all orders for this visit:    Atrial fibrillation, unspecified type  -     Comprehensive Metabolic Panel; Future  -     Lipid Panel; Future  She needs to see Cards  In a fib  On Couamdin.  Will need to discuss holding meds for sx, and risk.    Hyperlipidemia associated with type 2 diabetes mellitus  -     Comprehensive Metabolic Panel; Future  -     Lipid Panel; Future  Checkign labs  She is to be on Lipitor    Aortic atherosclerosis  -     Comprehensive Metabolic Panel; Future  -     Lipid Panel; Future  Age related  Needs statin    Coronary artery disease of native artery of native heart with stable angina pectoris  Last Nuclear stress test last year  No evidence of ischemia    Type 2 diabetes mellitus with diabetic polyneuropathy, without long-term current use of insulin  -     Comprehensive Metabolic Panel; Future  -     Lipid Panel; Future  -     Hemoglobin A1C; Future  On metformin.  Check A1c  DORA (obstructive sleep apnea)    Primary hypertension  -     Comprehensive Metabolic Panel; Future  -     Lipid Panel; Future  Bp in goal range  Norvasc, Cardizem, Cozaar, Metoprolol  She did not bring her her medication, we ask she review the list to make sure she has all meds listed.    Pre-operative clearance  -     CBC Auto Differential; Future  -     Comprehensive Metabolic Panel; Future  Eye sx.    Anticoagulated on Coumadin  -     CBC Auto Differential; Future  Need cards to determine with A fib and high  CHADSvASc what should we do with Coumadin?    [id number] Score: 5    6 High Risk:18.2% if no warfarin  5 High Risk: 12.5% if no warfarin  4 High Risk: 8.5% if no warfarin  3 High Risk: 5.9% if no warfarin  2 Intermediate Risk: 4% if no warfarin  1 Intermediate Risk: 2.8% if no warfarin    This is for Cataract Sx.   Do we need to stop Coumadin?  Her paperwork says N/A meaning she can continue. I will verify with Optho, that she will remain.  She has INR check on March 5th, one day prior to sx, suggest Optho review and if in 2-3 its okay to proceed.    I reviewed her meds  She will verify she has all of them.  Still in a fib, rate . Which has been her range.               Health Maintenance Due   Topic Date Due    RSV Vaccine (Age 60+ and Pregnant patients) (1 - 1-dose 60+ series) Never done    DEXA Scan  11/07/2022         I have evaluated and discussed management associated with medical care services that serve as the continuing focal point for all needed health care services and/or with medical care services that are part of ongoing care related to my patients single, serious condition or a complex condition.     I am providing ongoing care and I am the primary care provider for this patient, and they are being managed, monitored, and/or observed for their chronic conditions over time.     I have addressed their ongoing health maintenance requirements and needs for all health care services and reviewed co-management plans provided by speciality providers when available.    Care Plan/Goals: Reviewed    Goals        Take at least one BP reading per week at various times of the day      Hypertension Goals/Individual Care Plan    Hypertension Goal Care Plan    1. Blood pressure goal is to be below 140/90. Normal Blood pressure is 120/80.  Patient's blood pressure is at goal today. (Yes)    2. Goal for self management is to check Blood Pressure daily. Record on Individual log. Patient is agreeable to self  management plan. blood pressure log.   Patient will bring logs at next office visit, or communicate to /Care Management team for review for interval follow up.     3. Moderately intense physical activity, such as brisk walking, is beneficial when done regularly. Aim for a total of 40 minutes of moderate to vigorous activity three to four times a week to help lower blood pressure. Exercise of patients choice was recommended at this office visit. walking    4. Eating Healthy Diet is important in Blood Pressure control. As its name implies, the DASH (Dietary Approaches to Stop Hypertension) eating plan is designed to help you manage blood pressure. Emphasizing healthy food sources, it also limits:  Red meat   Sodium (salt)   Sweets, added sugars and sugar-containing beverages.     5. Barriers to goals reviewed and addressed with patient at visit. (Yes)    6. Adherence and Medication Side effects discussed (Yes)    Referral to on-site Pharmacy for Co-management of hypertension (No)  Patient enrolled in Tele-health Hypertension Management. (No)    Patient is under care of /Care management (No) Referral Sent (No)                    Follow up: No follow-ups on file.    After visit summary was printed and given to patient upon discharge today.  Patient goals and care plan are included in After Visit Summary.

## 2024-02-26 NOTE — Clinical Note
Pre OP,  Need to know what to do with [place]. Trying to get her in to see you- but I will also ask Optho, about the anticoagulation. Sx is March 6, so I need to know about holding Coumadin or not

## 2024-03-02 DIAGNOSIS — I48.91 ATRIAL FIBRILLATION, UNSPECIFIED TYPE: ICD-10-CM

## 2024-03-03 ENCOUNTER — HOSPITAL ENCOUNTER (EMERGENCY)
Facility: HOSPITAL | Age: 84
Discharge: HOME | End: 2024-03-03
Attending: EMERGENCY MEDICINE
Payer: MEDICARE

## 2024-03-03 VITALS
HEART RATE: 71 BPM | RESPIRATION RATE: 19 BRPM | WEIGHT: 221 LBS | TEMPERATURE: 98 F | OXYGEN SATURATION: 95 % | SYSTOLIC BLOOD PRESSURE: 178 MMHG | HEIGHT: 65 IN | DIASTOLIC BLOOD PRESSURE: 74 MMHG | BODY MASS INDEX: 36.82 KG/M2

## 2024-03-03 DIAGNOSIS — R79.1 SUBTHERAPEUTIC INTERNATIONAL NORMALIZED RATIO (INR): ICD-10-CM

## 2024-03-03 DIAGNOSIS — I48.20 CHRONIC ATRIAL FIBRILLATION: ICD-10-CM

## 2024-03-03 DIAGNOSIS — J81.0 ACUTE PULMONARY EDEMA: Primary | ICD-10-CM

## 2024-03-03 DIAGNOSIS — R06.02 SOB (SHORTNESS OF BREATH): ICD-10-CM

## 2024-03-03 LAB
ALBUMIN SERPL BCP-MCNC: 3.7 G/DL (ref 3.5–5.2)
ALP SERPL-CCNC: 70 U/L (ref 55–135)
ALT SERPL W/O P-5'-P-CCNC: 19 U/L (ref 10–44)
ANION GAP SERPL CALC-SCNC: 9 MMOL/L (ref 8–16)
AST SERPL-CCNC: 19 U/L (ref 10–40)
BASOPHILS # BLD AUTO: 0.04 K/UL (ref 0–0.2)
BASOPHILS NFR BLD: 0.8 % (ref 0–1.9)
BILIRUB SERPL-MCNC: 0.9 MG/DL (ref 0.1–1)
BNP SERPL-MCNC: 154 PG/ML (ref 0–99)
BUN SERPL-MCNC: 8 MG/DL (ref 8–23)
CALCIUM SERPL-MCNC: 8.6 MG/DL (ref 8.7–10.5)
CHLORIDE SERPL-SCNC: 100 MMOL/L (ref 95–110)
CO2 SERPL-SCNC: 28 MMOL/L (ref 23–29)
CREAT SERPL-MCNC: 0.8 MG/DL (ref 0.5–1.4)
D DIMER PPP IA.FEU-MCNC: 2.14 MG/L FEU
DIFFERENTIAL METHOD BLD: ABNORMAL
EOSINOPHIL # BLD AUTO: 0.1 K/UL (ref 0–0.5)
EOSINOPHIL NFR BLD: 1.6 % (ref 0–8)
ERYTHROCYTE [DISTWIDTH] IN BLOOD BY AUTOMATED COUNT: 14.4 % (ref 11.5–14.5)
EST. GFR  (NO RACE VARIABLE): >60 ML/MIN/1.73 M^2
GLUCOSE SERPL-MCNC: 185 MG/DL (ref 70–110)
HCT VFR BLD AUTO: 35.9 % (ref 37–48.5)
HGB BLD-MCNC: 12.4 G/DL (ref 12–16)
IMM GRANULOCYTES # BLD AUTO: 0.02 K/UL (ref 0–0.04)
IMM GRANULOCYTES NFR BLD AUTO: 0.4 % (ref 0–0.5)
INFLUENZA A, MOLECULAR: NEGATIVE
INFLUENZA B, MOLECULAR: NEGATIVE
INR PPP: 1.1 (ref 0.8–1.2)
LYMPHOCYTES # BLD AUTO: 1 K/UL (ref 1–4.8)
LYMPHOCYTES NFR BLD: 20 % (ref 18–48)
MCH RBC QN AUTO: 32 PG (ref 27–31)
MCHC RBC AUTO-ENTMCNC: 34.5 G/DL (ref 32–36)
MCV RBC AUTO: 93 FL (ref 82–98)
MONOCYTES # BLD AUTO: 0.5 K/UL (ref 0.3–1)
MONOCYTES NFR BLD: 10 % (ref 4–15)
NEUTROPHILS # BLD AUTO: 3.4 K/UL (ref 1.8–7.7)
NEUTROPHILS NFR BLD: 67.2 % (ref 38–73)
NRBC BLD-RTO: 0 /100 WBC
OHS QRS DURATION: 92 MS
OHS QTC CALCULATION: 473 MS
PLATELET # BLD AUTO: 171 K/UL (ref 150–450)
PMV BLD AUTO: 11.3 FL (ref 9.2–12.9)
POTASSIUM SERPL-SCNC: 3.6 MMOL/L (ref 3.5–5.1)
PROT SERPL-MCNC: 6.9 G/DL (ref 6–8.4)
PROTHROMBIN TIME: 12.4 SEC (ref 9–12.5)
RBC # BLD AUTO: 3.88 M/UL (ref 4–5.4)
SARS-COV-2 RDRP RESP QL NAA+PROBE: NEGATIVE
SODIUM SERPL-SCNC: 137 MMOL/L (ref 136–145)
SPECIMEN SOURCE: NORMAL
TROPONIN I SERPL DL<=0.01 NG/ML-MCNC: <0.006 NG/ML (ref 0–0.03)
WBC # BLD AUTO: 5.09 K/UL (ref 3.9–12.7)

## 2024-03-03 PROCEDURE — 87502 INFLUENZA DNA AMP PROBE: CPT | Mod: HCNC | Performed by: EMERGENCY MEDICINE

## 2024-03-03 PROCEDURE — 85025 COMPLETE CBC W/AUTO DIFF WBC: CPT | Mod: HCNC | Performed by: EMERGENCY MEDICINE

## 2024-03-03 PROCEDURE — U0002 COVID-19 LAB TEST NON-CDC: HCPCS | Mod: HCNC | Performed by: EMERGENCY MEDICINE

## 2024-03-03 PROCEDURE — 80053 COMPREHEN METABOLIC PANEL: CPT | Mod: HCNC | Performed by: EMERGENCY MEDICINE

## 2024-03-03 PROCEDURE — 96374 THER/PROPH/DIAG INJ IV PUSH: CPT | Mod: HCNC,59

## 2024-03-03 PROCEDURE — 83880 ASSAY OF NATRIURETIC PEPTIDE: CPT | Mod: HCNC | Performed by: EMERGENCY MEDICINE

## 2024-03-03 PROCEDURE — 25500020 PHARM REV CODE 255: Mod: HCNC | Performed by: EMERGENCY MEDICINE

## 2024-03-03 PROCEDURE — 84484 ASSAY OF TROPONIN QUANT: CPT | Mod: HCNC | Performed by: EMERGENCY MEDICINE

## 2024-03-03 PROCEDURE — 85610 PROTHROMBIN TIME: CPT | Mod: HCNC | Performed by: EMERGENCY MEDICINE

## 2024-03-03 PROCEDURE — 93005 ELECTROCARDIOGRAM TRACING: CPT | Mod: HCNC

## 2024-03-03 PROCEDURE — 85379 FIBRIN DEGRADATION QUANT: CPT | Mod: HCNC | Performed by: EMERGENCY MEDICINE

## 2024-03-03 PROCEDURE — 99285 EMERGENCY DEPT VISIT HI MDM: CPT | Mod: 25,HCNC

## 2024-03-03 PROCEDURE — 93010 ELECTROCARDIOGRAM REPORT: CPT | Mod: HCNC,,, | Performed by: INTERNAL MEDICINE

## 2024-03-03 PROCEDURE — 36415 COLL VENOUS BLD VENIPUNCTURE: CPT | Mod: HCNC | Performed by: EMERGENCY MEDICINE

## 2024-03-03 PROCEDURE — 63600175 PHARM REV CODE 636 W HCPCS: Mod: HCNC | Performed by: EMERGENCY MEDICINE

## 2024-03-03 RX ORDER — FUROSEMIDE 20 MG/1
20 TABLET ORAL DAILY
Qty: 5 TABLET | Refills: 0 | Status: SHIPPED | OUTPATIENT
Start: 2024-03-03 | End: 2024-03-03

## 2024-03-03 RX ORDER — FUROSEMIDE 20 MG/1
20 TABLET ORAL DAILY
Qty: 5 TABLET | Refills: 0 | Status: SHIPPED | OUTPATIENT
Start: 2024-03-03 | End: 2024-03-13 | Stop reason: SDUPTHER

## 2024-03-03 RX ORDER — FUROSEMIDE 10 MG/ML
40 INJECTION INTRAMUSCULAR; INTRAVENOUS
Status: COMPLETED | OUTPATIENT
Start: 2024-03-03 | End: 2024-03-03

## 2024-03-03 RX ADMIN — FUROSEMIDE 40 MG: 10 INJECTION, SOLUTION INTRAMUSCULAR; INTRAVENOUS at 07:03

## 2024-03-03 RX ADMIN — IOHEXOL 100 ML: 350 INJECTION, SOLUTION INTRAVENOUS at 06:03

## 2024-03-03 NOTE — ED PROVIDER NOTES
"SCRIBE #1 NOTE: ILizeth, am scribing for, and in the presence of, Guero Garvey MD. I have scribed the HPI, ROS, and PEx.     SCRIBE #2 NOTE: I, Marily Goldberg, am scribing for, and in the presence of,  Hortencia Price MD. I have scribed the remaining portions of the note not scribed by Scribe #1.      History     Chief Complaint   Patient presents with    Shortness of Breath     X 2 days,      Review of patient's allergies indicates:  No Known Allergies      History of Present Illness     HPI    3/3/2024, 4:54 AM  History obtained from the patient      History of Present Illness: Natalie Greene is a 83 y.o. 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 Pt states she "just doesn't feel right" and wants to get checked out. Pt told her nurse that her SOB is worse with exertion. Pt is not a smoker. She takes Coumadin. Symptoms are constant and moderate in severity. No mitigating or exacerbating factors reported. Associated sxs include wheezing, coughing, and chest pain. Patient denies any fever, nausea, dysuria, and all other sxs at this time. No prior Tx reported. No further complaints or concerns at this time.       Arrival mode: Personal vehicle    PCP: Annabella Fenton MD        Past Medical History:  Past Medical History:   Diagnosis Date    *Atrial fibrillation     Abdominal wall seroma 1/16/2019    Seroma developed after her hernia repair in 2012.  Please review the notes in the Care everywhere section of the chart.  There is no luis evidence of infection at this time.  I would recommend checking a erythrocyte sedimentation rate and C reactive protein.  These are significant elevated then aspiration of the seroma fluid would be warranted.  If not the seroma does not require any intervention    " Coronary artery disease involving native coronary artery of native heart with angina pectoris 4/10/2018    Diabetes mellitus     DM (diabetes mellitus) 2002     09/06/2017 no medication    Hyperlipidemia     Hypertension     Morbid (severe) obesity due to excess calories 7/23/2013    Obesity (BMI 30-39.9) 2/24/2015    Obstructive sleep apnea        Past Surgical History:  Past Surgical History:   Procedure Laterality Date    CATARACT EXTRACTION Left 12/15/2021    distance    CHOLECYSTECTOMY      COLON SURGERY      HYSTERECTOMY           Family History:  Family History   Problem Relation Age of Onset    Diabetes Sister     Hypertension Sister     Hypertension Mother     Diabetes Sister     COPD Neg Hx     Cancer Neg Hx     Heart disease Neg Hx     Hyperlipidemia Neg Hx     Stroke Neg Hx        Social History:  Social History     Tobacco Use    Smoking status: Never    Smokeless tobacco: Never   Substance and Sexual Activity    Alcohol use: No    Drug use: No    Sexual activity: Yes     Partners: Male        Review of Systems     Review of Systems   Constitutional:  Negative for fever.   HENT:  Negative for sore throat.    Respiratory:  Positive for cough, shortness of breath and wheezing.    Cardiovascular:  Positive for chest pain.   Gastrointestinal:  Negative for nausea.   Genitourinary:  Negative for dysuria.   Musculoskeletal:  Negative for back pain.   Skin:  Negative for rash.   Neurological:  Negative for weakness.   Hematological:  Does not bruise/bleed easily.        Physical Exam     Initial Vitals [03/03/24 0445]   BP Pulse Resp Temp SpO2   (!) 161/63 76 18 98.5 °F (36.9 °C) 96 %      MAP       --          Physical Exam  Nursing Notes and Vital Signs Reviewed.  Constitutional: Patient is in no acute distress. Well-developed and well-nourished.  Head: Atraumatic. Normocephalic.  Eyes: PERRL. EOM intact. Conjunctivae are not pale. No scleral icterus.  ENT: Mucous membranes are moist. Oropharynx is  "clear and symmetric.    Neck: Supple. Full ROM. No lymphadenopathy.  Cardiovascular: Regular rate. Regular rhythm. No murmurs, rubs, or gallops. Distal pulses are 2+ and symmetric.  Pulmonary/Chest: No respiratory distress. Clear to auscultation bilaterally. No wheezing or rales.  Abdominal: Soft and non-distended.  There is no tenderness.  No rebound, guarding, or rigidity. Good bowel sounds.  Genitourinary: No CVA tenderness  Musculoskeletal: Moves all extremities. No obvious deformities. No edema. No calf tenderness.  Skin: Warm and dry.  Neurological:  Alert, awake, and appropriate.  Normal speech.  No acute focal neurological deficits are appreciated.  Psychiatric: Normal affect. Good eye contact. Appropriate in content.     ED Course   Procedures  ED Vital Signs:  Vitals:    03/03/24 0445 03/03/24 0510 03/03/24 0532 03/03/24 0700   BP: (!) 161/63  (!) 159/94 (!) 164/74   Pulse: 76 68 66 74   Resp: 18  16 15   Temp: 98.5 °F (36.9 °C)      TempSrc: Oral      SpO2: 96%  95% (!) 94%   Weight: 100.2 kg (221 lb)      Height: 5' 5" (1.651 m)       03/03/24 0744   BP: (!) 178/74   Pulse: 71   Resp: 19   Temp: 97.8 °F (36.6 °C)   TempSrc: Oral   SpO2: 95%   Weight:    Height:        Abnormal Lab Results:  Labs Reviewed   CBC W/ AUTO DIFFERENTIAL - Abnormal; Notable for the following components:       Result Value    RBC 3.88 (*)     Hematocrit 35.9 (*)     MCH 32.0 (*)     All other components within normal limits   COMPREHENSIVE METABOLIC PANEL - Abnormal; Notable for the following components:    Glucose 185 (*)     Calcium 8.6 (*)     All other components within normal limits   B-TYPE NATRIURETIC PEPTIDE - Abnormal; Notable for the following components:     (*)     All other components within normal limits   D DIMER, QUANTITATIVE - Abnormal; Notable for the following components:    D-Dimer 2.14 (*)     All other components within normal limits   INFLUENZA A & B BY MOLECULAR   TROPONIN I   SARS-COV-2 RNA " AMPLIFICATION, QUAL   PROTIME-INR   PROTIME-INR        All Lab Results:  Results for orders placed or performed during the hospital encounter of 03/03/24   Influenza A & B by Molecular    Specimen: Nasopharyngeal Swab   Result Value Ref Range    Influenza A, Molecular Negative Negative    Influenza B, Molecular Negative Negative    Flu A & B Source Nasal swab    CBC auto differential   Result Value Ref Range    WBC 5.09 3.90 - 12.70 K/uL    RBC 3.88 (L) 4.00 - 5.40 M/uL    Hemoglobin 12.4 12.0 - 16.0 g/dL    Hematocrit 35.9 (L) 37.0 - 48.5 %    MCV 93 82 - 98 fL    MCH 32.0 (H) 27.0 - 31.0 pg    MCHC 34.5 32.0 - 36.0 g/dL    RDW 14.4 11.5 - 14.5 %    Platelets 171 150 - 450 K/uL    MPV 11.3 9.2 - 12.9 fL    Immature Granulocytes 0.4 0.0 - 0.5 %    Gran # (ANC) 3.4 1.8 - 7.7 K/uL    Immature Grans (Abs) 0.02 0.00 - 0.04 K/uL    Lymph # 1.0 1.0 - 4.8 K/uL    Mono # 0.5 0.3 - 1.0 K/uL    Eos # 0.1 0.0 - 0.5 K/uL    Baso # 0.04 0.00 - 0.20 K/uL    nRBC 0 0 /100 WBC    Gran % 67.2 38.0 - 73.0 %    Lymph % 20.0 18.0 - 48.0 %    Mono % 10.0 4.0 - 15.0 %    Eosinophil % 1.6 0.0 - 8.0 %    Basophil % 0.8 0.0 - 1.9 %    Differential Method Automated    Comprehensive metabolic panel   Result Value Ref Range    Sodium 137 136 - 145 mmol/L    Potassium 3.6 3.5 - 5.1 mmol/L    Chloride 100 95 - 110 mmol/L    CO2 28 23 - 29 mmol/L    Glucose 185 (H) 70 - 110 mg/dL    BUN 8 8 - 23 mg/dL    Creatinine 0.8 0.5 - 1.4 mg/dL    Calcium 8.6 (L) 8.7 - 10.5 mg/dL    Total Protein 6.9 6.0 - 8.4 g/dL    Albumin 3.7 3.5 - 5.2 g/dL    Total Bilirubin 0.9 0.1 - 1.0 mg/dL    Alkaline Phosphatase 70 55 - 135 U/L    AST 19 10 - 40 U/L    ALT 19 10 - 44 U/L    eGFR >60 >60 mL/min/1.73 m^2    Anion Gap 9 8 - 16 mmol/L   Brain natriuretic peptide   Result Value Ref Range     (H) 0 - 99 pg/mL   Troponin I   Result Value Ref Range    Troponin I <0.006 0.000 - 0.026 ng/mL   COVID-19 Rapid Screening   Result Value Ref Range    SARS-CoV-2 RNA,  Amplification, Qual Negative Negative   D dimer, quantitative   Result Value Ref Range    D-Dimer 2.14 (H) <0.50 mg/L FEU   Protime-INR   Result Value Ref Range    Prothrombin Time 12.4 9.0 - 12.5 sec    INR 1.1 0.8 - 1.2   EKG 12-lead   Result Value Ref Range    QRS Duration 92 ms    [facility] QTC Calculation 473 ms        Imaging Results:  Imaging Results              CTA Chest Non-Coronary (PE Studies) (Final result)  Result time 03/03/24 06:48:17      Final result by Isaak Cohen MD (03/03/24 06:48:17)                   Impression:      1. Suboptimal opacification pulmonary arteries without evidence of a central filling defect to suggest pulmonary embolism.  2. Enlarged pulmonary artery suggesting pulmonary hypertension sequela.  3. Bilaterally symmetric smooth septal thickening upper, mid, and most notably bibasilar distribution with intermixed bibasilar patchy ground-glass opacities, small bilateral pleural effusions characteristic of pulmonary edema sequela.  Normal size heart.  Coronary artery calcification.  4. No evidence of pneumonia.  5. Bilateral hilar, mediastinal adenopathy likely benign reactive.  All CT scans at this facility are performed  using dose modulation techniques as appropriate to performed exam including the following:  automated exposure control; adjustment of mA and/or kV according to the patients size (this includes techniques or standardized protocols for targeted exams where dose is matched to indication/reason for exam: i.e. extremities or head);  iterative reconstruction technique.      Electronically signed by: Isaak Cohen MD  Date:    03/03/2024  Time:    06:48               Narrative:    EXAMINATION:  CTA CHEST NON CORONARY (PE STUDIES)    CLINICAL HISTORY:  Pulmonary embolism (PE) suspected, positive D-dimer;    TECHNIQUE:  CT angiogram chest performed with pulmonary embolism protocol.  100 cc Omnipaque 350.  Soft tissue and lung algorithms.  Multiplanar maximum intensity  projections.    COMPARISON:  Chest x-ray 03/03/2024.    FINDINGS:  There is suboptimal opacification of the pulmonary arteries, Hounsfield unit 150, without evidence of a discrete intraluminal filling defect to suggest pulmonary embolism.  The pulmonary arteries are enlarged; main 39 mm, right 30 mm, and left 26 mm raising possibility of pulmonary hypertension sequela.    Normal caliber aorta without dissection.  Aortic arch atherosclerosis.    Normal size heart.  There are coronary artery calcifications.  No pericardial effusion.    There are multiple small mediastinal and bilateral hilar lymph nodes, probably benign reactive.    Trachea and central bronchi are patent.  There is senescent calcification of the tracheobronchial tree.    There are bilateral small dependent pleural effusions.  There is pattern of bilaterally symmetric smooth septal thickening upper, mid, and most notably bibasilar distribution with intermixed bibasilar patchy ground-glass opacities characteristic of pulmonary edema sequela.    There is no dense airspace consolidation.    There is a gastric diverticulum, 5 cm.  Left renal cyst.  Otherwise, the visualized portion of the upper abdominal viscera is unremarkable.    Dextroscoliosis.  Thoracic DISH.                                       X-Ray Chest AP Portable (Final result)  Result time 03/03/24 06:25:21   Procedure changed from X-Ray Chest PA And Lateral     Final result by Isaak Cohen MD (03/03/24 06:25:21)                   Impression:      See above      Electronically signed by: Isaak Cohen MD  Date:    03/03/2024  Time:    06:25               Narrative:    EXAMINATION:  XR CHEST AP PORTABLE    CLINICAL HISTORY:  Shortness of breath.    COMPARISON:  02/07/2022    FINDINGS:  Mild cardiomegaly.  Aortic atherosclerosis.  The pulmonary vasculature is congested with coarsening of the pulmonary markings mid and lower lungs, new since the prior study, suggesting interstitial edema  versus atypical pneumonia.  No dense airspace consolidation or pleural fluid collection.                                       The EKG was ordered, reviewed, and independently interpreted by the ED provider.  Interpretation time: 4:51  Rate: 77 BPM  Rhythm:  Atrial fibrillation with premature ventricular or aberrantly conducted complexes  Interpretation: Incomplete right bundle branch block. Nonspecific ST abnormality. Abnormal ECG. No STEMI.           The Emergency Provider reviewed the vital signs and test results, which are outlined above.     ED Discussion              Medical Decision Making  DDX:  1. CHF  2. ACS  3. PE    On coumadin for aortic valve repair, INR subtherapeutic, lab work reviewed and BNP mildly elevated along with d dimer, ECG shows chronic atrial fib, cmp normal, CXR reviewed and shows mild pulmonary edema, CTA negative for PE but mild pulmonary edema noted, given lasix in the ER with adequate diuresis, admission considered but feel she is safe for discharge, recommend increasing lasix, close follow up with cardiology, low salt diet, reasons to return given.     Amount and/or Complexity of Data Reviewed  Labs: ordered. Decision-making details documented in ED Course.  Radiology: ordered. Decision-making details documented in ED Course.  ECG/medicine tests: ordered and independent interpretation performed. Decision-making details documented in ED Course.  Discussion of management or test interpretation with external provider(s): 6:00 AM: Dr. Garvey transfers care of patient to Dr. Price pending CT results.     7:15 AM: Reassessed pt at this time.  Discussed with pt all pertinent ED information and results. Discussed pt dx and plan of tx. Gave pt all f/u and return to the ED instructions. All questions and concerns were addressed at this time. Pt expresses understanding of information and instructions, and is comfortable with plan to discharge. Pt is stable for discharge.    I discussed with  patient and/or family/caretaker that evaluation in the ED does not suggest any emergent or life threatening medical conditions requiring immediate intervention beyond what was provided in the ED, and I believe patient is safe for discharge.  Regardless, an unremarkable evaluation in the ED does not preclude the development or presence of a serious of life threatening condition. As such, patient was instructed to return immediately for any worsening or change in current symptoms.    Risk  Prescription drug management.  Decision regarding hospitalization.                ED Medication(s):  Medications   iohexoL (OMNIPAQUE 350) injection 100 mL (100 mLs Intravenous Given 3/3/24 0627)   furosemide injection 40 mg (40 mg Intravenous Given 3/3/24 0744)       Discharge Medication List as of 3/3/2024  7:28 AM        START taking these medications    Details   furosemide (LASIX) 20 MG tablet Take 1 tablet (20 mg total) by mouth once daily. for 5 days, Starting Sun 3/3/2024, Until Fri 3/8/2024, Normal              Follow-up Information       Annabella Fenton MD. Schedule an appointment as soon as possible for a visit in 2 days.    Specialty: Family Medicine  Contact information:  [address] [facility] [address] [phone]               Law oRd MD. Schedule an appointment as soon as possible for a visit in 2 days.    Specialties: Cardiology, Internal Medicine  Contact information:  [address]  [place] [address] [phone]                                 Scribe Attestation:   Scribe #1: I performed the above scribed service and the documentation accurately describes the services I performed. I attest to the accuracy of the note.     Attending:   Physician Attestation Statement for Scribe #1: I, Guero Garvey MD, personally performed the services described in this documentation, as scribed by Lizeth Alcocer, in my presence, and it is both accurate and complete.       Scribe  Attestation:   Scribe #2: I performed the above scribed service and the documentation accurately describes the services I performed. I attest to the accuracy of the note.    Attending Attestation:           Physician Attestation for Scribe:    Physician Attestation Statement for Scribe #2: I, Hortencia Price MD, reviewed documentation, as scribed by Marily Goldberg in my presence, and it is both accurate and complete. I also acknowledge and confirm the content of the note done by Chasityibe #1.           Clinical Impression       ICD-10-CM ICD-9-CM   1. Acute pulmonary edema  J81.0 518.4   2. SOB (shortness of breath)  R06.02 786.05   3. Chronic atrial fibrillation  I48.20 427.31   4. Subtherapeutic international normalized ratio (INR)  R79.1 790.92       Disposition:   Disposition: Discharged  Condition: Stable        Hortencia Price MD  03/06/24 1214

## 2024-03-04 ENCOUNTER — PATIENT OUTREACH (OUTPATIENT)
Dept: EMERGENCY MEDICINE | Facility: HOSPITAL | Age: 84
End: 2024-03-04

## 2024-03-04 ENCOUNTER — TELEPHONE (OUTPATIENT)
Dept: OPHTHALMOLOGY | Facility: CLINIC | Age: 84
End: 2024-03-04
Payer: MEDICARE

## 2024-03-04 NOTE — TELEPHONE ENCOUNTER
Patient called to postpone cat sx due to medical issue. Patient will call to reschedule will issue is resolved.

## 2024-03-05 ENCOUNTER — ANTI-COAG VISIT (OUTPATIENT)
Dept: CARDIOLOGY | Facility: CLINIC | Age: 84
End: 2024-03-05
Payer: MEDICARE

## 2024-03-05 DIAGNOSIS — I48.91 ATRIAL FIBRILLATION, UNSPECIFIED TYPE: ICD-10-CM

## 2024-03-05 DIAGNOSIS — Z79.01 LONG TERM (CURRENT) USE OF ANTICOAGULANTS: Primary | ICD-10-CM

## 2024-03-05 LAB — INR PPP: 1.3 (ref 2–3)

## 2024-03-05 PROCEDURE — 93793 ANTICOAG MGMT PT WARFARIN: CPT | Mod: HCNC,S$GLB,,

## 2024-03-05 PROCEDURE — 85610 PROTHROMBIN TIME: CPT | Mod: QW,HCNC,S$GLB, | Performed by: INTERNAL MEDICINE

## 2024-03-05 RX ORDER — INDAPAMIDE 2.5 MG/1
2.5 TABLET ORAL DAILY
Qty: 90 TABLET | Refills: 0 | Status: SHIPPED | OUTPATIENT
Start: 2024-03-05 | End: 2024-05-22 | Stop reason: SDUPTHER

## 2024-03-05 RX ORDER — WARFARIN 6 MG/1
6 TABLET ORAL DAILY
Qty: 90 TABLET | Refills: 3 | Status: SHIPPED | OUTPATIENT
Start: 2024-03-05

## 2024-03-05 NOTE — PROGRESS NOTES
INR not at goal. Medications, chart, and patient findings reviewed. See calendar for adjustments to dose and follow up plan.  Boost and resume.

## 2024-03-05 NOTE — PROGRESS NOTES
"INR is subtherapeutic at 1.3.  Patient reports several doses were missed - "wasn't feeling well and went to the ER on Sunday, 3/03".  Will send to PharmD for review and dosing.    "

## 2024-03-13 ENCOUNTER — HOSPITAL ENCOUNTER (OUTPATIENT)
Dept: RADIOLOGY | Facility: HOSPITAL | Age: 84
Discharge: HOME | End: 2024-03-13
Attending: FAMILY MEDICINE
Payer: MEDICARE

## 2024-03-13 ENCOUNTER — OFFICE VISIT (OUTPATIENT)
Dept: INTERNAL MEDICINE | Facility: CLINIC | Age: 84
End: 2024-03-13
Payer: MEDICARE

## 2024-03-13 VITALS
HEART RATE: 59 BPM | HEIGHT: 65 IN | DIASTOLIC BLOOD PRESSURE: 68 MMHG | WEIGHT: 218.5 LBS | OXYGEN SATURATION: 98 % | BODY MASS INDEX: 36.4 KG/M2 | SYSTOLIC BLOOD PRESSURE: 126 MMHG

## 2024-03-13 DIAGNOSIS — I25.118 CORONARY ARTERY DISEASE OF NATIVE ARTERY OF NATIVE HEART WITH STABLE ANGINA PECTORIS: ICD-10-CM

## 2024-03-13 DIAGNOSIS — E11.69 HYPERLIPIDEMIA ASSOCIATED WITH TYPE 2 DIABETES MELLITUS: ICD-10-CM

## 2024-03-13 DIAGNOSIS — Z79.01 LONG TERM CURRENT USE OF ANTICOAGULANT THERAPY: ICD-10-CM

## 2024-03-13 DIAGNOSIS — I70.0 AORTIC ATHEROSCLEROSIS: ICD-10-CM

## 2024-03-13 DIAGNOSIS — Z85.038 HISTORY OF MALIGNANT NEOPLASM OF COLON: ICD-10-CM

## 2024-03-13 DIAGNOSIS — E11.42 TYPE 2 DIABETES MELLITUS WITH DIABETIC POLYNEUROPATHY, WITHOUT LONG-TERM CURRENT USE OF INSULIN: ICD-10-CM

## 2024-03-13 DIAGNOSIS — I27.20 PULMONARY HYPERTENSION: ICD-10-CM

## 2024-03-13 DIAGNOSIS — I48.91 ATRIAL FIBRILLATION, UNSPECIFIED TYPE: Primary | ICD-10-CM

## 2024-03-13 DIAGNOSIS — E78.5 HYPERLIPIDEMIA ASSOCIATED WITH TYPE 2 DIABETES MELLITUS: ICD-10-CM

## 2024-03-13 DIAGNOSIS — I10 PRIMARY HYPERTENSION: ICD-10-CM

## 2024-03-13 PROCEDURE — 1126F AMNT PAIN NOTED NONE PRSNT: CPT | Mod: HCNC,CPTII,S$GLB, | Performed by: FAMILY MEDICINE

## 2024-03-13 PROCEDURE — 99214 OFFICE O/P EST MOD 30 MIN: CPT | Mod: HCNC,S$GLB,, | Performed by: FAMILY MEDICINE

## 2024-03-13 PROCEDURE — 71046 X-RAY EXAM CHEST 2 VIEWS: CPT | Mod: TC,HCNC

## 2024-03-13 PROCEDURE — 3078F DIAST BP <80 MM HG: CPT | Mod: HCNC,CPTII,S$GLB, | Performed by: FAMILY MEDICINE

## 2024-03-13 PROCEDURE — 99999 PR PBB SHADOW E&M-EST. PATIENT-LVL V: CPT | Mod: PBBFAC,HCNC,, | Performed by: FAMILY MEDICINE

## 2024-03-13 PROCEDURE — 3074F SYST BP LT 130 MM HG: CPT | Mod: HCNC,CPTII,S$GLB, | Performed by: FAMILY MEDICINE

## 2024-03-13 PROCEDURE — G2211 COMPLEX E/M VISIT ADD ON: HCPCS | Mod: HCNC,S$GLB,, | Performed by: FAMILY MEDICINE

## 2024-03-13 PROCEDURE — 1159F MED LIST DOCD IN RCRD: CPT | Mod: HCNC,CPTII,S$GLB, | Performed by: FAMILY MEDICINE

## 2024-03-13 PROCEDURE — 71046 X-RAY EXAM CHEST 2 VIEWS: CPT | Mod: 26,HCNC,, | Performed by: RADIOLOGY

## 2024-03-13 RX ORDER — CHOLECALCIFEROL (VITAMIN D3) 25 MCG
1000 TABLET ORAL DAILY
COMMUNITY

## 2024-03-13 RX ORDER — SEMAGLUTIDE 0.68 MG/ML
0.25 INJECTION, SOLUTION SUBCUTANEOUS
Qty: 3 ML | Refills: 2 | Status: SHIPPED | OUTPATIENT
Start: 2024-03-13

## 2024-03-13 RX ORDER — UBIDECARENONE 30 MG
30 CAPSULE ORAL 3 TIMES DAILY
COMMUNITY

## 2024-03-13 RX ORDER — FUROSEMIDE 20 MG/1
20 TABLET ORAL DAILY
Qty: 90 TABLET | Refills: 1 | Status: SHIPPED | OUTPATIENT
Start: 2024-03-13 | End: 2024-03-14 | Stop reason: SDUPTHER

## 2024-03-13 RX ORDER — NIACIN 50 MG
100 TABLET ORAL NIGHTLY
COMMUNITY

## 2024-03-13 RX ORDER — ONDANSETRON 8 MG/1
8 TABLET, ORALLY DISINTEGRATING ORAL EVERY 12 HOURS PRN
Qty: 20 TABLET | Refills: 0 | Status: SHIPPED | OUTPATIENT
Start: 2024-03-13

## 2024-03-13 NOTE — Clinical Note
She needs follow up with Dr. Rod. Didn't get it scheudled after my last visit, and went to ER, with pulm HTN/overload I will continue lasix and follow up labs Please assist in cards follow up. ER said to follow up with primary cardiologist.

## 2024-03-13 NOTE — PROGRESS NOTES
Natalie Greene  03/13/2024  [id number]    Annabella Fenton MD  Patient Care Team:  Annabella Fenton MD as PCP - General (Family Medicine)  Annabella Fenton MD as Consulting Physician (Family Medicine)  Lisbet Lopez LPN as Care Coordinator (Internal Medicine)  Law Rod MD as Consulting Physician (Cardiology)  Henry Rojas OD as Consulting Physician (Optometry)  Lizeth Cain, PharmD as Pharmacist (Pharmacist)  Andressa Britton as ED Navigator          Visit Type:a scheduled routine follow-up visit    Chief Complaint:  Chief Complaint   Patient presents with    Nausea       History of Present Illness:  83 year old  Follow up  Had one episode of SOB this am, but gone  Now nausea  Said she doesn't feel good  Same c/o from ER  Vitals are stabel    I saw her in Feb  Asked her to follow up with Maida and to bring all med in.  Suggestive at last appt not clear on medication.    Known A fib  Not controlled at times  On coumadin.    Went to Er 2 weeks ago after my visit   Not feeling right. Admitted to SOB. Comes and goes.   On coumadin for aortic valve repair, INR subtherapeutic, lab work reviewed and BNP mildly elevated along with d dimer, ECG shows chronic atrial fib, cmp normal, CXR reviewed and shows mild pulmonary edema, CTA negative for PE but mild pulmonary edema noted, given lasix in the ER with adequate diuresis, admission considered but feel she is safe for discharge, recommend increasing lasix, close follow up with cardiology, low salt diet, reasons to return given.    BNP was 153  Given lasix 20 daily, for 5 days      History of a fib. On coumadin.  Cards has her on Norvasc and Cardizem, Cozaar, Metoprolol     She has HLD.        Lab Results   Component Value Date     LDLCALC 87.6 03/31/2023   On statin  Lipitor 40     She has known Aortic Athersclerosis.   CAD  She hasn't had recent follow up with Cards     DM2        Lab Results   Component Value Date     HGBA1C 7.1 (H) 10/12/2023    Taking Metformin     Lab Results   Component Value Date    HGBA1C 7.0 (H) 02/26/2024                Date Due     Never done     11/07/2022 4/27/23  Nuclear stress neg for ischemia 2/2023. ECHO with normal bi V function, mild TR, mild MR, PASP 33mmHg 2/2023.      History:  Past Medical History:   Diagnosis Date    *Atrial fibrillation     Abdominal wall seroma 1/16/2019    Seroma developed after her hernia repair in 2012.  Please review the notes in the Care everywhere section of the chart.  There is no luis evidence of infection at this time.  I would recommend checking a erythrocyte sedimentation rate and C reactive protein.  These are significant elevated then aspiration of the seroma fluid would be warranted.  If not the seroma does not require any intervention    Coronary artery disease involving native coronary artery of native heart with angina pectoris 4/10/2018    Diabetes mellitus     DM (diabetes mellitus) 2002     09/06/2017 no medication    Hyperlipidemia     Hypertension     Morbid (severe) obesity due to excess calories 7/23/2013    Obesity (BMI 30-39.9) 2/24/2015    Obstructive sleep apnea      Past Surgical History:   Procedure Laterality Date    CATARACT EXTRACTION Left 12/15/2021    distance    CHOLECYSTECTOMY      COLON SURGERY      HYSTERECTOMY       Family History   Problem Relation Age of Onset    Diabetes Sister     Hypertension Sister     Hypertension Mother     Diabetes Sister     COPD Neg Hx     Cancer Neg Hx     Heart disease Neg Hx     Hyperlipidemia Neg Hx     Stroke Neg Hx      Social History     Socioeconomic History    Marital status:     Number of children: 1    Highest education level: High school graduate   Occupational History    Occupation: Retired   Tobacco Use    Smoking status: Never    Smokeless tobacco: Never   Substance and Sexual Activity    Alcohol use: No    Drug use: No    Sexual activity: Yes     Partners: Male     Social Determinants of Health      Financial Resource Strain: Low Risk  (1/4/2024)    Overall Financial Resource Strain (CARDIA)     Difficulty of Paying Living Expenses: Not hard at all   Food Insecurity: No Food Insecurity (1/4/2024)    Hunger Vital Sign     Worried About Running Out of Food in the Last Year: Never true     Ran Out of Food in the Last Year: Never true   Transportation Needs: No Transportation Needs (1/4/2024)    PRAPARE - Transportation     Lack of Transportation (Medical): No     Lack of Transportation (Non-Medical): No   Physical Activity: Inactive (1/4/2024)    Exercise Vital Sign     Days of Exercise per Week: 0 days     Minutes of Exercise per Session: 0 min   Stress: Stress Concern Present (1/4/2024)    [employer] [place] [employer] - Occupational Stress Questionnaire     Feeling of Stress : To some extent   Social Connections: Moderately Integrated (1/4/2024)    Social Connection and Isolation Panel [NHANES]     Frequency of Communication with Friends and Family: More than three times a week     Frequency of Social Gatherings with Friends and Family: More than three times a week     Attends Sabianism Services: More than 4 times per year     Active Member of Clubs or Organizations: No     Attends Club or Organization Meetings: Never     Marital Status:    Housing Stability: Low Risk  (1/4/2024)    Housing Stability Vital Sign     Unable to Pay for Housing in the Last Year: No     Number of Places Lived in the Last Year: 1     Unstable Housing in the Last Year: No     Patient Active Problem List   Diagnosis    A-fib    HTN (hypertension)    Hyperlipidemia with target LDL less than 100    Hyperlipidemia associated with type 2 diabetes mellitus    History of malignant neoplasm of colon    Shoulder pain, left    Aortic atherosclerosis    Long term current use of anticoagulant therapy    DORA (obstructive sleep apnea)    Psychophysiological insomnia    PLMD (periodic limb movement disorder)    Inadequate sleep  hygiene    Pulmonary hypertension    CAD (coronary artery disease)    Osteoporosis    Severe obesity (BMI 35.0-39.9) with comorbidity    Varicose veins of both lower extremities    Type 2 diabetes mellitus with diabetic polyneuropathy, without long-term current use of insulin     Review of patient's allergies indicates:  No Known Allergies    The following were reviewed at this visit: active problem list, medication list, allergies, family history, social history, and health maintenance.    Medications:  Current Outpatient Medications on File Prior to Visit   Medication Sig Dispense Refill    amLODIPine (NORVASC) 5 MG tablet Take 1 tablet (5 mg total) by mouth once daily. 90 tablet 3    atorvastatin (LIPITOR) 40 MG tablet Take 1 tablet (40 mg total) by mouth once daily. 90 tablet 3    co-enzyme Q-10 30 mg capsule Take 30 mg by mouth 3 (three) times daily.      diltiaZEM (CARDIZEM CD) 180 MG 24 hr capsule Take 1 capsule (180 mg total) by mouth once daily. 90 capsule 3    indapamide (LOZOL) 2.5 MG Tab Take 1 tablet (2.5 mg total) by mouth once daily. 90 tablet 0    magnesium oxide (MAG-OX) 400 mg (241.3 mg magnesium) tablet Take 1 tablet (400 mg total) by mouth once daily. 90 tablet 1    metFORMIN (GLUCOPHAGE) 500 MG tablet Take 500 mg by mouth daily with breakfast.      metoprolol succinate (TOPROL-XL) 100 MG 24 hr tablet Take 1 tablet (100 mg total) by mouth once daily. 90 tablet 2    niacin 50 MG Tab Take 100 mg by mouth every evening.      potassium chloride SA (K-DUR,KLOR-CON) 20 MEQ tablet Take 1 tablet (20 mEq total) by mouth once daily. 90 tablet 1    TRUE METRIX AIR GLUCOSE METER kit       TRUE METRIX GLUCOSE TEST STRIP Strp TEST BLOOD SUGAR TWICE DAILY 200 strip 11    TRUE METRIX LEVEL 1 Soln 1 each by Tube route once daily. 1 each 11    vitamin D (VITAMIN D3) 1000 units Tab Take 1,000 Units by mouth once daily.      warfarin (COUMADIN) 6 MG tablet Take 1 tablet (6 mg total) by mouth Daily. Except TAKE ONE  HALF TABLET (3 MG) EVERY THURSDAY AS DIRECTED BY THE CC. 90 tablet 3    ACCU-CHEK SOFTCLIX LANCETS Misc       furosemide (LASIX) 20 MG tablet Take 1 tablet (20 mg total) by mouth once daily. for 5 days 5 tablet 0    gatifloxacin 0.5 % Drop drops Place 1 drop into the right eye 2 (two) times daily. Eyedrops to start one day before surgery 5 mL 2    ketorolac 0.5% (ACULAR) 0.5 % Drop Place 1 drop into the right eye 4 (four) times daily. Eyedrops to start one day before surgery 5 mL 2    losartan (COZAAR) 100 MG tablet Take 1 tablet (100 mg total) by mouth once daily. 90 tablet 1    nitroGLYCERIN (NITROSTAT) 0.4 MG SL tablet Place 1 tablet (0.4 mg total) under the tongue every 5 (five) minutes as needed for Chest pain. Take up to 3 times 5 min apart (Patient not taking: Reported on 1/4/2024) 30 tablet 0    prednisoLONE acetate (PRED FORTE) 1 % DrpS Place 1 drop into the right eye 4 (four) times daily. Eyedrops to start one day before surgery (Patient not taking: Reported on 3/13/2024) 5 mL 2     No current facility-administered medications on file prior to visit.       Medications have been reviewed and reconciled with patient at this visit.  Barriers to medications reviewed with patient.    Adverse reactions to current medications reviewed with patient..    Over the counter medications reviewed and reconciled with patient.    Exam:  Wt Readings from Last 3 Encounters:   03/13/24 99.1 kg (218 lb 7.6 oz)   03/03/24 100.2 kg (221 lb)   02/26/24 100.5 kg (221 lb 9 oz)     Temp Readings from Last 3 Encounters:   03/03/24 97.8 °F (36.6 °C) (Oral)   02/26/24 97.1 °F (36.2 °C) (Tympanic)   01/04/24 97.9 °F (36.6 °C) (Temporal)     BP Readings from Last 3 Encounters:   03/13/24 126/68   03/03/24 (!) 178/74   02/26/24 110/62     Pulse Readings from Last 3 Encounters:   03/13/24 (!) 59   03/03/24 71   02/26/24 104     Body mass index is 36.36 kg/m².      Review of Systems   Constitutional: Negative.  Negative for chills and  fever.   HENT: Negative.  Negative for congestion, sinus pain and sore throat.    Eyes:  Negative for blurred vision and double vision.   Respiratory:  Positive for shortness of breath. Negative for cough, sputum production and wheezing.    Cardiovascular:  Negative for chest pain, palpitations and leg swelling.   Gastrointestinal:  Positive for nausea. Negative for abdominal pain, constipation, diarrhea, heartburn and vomiting.   Genitourinary: Negative.    Musculoskeletal: Negative.    Skin: Negative.  Negative for rash.   Neurological: Negative.    Endo/Heme/Allergies: Negative.  Negative for polydipsia. Does not bruise/bleed easily.   Psychiatric/Behavioral:  Positive for memory loss. Negative for depression and substance abuse.      Physical Exam  Nursing note reviewed.   Cardiovascular:      Rate and Rhythm: Rhythm irregular.      Heart sounds: Murmur heard.   Pulmonary:      Effort: Pulmonary effort is normal. No respiratory distress.   Neurological:      Mental Status: She is alert and oriented to person, place, and time.   Psychiatric:         Mood and Affect: Mood normal.         Behavior: Behavior normal.         Thought Content: Thought content normal.         Judgment: Judgment normal.         Laboratory Reviewed ({Yes)  Lab Results   Component Value Date    WBC 5.09 03/03/2024    HGB 12.4 03/03/2024    HCT 35.9 (L) 03/03/2024     03/03/2024    CHOL 147 02/26/2024    TRIG 82 02/26/2024    HDL 35 (L) 02/26/2024    ALT 19 03/03/2024    AST 19 03/03/2024     03/03/2024    K 3.6 03/03/2024     03/03/2024    CREATININE 0.8 03/03/2024    BUN 8 03/03/2024    CO2 28 03/03/2024    TSH 1.519 09/29/2022    INR 1.3 (A) 03/05/2024    HGBA1C 7.0 (H) 02/26/2024       Diagnoses and all orders for this visit:    Atrial fibrillation, unspecified type  Needs Cards follow up   Echo repeat  Keep on lasix- Rx now given  Check Electrolytes  Tx nausea with Zofran  Low salt  Daily weight  Check BMP  today    Hyperlipidemia associated with type 2 diabetes mellitus  Lab Results   Component Value Date    LDLCALC 95.6 02/26/2024   Statin    Aortic atherosclerosis  Statin    Primary hypertension  Compliance and understanding of meds  OPCM for complex case management  She has to engage with RN manager    Pulmonary hypertension  Need REpeat Echo    Coronary artery disease of native artery of native heart with stable angina pectoris  Follow up card  Send chart to help with coordination of care    History of malignant neoplasm of colon  Monitoring    Type 2 diabetes mellitus with diabetic polyneuropathy, without long-term current use of insulin  Lab Results   Component Value Date    HGBA1C 7.0 (H) 02/26/2024   Stable at goal  Add Ozempic for weight loss    Long term current use of anticoagulant therapy  Coumadin clinic.    Still has not scheduled with Cards  Will assist in that today  I sent pre op notes last visit and asked for appt for follow up    Check BNP to see if improved.          Visit today included increased complexity associated with the care of the episodic problem SOB , which was addressed while instituting co-management of the longitudinal care of the patient due to the serious and/or complex managed problem(s) .    I have evaluated and discussed management associated with medical care services that serve as the continuing focal point for all needed health care services and/or with medical care services that are part of ongoing care related to my patient's single, serious condition or a complex condition(s).    I am providing ongoing care and I am the primary care provider for this patient, and they are being managed, monitored, and/or observed for their chronic conditions over time.     I have addressed their ongoing health maintenance requirements and needs for all health care services and reviewed co-management plans provided by specialty providers when available.    Health Maintenance Due   Topic Date  Due    RSV Vaccine (Age 60+ and Pregnant patients) (1 - 1-dose 60+ series) Never done    DEXA Scan  11/07/2022       Care Plan/Goals: Reviewed    Goals        Take at least one BP reading per week at various times of the day      Hypertension Goals/Individual Care Plan    Hypertension Goal Care Plan    1. Blood pressure goal is to be below 140/90. Normal Blood pressure is 120/80.  Patient's blood pressure is at goal today. (Yes)    2. Goal for self management is to check Blood Pressure daily. Record on Individual log. Patient is agreeable to self management plan. blood pressure log.   Patient will bring logs at next office visit, or communicate to /Care Management team for review for interval follow up.     3. Moderately intense physical activity, such as brisk walking, is beneficial when done regularly. Aim for a total of 40 minutes of moderate to vigorous activity three to four times a week to help lower blood pressure. Exercise of patients choice was recommended at this office visit. walking    4. Eating Healthy Diet is important in Blood Pressure control. As its name implies, the DASH (Dietary Approaches to Stop Hypertension) eating plan is designed to help you manage blood pressure. Emphasizing healthy food sources, it also limits:  Red meat   Sodium (salt)   Sweets, added sugars and sugar-containing beverages.     5. Barriers to goals reviewed and addressed with patient at visit. (Yes)    6. Adherence and Medication Side effects discussed (Yes)    Referral to on-site Pharmacy for Co-management of hypertension (No)  Patient enrolled in Tele-health Hypertension Management. (No)    Patient is under care of /Care management (No) Referral Sent (No)                    Follow up: Follow up in about 3 months (around 6/13/2024) for Follow up MANINDER.    After visit summary was printed and given to patient upon discharge today.  Patient goals and care plan are included in After Visit Summary.

## 2024-03-13 NOTE — Clinical Note
After my last visit, she went to ER, CHF? Given Lasix. Told her to follow up with Cards. I see no appt with you. Can your staff assist in getting her in for Cardiology follow up. I will continue the lasix

## 2024-03-14 ENCOUNTER — ANTI-COAG VISIT (OUTPATIENT)
Dept: CARDIOLOGY | Facility: CLINIC | Age: 84
End: 2024-03-14
Payer: MEDICARE

## 2024-03-14 ENCOUNTER — OFFICE VISIT (OUTPATIENT)
Dept: CARDIOLOGY | Facility: CLINIC | Age: 84
End: 2024-03-14
Payer: MEDICARE

## 2024-03-14 VITALS
HEIGHT: 65 IN | HEART RATE: 83 BPM | BODY MASS INDEX: 36.4 KG/M2 | SYSTOLIC BLOOD PRESSURE: 136 MMHG | OXYGEN SATURATION: 96 % | DIASTOLIC BLOOD PRESSURE: 72 MMHG | WEIGHT: 218.5 LBS

## 2024-03-14 DIAGNOSIS — E11.69 HYPERLIPIDEMIA ASSOCIATED WITH TYPE 2 DIABETES MELLITUS: ICD-10-CM

## 2024-03-14 DIAGNOSIS — I48.91 ATRIAL FIBRILLATION, UNSPECIFIED TYPE: ICD-10-CM

## 2024-03-14 DIAGNOSIS — Z79.01 LONG TERM (CURRENT) USE OF ANTICOAGULANTS: Primary | ICD-10-CM

## 2024-03-14 DIAGNOSIS — I83.93 VARICOSE VEINS OF BOTH LOWER EXTREMITIES, UNSPECIFIED WHETHER COMPLICATED: ICD-10-CM

## 2024-03-14 DIAGNOSIS — R06.09 DOE (DYSPNEA ON EXERTION): Primary | ICD-10-CM

## 2024-03-14 DIAGNOSIS — I25.118 CORONARY ARTERY DISEASE WITH STABLE ANGINA PECTORIS, UNSPECIFIED VESSEL OR LESION TYPE, UNSPECIFIED WHETHER NATIVE OR TRANSPLANTED HEART: ICD-10-CM

## 2024-03-14 DIAGNOSIS — I48.20 CHRONIC A-FIB: ICD-10-CM

## 2024-03-14 DIAGNOSIS — E78.5 HYPERLIPIDEMIA WITH TARGET LDL LESS THAN 100: ICD-10-CM

## 2024-03-14 DIAGNOSIS — G47.33 OSA (OBSTRUCTIVE SLEEP APNEA): ICD-10-CM

## 2024-03-14 DIAGNOSIS — Z79.01 ANTICOAGULATED ON COUMADIN: ICD-10-CM

## 2024-03-14 DIAGNOSIS — R06.09 OTHER FORM OF DYSPNEA: ICD-10-CM

## 2024-03-14 DIAGNOSIS — E78.5 HYPERLIPIDEMIA ASSOCIATED WITH TYPE 2 DIABETES MELLITUS: ICD-10-CM

## 2024-03-14 DIAGNOSIS — I25.118 CORONARY ARTERY DISEASE OF NATIVE ARTERY OF NATIVE HEART WITH STABLE ANGINA PECTORIS: ICD-10-CM

## 2024-03-14 DIAGNOSIS — I70.0 AORTIC ATHEROSCLEROSIS: ICD-10-CM

## 2024-03-14 DIAGNOSIS — I50.32 CHRONIC HEART FAILURE WITH PRESERVED EJECTION FRACTION: ICD-10-CM

## 2024-03-14 DIAGNOSIS — I10 HYPERTENSION, UNSPECIFIED TYPE: ICD-10-CM

## 2024-03-14 LAB — INR PPP: 3.2 (ref 2–3)

## 2024-03-14 PROCEDURE — 85610 PROTHROMBIN TIME: CPT | Mod: QW,HCNC,S$GLB, | Performed by: INTERNAL MEDICINE

## 2024-03-14 PROCEDURE — 1159F MED LIST DOCD IN RCRD: CPT | Mod: HCNC,CPTII,S$GLB, | Performed by: INTERNAL MEDICINE

## 2024-03-14 PROCEDURE — 3075F SYST BP GE 130 - 139MM HG: CPT | Mod: HCNC,CPTII,S$GLB, | Performed by: INTERNAL MEDICINE

## 2024-03-14 PROCEDURE — 1101F PT FALLS ASSESS-DOCD LE1/YR: CPT | Mod: HCNC,CPTII,S$GLB, | Performed by: INTERNAL MEDICINE

## 2024-03-14 PROCEDURE — 3078F DIAST BP <80 MM HG: CPT | Mod: HCNC,CPTII,S$GLB, | Performed by: INTERNAL MEDICINE

## 2024-03-14 PROCEDURE — 99214 OFFICE O/P EST MOD 30 MIN: CPT | Mod: HCNC,S$GLB,, | Performed by: INTERNAL MEDICINE

## 2024-03-14 PROCEDURE — 1160F RVW MEDS BY RX/DR IN RCRD: CPT | Mod: HCNC,CPTII,S$GLB, | Performed by: INTERNAL MEDICINE

## 2024-03-14 PROCEDURE — 99999 PR PBB SHADOW E&M-EST. PATIENT-LVL IV: CPT | Mod: PBBFAC,HCNC,, | Performed by: INTERNAL MEDICINE

## 2024-03-14 PROCEDURE — 1126F AMNT PAIN NOTED NONE PRSNT: CPT | Mod: HCNC,CPTII,S$GLB, | Performed by: INTERNAL MEDICINE

## 2024-03-14 PROCEDURE — G2211 COMPLEX E/M VISIT ADD ON: HCPCS | Mod: HCNC,S$GLB,, | Performed by: INTERNAL MEDICINE

## 2024-03-14 PROCEDURE — 3288F FALL RISK ASSESSMENT DOCD: CPT | Mod: HCNC,CPTII,S$GLB, | Performed by: INTERNAL MEDICINE

## 2024-03-14 RX ORDER — POTASSIUM CHLORIDE 20 MEQ/1
20 TABLET, EXTENDED RELEASE ORAL DAILY
Qty: 90 TABLET | Refills: 1 | Status: SHIPPED | OUTPATIENT
Start: 2024-03-14

## 2024-03-14 RX ORDER — LANOLIN ALCOHOL/MO/W.PET/CERES
400 CREAM (GRAM) TOPICAL DAILY
Qty: 90 TABLET | Refills: 1 | Status: SHIPPED | OUTPATIENT
Start: 2024-03-14

## 2024-03-14 RX ORDER — FUROSEMIDE 20 MG/1
20 TABLET ORAL DAILY
Qty: 30 TABLET | Refills: 3 | Status: SHIPPED | OUTPATIENT
Start: 2024-03-14 | End: 2024-04-15 | Stop reason: SDUPTHER

## 2024-03-14 NOTE — PROGRESS NOTES
INR is slightly above goal at 3.2.  Previous warfarin instructions were verified and followed.  No bleeding issues reported.  Instructions given:  Small serving of greens today.  Continue warfarin 3 mg every Thursday and 6 mg on all other days.  Will recheck INR in 2 weeks.  Bleeding precaution explained - ER, if needed.  Dose calendar given - confirms understanding.

## 2024-03-14 NOTE — PROGRESS NOTES
"Subjective:   Patient ID:  Natalie Greene is a 83 y.o. female who presents for cardiac consult of Hospital Follow Up          The patient came in today for cardiac consult of Hospital Follow Up      Natalie Greene is a 83 y.o. female  HTN, HFPEF, non-obs CAD, atrial fibrillation (on Coumadin), pulm HTN,  hyperlipidemia, obesity, and DM, DORA presents for CV follow up.    4/27/23  Nuclear stress neg for ischemia 2/2023. ECHO with normal bi V function, mild TR, mild MR, PASP 33mmHg 2/2023. From labs - of labs reveal mildly elevated BNP due to extra fluid, the increase in the fluid pill Lozol will improve that but also need to have a strict low salt diet and monitor BP and weights daily. The potassium is borderline low and magnesium is also low start potassium and magnesium supplements daily, sent to your local pharmacy.   She is taking K and MG. BP and HR stable. BMI 36 - 218 lbs.     3/14/24    3/3/2024, 4:54 AM  History obtained from the patient               History of Present Illness: Natalie Greene is a 83 y.o. 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 Pt states she "just doesn't feel right" and wants to get checked out. Pt told her nurse that her SOB is worse with exertion. Pt is not a smoker. She takes Coumadin. Symptoms are constant and moderate in severity. No mitigating or exacerbating factors reported. Associated sxs include wheezing, coughing, and chest pain. Patient denies any fever, nausea, dysuria, and all other sxs at this time. No prior Tx reported. No further complaints or concerns at this time.     BNP (pg/mL)   Date Value   03/13/2024 106 (H)   03/03/2024 154 (H)   10/26/2022 111 (H)   01/19/2021 65   04/21/2016 83        Recent ER eval for SOB, given Lasix and DC'd home. BNP was 154 --> 106 at " PCP office.     BP and HR stable today. She feels much better now.     Patient is compliant with medications.    ECG - Afib V rate 95    Results for orders placed during the hospital encounter of 02/22/23    Echo    Interpretation Summary  · The left ventricle is normal in size with concentric hypertrophy and normal systolic function.  · Mild left atrial enlargement.  · The estimated ejection fraction is 55%.  · Atrial fibrillation observed.  · Normal right ventricular size with normal right ventricular systolic function.  · Mild right atrial enlargement.  · Mild mitral regurgitation.  · Mild tricuspid regurgitation.  · Normal central venous pressure (3 mmHg).  · The estimated PA systolic pressure is 33 mmHg.      Results for orders placed during the hospital encounter of 02/22/23    Nuclear Stress - Cardiology Interpreted    Interpretation Summary    Normal myocardial perfusion scan. There is no evidence of myocardial ischemia or infarction.    There is a mild intensity fixed perfusion abnormality in the apical wall of the left ventricle, scar vs attenuation defect.    The gated perfusion images showed an ejection fraction of 78% at rest. The gated perfusion images showed an ejection fraction of 81% post stress. Normal ejection fraction is greater than 59%.    The ECG portion of the study is negative for ischemia.    There were no arrhythmias during stress.      Results for orders placed during the hospital encounter of 01/29/21    Echo Color Flow Doppler? Yes    Interpretation Summary  · Concentric remodeling and normal systolic function. The estimated ejection fraction is 60%  · Mild left atrial enlargement.  · Mild right atrial enlargement.  · Normal left ventricular diastolic function.  · With normal right ventricular systolic function.  · Mild mitral regurgitation.  · Normal central venous pressure (3 mmHg).  · The estimated PA systolic pressure is 32 mmHg.      Results for orders placed during the hospital  encounter of 01/29/21    Nuclear Stress - Cardiology Interpreted    Interpretation Summary    Normal myocardial perfusion scan. There is no evidence of myocardial ischemia or infarction.    The gated perfusion images showed an ejection fraction of 58% at rest. The gated perfusion images showed an ejection fraction of 65% post stress. Normal ejection fraction is greater than %.    The EKG portion of this study is negative for ischemia.    Arrhythmias during stress: PVCs.      4/11/2016 [facility]    Patient has a right dominant coronary artery.      - Left Main Coronary Artery:             The LM is normal. There is NICOLE 3 flow.     - Left Anterior Descending Artery:             The LAD has luminal irregularities. There is NICOLE 3 flow.     - Left Circumflex Artery:             The LCX is normal. There is NICOLE 3 flow.     - Right Coronary Artery:             The RCA has luminal irregularities. There is NICOLE 3 flow.    D. SUMMARY/POST-OPERATIVE DIAGNOSIS:  1. Non-obstructive CAD.  2. Normal LVEF.    Past Medical History:   Diagnosis Date    *Atrial fibrillation     Abdominal wall seroma 1/16/2019    Seroma developed after her hernia repair in 2012.  Please review the notes in the Care everywhere section of the chart.  There is no luis evidence of infection at this time.  I would recommend checking a erythrocyte sedimentation rate and C reactive protein.  These are significant elevated then aspiration of the seroma fluid would be warranted.  If not the seroma does not require any intervention    Coronary artery disease involving native coronary artery of native heart with angina pectoris 4/10/2018    Diabetes mellitus     DM (diabetes mellitus) 2002     09/06/2017 no medication    Hyperlipidemia     Hypertension     Morbid (severe) obesity due to excess calories 7/23/2013    Obesity (BMI 30-39.9) 2/24/2015    Obstructive sleep apnea        Past Surgical History:   Procedure Laterality Date    CATARACT EXTRACTION Left  12/15/2021    distance    CHOLECYSTECTOMY      COLON SURGERY      HYSTERECTOMY         Social History     Tobacco Use    Smoking status: Never    Smokeless tobacco: Never   Substance Use Topics    Alcohol use: No    Drug use: No       Family History   Problem Relation Age of Onset    Diabetes Sister     Hypertension Sister     Hypertension Mother     Diabetes Sister     COPD Neg Hx     Cancer Neg Hx     Heart disease Neg Hx     Hyperlipidemia Neg Hx     Stroke Neg Hx        Patient's Medications   New Prescriptions    No medications on file   Previous Medications    ACCU-CHEK SOFTCLIX LANCETS MISC        AMLODIPINE (NORVASC) 5 MG TABLET    Take 1 tablet (5 mg total) by mouth once daily.    ATORVASTATIN (LIPITOR) 40 MG TABLET    Take 1 tablet (40 mg total) by mouth once daily.    CO-ENZYME Q-10 30 MG CAPSULE    Take 30 mg by mouth 3 (three) times daily.    DILTIAZEM (CARDIZEM CD) 180 MG 24 HR CAPSULE    Take 1 capsule (180 mg total) by mouth once daily.    FUROSEMIDE (LASIX) 20 MG TABLET    Take 1 tablet (20 mg total) by mouth once daily. for 5 days    GATIFLOXACIN 0.5 % DROP DROPS    Place 1 drop into the right eye 2 (two) times daily. Eyedrops to start one day before surgery    INDAPAMIDE (LOZOL) 2.5 MG TAB    Take 1 tablet (2.5 mg total) by mouth once daily.    KETOROLAC 0.5% (ACULAR) 0.5 % DROP    Place 1 drop into the right eye 4 (four) times daily. Eyedrops to start one day before surgery    LOSARTAN (COZAAR) 100 MG TABLET    Take 1 tablet (100 mg total) by mouth once daily.    MAGNESIUM OXIDE (MAG-OX) 400 MG (241.3 MG MAGNESIUM) TABLET    Take 1 tablet (400 mg total) by mouth once daily.    METFORMIN (GLUCOPHAGE) 500 MG TABLET    Take 500 mg by mouth daily with breakfast.    METOPROLOL SUCCINATE (TOPROL-XL) 100 MG 24 HR TABLET    Take 1 tablet (100 mg total) by mouth once daily.    NIACIN 50 MG TAB    Take 100 mg by mouth every evening.    NITROGLYCERIN (NITROSTAT) 0.4 MG SL TABLET    Place 1 tablet (0.4 mg  total) under the tongue every 5 (five) minutes as needed for Chest pain. Take up to 3 times 5 min apart    ONDANSETRON (ZOFRAN-ODT) 8 MG TBDL    Take 1 tablet (8 mg total) by mouth every 12 (twelve) hours as needed (nausea).    POTASSIUM CHLORIDE SA (K-DUR,KLOR-CON) 20 MEQ TABLET    Take 1 tablet (20 mEq total) by mouth once daily.    PREDNISOLONE ACETATE (PRED FORTE) 1 % DRPS    Place 1 drop into the right eye 4 (four) times daily. Eyedrops to start one day before surgery    SEMAGLUTIDE (OZEMPIC) 0.25 MG OR 0.5 MG (2 MG/3 ML) PEN INJECTOR    Inject 0.25 mg into the skin every 7 days.    TRUE METRIX AIR GLUCOSE METER KIT        TRUE METRIX GLUCOSE TEST STRIP STRP    TEST BLOOD SUGAR TWICE DAILY    TRUE METRIX LEVEL 1 SOLN    1 each by Tube route once daily.    VITAMIN D (VITAMIN D3) 1000 UNITS TAB    Take 1,000 Units by mouth once daily.    WARFARIN (COUMADIN) 6 MG TABLET    Take 1 tablet (6 mg total) by mouth Daily. Except TAKE ONE HALF TABLET (3 MG) EVERY THURSDAY AS DIRECTED BY THE CC.   Modified Medications    No medications on file   Discontinued Medications    No medications on file       Review of Systems   Constitutional:  Positive for malaise/fatigue.   HENT: Negative.     Eyes: Negative.    Respiratory:  Positive for shortness of breath.    Cardiovascular:  Positive for chest pain and leg swelling. Negative for palpitations.   Gastrointestinal: Negative.    Genitourinary: Negative.    Musculoskeletal: Negative.    Skin: Negative.    Neurological:  Positive for dizziness.   Endo/Heme/Allergies: Negative.    Psychiatric/Behavioral: Negative.     All 12 systems otherwise negative.      Wt Readings from Last 3 Encounters:   03/14/24 99.1 kg (218 lb 7.6 oz)   03/13/24 99.1 kg (218 lb 7.6 oz)   03/03/24 100.2 kg (221 lb)     Temp Readings from Last 3 Encounters:   03/03/24 97.8 °F (36.6 °C) (Oral)   02/26/24 97.1 °F (36.2 °C) (Tympanic)   01/04/24 97.9 °F (36.6 °C) (Temporal)     BP Readings from Last 3  "Encounters:   03/14/24 136/72   03/13/24 126/68   03/03/24 (!) 178/74     Pulse Readings from Last 3 Encounters:   03/14/24 83   03/13/24 (!) 59   03/03/24 71       /72 (BP Location: Right arm, Patient Position: Sitting, BP Method: Medium (Manual))   Pulse 83   Ht 5' 5" (1.651 m)   Wt 99.1 kg (218 lb 7.6 oz)   LMP  (LMP Unknown)   SpO2 96%   BMI 36.36 kg/m²     Objective:   Physical Exam  Vitals and nursing note reviewed.   Constitutional:       General: She is not in acute distress.     Appearance: She is well-developed. She is obese. She is not diaphoretic.   HENT:      Head: Normocephalic and atraumatic.      Nose: Nose normal.   Eyes:      General: No scleral icterus.     Conjunctiva/sclera: Conjunctivae normal.   Neck:      Thyroid: No thyromegaly.      Vascular: No JVD.   Cardiovascular:      Rate and Rhythm: Normal rate. Rhythm irregularly irregular.      Heart sounds: S1 normal and S2 normal. Murmur heard.      No friction rub. No gallop. No S3 or S4 sounds.   Pulmonary:      Effort: Pulmonary effort is normal. No respiratory distress.      Breath sounds: Normal breath sounds. No stridor. No wheezing or rales.   Chest:      Chest wall: No tenderness.   Abdominal:      General: Bowel sounds are normal. There is no distension.      Palpations: Abdomen is soft. There is no mass.      Tenderness: There is no abdominal tenderness. There is no rebound.   Genitourinary:     Comments: Deferred  Musculoskeletal:         General: No tenderness or deformity. Normal range of motion.      Cervical back: Normal range of motion and neck supple.      Right lower leg: Edema present.      Left lower leg: Edema present.   Lymphadenopathy:      Cervical: No cervical adenopathy.   Skin:     General: Skin is warm and dry.      Coloration: Skin is not pale.      Findings: No erythema or rash.   Neurological:      Mental Status: She is alert and oriented to person, place, and time.      Motor: No abnormal muscle tone. "      Coordination: Coordination normal.   Psychiatric:         Behavior: Behavior normal.         Thought Content: Thought content normal.         Judgment: Judgment normal.         Lab Results   Component Value Date     03/13/2024    K 3.5 03/13/2024    CL 99 03/13/2024    CO2 27 03/13/2024    BUN 13 03/13/2024    CREATININE 0.8 03/13/2024     (H) 03/13/2024    HGBA1C 7.0 (H) 02/26/2024    MG 1.7 10/26/2022    AST 19 03/03/2024    ALT 19 03/03/2024    ALBUMIN 3.7 03/03/2024    PROT 6.9 03/03/2024    BILITOT 0.9 03/03/2024    WBC 5.09 03/03/2024    HGB 12.4 03/03/2024    HCT 35.9 (L) 03/03/2024    MCV 93 03/03/2024     03/03/2024    INR 1.3 (A) 03/05/2024    INR 1.1 03/03/2024    TSH 1.519 09/29/2022    CHOL 147 02/26/2024    HDL 35 (L) 02/26/2024    LDLCALC 95.6 02/26/2024    TRIG 82 02/26/2024     (H) 03/13/2024     Assessment:      1. PLAZA (dyspnea on exertion)    2. Chronic heart failure with preserved ejection fraction    3. Coronary artery disease of native artery of native heart with stable angina pectoris    4. Chronic a-fib    5. Hypertension, unspecified type    6. Aortic atherosclerosis    7. Hyperlipidemia associated with type 2 diabetes mellitus    8. Other form of dyspnea    9. Coronary artery disease with stable angina pectoris, unspecified vessel or lesion type, unspecified whether native or transplanted heart    10. Hyperlipidemia with target LDL less than 100    11. Varicose veins of both lower extremities, unspecified whether complicated    12. DORA (obstructive sleep apnea)    13. Anticoagulated on Coumadin            Plan:     1. AF, chronic   - cont meds  - cont coumadin and f/u coumadin clinic    2. LE edema sec to venous insufficiency   - rec stockings, elevate legs. Stop norvasc   - cont Lozol to 2.5 mg and lasix    3. DORA  - needs CPAP    4. Obesity BMI 33 - 216 --> BMI 36 - 220 lbs --> 218 lbs  - rec weight loss with diet and exercise    5. HTN with non obs CAD  -  stop Norvasc and cont lasix   -- increase Lozol to 2.5 mg    6. HLD  - cont statin    7. Pulm HTN  - needs CPAP  - f/u with pulm    8.DM2  - A1c 6.8 -- 6.5 --> 6.7  - cont meds per PCP  - started Ozempic  - waiting on delivery     9. Back pain/arm pain with weakness  - refer to PMR    10. Chest pain with PLAZA;  -- BNP was 154 --> 106 at PCP office.   - Nuclear stress neg for ischemia 2/2023.   - ECHO with normal bi V function, mild TR, mild MR, PASP 33mmHg 2/2023.   - refer to GI - may have esoph issues/GERD - saw ENT - Dr. Wheeler  - BNP mildly elevated - cont lasix    Visit today included increased complexity associated with the care of the episodic problem SOB addressed and managing the longitudinal care of the patient due to the serious and/or complex managed problem(s) .      Thank you for allowing me to participate in this patient's care. Please do not hesitate to contact me with any questions or concerns. Consult note has been forwarded to the referral physician.

## 2024-03-26 ENCOUNTER — ANTI-COAG VISIT (OUTPATIENT)
Dept: CARDIOLOGY | Facility: CLINIC | Age: 84
End: 2024-03-26
Payer: MEDICARE

## 2024-03-26 DIAGNOSIS — Z79.01 LONG TERM (CURRENT) USE OF ANTICOAGULANTS: Primary | ICD-10-CM

## 2024-03-26 DIAGNOSIS — I48.91 ATRIAL FIBRILLATION, UNSPECIFIED TYPE: ICD-10-CM

## 2024-03-26 LAB — INR PPP: 3.3 (ref 2–3)

## 2024-03-26 PROCEDURE — 85610 PROTHROMBIN TIME: CPT | Mod: QW,HCNC,S$GLB, | Performed by: INTERNAL MEDICINE

## 2024-03-26 PROCEDURE — 93793 ANTICOAG MGMT PT WARFARIN: CPT | Mod: HCNC,S$GLB,,

## 2024-03-26 NOTE — PROGRESS NOTES
INR remains elevated - 3.3.  Previous instructions were verified and followed.  No bleeding issues reported.  Bleeding precaution remains in place.  Patient reports she has not taken her warfarin dose this morning as normal.  Will decrease warfarin dose to 3 mg every Tues, Thurs; and 6 mg on all other days.  Advised to take warfarin in the PM for better management - patient agrees.  Recheck INR in 2 weeks.  Dose calendar given and reviewed with patient.  Confirms understanding of instructions given.

## 2024-04-08 ENCOUNTER — OUTPATIENT CASE MANAGEMENT (OUTPATIENT)
Dept: ADMINISTRATIVE | Facility: OTHER | Age: 84
End: 2024-04-08
Payer: MEDICARE

## 2024-04-09 ENCOUNTER — ANTI-COAG VISIT (OUTPATIENT)
Dept: CARDIOLOGY | Facility: CLINIC | Age: 84
End: 2024-04-09
Payer: MEDICARE

## 2024-04-09 DIAGNOSIS — I48.91 ATRIAL FIBRILLATION, UNSPECIFIED TYPE: ICD-10-CM

## 2024-04-09 DIAGNOSIS — Z79.01 LONG TERM (CURRENT) USE OF ANTICOAGULANTS: Primary | ICD-10-CM

## 2024-04-09 LAB — INR PPP: 2.7 (ref 2–3)

## 2024-04-09 PROCEDURE — 93793 ANTICOAG MGMT PT WARFARIN: CPT | Mod: HCNC,S$GLB,,

## 2024-04-09 PROCEDURE — 85610 PROTHROMBIN TIME: CPT | Mod: QW,HCNC,S$GLB, | Performed by: INTERNAL MEDICINE

## 2024-04-09 NOTE — PROGRESS NOTES
INR is therapeutic at 2.7.  Previous new dose adjustment of warfarin was verified and followed.  No other changes reported.  Instructions given to continue 3 mg every Tuesday, Thursday; and 6 mg all other days.  Will recheck INR in 2 weeks.  Dose calendar given - confirms understanding.

## 2024-04-15 ENCOUNTER — TELEPHONE (OUTPATIENT)
Dept: CARDIOLOGY | Facility: CLINIC | Age: 84
End: 2024-04-15
Payer: MEDICARE

## 2024-04-15 RX ORDER — FUROSEMIDE 20 MG/1
20 TABLET ORAL DAILY
Qty: 90 TABLET | Refills: 1 | Status: SHIPPED | OUTPATIENT
Start: 2024-04-15 | End: 2024-04-17 | Stop reason: SDUPTHER

## 2024-04-15 NOTE — TELEPHONE ENCOUNTER
Called and spoke to pt regarding her medication. Informed pt she should be taking furosemide daily. She shouldn't take it if BP is below 110/70. Also pt in a refill request for pt.    ----- Message from Jo-Ann Handy LPN sent at 4/15/2024  2:03 PM CDT -----  Contact: Natalie    ----- Message -----  From: Yodit Fenton  Sent: 4/15/2024  11:13 AM CDT  To: Eliceo Winchester Staff    Natalie needs a call back at [phone], Regards to Furosemide (LASIX) 20 MG tablet she wants to know if she needs to keep taken this medication.    Thanks  Td

## 2024-04-17 ENCOUNTER — TELEPHONE (OUTPATIENT)
Dept: CARDIOLOGY | Facility: CLINIC | Age: 84
End: 2024-04-17
Payer: MEDICARE

## 2024-04-17 RX ORDER — FUROSEMIDE 20 MG/1
20 TABLET ORAL DAILY
Qty: 30 TABLET | Refills: 1 | Status: SHIPPED | OUTPATIENT
Start: 2024-04-17

## 2024-04-17 NOTE — TELEPHONE ENCOUNTER
Returned call to the pt. Pt requesting a small supply of furosemide sent to her local pharmacy - [facility] [address], [place], [place]. Pt original rx was sent to [facility]. It would take 5-7 business days for pt to receive and she is completley out.      ----- Message from Tasha Altamirano sent at 4/17/2024  4:44 PM CDT -----  Contact: silviano  The pt is calling to speak with the nurse in regards some concerns Please give a call back at [phone]

## 2024-04-23 ENCOUNTER — ANTI-COAG VISIT (OUTPATIENT)
Dept: CARDIOLOGY | Facility: CLINIC | Age: 84
End: 2024-04-23
Payer: MEDICARE

## 2024-04-23 DIAGNOSIS — I48.91 ATRIAL FIBRILLATION, UNSPECIFIED TYPE: ICD-10-CM

## 2024-04-23 DIAGNOSIS — Z79.01 LONG TERM (CURRENT) USE OF ANTICOAGULANTS: Primary | ICD-10-CM

## 2024-04-23 LAB — INR PPP: 2.4 (ref 2–3)

## 2024-04-23 PROCEDURE — 93793 ANTICOAG MGMT PT WARFARIN: CPT | Mod: HCNC,S$GLB,,

## 2024-04-23 PROCEDURE — 85610 PROTHROMBIN TIME: CPT | Mod: QW,HCNC,S$GLB, | Performed by: INTERNAL MEDICINE

## 2024-04-23 NOTE — PROGRESS NOTES
INR is therapeutic at 2.4.  Patient reports no recent changes.  Confirms current warfarin dose - continue 3 mg every Tuesday, Thursday; and 6 mg all other days.  Follow up in 1 month for recheck.  Dose calendar given - confirms understanding.

## 2024-05-09 ENCOUNTER — HOSPITAL ENCOUNTER (EMERGENCY)
Facility: HOSPITAL | Age: 84
Discharge: HOME | End: 2024-05-09
Attending: EMERGENCY MEDICINE
Payer: MEDICARE

## 2024-05-09 VITALS
WEIGHT: 212.94 LBS | OXYGEN SATURATION: 98 % | BODY MASS INDEX: 35.44 KG/M2 | DIASTOLIC BLOOD PRESSURE: 72 MMHG | TEMPERATURE: 97 F | RESPIRATION RATE: 18 BRPM | SYSTOLIC BLOOD PRESSURE: 176 MMHG | HEART RATE: 63 BPM

## 2024-05-09 DIAGNOSIS — I48.20 CHRONIC ATRIAL FIBRILLATION: Primary | ICD-10-CM

## 2024-05-09 DIAGNOSIS — R06.02 SHORTNESS OF BREATH: ICD-10-CM

## 2024-05-09 DIAGNOSIS — Z79.01 CHRONIC ANTICOAGULATION: ICD-10-CM

## 2024-05-09 LAB
ALBUMIN SERPL BCP-MCNC: 3.8 G/DL (ref 3.5–5.2)
ALP SERPL-CCNC: 69 U/L (ref 55–135)
ALT SERPL W/O P-5'-P-CCNC: 26 U/L (ref 10–44)
ANION GAP SERPL CALC-SCNC: 13 MMOL/L (ref 8–16)
AST SERPL-CCNC: 35 U/L (ref 10–40)
BASOPHILS # BLD AUTO: 0.02 K/UL (ref 0–0.2)
BASOPHILS NFR BLD: 0.4 % (ref 0–1.9)
BILIRUB SERPL-MCNC: 1.2 MG/DL (ref 0.1–1)
BNP SERPL-MCNC: 111 PG/ML (ref 0–99)
BUN SERPL-MCNC: 8 MG/DL (ref 8–23)
CALCIUM SERPL-MCNC: 9.3 MG/DL (ref 8.7–10.5)
CHLORIDE SERPL-SCNC: 99 MMOL/L (ref 95–110)
CO2 SERPL-SCNC: 25 MMOL/L (ref 23–29)
CREAT SERPL-MCNC: 0.8 MG/DL (ref 0.5–1.4)
DIFFERENTIAL METHOD BLD: ABNORMAL
EOSINOPHIL # BLD AUTO: 0 K/UL (ref 0–0.5)
EOSINOPHIL NFR BLD: 0.7 % (ref 0–8)
ERYTHROCYTE [DISTWIDTH] IN BLOOD BY AUTOMATED COUNT: 15.3 % (ref 11.5–14.5)
EST. GFR  (NO RACE VARIABLE): >60 ML/MIN/1.73 M^2
GLUCOSE SERPL-MCNC: 141 MG/DL (ref 70–110)
HCT VFR BLD AUTO: 38.2 % (ref 37–48.5)
HCV AB SERPL QL IA: NEGATIVE
HEP C VIRUS HOLD SPECIMEN: NORMAL
HGB BLD-MCNC: 13 G/DL (ref 12–16)
HIV 1+2 AB+HIV1 P24 AG SERPL QL IA: NEGATIVE
IMM GRANULOCYTES # BLD AUTO: 0.02 K/UL (ref 0–0.04)
IMM GRANULOCYTES NFR BLD AUTO: 0.4 % (ref 0–0.5)
INR PPP: 2.1 (ref 0.8–1.2)
LYMPHOCYTES # BLD AUTO: 1.1 K/UL (ref 1–4.8)
LYMPHOCYTES NFR BLD: 19.2 % (ref 18–48)
MCH RBC QN AUTO: 31.3 PG (ref 27–31)
MCHC RBC AUTO-ENTMCNC: 34 G/DL (ref 32–36)
MCV RBC AUTO: 92 FL (ref 82–98)
MONOCYTES # BLD AUTO: 0.5 K/UL (ref 0.3–1)
MONOCYTES NFR BLD: 8.1 % (ref 4–15)
NEUTROPHILS # BLD AUTO: 4 K/UL (ref 1.8–7.7)
NEUTROPHILS NFR BLD: 71.2 % (ref 38–73)
NRBC BLD-RTO: 0 /100 WBC
PLATELET # BLD AUTO: 186 K/UL (ref 150–450)
PMV BLD AUTO: 11.1 FL (ref 9.2–12.9)
POTASSIUM SERPL-SCNC: 3.7 MMOL/L (ref 3.5–5.1)
PROT SERPL-MCNC: 7.4 G/DL (ref 6–8.4)
PROTHROMBIN TIME: 23 SEC (ref 9–12.5)
RBC # BLD AUTO: 4.15 M/UL (ref 4–5.4)
SODIUM SERPL-SCNC: 137 MMOL/L (ref 136–145)
TROPONIN I SERPL DL<=0.01 NG/ML-MCNC: <0.006 NG/ML (ref 0–0.03)
WBC # BLD AUTO: 5.58 K/UL (ref 3.9–12.7)

## 2024-05-09 PROCEDURE — 99285 EMERGENCY DEPT VISIT HI MDM: CPT | Mod: 25,HCNC

## 2024-05-09 PROCEDURE — 85610 PROTHROMBIN TIME: CPT | Mod: HCNC | Performed by: PHYSICIAN ASSISTANT

## 2024-05-09 PROCEDURE — 93010 ELECTROCARDIOGRAM REPORT: CPT | Mod: HCNC,,, | Performed by: STUDENT IN AN ORGANIZED HEALTH CARE EDUCATION/TRAINING PROGRAM

## 2024-05-09 PROCEDURE — 86803 HEPATITIS C AB TEST: CPT | Mod: HCNC | Performed by: EMERGENCY MEDICINE

## 2024-05-09 PROCEDURE — 80053 COMPREHEN METABOLIC PANEL: CPT | Mod: HCNC | Performed by: PHYSICIAN ASSISTANT

## 2024-05-09 PROCEDURE — 93005 ELECTROCARDIOGRAM TRACING: CPT | Mod: HCNC

## 2024-05-09 PROCEDURE — 85025 COMPLETE CBC W/AUTO DIFF WBC: CPT | Mod: HCNC | Performed by: PHYSICIAN ASSISTANT

## 2024-05-09 PROCEDURE — 83880 ASSAY OF NATRIURETIC PEPTIDE: CPT | Mod: HCNC | Performed by: PHYSICIAN ASSISTANT

## 2024-05-09 PROCEDURE — 87389 HIV-1 AG W/HIV-1&-2 AB AG IA: CPT | Mod: HCNC | Performed by: EMERGENCY MEDICINE

## 2024-05-09 PROCEDURE — 84484 ASSAY OF TROPONIN QUANT: CPT | Mod: HCNC | Performed by: PHYSICIAN ASSISTANT

## 2024-05-09 NOTE — ED PROVIDER NOTES
"SCRIBE #1 NOTE: I, Madhu Swanson, am scribing for, and in the presence of, Hortencia Price MD. I have scribed the entire note.       History     Chief Complaint   Patient presents with    Shortness of Breath     SOB, feels like there is fluid in her chest. Worse when lying.     Review of patient's allergies indicates:  No Known Allergies      History of Present Illness     HPI    5/9/2024, 10:59 AM  History obtained from the patient      History of Present Illness: Natalie Greene is a 83 y.o. female patient with a PMHx of A-fib, CAD, DM, HLD, HTN, morbid obesity, and DORA who presents to the Emergency Department for evaluation of SOB which onset gradually. She notes that she had fluid in her hest a few weeks ago; sxs feel similar to then. Sxs worsen when laying flat; she sleeps on "a whole bunch of pillows." No associated sxs. Patient denies any CP, chest tightness, fever, N/V/D, and all other sxs at this time. No prior Tx. She notes taking blood thinners and a fluid pill. No further complaints or concerns at this time.       Arrival mode: Personal vehicle    PCP: Annabella Fenton MD        Past Medical History:  Past Medical History:   Diagnosis Date    *Atrial fibrillation     Abdominal wall seroma 1/16/2019    Seroma developed after her hernia repair in 2012.  Please review the notes in the Care everywhere section of the chart.  There is no luis evidence of infection at this time.  I would recommend checking a erythrocyte sedimentation rate and C reactive protein.  These are significant elevated then aspiration of the seroma fluid would be warranted.  If not the seroma does not require any intervention    Coronary artery disease involving native coronary artery of native heart with angina pectoris 4/10/2018    Diabetes mellitus     DM (diabetes mellitus) 2002     09/06/2017 no medication    Hyperlipidemia     Hypertension     Morbid (severe) obesity due to excess calories 7/23/2013    Obesity (BMI " 30-39.9) 2/24/2015    Obstructive sleep apnea        Past Surgical History:  Past Surgical History:   Procedure Laterality Date    CATARACT EXTRACTION Left 12/15/2021    distance    CHOLECYSTECTOMY      COLON SURGERY      HYSTERECTOMY           Family History:  Family History   Problem Relation Name Age of Onset    Diabetes Sister      Hypertension Sister      Hypertension Mother      Diabetes Sister      COPD Neg Hx      Cancer Neg Hx      Heart disease Neg Hx      Hyperlipidemia Neg Hx      Stroke Neg Hx         Social History:  Social History     Tobacco Use    Smoking status: Never    Smokeless tobacco: Never   Substance and Sexual Activity    Alcohol use: No    Drug use: No    Sexual activity: Yes     Partners: Male        Review of Systems     Review of Systems   Constitutional:  Negative for fever.   HENT:  Negative for sore throat.    Respiratory:  Positive for shortness of breath. Negative for chest tightness.    Cardiovascular:  Negative for chest pain.   Gastrointestinal:  Negative for diarrhea, nausea and vomiting.   Genitourinary:  Negative for dysuria.   Musculoskeletal:  Negative for back pain.   Skin:  Negative for rash.   Neurological:  Negative for weakness.   Hematological:  Does not bruise/bleed easily.   All other systems reviewed and are negative.     Physical Exam     Initial Vitals [05/09/24 0855]   BP Pulse Resp Temp SpO2   (!) 158/70 87 (!) 22 97.4 °F (36.3 °C) 97 %      MAP       --          Physical Exam  Nursing Notes and Vital Signs Reviewed.  Constitutional: Patient is in no acute distress. Well-developed and well-nourished.  Head: Atraumatic. Normocephalic.  Eyes: PERRL. EOM intact. Conjunctivae are not pale. No scleral icterus.  ENT: Mucous membranes are moist. Oropharynx is clear and symmetric.    Neck: Supple. Full ROM. No lymphadenopathy.  Cardiovascular: Regular rate. Regular rhythm. No murmurs, rubs, or gallops. Distal pulses are 2+ and symmetric.  Pulmonary/Chest: No  respiratory distress. Clear to auscultation bilaterally. No wheezing or rales.  Abdominal: Soft and non-distended.  There is no tenderness.  No rebound, guarding, or rigidity. Good bowel sounds.  Genitourinary: No CVA tenderness  Musculoskeletal: Moves all extremities. No obvious deformities. No edema. No calf tenderness.  Skin: Warm and dry.  Neurological:  Alert, awake, and appropriate.  Normal speech.  No acute focal neurological deficits are appreciated.  Psychiatric: Normal affect. Good eye contact. Appropriate in content.     ED Course   Procedures  ED Vital Signs:  Vitals:    05/09/24 0855 05/09/24 0915 05/09/24 0949 05/09/24 1001   BP: (!) 158/70  (!) 166/69 (!) 150/70   Pulse: 87 65 68 65   Resp: (!) 22  11 14   Temp: 97.4 °F (36.3 °C)      TempSrc: Oral      SpO2: 97%  99% 98%   Weight: 96.6 kg (212 lb 15.4 oz)       05/09/24 1100 05/09/24 1147   BP: (!) 155/72 (!) 176/72   Pulse: 60 63   Resp: 18 18   Temp:     TempSrc:     SpO2: 98% 98%   Weight:         Abnormal Lab Results:  Labs Reviewed   CBC W/ AUTO DIFFERENTIAL - Abnormal; Notable for the following components:       Result Value    MCH 31.3 (*)     RDW 15.3 (*)     All other components within normal limits    Narrative:     Release to patient->Immediate   COMPREHENSIVE METABOLIC PANEL - Abnormal; Notable for the following components:    Glucose 141 (*)     Total Bilirubin 1.2 (*)     All other components within normal limits    Narrative:     Release to patient->Immediate   B-TYPE NATRIURETIC PEPTIDE - Abnormal; Notable for the following components:     (*)     All other components within normal limits    Narrative:     Release to patient->Immediate   PROTIME-INR - Abnormal; Notable for the following components:    Prothrombin Time 23.0 (*)     INR 2.1 (*)     All other components within normal limits    Narrative:     Release to patient->Immediate   TROPONIN I    Narrative:     Release to patient->Immediate   HIV 1 / 2 ANTIBODY     Narrative:     Release to patient->Immediate   HEPATITIS C ANTIBODY    Narrative:     Release to patient->Immediate   HEP C VIRUS HOLD SPECIMEN    Narrative:     Release to patient->Immediate        All Lab Results:  Results for orders placed or performed during the hospital encounter of 05/09/24   CBC auto differential   Result Value Ref Range    WBC 5.58 3.90 - 12.70 K/uL    RBC 4.15 4.00 - 5.40 M/uL    Hemoglobin 13.0 12.0 - 16.0 g/dL    Hematocrit 38.2 37.0 - 48.5 %    MCV 92 82 - 98 fL    MCH 31.3 (H) 27.0 - 31.0 pg    MCHC 34.0 32.0 - 36.0 g/dL    RDW 15.3 (H) 11.5 - 14.5 %    Platelets 186 150 - 450 K/uL    MPV 11.1 9.2 - 12.9 fL    Immature Granulocytes 0.4 0.0 - 0.5 %    Gran # (ANC) 4.0 1.8 - 7.7 K/uL    Immature Grans (Abs) 0.02 0.00 - 0.04 K/uL    Lymph # 1.1 1.0 - 4.8 K/uL    Mono # 0.5 0.3 - 1.0 K/uL    Eos # 0.0 0.0 - 0.5 K/uL    Baso # 0.02 0.00 - 0.20 K/uL    nRBC 0 0 /100 WBC    Gran % 71.2 38.0 - 73.0 %    Lymph % 19.2 18.0 - 48.0 %    Mono % 8.1 4.0 - 15.0 %    Eosinophil % 0.7 0.0 - 8.0 %    Basophil % 0.4 0.0 - 1.9 %    Differential Method Automated    Comprehensive metabolic panel   Result Value Ref Range    Sodium 137 136 - 145 mmol/L    Potassium 3.7 3.5 - 5.1 mmol/L    Chloride 99 95 - 110 mmol/L    CO2 25 23 - 29 mmol/L    Glucose 141 (H) 70 - 110 mg/dL    BUN 8 8 - 23 mg/dL    Creatinine 0.8 0.5 - 1.4 mg/dL    Calcium 9.3 8.7 - 10.5 mg/dL    Total Protein 7.4 6.0 - 8.4 g/dL    Albumin 3.8 3.5 - 5.2 g/dL    Total Bilirubin 1.2 (H) 0.1 - 1.0 mg/dL    Alkaline Phosphatase 69 55 - 135 U/L    AST 35 10 - 40 U/L    ALT 26 10 - 44 U/L    eGFR >60 >60 mL/min/1.73 m^2    Anion Gap 13 8 - 16 mmol/L   Troponin I   Result Value Ref Range    Troponin I <0.006 0.000 - 0.026 ng/mL   Brain natriuretic peptide   Result Value Ref Range     (H) 0 - 99 pg/mL   HIV 1/2 Ag/Ab (4th Gen)   Result Value Ref Range    HIV 1/2 Ag/Ab Negative Negative   Hepatitis C Antibody   Result Value Ref Range     Hepatitis C Ab Negative Negative   HCV Virus Hold Specimen   Result Value Ref Range    HEP C Virus Hold Specimen Hold for HCV sendout    Protime-INR   Result Value Ref Range    Prothrombin Time 23.0 (H) 9.0 - 12.5 sec    INR 2.1 (H) 0.8 - 1.2   EKG 12-lead   Result Value Ref Range    QRS Duration 90 ms    [facility] QTC Calculation 477 ms         Imaging Results:  Imaging Results              X-Ray Chest AP Portable (Final result)  Result time 05/09/24 09:25:05      Final result by Tyson Donahue MD (05/09/24 09:25:05)                   Impression:      1.  Negative for acute process involving the chest.    2.  Stable findings as noted above.      Electronically signed by: Tyson Donahue MD  Date:    05/09/2024  Time:    09:25               Narrative:    EXAMINATION:  XR CHEST AP PORTABLE    CLINICAL HISTORY:  CHF;    COMPARISON:  Studies dating back to February 7, 2022    FINDINGS:  The study is rotated to the left.  The lungs are clear. The cardiac silhouette size is normal. The trachea is midline and the mediastinal width is normal. Negative for focal infiltrate, effusion or pneumothorax. Pulmonary vasculature is normal. Negative for osseous abnormalities. Stable convex right curvature of the thoracic spine with marginal spondylosis, tortuosity of the aorta with aortic arch calcifications and eventration is of the hemidiaphragms.  Stable degenerative changes of the shoulder girdles.                                       The EKG was ordered, reviewed, and independently interpreted by the ED provider.  Interpretation time: 8:53  Rate: 80 BPM  Rhythm: atrial fibrillation with premature ventricular or aberrantly conducted complexes.  Interpretation: Nonspecific ST and T wave abnormality. No STEMI.           The Emergency Provider reviewed the vital signs and test results, which are outlined above.     ED Discussion       11:43 AM: Reassessed pt at this time. Discussed with pt all pertinent ED information and results.  Discussed pt dx and plan of tx. Gave pt all f/u and return to the ED instructions. All questions and concerns were addressed at this time. Pt expresses understanding of information and instructions, and is comfortable with plan to discharge. Pt is stable for discharge.    I discussed with patient and/or family/caretaker that evaluation in the ED does not suggest any emergent or life threatening medical conditions requiring immediate intervention beyond what was provided in the ED, and I believe patient is safe for discharge.  Regardless, an unremarkable evaluation in the ED does not preclude the development or presence of a serious of life threatening condition. As such, patient was instructed to return immediately for any worsening or change in current symptoms.         Medical Decision Making  DDX: 1. Anxiety 2. CHF 3. COPD 4. PTX     Lungs clear, htn noted but otherwise stable, ECG reviewed and no acute ischemic changes noted, CXR negative, lab work reviewed and otherwise normal including troponin and bnp, patient stable for discharge. No concerns for PE or ACS    Amount and/or Complexity of Data Reviewed  Labs: ordered. Decision-making details documented in ED Course.  Radiology: ordered. Decision-making details documented in ED Course.  ECG/medicine tests: ordered and independent interpretation performed. Decision-making details documented in ED Course.    Risk  Prescription drug management.                ED Medication(s):  Medications - No data to display    Discharge Medication List as of 5/9/2024 11:44 AM           Follow-up Information       Annabella Fenton MD. Schedule an appointment as soon as possible for a visit in 2 days.    Specialty: Family Medicine  Why: Return to the Emergency Room, If symptoms worsen  Contact information:  [id number] [facility] [address] [phone]                                 Scribe Attestation:   Scribe #1: I performed the above scribed service and the  documentation accurately describes the services I performed. I attest to the accuracy of the note.     Attending:   Physician Attestation Statement for Scribe #1: I, Hortencia Price MD, personally performed the services described in this documentation, as scribed by Madhu Swanson, in my presence, and it is both accurate and complete.           Clinical Impression       ICD-10-CM ICD-9-CM   1. Chronic atrial fibrillation  I48.20 427.31   2. Shortness of breath  R06.02 786.05   3. Chronic anticoagulation  Z79.01 V58.61       Disposition:   Disposition: Discharged  Condition: Stable         Hortencia Price MD  05/10/24 [id number]

## 2024-05-09 NOTE — FIRST PROVIDER EVALUATION
Medical screening examination initiated.  I have conducted a focused provider triage encounter, findings are as follows:    Brief history of present illness:  SOB since last night    Vitals:    05/09/24 0855   BP: (!) 158/70   BP Location: Right arm   Patient Position: Sitting   Pulse: 87   Resp: (!) 22   Temp: 97.4 °F (36.3 °C)   TempSrc: Oral   SpO2: 97%   Weight: 96.6 kg (212 lb 15.4 oz)       Pertinent physical exam:  nad, alert    Brief workup plan:  chf w/u    Preliminary workup initiated; this workup will be continued and followed by the physician or advanced practice provider that is assigned to the patient when roomed.

## 2024-05-10 ENCOUNTER — PATIENT OUTREACH (OUTPATIENT)
Dept: EMERGENCY MEDICINE | Facility: HOSPITAL | Age: 84
End: 2024-05-10
Payer: MEDICARE

## 2024-05-10 LAB
OHS QRS DURATION: 90 MS
OHS QTC CALCULATION: 477 MS

## 2024-05-10 NOTE — PROGRESS NOTES
Pt visited the ED on 4/10/24. I made an attempt to reach patient to assist with scheduling a post ED 7-day follow up with PCP. Unable to reach. Closing encounter.    Andressa Britton

## 2024-05-13 NOTE — PROGRESS NOTES
Pt visited the ED on 5/9/24. I made 2 attempts to reach patient to assist with scheduling a post ED 7-day follow up with PCP. Unable to reach. Closing encounter.    Andressa Britton

## 2024-05-21 ENCOUNTER — ANTI-COAG VISIT (OUTPATIENT)
Dept: CARDIOLOGY | Facility: CLINIC | Age: 84
End: 2024-05-21
Payer: MEDICARE

## 2024-05-21 DIAGNOSIS — Z79.01 LONG TERM (CURRENT) USE OF ANTICOAGULANTS: Primary | ICD-10-CM

## 2024-05-21 DIAGNOSIS — I48.91 ATRIAL FIBRILLATION, UNSPECIFIED TYPE: ICD-10-CM

## 2024-05-21 LAB
CTP QC/QA: YES
INR PPP: 2.3 (ref 2–3)

## 2024-05-21 PROCEDURE — 85610 PROTHROMBIN TIME: CPT | Mod: QW,HCNC,S$GLB, | Performed by: INTERNAL MEDICINE

## 2024-05-21 PROCEDURE — 93793 ANTICOAG MGMT PT WARFARIN: CPT | Mod: HCNC,S$GLB,,

## 2024-05-21 NOTE — PROGRESS NOTES
INR is in therapeutic goal range at 2.3.  Patient reports no recent changes.  Confirms current warfarin dose - continue 3 mg every Tues, Thurs; and 6 mg all other days.  Follow up in 1 month for an INR recheck.  Dose calendar given - confirms understanding.

## 2024-05-22 RX ORDER — INDAPAMIDE 2.5 MG/1
2.5 TABLET ORAL DAILY
Qty: 90 TABLET | Refills: 1 | Status: SHIPPED | OUTPATIENT
Start: 2024-05-22

## 2024-06-20 ENCOUNTER — ANTI-COAG VISIT (OUTPATIENT)
Dept: CARDIOLOGY | Facility: CLINIC | Age: 84
End: 2024-06-20
Payer: MEDICARE

## 2024-06-20 DIAGNOSIS — I48.91 ATRIAL FIBRILLATION, UNSPECIFIED TYPE: ICD-10-CM

## 2024-06-20 DIAGNOSIS — Z79.01 LONG TERM (CURRENT) USE OF ANTICOAGULANTS: Primary | ICD-10-CM

## 2024-06-20 LAB
CTP QC/QA: YES
INR PPP: 2.2 (ref 2–3)

## 2024-06-20 PROCEDURE — 85610 PROTHROMBIN TIME: CPT | Mod: QW,HCNC,S$GLB, | Performed by: INTERNAL MEDICINE

## 2024-06-20 PROCEDURE — 93793 ANTICOAG MGMT PT WARFARIN: CPT | Mod: HCNC,S$GLB,,

## 2024-06-20 NOTE — PROGRESS NOTES
INR is in therapeutic goal range at 2.2.  Patient reports no recent changes.  States current warfarin dose - continue 3 mg every Tuesday, Thursday; and 6 mg all other days.  Follow up in 1 month for an INR recheck.  Patient confirms understanding.

## 2024-07-18 ENCOUNTER — ANTI-COAG VISIT (OUTPATIENT)
Dept: CARDIOLOGY | Facility: CLINIC | Age: 84
End: 2024-07-18
Payer: MEDICARE

## 2024-07-18 DIAGNOSIS — Z79.01 LONG TERM (CURRENT) USE OF ANTICOAGULANTS: Primary | ICD-10-CM

## 2024-07-18 DIAGNOSIS — I48.91 ATRIAL FIBRILLATION, UNSPECIFIED TYPE: ICD-10-CM

## 2024-07-18 LAB
CTP QC/QA: YES
INR PPP: 2.1 (ref 2–3)

## 2024-07-18 PROCEDURE — 93793 ANTICOAG MGMT PT WARFARIN: CPT | Mod: HCNC,S$GLB,,

## 2024-07-18 PROCEDURE — 85610 PROTHROMBIN TIME: CPT | Mod: QW,HCNC,S$GLB, | Performed by: INTERNAL MEDICINE

## 2024-07-18 NOTE — PROGRESS NOTES
INR is therapeutic at 2.1.  Patient reports no recent changes.  Confirms current warfarin dose - continue 3 mg every Tuesday, Thursday; and 6 mg all other days.  Follow up in 1 month for an INR recheck.  Dose calendar given - confirms understanding.

## 2024-07-31 RX ORDER — FUROSEMIDE 20 MG/1
20 TABLET ORAL DAILY
Qty: 30 TABLET | Refills: 6 | Status: SHIPPED | OUTPATIENT
Start: 2024-07-31

## 2024-08-15 ENCOUNTER — ANTI-COAG VISIT (OUTPATIENT)
Dept: CARDIOLOGY | Facility: CLINIC | Age: 84
End: 2024-08-15
Payer: MEDICARE

## 2024-08-15 DIAGNOSIS — I48.91 ATRIAL FIBRILLATION, UNSPECIFIED TYPE: ICD-10-CM

## 2024-08-15 DIAGNOSIS — Z79.01 LONG TERM (CURRENT) USE OF ANTICOAGULANTS: Primary | ICD-10-CM

## 2024-08-15 LAB
CTP QC/QA: YES
INR PPP: 3.5 (ref 2–3)

## 2024-08-15 PROCEDURE — 93793 ANTICOAG MGMT PT WARFARIN: CPT | Mod: HCNC,S$GLB,,

## 2024-08-15 PROCEDURE — 85610 PROTHROMBIN TIME: CPT | Mod: QW,HCNC,S$GLB, | Performed by: INTERNAL MEDICINE

## 2024-08-15 NOTE — PROGRESS NOTES
INR is above therapeutic goal range at 3.5.  Patient reports no medication/dietary changes or bleeding issues.  Confirms current warfarin dose and followed.  Instructions given to hold warfarin dose today, then resume current dose of 3 mg every Tues, Thurs; and 6 mg all other days -- will re-challenge.  INR recheck in 2 weeks.  Bleeding precautions given - ED for any issues.  Dose calendar given and reviewed with patient - confirms understanding.

## 2024-08-26 ENCOUNTER — ANTI-COAG VISIT (OUTPATIENT)
Dept: CARDIOLOGY | Facility: CLINIC | Age: 84
End: 2024-08-26
Payer: MEDICARE

## 2024-08-26 ENCOUNTER — LAB VISIT (OUTPATIENT)
Dept: LAB | Facility: HOSPITAL | Age: 84
End: 2024-08-26
Attending: FAMILY MEDICINE
Payer: MEDICARE

## 2024-08-26 ENCOUNTER — OFFICE VISIT (OUTPATIENT)
Dept: INTERNAL MEDICINE | Facility: CLINIC | Age: 84
End: 2024-08-26
Payer: MEDICARE

## 2024-08-26 VITALS
HEART RATE: 81 BPM | OXYGEN SATURATION: 96 % | SYSTOLIC BLOOD PRESSURE: 134 MMHG | DIASTOLIC BLOOD PRESSURE: 70 MMHG | HEIGHT: 65 IN | TEMPERATURE: 98 F | WEIGHT: 213.88 LBS | BODY MASS INDEX: 35.63 KG/M2

## 2024-08-26 DIAGNOSIS — I70.0 AORTIC ATHEROSCLEROSIS: ICD-10-CM

## 2024-08-26 DIAGNOSIS — E78.5 HYPERLIPIDEMIA WITH TARGET LDL LESS THAN 100: ICD-10-CM

## 2024-08-26 DIAGNOSIS — F51.04 PSYCHOPHYSIOLOGICAL INSOMNIA: ICD-10-CM

## 2024-08-26 DIAGNOSIS — E11.42 TYPE 2 DIABETES MELLITUS WITH DIABETIC POLYNEUROPATHY, WITHOUT LONG-TERM CURRENT USE OF INSULIN: ICD-10-CM

## 2024-08-26 DIAGNOSIS — Z79.01 LONG TERM CURRENT USE OF ANTICOAGULANT THERAPY: ICD-10-CM

## 2024-08-26 DIAGNOSIS — E78.5 HYPERLIPIDEMIA ASSOCIATED WITH TYPE 2 DIABETES MELLITUS: ICD-10-CM

## 2024-08-26 DIAGNOSIS — Z79.01 LONG TERM (CURRENT) USE OF ANTICOAGULANTS: Primary | ICD-10-CM

## 2024-08-26 DIAGNOSIS — E11.69 HYPERLIPIDEMIA ASSOCIATED WITH TYPE 2 DIABETES MELLITUS: ICD-10-CM

## 2024-08-26 DIAGNOSIS — I10 PRIMARY HYPERTENSION: ICD-10-CM

## 2024-08-26 DIAGNOSIS — I48.91 ATRIAL FIBRILLATION, UNSPECIFIED TYPE: ICD-10-CM

## 2024-08-26 DIAGNOSIS — Z85.038 HISTORY OF MALIGNANT NEOPLASM OF COLON: ICD-10-CM

## 2024-08-26 DIAGNOSIS — E11.42 TYPE 2 DIABETES MELLITUS WITH DIABETIC POLYNEUROPATHY, WITHOUT LONG-TERM CURRENT USE OF INSULIN: Primary | ICD-10-CM

## 2024-08-26 LAB
ALBUMIN SERPL BCP-MCNC: 3.7 G/DL (ref 3.5–5.2)
ALBUMIN/CREAT UR: 18.2 UG/MG (ref 0–30)
ALP SERPL-CCNC: 77 U/L (ref 55–135)
ALT SERPL W/O P-5'-P-CCNC: 22 U/L (ref 10–44)
ANION GAP SERPL CALC-SCNC: 10 MMOL/L (ref 8–16)
AST SERPL-CCNC: 24 U/L (ref 10–40)
BASOPHILS # BLD AUTO: 0.02 K/UL (ref 0–0.2)
BASOPHILS NFR BLD: 0.3 % (ref 0–1.9)
BILIRUB SERPL-MCNC: 1.4 MG/DL (ref 0.1–1)
BUN SERPL-MCNC: 7 MG/DL (ref 8–23)
CALCIUM SERPL-MCNC: 9.6 MG/DL (ref 8.7–10.5)
CHLORIDE SERPL-SCNC: 98 MMOL/L (ref 95–110)
CO2 SERPL-SCNC: 30 MMOL/L (ref 23–29)
CREAT SERPL-MCNC: 0.8 MG/DL (ref 0.5–1.4)
CREAT UR-MCNC: 44 MG/DL (ref 15–325)
CTP QC/QA: YES
DIFFERENTIAL METHOD BLD: ABNORMAL
EOSINOPHIL # BLD AUTO: 0.1 K/UL (ref 0–0.5)
EOSINOPHIL NFR BLD: 1 % (ref 0–8)
ERYTHROCYTE [DISTWIDTH] IN BLOOD BY AUTOMATED COUNT: 15.2 % (ref 11.5–14.5)
EST. GFR  (NO RACE VARIABLE): >60 ML/MIN/1.73 M^2
ESTIMATED AVG GLUCOSE: 160 MG/DL (ref 68–131)
GLUCOSE SERPL-MCNC: 147 MG/DL (ref 70–110)
HBA1C MFR BLD: 7.2 % (ref 4–5.6)
HCT VFR BLD AUTO: 35.8 % (ref 37–48.5)
HGB BLD-MCNC: 12.1 G/DL (ref 12–16)
IMM GRANULOCYTES # BLD AUTO: 0.02 K/UL (ref 0–0.04)
IMM GRANULOCYTES NFR BLD AUTO: 0.3 % (ref 0–0.5)
INR PPP: 1.9 (ref 2–3)
LYMPHOCYTES # BLD AUTO: 1 K/UL (ref 1–4.8)
LYMPHOCYTES NFR BLD: 15.3 % (ref 18–48)
MCH RBC QN AUTO: 32.2 PG (ref 27–31)
MCHC RBC AUTO-ENTMCNC: 33.8 G/DL (ref 32–36)
MCV RBC AUTO: 95 FL (ref 82–98)
MICROALBUMIN UR DL<=1MG/L-MCNC: 8 UG/ML
MONOCYTES # BLD AUTO: 0.5 K/UL (ref 0.3–1)
MONOCYTES NFR BLD: 7.8 % (ref 4–15)
NEUTROPHILS # BLD AUTO: 5.1 K/UL (ref 1.8–7.7)
NEUTROPHILS NFR BLD: 75.3 % (ref 38–73)
NRBC BLD-RTO: 0 /100 WBC
PLATELET # BLD AUTO: 187 K/UL (ref 150–450)
PMV BLD AUTO: 11.8 FL (ref 9.2–12.9)
POTASSIUM SERPL-SCNC: 3.3 MMOL/L (ref 3.5–5.1)
PROT SERPL-MCNC: 7.2 G/DL (ref 6–8.4)
RBC # BLD AUTO: 3.76 M/UL (ref 4–5.4)
SODIUM SERPL-SCNC: 138 MMOL/L (ref 136–145)
WBC # BLD AUTO: 6.79 K/UL (ref 3.9–12.7)

## 2024-08-26 PROCEDURE — 3075F SYST BP GE 130 - 139MM HG: CPT | Mod: HCNC,CPTII,S$GLB, | Performed by: FAMILY MEDICINE

## 2024-08-26 PROCEDURE — 99214 OFFICE O/P EST MOD 30 MIN: CPT | Mod: HCNC,S$GLB,, | Performed by: FAMILY MEDICINE

## 2024-08-26 PROCEDURE — 82570 ASSAY OF URINE CREATININE: CPT | Mod: HCNC | Performed by: FAMILY MEDICINE

## 2024-08-26 PROCEDURE — G2211 COMPLEX E/M VISIT ADD ON: HCPCS | Mod: HCNC,S$GLB,, | Performed by: FAMILY MEDICINE

## 2024-08-26 PROCEDURE — 1126F AMNT PAIN NOTED NONE PRSNT: CPT | Mod: HCNC,CPTII,S$GLB, | Performed by: FAMILY MEDICINE

## 2024-08-26 PROCEDURE — 80053 COMPREHEN METABOLIC PANEL: CPT | Mod: HCNC | Performed by: FAMILY MEDICINE

## 2024-08-26 PROCEDURE — 82043 UR ALBUMIN QUANTITATIVE: CPT | Mod: HCNC | Performed by: FAMILY MEDICINE

## 2024-08-26 PROCEDURE — 85025 COMPLETE CBC W/AUTO DIFF WBC: CPT | Mod: HCNC | Performed by: FAMILY MEDICINE

## 2024-08-26 PROCEDURE — 99999 PR PBB SHADOW E&M-EST. PATIENT-LVL IV: CPT | Mod: PBBFAC,HCNC,, | Performed by: FAMILY MEDICINE

## 2024-08-26 PROCEDURE — 36415 COLL VENOUS BLD VENIPUNCTURE: CPT | Mod: HCNC | Performed by: FAMILY MEDICINE

## 2024-08-26 PROCEDURE — 83036 HEMOGLOBIN GLYCOSYLATED A1C: CPT | Mod: HCNC | Performed by: FAMILY MEDICINE

## 2024-08-26 PROCEDURE — 3078F DIAST BP <80 MM HG: CPT | Mod: HCNC,CPTII,S$GLB, | Performed by: FAMILY MEDICINE

## 2024-08-26 RX ORDER — METFORMIN HYDROCHLORIDE 500 MG/1
500 TABLET ORAL
Qty: 90 TABLET | Refills: 3 | Status: SHIPPED | OUTPATIENT
Start: 2024-08-26

## 2024-08-26 NOTE — PROGRESS NOTES
Natalie Greene  08/26/2024  [id number]    Annabella Fenton MD  Patient Care Team:  Annabella Fenton MD as PCP - General (Family Medicine)  Annabella Fenton MD as Consulting Physician (Family Medicine)  Lisbet Lopez LPN as Care Coordinator (Internal Medicine)  Law Rod MD as Consulting Physician (Cardiology)  Henry Rojas OD as Consulting Physician (Optometry)  Lizeth Cain, PharmD as Pharmacist (Pharmacist)  Andressa Britton as ED Navigator          Visit Type:a scheduled routine follow-up visit    Chief Complaint:  Chief Complaint   Patient presents with    Follow-up     6 month       History of Present Illness:    History of Present Illness    CHIEF COMPLAINT:  Ms. Greene presents today for six-month follow-up.    CARDIOVASCULAR:  She has a history of hypertension, atrial fibrillation, pulmonary hypertension, heart failure with preserved EF, and nonobstructive coronary disease. She occasionally reports shortness of breath, alleviated by Lasix. Her heart rate remains under 100 and irregular, consistent with known atrial fibrillation. She had an ER visit in May for shortness of breath, with slightly elevated BNP at 111, EKG confirming atrial fibrillation with a rate of 90, and negative troponin. She reports feeling fine after starting a new medication following the ER visit. She denies current shortness of breath with movement but admits to limiting activities due to age.    DIABETES:  She has a history of type 2 diabetes. Her last hemoglobin A1c was 7 in February, at the target of 7 or below. She occasionally checks blood sugars at home. She continues metformin 500 mg daily but has discontinued Ozempic. She reports occasional numbness in her feet, which she attributes to age.    HYPERLIPIDEMIA:  She has a history of hyperlipidemia. She should be on Lipitor 40 mg, but medication adherence is unclear.    MEDICATIONS:  She reports taking Coumadin (with regular INR checks, most recent 1.9),  "Lasix, Metformin, Lipitor, Metoprolol, Indapamide, Cozaar, and Cardizem. She divides pill intake throughout the day at specific times. Adherence to all prescribed medications is unclear.    VASCULAR ISSUES:  She reports having varicose veins, noting they can be bothersome. She acknowledges a family history, mentioning her mother also had them. She is currently using support stockings during the visit, describing them as tight and effective.    LIFESTYLE AND SYMPTOMS:  She maintains limited physical activity, stating she does not attempt much movement or exertion. She denies significant limitations in daily activities. She reports occasional numbness and tingling in her feet, describing them as "kind of numb sometimes." She denies having any ulcers, sores, or calluses on her feet.    FOLLOW-UP CARE:  She adheres to INR checks, with her most recent INR being 1.9. She missed a scheduled cardiology appointment on July 2nd. She maintains a Tuesday and Thursday routine for medical appointments, preferring appointments at approximately 2:00 PM.            The following were reviewed at this visit: active problem list, medication list, allergies, family history, social history, and health maintenance.  History:  Past Medical History:   Diagnosis Date    *Atrial fibrillation     Abdominal wall seroma 1/16/2019    Seroma developed after her hernia repair in 2012.  Please review the notes in the Care everywhere section of the chart.  There is no luis evidence of infection at this time.  I would recommend checking a erythrocyte sedimentation rate and C reactive protein.  These are significant elevated then aspiration of the seroma fluid would be warranted.  If not the seroma does not require any intervention    Coronary artery disease involving native coronary artery of native heart with angina pectoris 4/10/2018    Diabetes mellitus     DM (diabetes mellitus) 2002     09/06/2017 no medication    Hyperlipidemia     " Hypertension     Morbid (severe) obesity due to excess calories 7/23/2013    Obesity (BMI 30-39.9) 2/24/2015    Obstructive sleep apnea      Past Surgical History:   Procedure Laterality Date    CATARACT EXTRACTION Left 12/15/2021    distance    CHOLECYSTECTOMY      COLON SURGERY      HYSTERECTOMY       Patient Active Problem List   Diagnosis    A-fib    HTN (hypertension)    Hyperlipidemia with target LDL less than 100    Hyperlipidemia associated with type 2 diabetes mellitus    History of malignant neoplasm of colon    Shoulder pain, left    Aortic atherosclerosis    Long term current use of anticoagulant therapy    DORA (obstructive sleep apnea)    Psychophysiological insomnia    PLMD (periodic limb movement disorder)    Inadequate sleep hygiene    Pulmonary hypertension    CAD (coronary artery disease)    Osteoporosis    Severe obesity (BMI 35.0-39.9) with comorbidity    Varicose veins of both lower extremities    Type 2 diabetes mellitus with diabetic polyneuropathy, without long-term current use of insulin       Medications:  Current Outpatient Medications on File Prior to Visit   Medication Sig Dispense Refill    ACCU-CHEK SOFTCLIX LANCETS Misc       atorvastatin (LIPITOR) 40 MG tablet Take 1 tablet (40 mg total) by mouth once daily. 90 tablet 3    co-enzyme Q-10 30 mg capsule Take 30 mg by mouth 3 (three) times daily.      furosemide (LASIX) 20 MG tablet Take 1 tablet (20 mg total) by mouth once daily. Do not take if BP is below 110/70 30 tablet 6    gatifloxacin 0.5 % Drop drops Place 1 drop into the right eye 2 (two) times daily. Eyedrops to start one day before surgery 5 mL 2    indapamide (LOZOL) 2.5 MG Tab Take 1 tablet (2.5 mg total) by mouth once daily. Do not take if BP is below 110/70 or feeling dry 90 tablet 1    ketorolac 0.5% (ACULAR) 0.5 % Drop Place 1 drop into the right eye 4 (four) times daily. Eyedrops to start one day before surgery 5 mL 2    losartan (COZAAR) 100 MG tablet Take 1 tablet  (100 mg total) by mouth once daily. 90 tablet 1    magnesium oxide (MAG-OX) 400 mg (241.3 mg magnesium) tablet Take 1 tablet (400 mg total) by mouth once daily. 90 tablet 1    metoprolol succinate (TOPROL-XL) 100 MG 24 hr tablet Take 1 tablet (100 mg total) by mouth once daily. 90 tablet 2    niacin 50 MG Tab Take 100 mg by mouth every evening.      ondansetron (ZOFRAN-ODT) 8 MG TbDL Take 1 tablet (8 mg total) by mouth every 12 (twelve) hours as needed (nausea). 20 tablet 0    potassium chloride SA (K-DUR,KLOR-CON) 20 MEQ tablet Take 1 tablet (20 mEq total) by mouth once daily. 90 tablet 1    TRUE METRIX AIR GLUCOSE METER kit       TRUE METRIX GLUCOSE TEST STRIP Strp TEST BLOOD SUGAR TWICE DAILY 200 strip 11    vitamin D (VITAMIN D3) 1000 units Tab Take 1,000 Units by mouth once daily.      warfarin (COUMADIN) 6 MG tablet Take 1 tablet (6 mg total) by mouth Daily. Except TAKE ONE HALF TABLET (3 MG) EVERY THURSDAY AS DIRECTED BY THE CC. (Patient taking differently: Take 6 mg by mouth Daily. Except TAKE ONE HALF TABLET (3 MG) EVERY TUESDAY AND THURSDAY AS DIRECTED BY THE CC.) 90 tablet 3    [DISCONTINUED] metFORMIN (GLUCOPHAGE) 500 MG tablet Take 500 mg by mouth daily with breakfast.      [DISCONTINUED] semaglutide (OZEMPIC) 0.25 mg or 0.5 mg (2 mg/3 mL) pen injector Inject 0.25 mg into the skin every 7 days. 3 mL 2    diltiaZEM (CARDIZEM CD) 180 MG 24 hr capsule Take 1 capsule (180 mg total) by mouth once daily. 90 capsule 3    nitroGLYCERIN (NITROSTAT) 0.4 MG SL tablet Place 1 tablet (0.4 mg total) under the tongue every 5 (five) minutes as needed for Chest pain. Take up to 3 times 5 min apart (Patient not taking: Reported on 1/4/2024) 30 tablet 0    prednisoLONE acetate (PRED FORTE) 1 % DrpS Place 1 drop into the right eye 4 (four) times daily. Eyedrops to start one day before surgery (Patient not taking: Reported on 3/13/2024) 5 mL 2    TRUE METRIX LEVEL 1 Soln 1 each by Tube route once daily. 1 each 11     No  current facility-administered medications on file prior to visit.       Medications have been reviewed and reconciled with patient at this visit.    Exam:  Wt Readings from Last 3 Encounters:   08/26/24 97 kg (213 lb 13.5 oz)   05/09/24 96.6 kg (212 lb 15.4 oz)   03/14/24 99.1 kg (218 lb 7.6 oz)     Temp Readings from Last 3 Encounters:   08/26/24 97.9 °F (36.6 °C) (Tympanic)   05/09/24 97.4 °F (36.3 °C) (Oral)   03/03/24 97.8 °F (36.6 °C) (Oral)     BP Readings from Last 3 Encounters:   08/26/24 134/70   05/09/24 (!) 176/72   03/14/24 136/72     Pulse Readings from Last 3 Encounters:   08/26/24 81   05/09/24 63   03/14/24 83     Body mass index is 35.59 kg/m².      Review of Systems   Constitutional: Negative.  Negative for chills and fever.   HENT: Negative.  Negative for congestion, sinus pain and sore throat.    Eyes:  Negative for blurred vision and double vision.   Respiratory:  Negative for cough, sputum production, shortness of breath and wheezing.    Cardiovascular:  Negative for chest pain, palpitations and leg swelling.   Gastrointestinal:  Negative for abdominal pain, constipation, diarrhea, heartburn, nausea and vomiting.   Genitourinary: Negative.    Musculoskeletal: Negative.    Skin: Negative.  Negative for rash.   Neurological: Negative.    Endo/Heme/Allergies: Negative.  Negative for polydipsia. Does not bruise/bleed easily.   Psychiatric/Behavioral:  Negative for depression and substance abuse.      Physical Exam  Nursing note reviewed.   Cardiovascular:      Rate and Rhythm: Normal rate. Rhythm irregular.   Pulmonary:      Effort: Pulmonary effort is normal. No respiratory distress.   Neurological:      Mental Status: She is alert and oriented to person, place, and time.   Psychiatric:         Mood and Affect: Mood normal.         Behavior: Behavior normal.         Thought Content: Thought content normal.         Judgment: Judgment normal.     Protective Sensation (w/ 10 gram  monofilament):  Right: Intact  Left: Intact  Reduced sensation  Visual Inspection:  Normal -  Bilateral    Pedal Pulses:   Right: Present  Left: Present    Posterior Tibialis Pulses:   Right:Present  Left: Present   Physical Exam    Extremities: Visible varicose veins. Varicose veins.         Laboratory Reviewed ({Yes)  Lab Results   Component Value Date    WBC 5.58 05/09/2024    HGB 13.0 05/09/2024    HCT 38.2 05/09/2024     05/09/2024    CHOL 147 02/26/2024    TRIG 82 02/26/2024    HDL 35 (L) 02/26/2024    ALT 26 05/09/2024    AST 35 05/09/2024     05/09/2024    K 3.7 05/09/2024    CL 99 05/09/2024    CREATININE 0.8 05/09/2024    BUN 8 05/09/2024    CO2 25 05/09/2024    TSH 1.519 09/29/2022    INR 1.9 (A) 08/26/2024    HGBA1C 7.0 (H) 02/26/2024       Natalie was seen today for follow-up.    Diagnoses and all orders for this visit:    Type 2 diabetes mellitus with diabetic polyneuropathy, without long-term current use of insulin  -     Comprehensive Metabolic Panel; Future  -     Hemoglobin A1C; Future  -     Microalbumin/Creatinine Ratio, Urine; Future    Psychophysiological insomnia    Long term current use of anticoagulant therapy  -     Comprehensive Metabolic Panel; Future  -     CBC Auto Differential; Future    Hyperlipidemia with target LDL less than 100  -     Comprehensive Metabolic Panel; Future  -     CBC Auto Differential; Future    Hyperlipidemia associated with type 2 diabetes mellitus  -     Comprehensive Metabolic Panel; Future    Primary hypertension  -     Comprehensive Metabolic Panel; Future    History of malignant neoplasm of colon  -     Comprehensive Metabolic Panel; Future    Aortic atherosclerosis    Other orders  -     metFORMIN (GLUCOPHAGE) 500 MG tablet; Take 1 tablet (500 mg total) by mouth daily with breakfast.          Assessment & Plan      E11.65 Type 2 diabetes mellitus with hyperglycemia    I83.91 Asymptomatic varicose veins of right lower extremity    E11.42 Type 2  diabetes mellitus with diabetic polyneuropathy    E78.5 Hyperlipidemia, unspecified    Z79.01 Long term (current) use of anticoagulants    I10 Essential (primary) hypertension      I48.91 Unspecified atrial fibrillation    Assessed cardiovascular status: BP within normal range, heart rate <100 bpm but still irregular, consistent with known atrial fibrillation  Evaluated diabetes management: Last A1C at 7%, patient discontinued Ozempic, currently only on metformin  Considered bone health: Previously ordered DEXA scannot yet completed  Noted stable respiratory status: No reported increase in shortness of breath since last ER visit    DIABETES:  - Explained importance of A1C monitoring for diabetes management.  - Continued metformin 500 mg daily for diabetes management.  - Hemoglobin A1C lab test ordered.    OSTEOPOROSIS SCREENING:  - Discussed purpose of DEXA scan for osteoporosis screening.  - DEXA scan ordered for osteoporosis screening.  - Schedule DEXA scan for Tuesday or Thursday at 2:00 PM as per patient preference.    VARICOSE VEINS:    - Ms. Greene to continue wearing support stockings for varicose veins.    ATRIAL FIBRILLATION:    - Continued Coumadin as per Coumadin clinic for atrial fibrillation, last INR 1.9.  FOLLOW UP:  - Follow up after completion of ordered lab work and DEXA scan.          This note was generated with the assistance of ambient listening technology. Verbal consent was obtained by the patient and accompanying visitor(s) for the recording of patient appointment to facilitate this note. I attest to having reviewed and edited the generated note for accuracy, though some syntax or spelling errors may persist. Please contact the author of this note for any clarification.     Care Plan/Goals: Reviewed     Follow up: Follow up in about 6 months (around 2/26/2025).    After visit summary was printed and given to patient upon discharge today.  Patient goals and care plan are included in After  Visit Summary.

## 2024-08-26 NOTE — PROGRESS NOTES
INR is just below goal at  1.9.  Previous instructions were verified and followed.  No other changes reported.  Will re-challenge current dose of 3 mg every Tues, Thurs; and 6 mg all other days.  Recheck INR in 2 weeks.  Dose calendar given - confirms understanding.

## 2024-08-27 ENCOUNTER — TELEPHONE (OUTPATIENT)
Dept: CARDIOLOGY | Facility: CLINIC | Age: 84
End: 2024-08-27
Payer: MEDICARE

## 2024-08-27 NOTE — TELEPHONE ENCOUNTER
Called and spoke to pt. Pt scheduled for Tuesday 09/03/24 with Dr. Rod.    ----- Message from Law Rod MD sent at 8/26/2024  5:46 PM CDT -----  OK I will discuss further at follow up if my staff can schedule an appt with me this week. Last time I saw her in March '24 she was on Dilt 180mg. Thanks    - PM  ----- Message -----  From: Annabella Fenton MD  Sent: 8/26/2024   2:22 PM CDT  To: Law Rod MD    She isn't taking Cardizem.  Remains on BB  A fib  On her coumadin.

## 2024-08-28 DIAGNOSIS — E80.6 HYPERBILIRUBINEMIA: Primary | ICD-10-CM

## 2024-09-17 ENCOUNTER — ANTI-COAG VISIT (OUTPATIENT)
Dept: CARDIOLOGY | Facility: CLINIC | Age: 84
End: 2024-09-17
Payer: MEDICARE

## 2024-09-17 DIAGNOSIS — Z79.01 LONG TERM (CURRENT) USE OF ANTICOAGULANTS: Primary | ICD-10-CM

## 2024-09-17 DIAGNOSIS — I48.91 ATRIAL FIBRILLATION, UNSPECIFIED TYPE: ICD-10-CM

## 2024-09-17 LAB
CTP QC/QA: YES
INR PPP: 2.8 (ref 2–3)

## 2024-09-17 NOTE — PROGRESS NOTES
INR is in therapeutic goal range at 2.8.  Patient reports no recent changes.  Confirms current warfarin dose and followed - continue 3 mg every Tues, Thurs; and 6 mg all other days.  INR recheck in 2 weeks.  Dose calendar given - confirms understanding.

## 2024-09-25 NOTE — ASSESSMENT & PLAN NOTE
2/4/2014  Nuclear Quantitative Functional Analysis:   LVEF: 66 %    Impression: ABNORMAL MYOCARDIAL PERFUSION  1. There is evidence for mild myocardial ischemia in the anteroapical wall of the left ventricle.   2. The perfusion scan is free of evidence for myocardial injury.   3. Resting wall motion is physiologic.   4. Resting LV function is normal.   5. The ventricular volumes are normal at rest and stress.   6. The extracardiac distribution of radioactivity is normal.     Normal [facility] in 4/2016 reported    Last visit with Cards, 6/28/2017    
BP stable  No change in medications.  Norvasc 5, Atenolol 100 daily  Indapamide 1.25 daily, Imdur 30 mg daily,   Cozaar 100 mg daily.    EF 55 - 65 60   Mitral Valve Regurgitation   MODERATE    Est. PA Systolic Pressure  38.44   Mitral Valve Mobility  NORMAL   Tricuspid Valve Regurgitation  MILD   Resulting Agency  [facility]   Narrative     Date of Procedure: 07/03/2017          
Diet, exercise, weight loss    
EF 55 - 65 60   Mitral Valve Regurgitation   MODERATE    Est. PA Systolic Pressure  38.44   Mitral Valve Mobility  NORMAL   Tricuspid Valve Regurgitation  MILD   Resulting Agency  [facility]   Narrative     Date of Procedure: 07/03/2017        Rate better with Atenolol  Followed by Coumadin clinic  Lab Results   Component Value Date    INR 2.7 07/18/2017    INR 1.9 (H) 06/28/2017    INR 2.8 06/13/2017     Therapeutic range    
Lab Results   Component Value Date    HGBA1C 6.7 (H) 06/28/2017     DM controlled  Never received documents on Pneumovax, will restart immunizations today      
Lab Results   Component Value Date    INR 2.7 07/18/2017    INR 1.9 (H) 06/28/2017    INR 2.8 06/13/2017     Lab Results   Component Value Date    WBC 5.73 06/28/2017    HGB 12.5 06/28/2017    HCT 38.2 06/28/2017    MCV 91 06/28/2017     06/28/2017     Stable, managed by Coumadin Clinic  
9

## 2024-10-01 ENCOUNTER — OFFICE VISIT (OUTPATIENT)
Dept: CARDIOLOGY | Facility: CLINIC | Age: 84
End: 2024-10-01
Payer: MEDICARE

## 2024-10-01 ENCOUNTER — ANTI-COAG VISIT (OUTPATIENT)
Dept: CARDIOLOGY | Facility: CLINIC | Age: 84
End: 2024-10-01
Payer: MEDICARE

## 2024-10-01 VITALS
DIASTOLIC BLOOD PRESSURE: 70 MMHG | HEART RATE: 85 BPM | SYSTOLIC BLOOD PRESSURE: 131 MMHG | WEIGHT: 212.94 LBS | BODY MASS INDEX: 35.44 KG/M2

## 2024-10-01 DIAGNOSIS — E11.69 HYPERLIPIDEMIA ASSOCIATED WITH TYPE 2 DIABETES MELLITUS: ICD-10-CM

## 2024-10-01 DIAGNOSIS — Z79.01 ANTICOAGULATED ON COUMADIN: ICD-10-CM

## 2024-10-01 DIAGNOSIS — I50.32 CHRONIC HEART FAILURE WITH PRESERVED EJECTION FRACTION: ICD-10-CM

## 2024-10-01 DIAGNOSIS — Z79.01 LONG TERM (CURRENT) USE OF ANTICOAGULANTS: ICD-10-CM

## 2024-10-01 DIAGNOSIS — G47.33 OSA (OBSTRUCTIVE SLEEP APNEA): ICD-10-CM

## 2024-10-01 DIAGNOSIS — I25.119 CORONARY ARTERY DISEASE INVOLVING NATIVE CORONARY ARTERY OF NATIVE HEART WITH ANGINA PECTORIS: ICD-10-CM

## 2024-10-01 DIAGNOSIS — I83.93 VARICOSE VEINS OF BOTH LOWER EXTREMITIES, UNSPECIFIED WHETHER COMPLICATED: ICD-10-CM

## 2024-10-01 DIAGNOSIS — I25.118 CORONARY ARTERY DISEASE OF NATIVE ARTERY OF NATIVE HEART WITH STABLE ANGINA PECTORIS: ICD-10-CM

## 2024-10-01 DIAGNOSIS — E78.5 HYPERLIPIDEMIA WITH TARGET LDL LESS THAN 100: ICD-10-CM

## 2024-10-01 DIAGNOSIS — R06.09 OTHER FORM OF DYSPNEA: ICD-10-CM

## 2024-10-01 DIAGNOSIS — E78.5 HYPERLIPIDEMIA ASSOCIATED WITH TYPE 2 DIABETES MELLITUS: ICD-10-CM

## 2024-10-01 DIAGNOSIS — Z79.01 LONG TERM (CURRENT) USE OF ANTICOAGULANTS: Primary | ICD-10-CM

## 2024-10-01 DIAGNOSIS — I48.91 ATRIAL FIBRILLATION, UNSPECIFIED TYPE: ICD-10-CM

## 2024-10-01 DIAGNOSIS — I70.0 AORTIC ATHEROSCLEROSIS: ICD-10-CM

## 2024-10-01 DIAGNOSIS — I48.20 CHRONIC A-FIB: Primary | ICD-10-CM

## 2024-10-01 DIAGNOSIS — R06.09 DOE (DYSPNEA ON EXERTION): ICD-10-CM

## 2024-10-01 LAB
CTP QC/QA: YES
INR PPP: 2.4 (ref 2–3)

## 2024-10-01 PROCEDURE — 99999 PR PBB SHADOW E&M-EST. PATIENT-LVL III: CPT | Mod: PBBFAC,HCNC,, | Performed by: INTERNAL MEDICINE

## 2024-10-01 PROCEDURE — 85610 PROTHROMBIN TIME: CPT | Mod: QW,HCNC,S$GLB, | Performed by: INTERNAL MEDICINE

## 2024-10-01 RX ORDER — LOSARTAN POTASSIUM 100 MG/1
100 TABLET ORAL DAILY
Qty: 90 TABLET | Refills: 1 | Status: SHIPPED | OUTPATIENT
Start: 2024-10-01

## 2024-10-01 RX ORDER — NITROGLYCERIN 0.4 MG/1
0.4 TABLET SUBLINGUAL EVERY 5 MIN PRN
Qty: 30 TABLET | Refills: 0 | Status: SHIPPED | OUTPATIENT
Start: 2024-10-01 | End: 2025-10-01

## 2024-10-01 RX ORDER — INDAPAMIDE 2.5 MG/1
2.5 TABLET ORAL DAILY
Qty: 90 TABLET | Refills: 1 | Status: SHIPPED | OUTPATIENT
Start: 2024-10-01

## 2024-10-01 RX ORDER — FUROSEMIDE 40 MG/1
40 TABLET ORAL DAILY
Qty: 90 TABLET | Refills: 1 | Status: SHIPPED | OUTPATIENT
Start: 2024-10-01

## 2024-10-01 RX ORDER — POTASSIUM CHLORIDE 20 MEQ/1
20 TABLET, EXTENDED RELEASE ORAL DAILY
Qty: 90 TABLET | Refills: 1 | Status: SHIPPED | OUTPATIENT
Start: 2024-10-01

## 2024-10-01 RX ORDER — LANOLIN ALCOHOL/MO/W.PET/CERES
400 CREAM (GRAM) TOPICAL DAILY
Qty: 90 TABLET | Refills: 1 | Status: SHIPPED | OUTPATIENT
Start: 2024-10-01

## 2024-10-01 RX ORDER — DILTIAZEM HYDROCHLORIDE 180 MG/1
180 CAPSULE, COATED, EXTENDED RELEASE ORAL DAILY
Qty: 90 CAPSULE | Refills: 3 | Status: SHIPPED | OUTPATIENT
Start: 2024-10-01 | End: 2025-10-01

## 2024-10-01 RX ORDER — FUROSEMIDE 20 MG/1
20 TABLET ORAL DAILY
Qty: 30 TABLET | Refills: 6 | Status: SHIPPED | OUTPATIENT
Start: 2024-10-01 | End: 2024-10-01 | Stop reason: SDUPTHER

## 2024-10-01 RX ORDER — METOPROLOL SUCCINATE 100 MG/1
100 TABLET, EXTENDED RELEASE ORAL DAILY
Qty: 90 TABLET | Refills: 2 | Status: SHIPPED | OUTPATIENT
Start: 2024-10-01

## 2024-10-01 RX ORDER — ATORVASTATIN CALCIUM 40 MG/1
40 TABLET, FILM COATED ORAL DAILY
Qty: 90 TABLET | Refills: 3 | Status: SHIPPED | OUTPATIENT
Start: 2024-10-01 | End: 2025-10-01

## 2024-10-01 NOTE — PROGRESS NOTES
INR is in therapeutic goal range at 2.4.  Patient reports no recent changes.  Confirms current warfarin dose and followed - continue 3 mg every Tues, Thurs; and 6 mg all other days.  INR recheck in 4 weeks.  Dose calendar given - confirms understanding.

## 2024-10-01 NOTE — PROGRESS NOTES
"Subjective:   Patient ID:  Natalie Greene is a 84 y.o. female who presents for cardiac consult of No chief complaint on file.          The patient came in today for cardiac consult of No chief complaint on file.      Natalie Greene is a 84 y.o. female  HTN, HFPEF, non-obs CAD, atrial fibrillation (on Coumadin), pulm HTN,  hyperlipidemia, obesity, and DM, DORA presents for CV follow up.    4/27/23  Nuclear stress neg for ischemia 2/2023. ECHO with normal bi V function, mild TR, mild MR, PASP 33mmHg 2/2023. From labs - of labs reveal mildly elevated BNP due to extra fluid, the increase in the fluid pill Lozol will improve that but also need to have a strict low salt diet and monitor BP and weights daily. The potassium is borderline low and magnesium is also low start potassium and magnesium supplements daily, sent to your local pharmacy.   She is taking K and MG. BP and HR stable. BMI 36 - 218 lbs.     3/14/24    3/3/2024, 4:54 AM  History obtained from the patient               History of Present Illness: Natalie Greene is a 83 y.o. 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 Pt states she "just doesn't feel right" and wants to get checked out. Pt told her nurse that her SOB is worse with exertion. Pt is not a smoker. She takes Coumadin. Symptoms are constant and moderate in severity. No mitigating or exacerbating factors reported. Associated sxs include wheezing, coughing, and chest pain. Patient denies any fever, nausea, dysuria, and all other sxs at this time. No prior Tx reported. No further complaints or concerns at this time.     BNP (pg/mL)   Date Value   05/09/2024 111 (H)   03/13/2024 106 (H)   03/03/2024 154 (H)   10/26/2022 111 (H)   01/19/2021 65      Recent ER eval for SOB, given Lasix and DC'd home. BNP " was 154 --> 106 at PCP office.   BP and HR stable today. She feels much better now.   ECG - Afib V rate 95    10/1/24  BP and HR stable. BMI 35 - 212 lbs     BNP (pg/mL)   Date Value   05/09/2024 111 (H)   03/13/2024 106 (H)   03/03/2024 154 (H)   10/26/2022 111 (H)   01/19/2021 65      Has not gotten on Ozempic had fear of it.       Results for orders placed during the hospital encounter of 02/22/23    Echo    Interpretation Summary  · The left ventricle is normal in size with concentric hypertrophy and normal systolic function.  · Mild left atrial enlargement.  · The estimated ejection fraction is 55%.  · Atrial fibrillation observed.  · Normal right ventricular size with normal right ventricular systolic function.  · Mild right atrial enlargement.  · Mild mitral regurgitation.  · Mild tricuspid regurgitation.  · Normal central venous pressure (3 mmHg).  · The estimated PA systolic pressure is 33 mmHg.      Results for orders placed during the hospital encounter of 02/22/23    Nuclear Stress - Cardiology Interpreted    Interpretation Summary    Normal myocardial perfusion scan. There is no evidence of myocardial ischemia or infarction.    There is a mild intensity fixed perfusion abnormality in the apical wall of the left ventricle, scar vs attenuation defect.    The gated perfusion images showed an ejection fraction of 78% at rest. The gated perfusion images showed an ejection fraction of 81% post stress. Normal ejection fraction is greater than 59%.    The ECG portion of the study is negative for ischemia.    There were no arrhythmias during stress.      Results for orders placed during the hospital encounter of 01/29/21    Echo Color Flow Doppler? Yes    Interpretation Summary  · Concentric remodeling and normal systolic function. The estimated ejection fraction is 60%  · Mild left atrial enlargement.  · Mild right atrial enlargement.  · Normal left ventricular diastolic function.  · With normal right  ventricular systolic function.  · Mild mitral regurgitation.  · Normal central venous pressure (3 mmHg).  · The estimated PA systolic pressure is 32 mmHg.      Results for orders placed during the hospital encounter of 01/29/21    Nuclear Stress - Cardiology Interpreted    Interpretation Summary    Normal myocardial perfusion scan. There is no evidence of myocardial ischemia or infarction.    The gated perfusion images showed an ejection fraction of 58% at rest. The gated perfusion images showed an ejection fraction of 65% post stress. Normal ejection fraction is greater than %.    The EKG portion of this study is negative for ischemia.    Arrhythmias during stress: PVCs.      4/11/2016 [facility]    Patient has a right dominant coronary artery.      - Left Main Coronary Artery:             The LM is normal. There is NICOLE 3 flow.     - Left Anterior Descending Artery:             The LAD has luminal irregularities. There is NICOLE 3 flow.     - Left Circumflex Artery:             The LCX is normal. There is NICOLE 3 flow.     - Right Coronary Artery:             The RCA has luminal irregularities. There is NICOLE 3 flow.    D. SUMMARY/POST-OPERATIVE DIAGNOSIS:  1. Non-obstructive CAD.  2. Normal LVEF.    Past Medical History:   Diagnosis Date    *Atrial fibrillation     Abdominal wall seroma 1/16/2019    Seroma developed after her hernia repair in 2012.  Please review the notes in the Care everywhere section of the chart.  There is no luis evidence of infection at this time.  I would recommend checking a erythrocyte sedimentation rate and C reactive protein.  These are significant elevated then aspiration of the seroma fluid would be warranted.  If not the seroma does not require any intervention    Coronary artery disease involving native coronary artery of native heart with angina pectoris 4/10/2018    Diabetes mellitus     DM (diabetes mellitus) 2002     09/06/2017 no medication    Hyperlipidemia     Hypertension      Morbid (severe) obesity due to excess calories 7/23/2013    Obesity (BMI 30-39.9) 2/24/2015    Obstructive sleep apnea        Past Surgical History:   Procedure Laterality Date    CATARACT EXTRACTION Left 12/15/2021    distance    CHOLECYSTECTOMY      COLON SURGERY      HYSTERECTOMY         Social History     Tobacco Use    Smoking status: Never    Smokeless tobacco: Never   Substance Use Topics    Alcohol use: No    Drug use: No       Family History   Problem Relation Name Age of Onset    Diabetes Sister      Hypertension Sister      Hypertension Mother      Diabetes Sister      COPD Neg Hx      Cancer Neg Hx      Heart disease Neg Hx      Hyperlipidemia Neg Hx      Stroke Neg Hx         Patient's Medications   New Prescriptions    No medications on file   Previous Medications    ACCU-CHEK SOFTCLIX LANCETS MISC        ATORVASTATIN (LIPITOR) 40 MG TABLET    Take 1 tablet (40 mg total) by mouth once daily.    CO-ENZYME Q-10 30 MG CAPSULE    Take 30 mg by mouth 3 (three) times daily.    DILTIAZEM (CARDIZEM CD) 180 MG 24 HR CAPSULE    Take 1 capsule (180 mg total) by mouth once daily.    FUROSEMIDE (LASIX) 20 MG TABLET    Take 1 tablet (20 mg total) by mouth once daily. Do not take if BP is below 110/70    GATIFLOXACIN 0.5 % DROP DROPS    Place 1 drop into the right eye 2 (two) times daily. Eyedrops to start one day before surgery    INDAPAMIDE (LOZOL) 2.5 MG TAB    Take 1 tablet (2.5 mg total) by mouth once daily. Do not take if BP is below 110/70 or feeling dry    KETOROLAC 0.5% (ACULAR) 0.5 % DROP    Place 1 drop into the right eye 4 (four) times daily. Eyedrops to start one day before surgery    LOSARTAN (COZAAR) 100 MG TABLET    Take 1 tablet (100 mg total) by mouth once daily.    MAGNESIUM OXIDE (MAG-OX) 400 MG (241.3 MG MAGNESIUM) TABLET    Take 1 tablet (400 mg total) by mouth once daily.    METFORMIN (GLUCOPHAGE) 500 MG TABLET    Take 1 tablet (500 mg total) by mouth daily with breakfast.    METOPROLOL  SUCCINATE (TOPROL-XL) 100 MG 24 HR TABLET    Take 1 tablet (100 mg total) by mouth once daily.    NIACIN 50 MG TAB    Take 100 mg by mouth every evening.    NITROGLYCERIN (NITROSTAT) 0.4 MG SL TABLET    Place 1 tablet (0.4 mg total) under the tongue every 5 (five) minutes as needed for Chest pain. Take up to 3 times 5 min apart    ONDANSETRON (ZOFRAN-ODT) 8 MG TBDL    Take 1 tablet (8 mg total) by mouth every 12 (twelve) hours as needed (nausea).    POTASSIUM CHLORIDE SA (K-DUR,KLOR-CON) 20 MEQ TABLET    Take 1 tablet (20 mEq total) by mouth once daily.    PREDNISOLONE ACETATE (PRED FORTE) 1 % DRPS    Place 1 drop into the right eye 4 (four) times daily. Eyedrops to start one day before surgery    TRUE METRIX AIR GLUCOSE METER KIT        TRUE METRIX GLUCOSE TEST STRIP STRP    TEST BLOOD SUGAR TWICE DAILY    TRUE METRIX LEVEL 1 SOLN    1 each by Tube route once daily.    VITAMIN D (VITAMIN D3) 1000 UNITS TAB    Take 1,000 Units by mouth once daily.    WARFARIN (COUMADIN) 6 MG TABLET    Take 1 tablet (6 mg total) by mouth Daily. Except TAKE ONE HALF TABLET (3 MG) EVERY THURSDAY AS DIRECTED BY THE CC.   Modified Medications    No medications on file   Discontinued Medications    No medications on file       Review of Systems   Constitutional:  Positive for malaise/fatigue.   HENT: Negative.     Eyes: Negative.    Respiratory:  Positive for shortness of breath.    Cardiovascular:  Positive for chest pain and leg swelling. Negative for palpitations.   Gastrointestinal: Negative.    Genitourinary: Negative.    Musculoskeletal: Negative.    Skin: Negative.    Neurological:  Positive for dizziness.   Endo/Heme/Allergies: Negative.    Psychiatric/Behavioral: Negative.     All 12 systems otherwise negative.      Wt Readings from Last 3 Encounters:   10/01/24 96.6 kg (212 lb 15.4 oz)   08/26/24 97 kg (213 lb 13.5 oz)   05/09/24 96.6 kg (212 lb 15.4 oz)     Temp Readings from Last 3 Encounters:   08/26/24 97.9 °F (36.6 °C)  (Tympanic)   05/09/24 97.4 °F (36.3 °C) (Oral)   03/03/24 97.8 °F (36.6 °C) (Oral)     BP Readings from Last 3 Encounters:   10/01/24 131/70   08/26/24 134/70   05/09/24 (!) 176/72     Pulse Readings from Last 3 Encounters:   10/01/24 85   08/26/24 81   05/09/24 63       /70 (BP Location: Left arm, Patient Position: Sitting)   Pulse 85   Wt 96.6 kg (212 lb 15.4 oz)   LMP  (LMP Unknown)   BMI 35.44 kg/m²     Objective:   Physical Exam  Vitals and nursing note reviewed.   Constitutional:       General: She is not in acute distress.     Appearance: She is well-developed. She is obese. She is not diaphoretic.   HENT:      Head: Normocephalic and atraumatic.      Nose: Nose normal.   Eyes:      General: No scleral icterus.     Conjunctiva/sclera: Conjunctivae normal.   Neck:      Thyroid: No thyromegaly.      Vascular: No JVD.   Cardiovascular:      Rate and Rhythm: Normal rate. Rhythm irregularly irregular.      Heart sounds: S1 normal and S2 normal. Murmur heard.      No friction rub. No gallop. No S3 or S4 sounds.   Pulmonary:      Effort: Pulmonary effort is normal. No respiratory distress.      Breath sounds: Normal breath sounds. No stridor. No wheezing or rales.   Chest:      Chest wall: No tenderness.   Abdominal:      General: Bowel sounds are normal. There is no distension.      Palpations: Abdomen is soft. There is no mass.      Tenderness: There is no abdominal tenderness. There is no rebound.   Genitourinary:     Comments: Deferred  Musculoskeletal:         General: No tenderness or deformity. Normal range of motion.      Cervical back: Normal range of motion and neck supple.      Right lower leg: Edema present.      Left lower leg: Edema present.   Lymphadenopathy:      Cervical: No cervical adenopathy.   Skin:     General: Skin is warm and dry.      Coloration: Skin is not pale.      Findings: No erythema or rash.   Neurological:      Mental Status: She is alert and oriented to person, place, and  time.      Motor: No abnormal muscle tone.      Coordination: Coordination normal.   Psychiatric:         Behavior: Behavior normal.         Thought Content: Thought content normal.         Judgment: Judgment normal.         Lab Results   Component Value Date     08/26/2024    K 3.3 (L) 08/26/2024    CL 98 08/26/2024    CO2 30 (H) 08/26/2024    BUN 7 (L) 08/26/2024    CREATININE 0.8 08/26/2024     (H) 08/26/2024    HGBA1C 7.2 (H) 08/26/2024    MG 1.7 10/26/2022    AST 24 08/26/2024    ALT 22 08/26/2024    ALBUMIN 3.7 08/26/2024    PROT 7.2 08/26/2024    BILITOT 1.4 (H) 08/26/2024    WBC 6.79 08/26/2024    HGB 12.1 08/26/2024    HCT 35.8 (L) 08/26/2024    MCV 95 08/26/2024     08/26/2024    INR 2.4 10/01/2024    INR 2.1 (H) 05/09/2024    TSH 1.519 09/29/2022    CHOL 147 02/26/2024    HDL 35 (L) 02/26/2024    LDLCALC 95.6 02/26/2024    TRIG 82 02/26/2024     (H) 05/09/2024     Assessment:      1. Chronic a-fib    2. Long term (current) use of anticoagulants    3. PLAZA (dyspnea on exertion)    4. Chronic heart failure with preserved ejection fraction    5. Coronary artery disease of native artery of native heart with stable angina pectoris    6. Aortic atherosclerosis    7. Other form of dyspnea    8. Hyperlipidemia with target LDL less than 100    9. DORA (obstructive sleep apnea)    10. Anticoagulated on Coumadin    11. Varicose veins of both lower extremities, unspecified whether complicated    12. Hyperlipidemia associated with type 2 diabetes mellitus          Plan:     1. AF, chronic   - cont meds  - cont coumadin and f/u coumadin clinic    2. LE edema sec to venous insufficiency   - rec stockings, elevate legs. Stop norvasc   - cont Lozol to 2.5 mg and lasix    3. DORA  - needs CPAP    4. Obesity BMI 33 - 216 --> BMI 36 - 220 lbs --> 218 lbs --> 212 LBS   - rec weight loss with diet and exercise    5. HTN with non obs CAD  - stop Norvasc and cont lasix   -- increase Lozol to 2.5 mg    6.  HLD  - cont statin    7. Pulm HTN  - needs CPAP  - f/u with pulm    8.DM2  - A1c 6.8 -- 6.5 --> 6.7 --> 7.2  - cont meds per PCP  - ordered Ozempic - did not want to start due to fear     9. Back pain/arm pain with weakness  - refer to PMR    10. Chest pain with PLAZA;  -- BNP was 154 --> 106 at PCP office.   - Nuclear stress neg for ischemia 2/2023.   - ECHO with normal bi V function, mild TR, mild MR, PASP 33mmHg 2/2023.   - refer to GI - may have esoph issues/GERD - saw ENT - Dr. Wheeler  - BNP mildly elevated - cont lasix =- increase to 40mg     Visit today included increased complexity associated with the care of the episodic problem SOB addressed and managing the longitudinal care of the patient due to the serious and/or complex managed problem(s) .      Thank you for allowing me to participate in this patient's care. Please do not hesitate to contact me with any questions or concerns. Consult note has been forwarded to the referral physician.

## 2024-10-03 ENCOUNTER — LAB VISIT (OUTPATIENT)
Dept: LAB | Facility: HOSPITAL | Age: 84
End: 2024-10-03
Attending: INTERNAL MEDICINE
Payer: MEDICARE

## 2024-10-03 DIAGNOSIS — I50.32 CHRONIC HEART FAILURE WITH PRESERVED EJECTION FRACTION: ICD-10-CM

## 2024-10-03 LAB
ANION GAP SERPL CALC-SCNC: 11 MMOL/L (ref 8–16)
BNP SERPL-MCNC: 146 PG/ML (ref 0–99)
BUN SERPL-MCNC: 9 MG/DL (ref 8–23)
CALCIUM SERPL-MCNC: 9.1 MG/DL (ref 8.7–10.5)
CHLORIDE SERPL-SCNC: 96 MMOL/L (ref 95–110)
CO2 SERPL-SCNC: 25 MMOL/L (ref 23–29)
CREAT SERPL-MCNC: 0.8 MG/DL (ref 0.5–1.4)
EST. GFR  (NO RACE VARIABLE): >60 ML/MIN/1.73 M^2
GLUCOSE SERPL-MCNC: 209 MG/DL (ref 70–110)
MAGNESIUM SERPL-MCNC: 1.5 MG/DL (ref 1.6–2.6)
POTASSIUM SERPL-SCNC: 3 MMOL/L (ref 3.5–5.1)
SODIUM SERPL-SCNC: 132 MMOL/L (ref 136–145)

## 2024-10-03 PROCEDURE — 83735 ASSAY OF MAGNESIUM: CPT | Mod: HCNC | Performed by: INTERNAL MEDICINE

## 2024-10-03 PROCEDURE — 80048 BASIC METABOLIC PNL TOTAL CA: CPT | Mod: HCNC | Performed by: INTERNAL MEDICINE

## 2024-10-03 PROCEDURE — 36415 COLL VENOUS BLD VENIPUNCTURE: CPT | Mod: HCNC | Performed by: INTERNAL MEDICINE

## 2024-10-03 PROCEDURE — 83880 ASSAY OF NATRIURETIC PEPTIDE: CPT | Mod: HCNC | Performed by: INTERNAL MEDICINE

## 2024-10-04 ENCOUNTER — TELEPHONE (OUTPATIENT)
Dept: CARDIOLOGY | Facility: CLINIC | Age: 84
End: 2024-10-04
Payer: MEDICARE

## 2024-10-04 NOTE — TELEPHONE ENCOUNTER
Called and spoke to pt. Results verbalized and understood by pt.     ----- Message from Law Rod MD sent at 10/4/2024  8:06 AM CDT -----  Please contact the patient and let them know that their results reveal higher BNP due to extra fluid, potassium and magnesium are both low - need to double up lasix for next 1 week, double up potassium and magnesium tablets as well and monitor BP and weights daily and continue low salt diet. If no improvement in 1 week please call office or come back for repeat labs/exam. Thanks

## 2024-10-21 ENCOUNTER — TELEPHONE (OUTPATIENT)
Dept: INTERNAL MEDICINE | Facility: CLINIC | Age: 84
End: 2024-10-21
Payer: MEDICARE

## 2024-10-21 NOTE — TELEPHONE ENCOUNTER
----- Message from Skybirdie sent at 10/21/2024  4:05 PM CDT -----  Contact: Natalie  Type:  Same Day Appointment Request    Caller is requesting a same day appointment.  Caller declined first available appointment listed below.    Name of Caller:Natalie  When is the first available appointment?Thursday with NP  Symptoms:high blood pressure 159/87, feels dizzy/funny  Best Call Back Number:[phone]   Additional Information: Symptom: High Blood Pressure - Caller Reports  Outcome: Schedule a same-day appointment or talk to a nurse or provider within 1 hour.  Reason: Caller denied all higher acuity questions

## 2024-10-22 DIAGNOSIS — R06.09 DOE (DYSPNEA ON EXERTION): ICD-10-CM

## 2024-10-22 DIAGNOSIS — I48.20 CHRONIC A-FIB: Primary | ICD-10-CM

## 2024-10-31 ENCOUNTER — ANTI-COAG VISIT (OUTPATIENT)
Dept: CARDIOLOGY | Facility: CLINIC | Age: 84
End: 2024-10-31
Payer: MEDICARE

## 2024-10-31 DIAGNOSIS — Z79.01 LONG TERM (CURRENT) USE OF ANTICOAGULANTS: Primary | ICD-10-CM

## 2024-10-31 DIAGNOSIS — I48.91 ATRIAL FIBRILLATION, UNSPECIFIED TYPE: ICD-10-CM

## 2024-10-31 LAB
CTP QC/QA: YES
INR PPP: 2.2 (ref 2–3)

## 2024-10-31 PROCEDURE — 93793 ANTICOAG MGMT PT WARFARIN: CPT | Mod: HCNC,S$GLB,,

## 2024-10-31 PROCEDURE — 85610 PROTHROMBIN TIME: CPT | Mod: QW,HCNC,S$GLB, | Performed by: INTERNAL MEDICINE

## 2024-11-07 ENCOUNTER — APPOINTMENT (OUTPATIENT)
Dept: RADIOLOGY | Facility: HOSPITAL | Age: 84
End: 2024-11-07
Attending: NURSE PRACTITIONER
Payer: MEDICARE

## 2024-11-07 DIAGNOSIS — M81.0 OSTEOPOROSIS, UNSPECIFIED OSTEOPOROSIS TYPE, UNSPECIFIED PATHOLOGICAL FRACTURE PRESENCE: Primary | ICD-10-CM

## 2024-11-07 DIAGNOSIS — Z78.0 POSTMENOPAUSAL: ICD-10-CM

## 2024-11-07 PROCEDURE — 77080 DXA BONE DENSITY AXIAL: CPT | Mod: TC,HCNC

## 2024-11-07 PROCEDURE — 77080 DXA BONE DENSITY AXIAL: CPT | Mod: 26,HCNC,, | Performed by: RADIOLOGY

## 2024-11-21 ENCOUNTER — HOSPITAL ENCOUNTER (OUTPATIENT)
Dept: CARDIOLOGY | Facility: HOSPITAL | Age: 84
Discharge: HOME | End: 2024-11-21
Attending: INTERNAL MEDICINE
Payer: MEDICARE

## 2024-11-21 ENCOUNTER — OFFICE VISIT (OUTPATIENT)
Dept: CARDIOLOGY | Facility: CLINIC | Age: 84
End: 2024-11-21
Payer: MEDICARE

## 2024-11-21 VITALS
HEART RATE: 72 BPM | WEIGHT: 205.81 LBS | OXYGEN SATURATION: 96 % | DIASTOLIC BLOOD PRESSURE: 60 MMHG | SYSTOLIC BLOOD PRESSURE: 116 MMHG | BODY MASS INDEX: 34.25 KG/M2

## 2024-11-21 DIAGNOSIS — E78.5 HYPERLIPIDEMIA ASSOCIATED WITH TYPE 2 DIABETES MELLITUS: ICD-10-CM

## 2024-11-21 DIAGNOSIS — I50.32 CHRONIC HEART FAILURE WITH PRESERVED EJECTION FRACTION: ICD-10-CM

## 2024-11-21 DIAGNOSIS — R06.09 DOE (DYSPNEA ON EXERTION): Primary | ICD-10-CM

## 2024-11-21 DIAGNOSIS — E78.5 HYPERLIPIDEMIA WITH TARGET LDL LESS THAN 100: ICD-10-CM

## 2024-11-21 DIAGNOSIS — Z79.01 ANTICOAGULATED ON COUMADIN: ICD-10-CM

## 2024-11-21 DIAGNOSIS — E11.69 HYPERLIPIDEMIA ASSOCIATED WITH TYPE 2 DIABETES MELLITUS: ICD-10-CM

## 2024-11-21 DIAGNOSIS — R06.09 OTHER FORM OF DYSPNEA: ICD-10-CM

## 2024-11-21 DIAGNOSIS — R06.09 DOE (DYSPNEA ON EXERTION): ICD-10-CM

## 2024-11-21 DIAGNOSIS — I25.118 CORONARY ARTERY DISEASE OF NATIVE ARTERY OF NATIVE HEART WITH STABLE ANGINA PECTORIS: ICD-10-CM

## 2024-11-21 DIAGNOSIS — I83.93 VARICOSE VEINS OF BOTH LOWER EXTREMITIES, UNSPECIFIED WHETHER COMPLICATED: ICD-10-CM

## 2024-11-21 DIAGNOSIS — I70.0 AORTIC ATHEROSCLEROSIS: ICD-10-CM

## 2024-11-21 DIAGNOSIS — I48.20 CHRONIC A-FIB: ICD-10-CM

## 2024-11-21 DIAGNOSIS — G47.33 OSA (OBSTRUCTIVE SLEEP APNEA): ICD-10-CM

## 2024-11-21 DIAGNOSIS — I25.119 CORONARY ARTERY DISEASE INVOLVING NATIVE CORONARY ARTERY OF NATIVE HEART WITH ANGINA PECTORIS: ICD-10-CM

## 2024-11-21 PROCEDURE — 93010 ELECTROCARDIOGRAM REPORT: CPT | Mod: HCNC,,, | Performed by: INTERNAL MEDICINE

## 2024-11-21 PROCEDURE — 1159F MED LIST DOCD IN RCRD: CPT | Mod: HCNC,CPTII,S$GLB, | Performed by: INTERNAL MEDICINE

## 2024-11-21 PROCEDURE — 93005 ELECTROCARDIOGRAM TRACING: CPT | Mod: HCNC

## 2024-11-21 PROCEDURE — 99999 PR PBB SHADOW E&M-EST. PATIENT-LVL IV: CPT | Mod: PBBFAC,HCNC,, | Performed by: INTERNAL MEDICINE

## 2024-11-21 PROCEDURE — 1160F RVW MEDS BY RX/DR IN RCRD: CPT | Mod: HCNC,CPTII,S$GLB, | Performed by: INTERNAL MEDICINE

## 2024-11-21 PROCEDURE — 3074F SYST BP LT 130 MM HG: CPT | Mod: HCNC,CPTII,S$GLB, | Performed by: INTERNAL MEDICINE

## 2024-11-21 PROCEDURE — 99214 OFFICE O/P EST MOD 30 MIN: CPT | Mod: HCNC,S$GLB,, | Performed by: INTERNAL MEDICINE

## 2024-11-21 PROCEDURE — 1101F PT FALLS ASSESS-DOCD LE1/YR: CPT | Mod: HCNC,CPTII,S$GLB, | Performed by: INTERNAL MEDICINE

## 2024-11-21 PROCEDURE — 3078F DIAST BP <80 MM HG: CPT | Mod: HCNC,CPTII,S$GLB, | Performed by: INTERNAL MEDICINE

## 2024-11-21 PROCEDURE — 3288F FALL RISK ASSESSMENT DOCD: CPT | Mod: HCNC,CPTII,S$GLB, | Performed by: INTERNAL MEDICINE

## 2024-11-21 PROCEDURE — G2211 COMPLEX E/M VISIT ADD ON: HCPCS | Mod: HCNC,S$GLB,, | Performed by: INTERNAL MEDICINE

## 2024-11-21 RX ORDER — FUROSEMIDE 40 MG/1
40 TABLET ORAL DAILY
Qty: 90 TABLET | Refills: 1 | Status: SHIPPED | OUTPATIENT
Start: 2024-11-21

## 2024-11-21 RX ORDER — LANOLIN ALCOHOL/MO/W.PET/CERES
400 CREAM (GRAM) TOPICAL 2 TIMES DAILY
Qty: 90 TABLET | Refills: 1 | Status: SHIPPED | OUTPATIENT
Start: 2024-11-21

## 2024-11-21 RX ORDER — POTASSIUM CHLORIDE 20 MEQ/1
40 TABLET, EXTENDED RELEASE ORAL DAILY
Qty: 90 TABLET | Refills: 1 | Status: SHIPPED | OUTPATIENT
Start: 2024-11-21

## 2024-11-21 NOTE — PROGRESS NOTES
"Subjective:   Patient ID:  Natalie Greene is a 84 y.o. female who presents for cardiac consult of No chief complaint on file.          The patient came in today for cardiac consult of No chief complaint on file.      Natalie Greene is a 84 y.o. female  HTN, HFPEF, non-obs CAD, atrial fibrillation (on Coumadin), pulm HTN,  hyperlipidemia, obesity, and DM, DORA presents for CV follow up.    4/27/23  Nuclear stress neg for ischemia 2/2023. ECHO with normal bi V function, mild TR, mild MR, PASP 33mmHg 2/2023. From labs - of labs reveal mildly elevated BNP due to extra fluid, the increase in the fluid pill Lozol will improve that but also need to have a strict low salt diet and monitor BP and weights daily. The potassium is borderline low and magnesium is also low start potassium and magnesium supplements daily, sent to your local pharmacy.   She is taking K and MG. BP and HR stable. BMI 36 - 218 lbs.     3/14/24    3/3/2024, 4:54 AM  History obtained from the patient               History of Present Illness: Natalie Greene is a 83 y.o. 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 Pt states she "just doesn't feel right" and wants to get checked out. Pt told her nurse that her SOB is worse with exertion. Pt is not a smoker. She takes Coumadin. Symptoms are constant and moderate in severity. No mitigating or exacerbating factors reported. Associated sxs include wheezing, coughing, and chest pain. Patient denies any fever, nausea, dysuria, and all other sxs at this time. No prior Tx reported. No further complaints or concerns at this time.     BNP (pg/mL)   Date Value   10/03/2024 146 (H)   05/09/2024 111 (H)   03/13/2024 106 (H)   03/03/2024 154 (H)   10/26/2022 111 (H)      Recent ER eval for SOB, given Lasix and DC'd " home. BNP was 154 --> 106 at PCP office.   BP and HR stable today. She feels much better now.   ECG - Afib V rate 95    10/1/24  BP and HR stable. BMI 35 - 212 lbs     BNP (pg/mL)   Date Value   10/03/2024 146 (H)   05/09/2024 111 (H)   03/13/2024 106 (H)   03/03/2024 154 (H)   10/26/2022 111 (H)      Has not gotten on Ozempic had fear of it.       11/21/24  Last BNP higher - 146.     results reveal higher BNP due to extra fluid, potassium and magnesium are both low - need to double up lasix for next 1 week, double up potassium and magnesium tablets as well and monitor BP and weights daily and continue low salt diet. If no improvement in 1 week please call office or come back for repeat labs/exam. Thanks     BP and HR stable. BMI 34 - 205 lbs       Results for orders placed during the hospital encounter of 02/22/23    Echo    Interpretation Summary  · The left ventricle is normal in size with concentric hypertrophy and normal systolic function.  · Mild left atrial enlargement.  · The estimated ejection fraction is 55%.  · Atrial fibrillation observed.  · Normal right ventricular size with normal right ventricular systolic function.  · Mild right atrial enlargement.  · Mild mitral regurgitation.  · Mild tricuspid regurgitation.  · Normal central venous pressure (3 mmHg).  · The estimated PA systolic pressure is 33 mmHg.      Results for orders placed during the hospital encounter of 02/22/23    Nuclear Stress - Cardiology Interpreted    Interpretation Summary    Normal myocardial perfusion scan. There is no evidence of myocardial ischemia or infarction.    There is a mild intensity fixed perfusion abnormality in the apical wall of the left ventricle, scar vs attenuation defect.    The gated perfusion images showed an ejection fraction of 78% at rest. The gated perfusion images showed an ejection fraction of 81% post stress. Normal ejection fraction is greater than 59%.    The ECG portion of the study is negative for  ischemia.    There were no arrhythmias during stress.      Results for orders placed during the hospital encounter of 01/29/21    Echo Color Flow Doppler? Yes    Interpretation Summary  · Concentric remodeling and normal systolic function. The estimated ejection fraction is 60%  · Mild left atrial enlargement.  · Mild right atrial enlargement.  · Normal left ventricular diastolic function.  · With normal right ventricular systolic function.  · Mild mitral regurgitation.  · Normal central venous pressure (3 mmHg).  · The estimated PA systolic pressure is 32 mmHg.      Results for orders placed during the hospital encounter of 01/29/21    Nuclear Stress - Cardiology Interpreted    Interpretation Summary    Normal myocardial perfusion scan. There is no evidence of myocardial ischemia or infarction.    The gated perfusion images showed an ejection fraction of 58% at rest. The gated perfusion images showed an ejection fraction of 65% post stress. Normal ejection fraction is greater than %.    The EKG portion of this study is negative for ischemia.    Arrhythmias during stress: PVCs.      4/11/2016 [facility]    Patient has a right dominant coronary artery.      - Left Main Coronary Artery:             The LM is normal. There is NICOLE 3 flow.     - Left Anterior Descending Artery:             The LAD has luminal irregularities. There is NICOLE 3 flow.     - Left Circumflex Artery:             The LCX is normal. There is NICOLE 3 flow.     - Right Coronary Artery:             The RCA has luminal irregularities. There is NICOLE 3 flow.    D. SUMMARY/POST-OPERATIVE DIAGNOSIS:  1. Non-obstructive CAD.  2. Normal LVEF.    Past Medical History:   Diagnosis Date    *Atrial fibrillation     Abdominal wall seroma 1/16/2019    Seroma developed after her hernia repair in 2012.  Please review the notes in the Care everywhere section of the chart.  There is no luis evidence of infection at this time.  I would recommend checking a erythrocyte  sedimentation rate and C reactive protein.  These are significant elevated then aspiration of the seroma fluid would be warranted.  If not the seroma does not require any intervention    Coronary artery disease involving native coronary artery of native heart with angina pectoris 4/10/2018    Diabetes mellitus     DM (diabetes mellitus) 2002     09/06/2017 no medication    Hyperlipidemia     Hypertension     Morbid (severe) obesity due to excess calories 7/23/2013    Obesity (BMI 30-39.9) 2/24/2015    Obstructive sleep apnea        Past Surgical History:   Procedure Laterality Date    CATARACT EXTRACTION Left 12/15/2021    distance    CHOLECYSTECTOMY      COLON SURGERY      HYSTERECTOMY         Social History     Tobacco Use    Smoking status: Never    Smokeless tobacco: Never   Substance Use Topics    Alcohol use: No    Drug use: No       Family History   Problem Relation Name Age of Onset    Diabetes Sister      Hypertension Sister      Hypertension Mother      Diabetes Sister      COPD Neg Hx      Cancer Neg Hx      Heart disease Neg Hx      Hyperlipidemia Neg Hx      Stroke Neg Hx         Patient's Medications   New Prescriptions    No medications on file   Previous Medications    ACCU-CHEK SOFTCLIX LANCETS MISC        ATORVASTATIN (LIPITOR) 40 MG TABLET    Take 1 tablet (40 mg total) by mouth once daily.    CO-ENZYME Q-10 30 MG CAPSULE    Take 30 mg by mouth 3 (three) times daily.    DILTIAZEM (CARDIZEM CD) 180 MG 24 HR CAPSULE    Take 1 capsule (180 mg total) by mouth once daily.    FUROSEMIDE (LASIX) 40 MG TABLET    Take 1 tablet (40 mg total) by mouth once daily. Do not take if BP is below 110/70    GATIFLOXACIN 0.5 % DROP DROPS    Place 1 drop into the right eye 2 (two) times daily. Eyedrops to start one day before surgery    INDAPAMIDE (LOZOL) 2.5 MG TAB    Take 1 tablet (2.5 mg total) by mouth once daily. Do not take if BP is below 110/70 or feeling dry    KETOROLAC 0.5% (ACULAR) 0.5 % DROP    Place  1 drop into the right eye 4 (four) times daily. Eyedrops to start one day before surgery    LOSARTAN (COZAAR) 100 MG TABLET    Take 1 tablet (100 mg total) by mouth once daily.    MAGNESIUM OXIDE (MAG-OX) 400 MG (241.3 MG MAGNESIUM) TABLET    Take 1 tablet (400 mg total) by mouth once daily.    METFORMIN (GLUCOPHAGE) 500 MG TABLET    Take 1 tablet (500 mg total) by mouth daily with breakfast.    METOPROLOL SUCCINATE (TOPROL-XL) 100 MG 24 HR TABLET    Take 1 tablet (100 mg total) by mouth once daily.    NIACIN 50 MG TAB    Take 100 mg by mouth every evening.    NITROGLYCERIN (NITROSTAT) 0.4 MG SL TABLET    Place 1 tablet (0.4 mg total) under the tongue every 5 (five) minutes as needed for Chest pain. Take up to 3 times 5 min apart    ONDANSETRON (ZOFRAN-ODT) 8 MG TBDL    Take 1 tablet (8 mg total) by mouth every 12 (twelve) hours as needed (nausea).    POTASSIUM CHLORIDE SA (K-DUR,KLOR-CON) 20 MEQ TABLET    Take 1 tablet (20 mEq total) by mouth once daily.    PREDNISOLONE ACETATE (PRED FORTE) 1 % DRPS    Place 1 drop into the right eye 4 (four) times daily. Eyedrops to start one day before surgery    TRUE METRIX AIR GLUCOSE METER KIT        TRUE METRIX GLUCOSE TEST STRIP STRP    TEST BLOOD SUGAR TWICE DAILY    TRUE METRIX LEVEL 1 SOLN    1 each by Tube route once daily.    VITAMIN D (VITAMIN D3) 1000 UNITS TAB    Take 1,000 Units by mouth once daily.    WARFARIN (COUMADIN) 6 MG TABLET    Take 1 tablet (6 mg total) by mouth Daily. Except TAKE ONE HALF TABLET (3 MG) EVERY THURSDAY AS DIRECTED BY THE CC.   Modified Medications    No medications on file   Discontinued Medications    No medications on file       Review of Systems   Constitutional:  Positive for malaise/fatigue.   HENT: Negative.     Eyes: Negative.    Respiratory:  Positive for shortness of breath.    Cardiovascular:  Positive for chest pain and leg swelling. Negative for palpitations.   Gastrointestinal: Negative.    Genitourinary: Negative.     Musculoskeletal: Negative.    Skin: Negative.    Neurological:  Positive for dizziness.   Endo/Heme/Allergies: Negative.    Psychiatric/Behavioral: Negative.     All 12 systems otherwise negative.      Wt Readings from Last 3 Encounters:   11/21/24 93.3 kg (205 lb 12.8 oz)   10/01/24 96.6 kg (212 lb 15.4 oz)   08/26/24 97 kg (213 lb 13.5 oz)     Temp Readings from Last 3 Encounters:   08/26/24 97.9 °F (36.6 °C) (Tympanic)   05/09/24 97.4 °F (36.3 °C) (Oral)   03/03/24 97.8 °F (36.6 °C) (Oral)     BP Readings from Last 3 Encounters:   11/21/24 116/60   10/01/24 131/70   08/26/24 134/70     Pulse Readings from Last 3 Encounters:   11/21/24 72   10/01/24 85   08/26/24 81       /60 (BP Location: Left arm, Patient Position: Sitting)   Pulse 72   Wt 93.3 kg (205 lb 12.8 oz)   LMP  (LMP Unknown)   SpO2 96%   BMI 34.25 kg/m²     Objective:   Physical Exam  Vitals and nursing note reviewed.   Constitutional:       General: She is not in acute distress.     Appearance: She is well-developed. She is obese. She is not diaphoretic.   HENT:      Head: Normocephalic and atraumatic.      Nose: Nose normal.   Eyes:      General: No scleral icterus.     Conjunctiva/sclera: Conjunctivae normal.   Neck:      Thyroid: No thyromegaly.      Vascular: No JVD.   Cardiovascular:      Rate and Rhythm: Normal rate. Rhythm irregularly irregular.      Heart sounds: S1 normal and S2 normal. Murmur heard.      No friction rub. No gallop. No S3 or S4 sounds.   Pulmonary:      Effort: Pulmonary effort is normal. No respiratory distress.      Breath sounds: Normal breath sounds. No stridor. No wheezing or rales.   Chest:      Chest wall: No tenderness.   Abdominal:      General: Bowel sounds are normal. There is no distension.      Palpations: Abdomen is soft. There is no mass.      Tenderness: There is no abdominal tenderness. There is no rebound.   Genitourinary:     Comments: Deferred  Musculoskeletal:         General: No tenderness  or deformity. Normal range of motion.      Cervical back: Normal range of motion and neck supple.      Right lower leg: Edema present.      Left lower leg: Edema present.   Lymphadenopathy:      Cervical: No cervical adenopathy.   Skin:     General: Skin is warm and dry.      Coloration: Skin is not pale.      Findings: No erythema or rash.   Neurological:      Mental Status: She is alert and oriented to person, place, and time.      Motor: No abnormal muscle tone.      Coordination: Coordination normal.   Psychiatric:         Behavior: Behavior normal.         Thought Content: Thought content normal.         Judgment: Judgment normal.         Lab Results   Component Value Date     (L) 10/03/2024    K 3.0 (L) 10/03/2024    CL 96 10/03/2024    CO2 25 10/03/2024    BUN 9 10/03/2024    CREATININE 0.8 10/03/2024     (H) 10/03/2024    HGBA1C 7.2 (H) 08/26/2024    MG 1.5 (L) 10/03/2024    AST 24 08/26/2024    ALT 22 08/26/2024    ALBUMIN 3.7 08/26/2024    PROT 7.2 08/26/2024    BILITOT 1.4 (H) 08/26/2024    WBC 6.79 08/26/2024    HGB 12.1 08/26/2024    HCT 35.8 (L) 08/26/2024    MCV 95 08/26/2024     08/26/2024    INR 2.2 10/31/2024    INR 2.1 (H) 05/09/2024    TSH 1.519 09/29/2022    CHOL 147 02/26/2024    HDL 35 (L) 02/26/2024    LDLCALC 95.6 02/26/2024    TRIG 82 02/26/2024     (H) 10/03/2024     Assessment:      1. PLAZA (dyspnea on exertion)    2. Chronic heart failure with preserved ejection fraction    3. Chronic a-fib    4. Coronary artery disease of native artery of native heart with stable angina pectoris    5. Other form of dyspnea    6. Aortic atherosclerosis    7. Hyperlipidemia with target LDL less than 100    8. DORA (obstructive sleep apnea)    9. Coronary artery disease involving native coronary artery of native heart with angina pectoris    10. Anticoagulated on Coumadin    11. Hyperlipidemia associated with type 2 diabetes mellitus    12. Varicose veins of both lower extremities,  unspecified whether complicated            Plan:     1. AF, chronic   - cont meds  - cont coumadin and f/u coumadin clinic    2. LE edema sec to venous insufficiency   - rec stockings, elevate legs. Stop norvasc   - cont Lozol to 2.5 mg and lasix    3. DORA  - needs CPAP    4. Obesity BMI 33 - 216 --> BMI 36 - 220 lbs --> 218 lbs --> 212 LBS  BMI 34 - 205 lbs   - rec weight loss with diet and exercise    5. HTN with non obs CAD  - stop Norvasc and cont lasix   -- increase Lozol to 2.5 mg    6. HLD  - cont statin    7. Pulm HTN  - needs CPAP  - f/u with pulm    8.DM2  - A1c 6.8 -- 6.5 --> 6.7 --> 7.2  - cont meds per PCP  - ordered Ozempic - did not want to start due to fear     9. Back pain/arm pain with weakness  - refer to PMR    10. Chest pain with PLAZA;  -- BNP was 154 --> 106 at PCP office.  --> 146   - Nuclear stress neg for ischemia 2/2023.   - ECHO with normal bi V function, mild TR, mild MR, PASP 33mmHg 2/2023.   - refer to GI - may have esoph issues/GERD - saw ENT - Dr. Wheeler  - BNP mildly elevated - cont lasix =- increase to 40mg     Visit today included increased complexity associated with the care of the episodic problem SOB addressed and managing the longitudinal care of the patient due to the serious and/or complex managed problem(s) .      Thank you for allowing me to participate in this patient's care. Please do not hesitate to contact me with any questions or concerns. Consult note has been forwarded to the referral physician.

## 2024-11-22 LAB
OHS QRS DURATION: 88 MS
OHS QTC CALCULATION: 440 MS

## 2024-11-25 ENCOUNTER — TELEPHONE (OUTPATIENT)
Dept: CARDIOLOGY | Facility: CLINIC | Age: 84
End: 2024-11-25
Payer: MEDICARE

## 2024-11-25 NOTE — TELEPHONE ENCOUNTER
Attempted to reach patient to discuss lab results, no answer. Left VM to return call.  ----- Message from Law Rod MD sent at 11/22/2024  8:01 AM CST -----  Please contact the patient and let them know that their results reveal improving potassium and magnesium levels, fluid level - BNP is slightly improving - continue lasix, potassium and magnesium - if having any worsening swelling/weight gain recommend doubling up for a few days.

## 2024-11-25 NOTE — TELEPHONE ENCOUNTER
Spoke with [facility] pharmacy to clarify that MD discontinued Norvasc and continue Diltiazem.    ----- Message from [facility] sent at 11/25/2024  9:34 AM CST -----  Contact: Ilda Loving  .Type:  Patient Requesting Call    Who Called:Ilda Loving  Does the patient know what this is regarding?:clarify the current therapy   1.diltiaZEM (CARDIZEM CD) 180 MG 24 hr capsule  Would the patient rather a call back or a response via [facility]? call  Best Call Back Number:[phone]  Additional Information:

## 2024-11-26 ENCOUNTER — ANTI-COAG VISIT (OUTPATIENT)
Dept: CARDIOLOGY | Facility: CLINIC | Age: 84
End: 2024-11-26
Payer: MEDICARE

## 2024-11-26 DIAGNOSIS — I48.91 ATRIAL FIBRILLATION, UNSPECIFIED TYPE: ICD-10-CM

## 2024-11-26 DIAGNOSIS — Z79.01 LONG TERM (CURRENT) USE OF ANTICOAGULANTS: Primary | ICD-10-CM

## 2024-11-26 LAB
CTP QC/QA: YES
INR PPP: 3.7 (ref 2–3)

## 2024-11-26 PROCEDURE — 85610 PROTHROMBIN TIME: CPT | Mod: QW,HCNC,S$GLB, | Performed by: INTERNAL MEDICINE

## 2024-11-26 PROCEDURE — 93793 ANTICOAG MGMT PT WARFARIN: CPT | Mod: HCNC,S$GLB,,

## 2024-11-26 NOTE — PROGRESS NOTES
INR is above therapeutic goal range at 3.7.  Patient reports no medication/dietary changes and no bleeding issues.  States current warfarin dose and followed.  Instructions given to hold warfarin dose today, then resume current dose of 3 mg every Tues, Thurs; and 6 mg all other days.  INR recheck in 3 weeks.  ED for any abnormal bleeding issues.  Dose calendar given and reviewed with patient.  Confirms understanding of instructions given.

## 2024-12-06 ENCOUNTER — HOSPITAL ENCOUNTER (INPATIENT)
Facility: HOSPITAL | Age: 84
LOS: 1 days | Discharge: HOSPICE | DRG: 436 | End: 2024-12-07
Attending: EMERGENCY MEDICINE | Admitting: INTERNAL MEDICINE
Payer: MEDICARE

## 2024-12-06 DIAGNOSIS — C78.7 SECONDARY LIVER CANCER: ICD-10-CM

## 2024-12-06 DIAGNOSIS — I16.1 HYPERTENSIVE EMERGENCY: Primary | ICD-10-CM

## 2024-12-06 DIAGNOSIS — R41.82 ALTERED MENTAL STATUS: ICD-10-CM

## 2024-12-06 DIAGNOSIS — C25.9 PRIMARY PANCREATIC CANCER WITH METASTASIS TO OTHER SITE: ICD-10-CM

## 2024-12-06 DIAGNOSIS — Z51.5 PALLIATIVE CARE ENCOUNTER: ICD-10-CM

## 2024-12-06 DIAGNOSIS — R07.9 CHEST PAIN: ICD-10-CM

## 2024-12-06 DIAGNOSIS — I48.91 ATRIAL FIBRILLATION WITH RAPID VENTRICULAR RESPONSE: ICD-10-CM

## 2024-12-06 DIAGNOSIS — R79.89 TROPONIN LEVEL ELEVATED: ICD-10-CM

## 2024-12-06 PROBLEM — N17.9 AKI (ACUTE KIDNEY INJURY): Status: ACTIVE | Noted: 2024-12-06

## 2024-12-06 PROBLEM — E87.1 HYPONATREMIA: Status: ACTIVE | Noted: 2024-12-06

## 2024-12-06 PROBLEM — E87.6 HYPOKALEMIA: Status: ACTIVE | Noted: 2024-12-06

## 2024-12-06 LAB
ALBUMIN SERPL BCP-MCNC: 3.1 G/DL (ref 3.5–5.2)
ALP SERPL-CCNC: 298 U/L (ref 40–150)
ALT SERPL W/O P-5'-P-CCNC: 58 U/L (ref 10–44)
AMMONIA PLAS-SCNC: 27 UMOL/L (ref 10–50)
AMPHET+METHAMPHET UR QL: NEGATIVE
ANION GAP SERPL CALC-SCNC: 15 MMOL/L (ref 8–16)
AST SERPL-CCNC: 54 U/L (ref 10–40)
BACTERIA #/AREA URNS HPF: ABNORMAL /HPF
BARBITURATES UR QL SCN>200 NG/ML: NEGATIVE
BASOPHILS # BLD AUTO: 0.02 K/UL (ref 0–0.2)
BASOPHILS NFR BLD: 0.2 % (ref 0–1.9)
BENZODIAZ UR QL SCN>200 NG/ML: NEGATIVE
BILIRUB SERPL-MCNC: 1.8 MG/DL (ref 0.1–1)
BILIRUB UR QL STRIP: NEGATIVE
BUN SERPL-MCNC: 18 MG/DL (ref 8–23)
BZE UR QL SCN: NEGATIVE
CALCIUM SERPL-MCNC: 9 MG/DL (ref 8.7–10.5)
CANNABINOIDS UR QL SCN: NEGATIVE
CHLORIDE SERPL-SCNC: 87 MMOL/L (ref 95–110)
CLARITY UR: CLEAR
CO2 SERPL-SCNC: 27 MMOL/L (ref 23–29)
COLOR UR: YELLOW
CREAT SERPL-MCNC: 1.5 MG/DL (ref 0.5–1.4)
CREAT UR-MCNC: 52.8 MG/DL (ref 15–325)
DIFFERENTIAL METHOD BLD: ABNORMAL
EOSINOPHIL # BLD AUTO: 0 K/UL (ref 0–0.5)
EOSINOPHIL NFR BLD: 0 % (ref 0–8)
ERYTHROCYTE [DISTWIDTH] IN BLOOD BY AUTOMATED COUNT: 13.5 % (ref 11.5–14.5)
EST. GFR  (NO RACE VARIABLE): 35 ML/MIN/1.73 M^2
ETHANOL SERPL-MCNC: <10 MG/DL
GLUCOSE SERPL-MCNC: 311 MG/DL (ref 70–110)
GLUCOSE UR QL STRIP: ABNORMAL
HCT VFR BLD AUTO: 39.4 % (ref 37–48.5)
HGB BLD-MCNC: 13.6 G/DL (ref 12–16)
HGB UR QL STRIP: NEGATIVE
HYALINE CASTS #/AREA URNS LPF: 4 /LPF
IMM GRANULOCYTES # BLD AUTO: 0.06 K/UL (ref 0–0.04)
IMM GRANULOCYTES NFR BLD AUTO: 0.5 % (ref 0–0.5)
INR PPP: 1.8 (ref 0.8–1.2)
KETONES UR QL STRIP: ABNORMAL
LACTATE SERPL-SCNC: 1.9 MMOL/L (ref 0.5–2.2)
LEUKOCYTE ESTERASE UR QL STRIP: NEGATIVE
LIPASE SERPL-CCNC: 44 U/L (ref 4–60)
LYMPHOCYTES # BLD AUTO: 0.4 K/UL (ref 1–4.8)
LYMPHOCYTES NFR BLD: 3.3 % (ref 18–48)
MAGNESIUM SERPL-MCNC: 1.7 MG/DL (ref 1.6–2.6)
MCH RBC QN AUTO: 31.1 PG (ref 27–31)
MCHC RBC AUTO-ENTMCNC: 34.5 G/DL (ref 32–36)
MCV RBC AUTO: 90 FL (ref 82–98)
METHADONE UR QL SCN>300 NG/ML: NEGATIVE
MICROSCOPIC COMMENT: ABNORMAL
MONOCYTES # BLD AUTO: 0.7 K/UL (ref 0.3–1)
MONOCYTES NFR BLD: 5.6 % (ref 4–15)
NEUTROPHILS # BLD AUTO: 11.4 K/UL (ref 1.8–7.7)
NEUTROPHILS NFR BLD: 90.4 % (ref 38–73)
NITRITE UR QL STRIP: NEGATIVE
NRBC BLD-RTO: 0 /100 WBC
OPIATES UR QL SCN: NEGATIVE
PCP UR QL SCN>25 NG/ML: NEGATIVE
PH UR STRIP: 7 [PH] (ref 5–8)
PLATELET # BLD AUTO: 166 K/UL (ref 150–450)
PMV BLD AUTO: 12.7 FL (ref 9.2–12.9)
POCT GLUCOSE: 339 MG/DL (ref 70–110)
POCT GLUCOSE: 344 MG/DL (ref 70–110)
POCT GLUCOSE: 359 MG/DL (ref 70–110)
POTASSIUM SERPL-SCNC: 3.1 MMOL/L (ref 3.5–5.1)
PROT SERPL-MCNC: 7.3 G/DL (ref 6–8.4)
PROT UR QL STRIP: ABNORMAL
PROTHROMBIN TIME: 19.8 SEC (ref 9–12.5)
RBC # BLD AUTO: 4.37 M/UL (ref 4–5.4)
RBC #/AREA URNS HPF: 2 /HPF (ref 0–4)
SODIUM SERPL-SCNC: 129 MMOL/L (ref 136–145)
SP GR UR STRIP: 1.01 (ref 1–1.03)
TOXICOLOGY INFORMATION: NORMAL
TROPONIN I SERPL DL<=0.01 NG/ML-MCNC: 0.13 NG/ML (ref 0–0.03)
TSH SERPL DL<=0.005 MIU/L-ACNC: 0.58 UIU/ML (ref 0.4–4)
URN SPEC COLLECT METH UR: ABNORMAL
UROBILINOGEN UR STRIP-ACNC: NEGATIVE EU/DL
WBC # BLD AUTO: 12.57 K/UL (ref 3.9–12.7)
WBC #/AREA URNS HPF: 3 /HPF (ref 0–5)
WBC CLUMPS URNS QL MICRO: ABNORMAL
YEAST URNS QL MICRO: ABNORMAL

## 2024-12-06 PROCEDURE — 81000 URINALYSIS NONAUTO W/SCOPE: CPT | Mod: HCNC | Performed by: EMERGENCY MEDICINE

## 2024-12-06 PROCEDURE — 63600175 PHARM REV CODE 636 W HCPCS: Mod: HCNC | Performed by: INTERNAL MEDICINE

## 2024-12-06 PROCEDURE — 96374 THER/PROPH/DIAG INJ IV PUSH: CPT | Mod: HCNC

## 2024-12-06 PROCEDURE — 85610 PROTHROMBIN TIME: CPT | Mod: HCNC | Performed by: INTERNAL MEDICINE

## 2024-12-06 PROCEDURE — 85025 COMPLETE CBC W/AUTO DIFF WBC: CPT | Mod: HCNC | Performed by: EMERGENCY MEDICINE

## 2024-12-06 PROCEDURE — 63600175 PHARM REV CODE 636 W HCPCS: Mod: HCNC | Performed by: EMERGENCY MEDICINE

## 2024-12-06 PROCEDURE — 86301 IMMUNOASSAY TUMOR CA 19-9: CPT | Mod: HCNC | Performed by: INTERNAL MEDICINE

## 2024-12-06 PROCEDURE — 84443 ASSAY THYROID STIM HORMONE: CPT | Mod: HCNC | Performed by: EMERGENCY MEDICINE

## 2024-12-06 PROCEDURE — 84484 ASSAY OF TROPONIN QUANT: CPT | Mod: 91,HCNC | Performed by: EMERGENCY MEDICINE

## 2024-12-06 PROCEDURE — 96361 HYDRATE IV INFUSION ADD-ON: CPT | Mod: HCNC

## 2024-12-06 PROCEDURE — 80307 DRUG TEST PRSMV CHEM ANLYZR: CPT | Mod: HCNC | Performed by: EMERGENCY MEDICINE

## 2024-12-06 PROCEDURE — 83036 HEMOGLOBIN GLYCOSYLATED A1C: CPT | Mod: HCNC | Performed by: INTERNAL MEDICINE

## 2024-12-06 PROCEDURE — 96375 TX/PRO/DX INJ NEW DRUG ADDON: CPT | Mod: HCNC

## 2024-12-06 PROCEDURE — 82140 ASSAY OF AMMONIA: CPT | Mod: HCNC | Performed by: EMERGENCY MEDICINE

## 2024-12-06 PROCEDURE — 82105 ALPHA-FETOPROTEIN SERUM: CPT | Mod: HCNC | Performed by: INTERNAL MEDICINE

## 2024-12-06 PROCEDURE — 83690 ASSAY OF LIPASE: CPT | Mod: HCNC | Performed by: EMERGENCY MEDICINE

## 2024-12-06 PROCEDURE — 82378 CARCINOEMBRYONIC ANTIGEN: CPT | Mod: HCNC | Performed by: INTERNAL MEDICINE

## 2024-12-06 PROCEDURE — 84480 ASSAY TRIIODOTHYRONINE (T3): CPT | Mod: HCNC | Performed by: INTERNAL MEDICINE

## 2024-12-06 PROCEDURE — 99285 EMERGENCY DEPT VISIT HI MDM: CPT | Mod: 25,HCNC

## 2024-12-06 PROCEDURE — 84484 ASSAY OF TROPONIN QUANT: CPT | Mod: HCNC | Performed by: INTERNAL MEDICINE

## 2024-12-06 PROCEDURE — 93010 ELECTROCARDIOGRAM REPORT: CPT | Mod: HCNC,,, | Performed by: INTERNAL MEDICINE

## 2024-12-06 PROCEDURE — 82077 ASSAY SPEC XCP UR&BREATH IA: CPT | Mod: HCNC | Performed by: EMERGENCY MEDICINE

## 2024-12-06 PROCEDURE — 80053 COMPREHEN METABOLIC PANEL: CPT | Mod: HCNC | Performed by: EMERGENCY MEDICINE

## 2024-12-06 PROCEDURE — 21400001 HC TELEMETRY ROOM: Mod: HCNC

## 2024-12-06 PROCEDURE — 83735 ASSAY OF MAGNESIUM: CPT | Mod: HCNC | Performed by: EMERGENCY MEDICINE

## 2024-12-06 PROCEDURE — 83605 ASSAY OF LACTIC ACID: CPT | Mod: HCNC | Performed by: EMERGENCY MEDICINE

## 2024-12-06 PROCEDURE — 84439 ASSAY OF FREE THYROXINE: CPT | Mod: HCNC | Performed by: INTERNAL MEDICINE

## 2024-12-06 PROCEDURE — 93005 ELECTROCARDIOGRAM TRACING: CPT | Mod: HCNC

## 2024-12-06 PROCEDURE — 25000003 PHARM REV CODE 250: Mod: HCNC | Performed by: EMERGENCY MEDICINE

## 2024-12-06 PROCEDURE — 25000003 PHARM REV CODE 250: Mod: HCNC | Performed by: INTERNAL MEDICINE

## 2024-12-06 RX ORDER — POTASSIUM CHLORIDE 20 MEQ/1
40 TABLET, EXTENDED RELEASE ORAL
Status: DISPENSED | OUTPATIENT
Start: 2024-12-06 | End: 2024-12-07

## 2024-12-06 RX ORDER — METOPROLOL SUCCINATE 50 MG/1
100 TABLET, EXTENDED RELEASE ORAL DAILY
Status: DISCONTINUED | OUTPATIENT
Start: 2024-12-07 | End: 2024-12-07 | Stop reason: HOSPADM

## 2024-12-06 RX ORDER — METOPROLOL TARTRATE 1 MG/ML
5 INJECTION, SOLUTION INTRAVENOUS EVERY 6 HOURS PRN
Status: DISCONTINUED | OUTPATIENT
Start: 2024-12-06 | End: 2024-12-07 | Stop reason: HOSPADM

## 2024-12-06 RX ORDER — HYDRALAZINE HYDROCHLORIDE 20 MG/ML
10 INJECTION INTRAMUSCULAR; INTRAVENOUS
Status: COMPLETED | OUTPATIENT
Start: 2024-12-06 | End: 2024-12-06

## 2024-12-06 RX ORDER — IBUPROFEN 200 MG
24 TABLET ORAL
Status: DISCONTINUED | OUTPATIENT
Start: 2024-12-06 | End: 2024-12-07 | Stop reason: HOSPADM

## 2024-12-06 RX ORDER — POTASSIUM CHLORIDE 7.45 MG/ML
10 INJECTION INTRAVENOUS
Status: COMPLETED | OUTPATIENT
Start: 2024-12-06 | End: 2024-12-06

## 2024-12-06 RX ORDER — HYDROMORPHONE HYDROCHLORIDE 1 MG/ML
1 INJECTION, SOLUTION INTRAMUSCULAR; INTRAVENOUS; SUBCUTANEOUS EVERY 4 HOURS PRN
Status: DISCONTINUED | OUTPATIENT
Start: 2024-12-06 | End: 2024-12-06 | Stop reason: SDUPTHER

## 2024-12-06 RX ORDER — BISACODYL 10 MG/1
10 SUPPOSITORY RECTAL DAILY PRN
Status: DISCONTINUED | OUTPATIENT
Start: 2024-12-06 | End: 2024-12-07 | Stop reason: HOSPADM

## 2024-12-06 RX ORDER — SODIUM CHLORIDE 9 MG/ML
INJECTION, SOLUTION INTRAVENOUS CONTINUOUS
Status: DISCONTINUED | OUTPATIENT
Start: 2024-12-06 | End: 2024-12-07

## 2024-12-06 RX ORDER — DILTIAZEM HYDROCHLORIDE 180 MG/1
180 CAPSULE, COATED, EXTENDED RELEASE ORAL DAILY
Status: DISCONTINUED | OUTPATIENT
Start: 2024-12-07 | End: 2024-12-07 | Stop reason: HOSPADM

## 2024-12-06 RX ORDER — DILTIAZEM HYDROCHLORIDE 5 MG/ML
20 INJECTION INTRAVENOUS
Status: COMPLETED | OUTPATIENT
Start: 2024-12-06 | End: 2024-12-06

## 2024-12-06 RX ORDER — GLUCAGON 1 MG
1 KIT INJECTION
Status: DISCONTINUED | OUTPATIENT
Start: 2024-12-06 | End: 2024-12-07 | Stop reason: HOSPADM

## 2024-12-06 RX ORDER — PANTOPRAZOLE SODIUM 40 MG/10ML
40 INJECTION, POWDER, LYOPHILIZED, FOR SOLUTION INTRAVENOUS DAILY
Status: DISCONTINUED | OUTPATIENT
Start: 2024-12-07 | End: 2024-12-07 | Stop reason: HOSPADM

## 2024-12-06 RX ORDER — IBUPROFEN 200 MG
16 TABLET ORAL
Status: DISCONTINUED | OUTPATIENT
Start: 2024-12-06 | End: 2024-12-07 | Stop reason: HOSPADM

## 2024-12-06 RX ORDER — LEVALBUTEROL INHALATION SOLUTION 0.63 MG/3ML
1.25 SOLUTION RESPIRATORY (INHALATION) EVERY 6 HOURS PRN
Status: DISCONTINUED | OUTPATIENT
Start: 2024-12-06 | End: 2024-12-07 | Stop reason: HOSPADM

## 2024-12-06 RX ORDER — HYDROMORPHONE HYDROCHLORIDE 2 MG/ML
1 INJECTION, SOLUTION INTRAMUSCULAR; INTRAVENOUS; SUBCUTANEOUS EVERY 4 HOURS PRN
Status: DISCONTINUED | OUTPATIENT
Start: 2024-12-07 | End: 2024-12-07 | Stop reason: HOSPADM

## 2024-12-06 RX ORDER — PROMETHAZINE HYDROCHLORIDE 25 MG/ML
25 INJECTION, SOLUTION INTRAMUSCULAR; INTRAVENOUS EVERY 6 HOURS PRN
Status: DISCONTINUED | OUTPATIENT
Start: 2024-12-06 | End: 2024-12-07 | Stop reason: HOSPADM

## 2024-12-06 RX ORDER — ENOXAPARIN SODIUM 100 MG/ML
1 INJECTION SUBCUTANEOUS EVERY 12 HOURS
Status: DISCONTINUED | OUTPATIENT
Start: 2024-12-06 | End: 2024-12-07 | Stop reason: HOSPADM

## 2024-12-06 RX ORDER — AMOXICILLIN 250 MG
1 CAPSULE ORAL 2 TIMES DAILY
Status: DISCONTINUED | OUTPATIENT
Start: 2024-12-06 | End: 2024-12-07 | Stop reason: HOSPADM

## 2024-12-06 RX ORDER — ACETAMINOPHEN 325 MG/1
650 TABLET ORAL EVERY 6 HOURS PRN
Status: DISCONTINUED | OUTPATIENT
Start: 2024-12-06 | End: 2024-12-07 | Stop reason: HOSPADM

## 2024-12-06 RX ORDER — NALOXONE HCL 0.4 MG/ML
0.02 VIAL (ML) INJECTION
Status: DISCONTINUED | OUTPATIENT
Start: 2024-12-06 | End: 2024-12-07 | Stop reason: HOSPADM

## 2024-12-06 RX ORDER — SODIUM CHLORIDE 0.9 % (FLUSH) 0.9 %
10 SYRINGE (ML) INJECTION EVERY 12 HOURS PRN
Status: DISCONTINUED | OUTPATIENT
Start: 2024-12-06 | End: 2024-12-07 | Stop reason: HOSPADM

## 2024-12-06 RX ORDER — ONDANSETRON HYDROCHLORIDE 2 MG/ML
4 INJECTION, SOLUTION INTRAVENOUS
Status: COMPLETED | OUTPATIENT
Start: 2024-12-06 | End: 2024-12-06

## 2024-12-06 RX ORDER — INSULIN ASPART 100 [IU]/ML
0-10 INJECTION, SOLUTION INTRAVENOUS; SUBCUTANEOUS EVERY 6 HOURS PRN
Status: DISCONTINUED | OUTPATIENT
Start: 2024-12-06 | End: 2024-12-07 | Stop reason: HOSPADM

## 2024-12-06 RX ORDER — ONDANSETRON HYDROCHLORIDE 2 MG/ML
4 INJECTION, SOLUTION INTRAVENOUS EVERY 6 HOURS PRN
Status: DISCONTINUED | OUTPATIENT
Start: 2024-12-06 | End: 2024-12-07 | Stop reason: HOSPADM

## 2024-12-06 RX ORDER — HYDRALAZINE HYDROCHLORIDE 20 MG/ML
20 INJECTION INTRAMUSCULAR; INTRAVENOUS
Status: COMPLETED | OUTPATIENT
Start: 2024-12-06 | End: 2024-12-06

## 2024-12-06 RX ADMIN — SODIUM CHLORIDE: 9 INJECTION, SOLUTION INTRAVENOUS at 11:12

## 2024-12-06 RX ADMIN — SODIUM CHLORIDE 1000 ML: 9 INJECTION, SOLUTION INTRAVENOUS at 05:12

## 2024-12-06 RX ADMIN — POTASSIUM CHLORIDE 10 MEQ: 7.46 INJECTION, SOLUTION INTRAVENOUS at 09:12

## 2024-12-06 RX ADMIN — ENOXAPARIN SODIUM 90 MG: 100 INJECTION SUBCUTANEOUS at 11:12

## 2024-12-06 RX ADMIN — DILTIAZEM HYDROCHLORIDE 20 MG: 5 INJECTION INTRAVENOUS at 06:12

## 2024-12-06 RX ADMIN — HUMAN INSULIN 8 UNITS: 100 INJECTION, SOLUTION SUBCUTANEOUS at 08:12

## 2024-12-06 RX ADMIN — ONDANSETRON 4 MG: 2 INJECTION INTRAMUSCULAR; INTRAVENOUS at 04:12

## 2024-12-06 RX ADMIN — HYDRALAZINE HYDROCHLORIDE 10 MG: 20 INJECTION INTRAMUSCULAR; INTRAVENOUS at 10:12

## 2024-12-06 RX ADMIN — HYDRALAZINE HYDROCHLORIDE 20 MG: 20 INJECTION INTRAMUSCULAR; INTRAVENOUS at 04:12

## 2024-12-06 RX ADMIN — HYDROMORPHONE HYDROCHLORIDE 1 MG: 2 INJECTION INTRAMUSCULAR; INTRAVENOUS; SUBCUTANEOUS at 11:12

## 2024-12-06 NOTE — ED PROVIDER NOTES
Encounter Date: 12/6/2024    SCRIBE #1 NOTE: I, Jean-Claude Guzmán, am scribing for, and in the presence of,  Wilfredo Ny Jr., MD.       History     Chief Complaint   Patient presents with    Abdominal Pain     Per ems pt has had abd pain N/V x 4 days. Ems states she her abd is hard and tender to touch. Pt has not had BM since before Monday.      HPI  84-year-old white female presents to the emergency department with a friend.  The patient has has been having nausea vomiting and some intermittent abdominal pain.  Patient has a very poor historian and notes that her pain is only with moving.  Patient appears to be significantly altered and this is confirmed by her friend who notes that she is normally much more responsive and her behaviors usually normal.  Patient does note some mild retro-orbital headache on the left.  She denies trauma.  There were no fevers or chills.  She was seen at urgent care earlier this week for an unknown issue.  There is no alcohol or drug use.  Patient's friend notes she has not had a bowel movement in his several days.    Review of patient's allergies indicates:  No Known Allergies  Past Medical History:   Diagnosis Date    *Atrial fibrillation     Abdominal wall seroma 1/16/2019    Seroma developed after her hernia repair in 2012.  Please review the notes in the Care everywhere section of the chart.  There is no luis evidence of infection at this time.  I would recommend checking a erythrocyte sedimentation rate and C reactive protein.  These are significant elevated then aspiration of the seroma fluid would be warranted.  If not the seroma does not require any intervention    Coronary artery disease involving native coronary artery of native heart with angina pectoris 4/10/2018    Diabetes mellitus     DM (diabetes mellitus) 2002     09/06/2017 no medication    Hyperlipidemia     Hypertension     Morbid (severe) obesity due to excess calories 7/23/2013    Obesity (BMI 30-39.9)  2/24/2015    Obstructive sleep apnea      Past Surgical History:   Procedure Laterality Date    CATARACT EXTRACTION Left 12/15/2021    distance    CHOLECYSTECTOMY      COLON SURGERY      HYSTERECTOMY       Family History   Problem Relation Name Age of Onset    Diabetes Sister      Hypertension Sister      Hypertension Mother      Diabetes Sister      COPD Neg Hx      Cancer Neg Hx      Heart disease Neg Hx      Hyperlipidemia Neg Hx      Stroke Neg Hx       Social History     Tobacco Use    Smoking status: Never    Smokeless tobacco: Never   Substance Use Topics    Alcohol use: No    Drug use: No     Review of Systems   Unable to perform ROS: Mental status change       Physical Exam     Initial Vitals   BP Pulse Resp Temp SpO2   12/06/24 1512 12/06/24 1512 12/06/24 1512 12/06/24 1633 12/06/24 1512   (!) 188/120 106 20 96.5 °F (35.8 °C) 98 %      MAP       --                Physical Exam  Nursing Notes and Vital Signs Reviewed.  Constitutional: Patient is in no acute distress. Well-developed and well-nourished.  Patient has very poor memory.  She is uncooperative with the exam.  There is some vomitus on her clothing.  Head: Atraumatic. Normocephalic.  Eyes:  EOM intact.  No scleral icterus.  No enophthalmos or exophthalmos.  ENT: Mucous membranes are moist.  Nares clear .  0 P is clear  Neck:  Full ROM. No JVD.  Cardiovascular: Regular rate. Regular rhythm No murmurs, rubs, or gallops. Distal pulses are 2+ and symmetric  Pulmonary/Chest: No respiratory distress. Clear to auscultation bilaterally. No wheezing or rales.  Equal chest wall rise bilaterally  Abdominal: Soft and non-distended.  There is no tenderness.  No rebound, guarding, or rigidity. Good bowel sounds.  Genitourinary: No CVA tenderness.  No suprapubic tenderness  Musculoskeletal: Moves all extremities. No obvious deformities.  5 x 5 strength in all extremities   Skin: Warm and dry.  Neurological:  Patient appears to be confused and has poor ability to  interact.  She does not answer questions appropriately intermittently.    Psychiatric:  Unable to assess    ED Course   Critical Care    Date/Time: 12/6/2024 6:41 PM    Performed by: Wilfredo Ny Jr., MD  Authorized by: Wilfredo Ny Jr., MD  Direct patient critical care time: 11 minutes  Additional history critical care time: 8 minutes  Ordering / reviewing critical care time: 10 minutes  Documentation critical care time: 12 minutes  Consulting other physicians critical care time: 4 minutes  Consult with family critical care time: 5 minutes  Total critical care time (exclusive of procedural time) : 50 minutes  Critical care time was exclusive of separately billable procedures and treating other patients and teaching time.  Critical care was necessary to treat or prevent imminent or life-threatening deterioration of the following conditions: CNS failure or compromise, circulatory failure and cardiac failure.  Critical care was time spent personally by me on the following activities: development of treatment plan with patient or surrogate, discussions with consultants, interpretation of cardiac output measurements, evaluation of patient's response to treatment, examination of patient, obtaining history from patient or surrogate, ordering and review of laboratory studies, ordering and performing treatments and interventions, ordering and review of radiographic studies, pulse oximetry, re-evaluation of patient's condition and review of old charts.        Labs Reviewed   CBC W/ AUTO DIFFERENTIAL - Abnormal       Result Value    WBC 12.57      RBC 4.37      Hemoglobin 13.6      Hematocrit 39.4      MCV 90      MCH 31.1 (*)     MCHC 34.5      RDW 13.5      Platelets 166      MPV 12.7      Immature Granulocytes 0.5      Gran # (ANC) 11.4 (*)     Immature Grans (Abs) 0.06 (*)     Lymph # 0.4 (*)     Mono # 0.7      Eos # 0.0      Baso # 0.02      nRBC 0      Gran % 90.4 (*)     Lymph % 3.3 (*)     Mono % 5.6       Eosinophil % 0.0      Basophil % 0.2      Differential Method Automated     COMPREHENSIVE METABOLIC PANEL - Abnormal    Sodium 129 (*)     Potassium 3.1 (*)     Chloride 87 (*)     CO2 27      Glucose 311 (*)     BUN 18      Creatinine 1.5 (*)     Calcium 9.0      Total Protein 7.3      Albumin 3.1 (*)     Total Bilirubin 1.8 (*)     Alkaline Phosphatase 298 (*)     AST 54 (*)     ALT 58 (*)     eGFR 35 (*)     Anion Gap 15     TROPONIN I - Abnormal    Troponin I 0.128 (*)    URINALYSIS, REFLEX TO URINE CULTURE - Abnormal    Specimen UA Urine, Catheterized      Color, UA Yellow      Appearance, UA Clear      pH, UA 7.0      Specific Gravity, UA 1.010      Protein, UA Trace (*)     Glucose, UA 3+ (*)     Ketones, UA 1+ (*)     Bilirubin (UA) Negative      Occult Blood UA Negative      Nitrite, UA Negative      Urobilinogen, UA Negative      Leukocytes, UA Negative      Narrative:     Specimen Source->Urine   URINALYSIS MICROSCOPIC - Abnormal    RBC, UA 2      WBC, UA 3      WBC Clumps, UA Occasional (*)     Bacteria Rare      Yeast, UA None      Hyaline Casts, UA 4 (*)     Microscopic Comment SEE COMMENT      Narrative:     Specimen Source->Urine   POCT GLUCOSE - Abnormal    POCT Glucose 344 (*)    POCT GLUCOSE - Abnormal    POCT Glucose 359 (*)    DRUG SCREEN PANEL, URINE EMERGENCY    Benzodiazepines Negative      Methadone metabolites Negative      Cocaine (Metab.) Negative      Opiate Scrn, Ur Negative      Barbiturate Screen, Ur Negative      Amphetamine Screen, Ur Negative      THC Negative      Phencyclidine Negative      Creatinine, Urine 52.8      Toxicology Information SEE COMMENT      Narrative:     Specimen Source->Urine   LIPASE    Lipase 44     TSH    TSH 0.582     LACTIC ACID, PLASMA    Lactate (Lactic Acid) 1.9     ALCOHOL,MEDICAL (ETHANOL)    Alcohol, Serum <10     AMMONIA    Ammonia 27     MAGNESIUM   POCT GLUCOSE MONITORING CONTINUOUS          Imaging Results              CT Abdomen Pelvis  Without  Contrast (Final result)  Result time 12/06/24 19:56:54      Final result by Lea Brownlee MD (Timothy) (12/06/24 19:56:54)                   Impression:      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    Large complex stable fluid collection related to anterior abdominal wall hernia mesh.    All CT scans at this facility use dose modulation, iterative reconstructions, and/or weight base dosing when appropriate to reduce radiation dose to as low as reasonably achievable      Electronically signed by: Lea Brownlee MD  Date:    12/06/2024  Time:    19:56               Narrative:    EXAMINATION:  CT ABDOMEN PELVIS WITHOUT CONTRAST    CLINICAL HISTORY:  Abdominal abscess/infection suspected;Abdominal pain, acute, nonlocalized;    TECHNIQUE:  Noncontrast images were obtained.    COMPARISON:  CT abdomen pelvis, 12/21/2019.    FINDINGS:  Lung bases are clear    Multiple low-density masses involving the liver suspicious for metastatic disease.  There also appears to be a low-density ill-defined mass involving the pancreatic tail which could represent primary adenocarcinoma.  This extends to the region of the splenic hilum.  No definite splenic lesions.    Previous cholecystectomy.  No bile duct dilatation.    The kidneys are normal.  No hydronephrosis.  No masses.  No stones    The aorta and inferior vena cava are unremarkable.    There are no acute bowel abnormalities.     No evidence of appendicitis.  No evidence of diverticulitis.    Previous anterior abdominal wall hernia repair.  The large complex fluid collection related to the hernia mesh measuring 17 x 15 cm on axial images    Bladder is normal. No abnormal masses or fluid collections in the pelvis.    The                                       CT Head Without Contrast (Final result)  Result time 12/06/24 17:49:52      Final result by Lea Brownlee MD (Timothy) (12/06/24 17:49:52)                   Impression:      No acute intracranial  abnormality.    All CT scans at this facility use dose modulation, iterative reconstructions, and/or weight base dosing when appropriate to reduce radiation dose to as low as reasonably achievable.      Electronically signed by: Lea Brownlee MD  Date:    12/06/2024  Time:    17:49               Narrative:    EXAMINATION:  CT HEAD WITHOUT CONTRAST    CLINICAL HISTORY:  Mental status change, unknown cause;    TECHNIQUE:  Noncontrast images were obtained    COMPARISON:  None    FINDINGS:  No intracranial acute hemorrhage or acute focal brain parenchymal abnormality is identified.  Mild atrophy.  Calvarium is intact.  Paranasal sinuses are clear.                                       X-Ray Chest AP Portable (Final result)  Result time 12/06/24 17:48:16      Final result by Lea Brownlee (Jalil), MD (12/06/24 17:48:16)                   Impression:      No definite infiltrates.      Electronically signed by: Lea Brownlee MD  Date:    12/06/2024  Time:    17:48               Narrative:    EXAMINATION:  XR CHEST AP PORTABLE    CLINICAL HISTORY:  < altered mental status    COMPARISON:  Chest, 05/09/2024.    FINDINGS:  One view.  Mild cardiomegaly.  No definite infiltrates.  Patient is rotated to left.                                       Medications   potassium chloride SA CR tablet 40 mEq (40 mEq Oral Not Given 12/6/24 2030)   potassium chloride 10 mEq in 100 mL IVPB (has no administration in time range)   hydrALAZINE injection 20 mg (20 mg Intravenous Given 12/6/24 1633)   ondansetron injection 4 mg (4 mg Intravenous Given 12/6/24 1633)   sodium chloride 0.9% bolus 1,000 mL 1,000 mL (0 mLs Intravenous Stopped 12/6/24 1804)   diltiaZEM injection 20 mg (20 mg Intravenous Given 12/6/24 1855)   insulin regular injection 8 Units 0.08 mL (8 Units Subcutaneous Given 12/6/24 2052)       Results for orders placed or performed during the hospital encounter of 12/06/24   POCT glucose    Collection Time: 12/06/24  4:16 PM    Result Value Ref Range    POCT Glucose 344 (H) 70 - 110 mg/dL   Urinalysis, Reflex to Urine Culture Urine, Catheterized    Collection Time: 12/06/24  4:34 PM    Specimen: Urine, Clean Catch   Result Value Ref Range    Specimen UA Urine, Catheterized     Color, UA Yellow Yellow, Straw, Vanesa    Appearance, UA Clear Clear    pH, UA 7.0 5.0 - 8.0    Specific Gravity, UA 1.010 1.005 - 1.030    Protein, UA Trace (A) Negative    Glucose, UA 3+ (A) Negative    Ketones, UA 1+ (A) Negative    Bilirubin (UA) Negative Negative    Occult Blood UA Negative Negative    Nitrite, UA Negative Negative    Urobilinogen, UA Negative <2.0 EU/dL    Leukocytes, UA Negative Negative   Urinalysis Microscopic    Collection Time: 12/06/24  4:34 PM   Result Value Ref Range    RBC, UA 2 0 - 4 /hpf    WBC, UA 3 0 - 5 /hpf    WBC Clumps, UA Occasional (A) None-Rare    Bacteria Rare None-Occ /hpf    Yeast, UA None None    Hyaline Casts, UA 4 (A) 0-1/lpf /lpf    Microscopic Comment SEE COMMENT    Drug screen panel, emergency    Collection Time: 12/06/24  4:36 PM   Result Value Ref Range    Benzodiazepines Negative Negative    Methadone metabolites Negative Negative    Cocaine (Metab.) Negative Negative    Opiate Scrn, Ur Negative Negative    Barbiturate Screen, Ur Negative Negative    Amphetamine Screen, Ur Negative Negative    THC Negative Negative    Phencyclidine Negative Negative    Creatinine, Urine 52.8 15.0 - 325.0 mg/dL    Toxicology Information SEE COMMENT    CBC auto differential    Collection Time: 12/06/24  4:54 PM   Result Value Ref Range    WBC 12.57 3.90 - 12.70 K/uL    RBC 4.37 4.00 - 5.40 M/uL    Hemoglobin 13.6 12.0 - 16.0 g/dL    Hematocrit 39.4 37.0 - 48.5 %    MCV 90 82 - 98 fL    MCH 31.1 (H) 27.0 - 31.0 pg    MCHC 34.5 32.0 - 36.0 g/dL    RDW 13.5 11.5 - 14.5 %    Platelets 166 150 - 450 K/uL    MPV 12.7 9.2 - 12.9 fL    Immature Granulocytes 0.5 0.0 - 0.5 %    Gran # (ANC) 11.4 (H) 1.8 - 7.7 K/uL    Immature Grans (Abs)  0.06 (H) 0.00 - 0.04 K/uL    Lymph # 0.4 (L) 1.0 - 4.8 K/uL    Mono # 0.7 0.3 - 1.0 K/uL    Eos # 0.0 0.0 - 0.5 K/uL    Baso # 0.02 0.00 - 0.20 K/uL    nRBC 0 0 /100 WBC    Gran % 90.4 (H) 38.0 - 73.0 %    Lymph % 3.3 (L) 18.0 - 48.0 %    Mono % 5.6 4.0 - 15.0 %    Eosinophil % 0.0 0.0 - 8.0 %    Basophil % 0.2 0.0 - 1.9 %    Differential Method Automated    Comprehensive metabolic panel    Collection Time: 12/06/24  4:54 PM   Result Value Ref Range    Sodium 129 (L) 136 - 145 mmol/L    Potassium 3.1 (L) 3.5 - 5.1 mmol/L    Chloride 87 (L) 95 - 110 mmol/L    CO2 27 23 - 29 mmol/L    Glucose 311 (H) 70 - 110 mg/dL    BUN 18 8 - 23 mg/dL    Creatinine 1.5 (H) 0.5 - 1.4 mg/dL    Calcium 9.0 8.7 - 10.5 mg/dL    Total Protein 7.3 6.0 - 8.4 g/dL    Albumin 3.1 (L) 3.5 - 5.2 g/dL    Total Bilirubin 1.8 (H) 0.1 - 1.0 mg/dL    Alkaline Phosphatase 298 (H) 40 - 150 U/L    AST 54 (H) 10 - 40 U/L    ALT 58 (H) 10 - 44 U/L    eGFR 35 (A) >60 mL/min/1.73 m^2    Anion Gap 15 8 - 16 mmol/L   Lipase    Collection Time: 12/06/24  4:54 PM   Result Value Ref Range    Lipase 44 4 - 60 U/L   Troponin I    Collection Time: 12/06/24  4:54 PM   Result Value Ref Range    Troponin I 0.128 (H) 0.000 - 0.026 ng/mL   TSH    Collection Time: 12/06/24  4:54 PM   Result Value Ref Range    TSH 0.582 0.400 - 4.000 uIU/mL   Lactic acid, plasma    Collection Time: 12/06/24  4:54 PM   Result Value Ref Range    Lactate (Lactic Acid) 1.9 0.5 - 2.2 mmol/L   Ethanol    Collection Time: 12/06/24  4:54 PM   Result Value Ref Range    Alcohol, Serum <10 <10 mg/dL   Ammonia    Collection Time: 12/06/24  4:54 PM   Result Value Ref Range    Ammonia 27 10 - 50 umol/L   POCT glucose    Collection Time: 12/06/24  8:52 PM   Result Value Ref Range    POCT Glucose 359 (H) 70 - 110 mg/dL        The EKG was ordered, reviewed, and independently interpreted by the ED provider.  Interpretation time: 16:18  Rate: 92 BPM  Rhythm: atrial fibrillation  Interpretation: Minimal  voltage criteria for LVH, may be normal variant  Marked ST abnormality, possible lateral subendocardial injury  Prolonged QT. No STEMI.    ED Discussion      8:25 PM: Discussed pt's case with Dr. Wood ([facility]) who recommends to  replace potassium, check Mg level, and give insulin for significant   hyperglycemia.    8:25 PM: Discussed case with Dr. Wood ([facility]). Dr. Wood agrees with current care and management of pt and accepts admission.   Admitting Service: [facility]  Admitting Physician: Dr. Wood  Admit to: Inpt tele    8:30 PM: Re-evaluated pt. I have discussed test results, shared treatment plan, and the need for admission with patient and family at bedside. Pt and family express understanding at this time and agree with all information. All questions answered. Pt and family have no further questions or concerns at this time. Pt is ready for admit.            Medical Decision Making  Differential diagnosis: Altered mental status, hypertensive urgency, hypertensive urgency, intracranial hemorrhage, stroke, UTI, pneumonia, electrolyte abnormality, thyroid issue, hypertensive encephalopathy, renal failure    Patient is evaluated with the history and physical examination.  Patient is workup reveals hyperglycemia along with the pancreatic mass with what appears to be Mets.  Likely source of her abdominal pain.  Patient has a elevated LFTs mild hypokalemia and elevated troponin as well.  Patient is being admitted to Hospital Medicine further evaluation and treatment    Amount and/or Complexity of Data Reviewed  Independent Historian: friend     Details: Patient's friend at bedside providing significant history  External Data Reviewed: labs, radiology and notes.     Details: Urgent care notes reviewed.  Patient was seen for constipation earlier this week.  KUB showed no abnormalities.  Baseline labs reviewed  Labs: ordered. Decision-making details documented in ED Course.     Details:  TSH is normal alcohol was undetectable troponin 0.128.  Lipase is normal.  Patient has an elevated LFTs with an elevated blood sugar.  Potassium was 3.1.  Lactate was normal ammonia was normal 12 has a white count with a 13.6 hemoglobin.  UA was negative for infection.  Radiology: ordered. Decision-making details documented in ED Course.     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  Chest x-ray showed no infiltrates  ECG/medicine tests: ordered and independent interpretation performed. Decision-making details documented in ED Course.     Details: Tachycardia.  Multiple ST changes however no STEMI.  Discussion of management or test interpretation with external provider(s): Discussed the case with hospital medicine graciously accepts for admission    Risk  OTC drugs.  Prescription drug management.  Drug therapy requiring intensive monitoring for toxicity.  Decision regarding hospitalization.  Diagnosis or treatment significantly limited by social determinants of health.    Critical Care  Total time providing critical care: 50 minutes            Scribe Attestation:   Scribe #1: I performed the above scribed service and the documentation accurately describes the services I performed. I attest to the accuracy of the note.    Attending Attestation:           Physician Attestation for Scribe:  Physician Attestation Statement for Scribe #1: I, Wilfredo Ny Jr., MD, reviewed documentation, as scribed by Jean-Claude Guzmán in my presence, and it is both accurate and complete.             ED Course as of 12/06/24 2107   Fri Dec 06, 2024   1846 Patient presented in AFib.  She has a accelerated heart rate in the 1 teens.  Will dosed with a Cardizem for AFib RVR [RT]      ED Course User Index  [RT] Wilfredo Ny Jr., MD                           Clinical Impression:  Final diagnoses:  [R41.82] Altered mental status  [I16.1] Hypertensive emergency (Primary)  [R79.89] Troponin level elevated  [I48.91] Atrial  fibrillation with rapid ventricular response          ED Disposition Condition    Admit Stable                Wilfredo Ny Jr., MD  12/06/24 1607

## 2024-12-06 NOTE — PHARMACY MED REC
"Admission Medication History     The home medication history was taken by Ankit Dennison.    You may go to "Admission" then "Reconcile Home Medications" tabs to review and/or act upon these items.     The home medication list has been updated by the Pharmacy department.   Please read ALL comments highlighted in yellow.   Please address this information as you see fit.    Feel free to contact us if you have any questions or require assistance.      Medications listed below were obtained from: Patient/family and Analytic software- [employer]  (Not in a hospital admission)        Ankit Dennison  [id number]    Current Outpatient Medications on File Prior to Encounter   Medication Sig Dispense Refill Last Dose/Taking    atorvastatin (LIPITOR) 40 MG tablet Take 1 tablet (40 mg total) by mouth once daily. 90 tablet 3 12/5/2024    diltiaZEM (CARDIZEM CD) 180 MG 24 hr capsule Take 1 capsule (180 mg total) by mouth once daily. 90 capsule 3 12/5/2024    indapamide (LOZOL) 2.5 MG Tab Take 1 tablet (2.5 mg total) by mouth once daily. Do not take if BP is below 110/70 or feeling dry 90 tablet 1 12/5/2024    losartan (COZAAR) 100 MG tablet Take 1 tablet (100 mg total) by mouth once daily. 90 tablet 1 12/5/2024    metFORMIN (GLUCOPHAGE) 500 MG tablet Take 1 tablet (500 mg total) by mouth daily with breakfast. 90 tablet 3 12/5/2024    metoprolol succinate (TOPROL-XL) 100 MG 24 hr tablet Take 1 tablet (100 mg total) by mouth once daily. 90 tablet 2 12/5/2024    potassium chloride SA (K-DUR,KLOR-CON) 20 MEQ tablet Take 2 tablets (40 mEq total) by mouth once daily. 90 tablet 1 12/5/2024    warfarin (COUMADIN) 6 MG tablet Take 1 tablet (6 mg total) by mouth Daily. Except TAKE ONE HALF TABLET (3 MG) EVERY THURSDAY AS DIRECTED BY THE CC. (Patient taking differently: Take 6 mg by mouth Daily. Except TAKE ONE HALF TABLET (3 MG) EVERY TUESDAY AND THURSDAY AS DIRECTED BY THE CC.) 90 tablet 3 12/5/2024    co-enzyme Q-10 30 mg capsule Take " 30 mg by mouth 3 (three) times daily.       furosemide (LASIX) 40 MG tablet Take 1 tablet (40 mg total) by mouth once daily. Do not take if BP is below 110/70 90 tablet 1     gatifloxacin 0.5 % Drop drops Place 1 drop into the right eye 2 (two) times daily. Eyedrops to start one day before surgery (Patient not taking: Reported on 12/6/2024) 5 mL 2 Not Taking    ketorolac 0.5% (ACULAR) 0.5 % Drop Place 1 drop into the right eye 4 (four) times daily. Eyedrops to start one day before surgery (Patient not taking: Reported on 12/6/2024) 5 mL 2 Not Taking    magnesium oxide (MAG-OX) 400 mg (241.3 mg magnesium) tablet Take 1 tablet (400 mg total) by mouth 2 (two) times daily. 90 tablet 1     niacin 50 MG Tab Take 100 mg by mouth every evening.       nitroGLYCERIN (NITROSTAT) 0.4 MG SL tablet Place 1 tablet (0.4 mg total) under the tongue every 5 (five) minutes as needed for Chest pain. Take up to 3 times 5 min apart 30 tablet 0     ondansetron (ZOFRAN-ODT) 8 MG TbDL Take 1 tablet (8 mg total) by mouth every 12 (twelve) hours as needed (nausea). 20 tablet 0     prednisoLONE acetate (PRED FORTE) 1 % DrpS Place 1 drop into the right eye 4 (four) times daily. Eyedrops to start one day before surgery (Patient not taking: Reported on 12/6/2024) 5 mL 2 Not Taking    vitamin D (VITAMIN D3) 1000 units Tab Take 1,000 Units by mouth once daily.                              .

## 2024-12-07 VITALS
HEART RATE: 89 BPM | OXYGEN SATURATION: 94 % | RESPIRATION RATE: 14 BRPM | BODY MASS INDEX: 33.57 KG/M2 | HEIGHT: 65 IN | SYSTOLIC BLOOD PRESSURE: 162 MMHG | TEMPERATURE: 98 F | DIASTOLIC BLOOD PRESSURE: 70 MMHG | WEIGHT: 201.5 LBS

## 2024-12-07 PROBLEM — Z51.5 PALLIATIVE CARE ENCOUNTER: Status: ACTIVE | Noted: 2024-12-07

## 2024-12-07 PROBLEM — I48.91 ATRIAL FIBRILLATION WITH RAPID VENTRICULAR RESPONSE: Status: ACTIVE | Noted: 2024-12-07

## 2024-12-07 PROBLEM — C78.7 SECONDARY LIVER CANCER: Status: ACTIVE | Noted: 2024-12-07

## 2024-12-07 LAB
AFP SERPL-MCNC: 2.8 NG/ML (ref 0–8.4)
ALBUMIN SERPL BCP-MCNC: 3 G/DL (ref 3.5–5.2)
ALP SERPL-CCNC: 270 U/L (ref 40–150)
ALT SERPL W/O P-5'-P-CCNC: 52 U/L (ref 10–44)
ANION GAP SERPL CALC-SCNC: 14 MMOL/L (ref 8–16)
AST SERPL-CCNC: 45 U/L (ref 10–40)
BASOPHILS # BLD AUTO: 0.02 K/UL (ref 0–0.2)
BASOPHILS NFR BLD: 0.2 % (ref 0–1.9)
BILIRUB SERPL-MCNC: 1.3 MG/DL (ref 0.1–1)
BUN SERPL-MCNC: 17 MG/DL (ref 8–23)
CALCIUM SERPL-MCNC: 8.7 MG/DL (ref 8.7–10.5)
CANCER AG19-9 SERPL-ACNC: ABNORMAL U/ML (ref 0–40)
CEA SERPL-MCNC: 7.9 NG/ML (ref 0–5)
CHLORIDE SERPL-SCNC: 94 MMOL/L (ref 95–110)
CO2 SERPL-SCNC: 24 MMOL/L (ref 23–29)
CREAT SERPL-MCNC: 1.2 MG/DL (ref 0.5–1.4)
DIFFERENTIAL METHOD BLD: ABNORMAL
EOSINOPHIL # BLD AUTO: 0 K/UL (ref 0–0.5)
EOSINOPHIL NFR BLD: 0.1 % (ref 0–8)
ERYTHROCYTE [DISTWIDTH] IN BLOOD BY AUTOMATED COUNT: 13.7 % (ref 11.5–14.5)
EST. GFR  (NO RACE VARIABLE): 45 ML/MIN/1.73 M^2
ESTIMATED AVG GLUCOSE: 194 MG/DL (ref 68–131)
GLUCOSE SERPL-MCNC: 228 MG/DL (ref 70–110)
HBA1C MFR BLD: 8.4 % (ref 4–5.6)
HCT VFR BLD AUTO: 40.9 % (ref 37–48.5)
HGB BLD-MCNC: 13.6 G/DL (ref 12–16)
IMM GRANULOCYTES # BLD AUTO: 0.05 K/UL (ref 0–0.04)
IMM GRANULOCYTES NFR BLD AUTO: 0.5 % (ref 0–0.5)
LYMPHOCYTES # BLD AUTO: 0.9 K/UL (ref 1–4.8)
LYMPHOCYTES NFR BLD: 7.7 % (ref 18–48)
MAGNESIUM SERPL-MCNC: 1.9 MG/DL (ref 1.6–2.6)
MCH RBC QN AUTO: 30.4 PG (ref 27–31)
MCHC RBC AUTO-ENTMCNC: 33.3 G/DL (ref 32–36)
MCV RBC AUTO: 92 FL (ref 82–98)
MONOCYTES # BLD AUTO: 0.7 K/UL (ref 0.3–1)
MONOCYTES NFR BLD: 5.9 % (ref 4–15)
NEUTROPHILS # BLD AUTO: 9.5 K/UL (ref 1.8–7.7)
NEUTROPHILS NFR BLD: 85.6 % (ref 38–73)
NRBC BLD-RTO: 0 /100 WBC
OHS QRS DURATION: 86 MS
OHS QTC CALCULATION: 499 MS
PHOSPHATE SERPL-MCNC: 3.7 MG/DL (ref 2.7–4.5)
PLATELET # BLD AUTO: 172 K/UL (ref 150–450)
PMV BLD AUTO: 12.2 FL (ref 9.2–12.9)
POCT GLUCOSE: 179 MG/DL (ref 70–110)
POCT GLUCOSE: 236 MG/DL (ref 70–110)
POCT GLUCOSE: 313 MG/DL (ref 70–110)
POTASSIUM SERPL-SCNC: 3.1 MMOL/L (ref 3.5–5.1)
PROT SERPL-MCNC: 6.9 G/DL (ref 6–8.4)
RBC # BLD AUTO: 4.47 M/UL (ref 4–5.4)
SODIUM SERPL-SCNC: 132 MMOL/L (ref 136–145)
T3 SERPL-MCNC: 52 NG/DL (ref 60–180)
T4 FREE SERPL-MCNC: 1.37 NG/DL (ref 0.71–1.51)
TROPONIN I SERPL DL<=0.01 NG/ML-MCNC: 0.1 NG/ML (ref 0–0.03)
TROPONIN I SERPL DL<=0.01 NG/ML-MCNC: 0.12 NG/ML (ref 0–0.03)
WBC # BLD AUTO: 11.11 K/UL (ref 3.9–12.7)

## 2024-12-07 PROCEDURE — 99223 1ST HOSP IP/OBS HIGH 75: CPT | Mod: HCNC,,, | Performed by: INTERNAL MEDICINE

## 2024-12-07 PROCEDURE — 63600175 PHARM REV CODE 636 W HCPCS: Mod: HCNC | Performed by: STUDENT IN AN ORGANIZED HEALTH CARE EDUCATION/TRAINING PROGRAM

## 2024-12-07 PROCEDURE — 80053 COMPREHEN METABOLIC PANEL: CPT | Mod: HCNC | Performed by: INTERNAL MEDICINE

## 2024-12-07 PROCEDURE — 83735 ASSAY OF MAGNESIUM: CPT | Mod: HCNC | Performed by: INTERNAL MEDICINE

## 2024-12-07 PROCEDURE — 99900035 HC TECH TIME PER 15 MIN (STAT): Mod: HCNC

## 2024-12-07 PROCEDURE — 94761 N-INVAS EAR/PLS OXIMETRY MLT: CPT | Mod: HCNC

## 2024-12-07 PROCEDURE — 84484 ASSAY OF TROPONIN QUANT: CPT | Mod: HCNC | Performed by: INTERNAL MEDICINE

## 2024-12-07 PROCEDURE — 63600175 PHARM REV CODE 636 W HCPCS: Mod: HCNC | Performed by: INTERNAL MEDICINE

## 2024-12-07 PROCEDURE — 85025 COMPLETE CBC W/AUTO DIFF WBC: CPT | Mod: HCNC | Performed by: INTERNAL MEDICINE

## 2024-12-07 PROCEDURE — 25000003 PHARM REV CODE 250: Mod: HCNC | Performed by: INTERNAL MEDICINE

## 2024-12-07 PROCEDURE — 36415 COLL VENOUS BLD VENIPUNCTURE: CPT | Mod: HCNC | Performed by: INTERNAL MEDICINE

## 2024-12-07 PROCEDURE — 84100 ASSAY OF PHOSPHORUS: CPT | Mod: HCNC | Performed by: INTERNAL MEDICINE

## 2024-12-07 RX ORDER — POTASSIUM CHLORIDE 7.45 MG/ML
10 INJECTION INTRAVENOUS
Status: COMPLETED | OUTPATIENT
Start: 2024-12-07 | End: 2024-12-07

## 2024-12-07 RX ORDER — SODIUM CHLORIDE, SODIUM LACTATE, POTASSIUM CHLORIDE, CALCIUM CHLORIDE 600; 310; 30; 20 MG/100ML; MG/100ML; MG/100ML; MG/100ML
INJECTION, SOLUTION INTRAVENOUS CONTINUOUS
Status: DISCONTINUED | OUTPATIENT
Start: 2024-12-07 | End: 2024-12-07 | Stop reason: HOSPADM

## 2024-12-07 RX ADMIN — PANTOPRAZOLE SODIUM 40 MG: 40 INJECTION, POWDER, LYOPHILIZED, FOR SOLUTION INTRAVENOUS at 09:12

## 2024-12-07 RX ADMIN — POTASSIUM CHLORIDE 10 MEQ: 7.46 INJECTION, SOLUTION INTRAVENOUS at 02:12

## 2024-12-07 RX ADMIN — POTASSIUM CHLORIDE 10 MEQ: 7.46 INJECTION, SOLUTION INTRAVENOUS at 01:12

## 2024-12-07 RX ADMIN — INSULIN ASPART 4 UNITS: 100 INJECTION, SOLUTION INTRAVENOUS; SUBCUTANEOUS at 12:12

## 2024-12-07 RX ADMIN — INSULIN ASPART 4 UNITS: 100 INJECTION, SOLUTION INTRAVENOUS; SUBCUTANEOUS at 05:12

## 2024-12-07 RX ADMIN — SODIUM CHLORIDE: 9 INJECTION, SOLUTION INTRAVENOUS at 12:12

## 2024-12-07 RX ADMIN — ENOXAPARIN SODIUM 90 MG: 100 INJECTION SUBCUTANEOUS at 09:12

## 2024-12-07 RX ADMIN — INSULIN ASPART 2 UNITS: 100 INJECTION, SOLUTION INTRAVENOUS; SUBCUTANEOUS at 12:12

## 2024-12-07 RX ADMIN — HYDROMORPHONE HYDROCHLORIDE 1 MG: 2 INJECTION INTRAMUSCULAR; INTRAVENOUS; SUBCUTANEOUS at 04:12

## 2024-12-07 RX ADMIN — SODIUM CHLORIDE, POTASSIUM CHLORIDE, SODIUM LACTATE AND CALCIUM CHLORIDE: 600; 310; 30; 20 INJECTION, SOLUTION INTRAVENOUS at 01:12

## 2024-12-07 NOTE — PT/OT/SLP PROGRESS
Speech Language Pathology      Natalie Greene  MRN: [id number]    Patient not seen today secondary to Patient unwilling to participate. Will follow-up at a later time/date.    Lisa Gaspar CCC-SLP  12/7/2024

## 2024-12-07 NOTE — PROGRESS NOTES
'[facility] [place] [facility]\"")  Hematology/Oncology  Progress Note    Patient Name: Natalie Greene  Admission Date: 12/6/2024  Hospital Length of Stay: 1 days  Code Status: DNR     Subjective:     HPI:  84-year-old white woman with history of atrial fibrillation on Coumadin, hypertension, hyperlipidemia, non-insulin-dependent diabetes mellitus type 2, colon cancer, aortic atherosclerosis, obstructive sleep apnea, periodic limb movement disorder, pulmonary hypertension, coronary artery disease, osteoporosis, and obesity who presents to the emergency department with complaint of abdominal pain over the last week.  Abdominal pain is generalized in nature, constant, severe, worse in the upper quadrant on exam, associated with nausea and vomiting   CT abdomen on 12/6/2024: 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 Large complex stable fluid collection related to anterior abdominal wall hernia mesh    Patient is lying in bed sleeping, , sister, son and daughter in law in the room  Hem/onc consulted to assisst with goals of care management    Medications:  Continuous Infusions:   0.9% NaCl   Intravenous Continuous 75 mL/hr at 12/07/24 0735 Rate Verify at 12/07/24 0735     Scheduled Meds:   diltiaZEM  180 mg Oral Daily    enoxparin  1 mg/kg Subcutaneous Q12H (prophylaxis, 0900/2100)    metoprolol succinate  100 mg Oral Daily    pantoprazole  40 mg Intravenous Daily    senna-docusate 8.6-50 mg  1 tablet Oral BID     PRN Meds:  Current Facility-Administered Medications:     acetaminophen, 650 mg, Oral, Q6H PRN    bisacodyL, 10 mg, Rectal, Daily PRN    dextrose 10%, 12.5 g, Intravenous, PRN    dextrose 10%, 12.5 g, Intravenous, PRN    dextrose 10%, 25 g, Intravenous, PRN    dextrose 10%, 25 g, Intravenous, PRN    glucagon (human recombinant), 1 mg, Intramuscular, PRN    glucagon (human recombinant), 1 mg, Intramuscular, PRN    glucose, 16 g, Oral, PRN    glucose, 24 g, Oral, PRN    HYDROmorphone, 1 mg,  Intravenous, Q4H PRN    insulin aspart U-100, 0-10 Units, Subcutaneous, Q6H PRN    levalbuterol, 1.2495 mg, Nebulization, Q6H PRN    metoprolol, 5 mg, Intravenous, Q6H PRN    naloxone, 0.02 mg, Intravenous, PRN    ondansetron, 4 mg, Intravenous, Q6H PRN    promethazine, 25 mg, Intramuscular, Q6H PRN    sodium chloride 0.9%, 10 mL, Intravenous, Q12H PRN     Review of Systems   Constitutional:  Positive for appetite change, fatigue and unexpected weight change.   HENT:  Negative for mouth sores.    Eyes:  Negative for visual disturbance.   Respiratory:  Negative for cough and shortness of breath.    Cardiovascular:  Negative for chest pain.   Gastrointestinal:  Positive for abdominal pain. Negative for diarrhea.   Genitourinary:  Negative for frequency.   Musculoskeletal:  Negative for back pain.   Skin:  Negative for rash.   Neurological:  Positive for weakness. Negative for headaches.   Hematological:  Negative for adenopathy.   Psychiatric/Behavioral:  The patient is not nervous/anxious.    All other systems reviewed and are negative.    Objective:     Vital Signs (Most Recent):  Temp: 97.4 °F (36.3 °C) (12/07/24 0806)  Pulse: 95 (12/07/24 0806)  Resp: 18 (12/07/24 0806)  BP: (!) 147/65 (12/07/24 0806)  SpO2: 97 % (12/07/24 0806) Vital Signs (24h Range):  Temp:  [96.5 °F (35.8 °C)-97.9 °F (36.6 °C)] 97.4 °F (36.3 °C)  Pulse:  [] 95  Resp:  [15-21] 18  SpO2:  [95 %-98 %] 97 %  BP: (136-205)/() 147/65     Weight: 91.4 kg (201 lb 8 oz)  Body mass index is 33.53 kg/m².  Body surface area is 2.05 meters squared.      Intake/Output Summary (Last 24 hours) at 12/7/2024 0835  Last data filed at 12/7/2024 0735  Gross per 24 hour   Intake 1622.9 ml   Output 200 ml   Net 1422.9 ml       Physical Exam    Significant Labs:   CBC:   Recent Labs   Lab 12/06/24  1654 12/07/24  0518   WBC 12.57 11.11   HGB 13.6 13.6   HCT 39.4 40.9    172    and CMP:   Recent Labs   Lab 12/06/24  1654 12/07/24  0518   *  132*   K 3.1* 3.1*   CL 87* 94*   CO2 27 24   * 228*   BUN 18 17   CREATININE 1.5* 1.2   CALCIUM 9.0 8.7   PROT 7.3 6.9   ALBUMIN 3.1* 3.0*   BILITOT 1.8* 1.3*   ALKPHOS 298* 270*   AST 54* 45*   ALT 58* 52*   ANIONGAP 15 14     C19-9: pending    Diagnostic Results: as above      Assessment/Plan:     Active Diagnoses:    Diagnosis Date Noted POA    PRINCIPAL PROBLEM:  Primary pancreatic cancer with metastasis to other site [C25.9] 12/06/2024 Yes    Atrial fibrillation with RVR [I48.91] 12/06/2024 Yes    ZITA (acute kidney injury) [N17.9] 12/06/2024 Yes    Hyponatremia [E87.1] 12/06/2024 Yes    Hypokalemia [E87.6] 12/06/2024 Yes    Elevated troponin [R79.89] 12/06/2024 Yes    Type 2 diabetes mellitus with diabetic polyneuropathy, without long-term current use of insulin [E11.42] 01/04/2024 Yes    CAD (coronary artery disease) [I25.10]  Yes    DORA (obstructive sleep apnea) [G47.33]  Yes    HTN (hypertension) [I10] 07/23/2013 Yes    Hyperlipidemia with target LDL less than 100 [E78.5] 07/23/2013 Yes      Problems Resolved During this Admission:     Mrs. Greene is a 84 year old with multiple medical problems including atrial fibrillation diabetes, pulmonary artery disease, coronary artery disease now with what appears to be metastatic pancreatic cancer which originated in the tail of the pancreas and spread to the liver.  She is not interested in chemo and she appears to be too weak for chemo    Discussed extensively with the family and patient and they would like to proceed with home hospice.  Poor prognosis was reviewed    Primary team notified  Thank you for your consult. I will sign off. Please contact us if you have any additional questions.     Birdie Vega MD  Hematology/Oncology  [facility] - Telemetry ([facility])

## 2024-12-07 NOTE — SUBJECTIVE & OBJECTIVE
Past Medical History:   Diagnosis Date    *Atrial fibrillation     Abdominal wall seroma 1/16/2019    Seroma developed after her hernia repair in 2012.  Please review the notes in the Care everywhere section of the chart.  There is no luis evidence of infection at this time.  I would recommend checking a erythrocyte sedimentation rate and C reactive protein.  These are significant elevated then aspiration of the seroma fluid would be warranted.  If not the seroma does not require any intervention    Coronary artery disease involving native coronary artery of native heart with angina pectoris 4/10/2018    Diabetes mellitus     DM (diabetes mellitus) 2002     09/06/2017 no medication    Hyperlipidemia     Hypertension     Morbid (severe) obesity due to excess calories 7/23/2013    Obesity (BMI 30-39.9) 2/24/2015    Obstructive sleep apnea        Past Surgical History:   Procedure Laterality Date    CATARACT EXTRACTION Left 12/15/2021    distance    CHOLECYSTECTOMY      COLON SURGERY      HYSTERECTOMY         Review of patient's allergies indicates:  No Known Allergies    No current facility-administered medications on file prior to encounter.     Current Outpatient Medications on File Prior to Encounter   Medication Sig    atorvastatin (LIPITOR) 40 MG tablet Take 1 tablet (40 mg total) by mouth once daily.    diltiaZEM (CARDIZEM CD) 180 MG 24 hr capsule Take 1 capsule (180 mg total) by mouth once daily.    indapamide (LOZOL) 2.5 MG Tab Take 1 tablet (2.5 mg total) by mouth once daily. Do not take if BP is below 110/70 or feeling dry    losartan (COZAAR) 100 MG tablet Take 1 tablet (100 mg total) by mouth once daily.    metFORMIN (GLUCOPHAGE) 500 MG tablet Take 1 tablet (500 mg total) by mouth daily with breakfast.    metoprolol succinate (TOPROL-XL) 100 MG 24 hr tablet Take 1 tablet (100 mg total) by mouth once daily.    potassium chloride SA (K-DUR,KLOR-CON) 20 MEQ tablet Take 2 tablets (40 mEq total) by mouth  once daily.    warfarin (COUMADIN) 6 MG tablet Take 1 tablet (6 mg total) by mouth Daily. Except TAKE ONE HALF TABLET (3 MG) EVERY THURSDAY AS DIRECTED BY THE CC. (Patient taking differently: Take 6 mg by mouth Daily. Except TAKE ONE HALF TABLET (3 MG) EVERY TUESDAY AND THURSDAY AS DIRECTED BY THE CC.)    co-enzyme Q-10 30 mg capsule Take 30 mg by mouth 3 (three) times daily.    furosemide (LASIX) 40 MG tablet Take 1 tablet (40 mg total) by mouth once daily. Do not take if BP is below 110/70    gatifloxacin 0.5 % Drop drops Place 1 drop into the right eye 2 (two) times daily. Eyedrops to start one day before surgery (Patient not taking: Reported on 12/6/2024)    ketorolac 0.5% (ACULAR) 0.5 % Drop Place 1 drop into the right eye 4 (four) times daily. Eyedrops to start one day before surgery (Patient not taking: Reported on 12/6/2024)    magnesium oxide (MAG-OX) 400 mg (241.3 mg magnesium) tablet Take 1 tablet (400 mg total) by mouth 2 (two) times daily.    niacin 50 MG Tab Take 100 mg by mouth every evening.    nitroGLYCERIN (NITROSTAT) 0.4 MG SL tablet Place 1 tablet (0.4 mg total) under the tongue every 5 (five) minutes as needed for Chest pain. Take up to 3 times 5 min apart    ondansetron (ZOFRAN-ODT) 8 MG TbDL Take 1 tablet (8 mg total) by mouth every 12 (twelve) hours as needed (nausea).    prednisoLONE acetate (PRED FORTE) 1 % DrpS Place 1 drop into the right eye 4 (four) times daily. Eyedrops to start one day before surgery (Patient not taking: Reported on 12/6/2024)    vitamin D (VITAMIN D3) 1000 units Tab Take 1,000 Units by mouth once daily.    [DISCONTINUED] ACCU-CHEK SOFTCLIX LANCETS Misc     [DISCONTINUED] TRUE METRIX AIR GLUCOSE METER kit     [DISCONTINUED] TRUE METRIX GLUCOSE TEST STRIP Strp TEST BLOOD SUGAR TWICE DAILY    [DISCONTINUED] TRUE METRIX LEVEL 1 Soln 1 each by Tube route once daily.     Family History       Problem Relation (Age of Onset)    Diabetes Sister, Sister    Hypertension Sister,  Mother          Tobacco Use    Smoking status: Never    Smokeless tobacco: Never   Substance and Sexual Activity    Alcohol use: No    Drug use: No    Sexual activity: Yes     Partners: Male     Review of Systems   Constitutional:  Negative for chills and fever.   Respiratory:  Negative for shortness of breath.    Cardiovascular:  Negative for chest pain.   Gastrointestinal:  Positive for abdominal pain, constipation, nausea and vomiting. Negative for diarrhea.   Genitourinary:  Positive for difficulty urinating. Negative for dysuria, frequency and hematuria.   All other systems reviewed and are negative.    Objective:     Vital Signs (Most Recent):  Temp: 96.5 °F (35.8 °C) (12/06/24 1633)  Pulse: 104 (12/06/24 2100)  Resp: 16 (12/06/24 2100)  BP: (!) 164/70 (12/06/24 2100)  SpO2: 95 % (12/06/24 2100) Vital Signs (24h Range):  Temp:  [96.5 °F (35.8 °C)] 96.5 °F (35.8 °C)  Pulse:  [] 104  Resp:  [15-21] 16  SpO2:  [95 %-98 %] 95 %  BP: (136-205)/() 164/70     Weight: 91.3 kg (201 lb 4.8 oz)  Body mass index is 33.5 kg/m².     Physical Exam  Vitals reviewed.   Constitutional:       Appearance: She is obese. She is ill-appearing. She is not diaphoretic.   HENT:      Nose: Nose normal.   Eyes:      Conjunctiva/sclera: Conjunctivae normal.   Cardiovascular:      Rate and Rhythm: Tachycardia present.   Pulmonary:      Effort: Pulmonary effort is normal. No respiratory distress.   Abdominal:      General: There is distension.      Tenderness: There is abdominal tenderness. There is guarding. There is no rebound.   Musculoskeletal:         General: No swelling.   Skin:     General: Skin is warm and dry.   Neurological:      Mental Status: She is alert and oriented to person, place, and time.   Psychiatric:         Mood and Affect: Mood normal.         Behavior: Behavior normal.                Significant Labs: All pertinent labs within the past 24 hours have been reviewed.  Recent Lab Results  (Last 5 results in  the past 24 hours)        12/06/24 2052 12/06/24  1654   12/06/24  1636   12/06/24  1634   12/06/24  1616        Benzodiazepines     Negative           Methadone metabolites     Negative           Phencyclidine     Negative           Albumin   3.1             Alcohol, Serum   <10             ALP   298             ALT   58             Ammonia   27             Amphetamines, Urine     Negative           Anion Gap   15             Appearance, UA       Clear         AST   54             Bacteria, UA       Rare         Barbituates, Urine     Negative           Baso #   0.02             Basophil %   0.2             Bilirubin (UA)       Negative         BILIRUBIN TOTAL   1.8  Comment: For infants and newborns, interpretation of results should be based  on gestational age, weight and in agreement with clinical  observations.    Premature Infant recommended reference ranges:  Up to 24 hours.............<8.0 mg/dL  Up to 48 hours............<12.0 mg/dL  3-5 days..................<15.0 mg/dL  6-29 days.................<15.0 mg/dL               BUN   18             Calcium   9.0             Chloride   87             CO2   27             Cocaine, Urine     Negative           Color, UA       Yellow         Creatinine   1.5             Urine Creatinine     52.8           Differential Method   Automated             eGFR   35             Eos #   0.0             Eos %   0.0             Glucose   311             Glucose, UA       3+         Gran # (ANC)   11.4             Gran %   90.4             Hematocrit   39.4             Hemoglobin   13.6             Hyaline Casts, UA       4         Immature Grans (Abs)   0.06  Comment: Mild elevation in immature granulocytes is non specific and   can be seen in a variety of conditions including stress response,   acute inflammation, trauma and pregnancy. Correlation with other   laboratory and clinical findings is essential.               Immature Granulocytes   0.5             Ketones, UA        1+         Lactic Acid Level   1.9  Comment: Falsely low lactic acid results can be found in samples   containing >=13.0 mg/dL total bilirubin and/or >=3.5 mg/dL   direct bilirubin.               Leukocyte Esterase, UA       Negative         Lipase   44             Lymph #   0.4             Lymph %   3.3             MCH   31.1             MCHC   34.5             MCV   90             Microscopic Comment       SEE COMMENT  Comment: Other formed elements not mentioned in the report are not   present in the microscopic examination.            Mono #   0.7             Mono %   5.6             MPV   12.7             NITRITE UA       Negative         nRBC   0             Blood, UA       Negative         Opiates, Urine     Negative           pH, UA       7.0         Platelet Count   166             POCT Glucose 359         344       Potassium   3.1             PROTEIN TOTAL   7.3             Protein, UA       Trace  Comment: Recommend a 24 hour urine protein or a urine   protein/creatinine ratio if globulin induced proteinuria is  clinically suspected.           RBC   4.37             RBC, UA       2         RDW   13.5             Sodium   129             Spec Grav UA       1.010         Specimen UA       Urine, Catheterized         Marijuana (THC) Metabolite     Negative           Toxicology Information     SEE COMMENT  Comment: This screen includes the following classes of drugs at the listed   cut-off:    Benzodiazepines 200 ng/ml  Methadone 300 ng/ml  Cocaine metabolite 300 ng/ml  Opiates 300 ng/ml  Barbiturates 200 ng/ml  Amphetamines 1000 ng/ml  Marijuana metabs (THC) 50 ng/ml  Phencyclidine (PCP) 25 ng/ml    This is a screening test. If results do not correlate with clinical   presentation, then a confirmatory send out test is advised.     This report is intended for use in clinical monitoring and management   of   patients. It is not intended for use in employment related drug   testing.             Troponin I    0.128  Comment: The reference interval for Troponin I represents the 99th percentile   cutoff   for our facility and is consistent with 3rd generation assay   performance.               TSH   0.582             UROBILINOGEN UA       Negative         WBC Clumps, UA       Occasional         WBC, UA       3         WBC   12.57             Yeast, UA       None                                Significant Imaging: I have reviewed all pertinent imaging results/findings within the past 24 hours.  CT Abdomen Pelvis  Without Contrast   Final Result      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      Large complex stable fluid collection related to anterior abdominal wall hernia mesh.      All CT scans at this facility use dose modulation, iterative reconstructions, and/or weight base dosing when appropriate to reduce radiation dose to as low as reasonably achievable         Electronically signed by: Lea Brownlee MD   Date:    12/06/2024   Time:    19:56      CT Head Without Contrast   Final Result      No acute intracranial abnormality.      All CT scans at this facility use dose modulation, iterative reconstructions, and/or weight base dosing when appropriate to reduce radiation dose to as low as reasonably achievable.         Electronically signed by: Lea Brownlee MD   Date:    12/06/2024   Time:    17:49      X-Ray Chest AP Portable   Final Result      No definite infiltrates.         Electronically signed by: Lea Brownlee MD   Date:    12/06/2024   Time:    17:48

## 2024-12-07 NOTE — ASSESSMENT & PLAN NOTE
Patient's blood pressure range in the last 24 hours was: BP  Min: 136/64  Max: 205/96.The patient's inpatient anti-hypertensive regimen is listed below:  Current Antihypertensives  diltiaZEM 24 hr capsule 180 mg, Daily, Oral  metoprolol succinate (TOPROL-XL) 24 hr tablet 100 mg, Daily, Oral  metoprolol injection 5 mg, Every 6 hours PRN, Intravenous  hydrALAZINE injection 10 mg, ED 1 Time, Intravenous    Plan  - BP is uncontrolled, will adjust as follows:  Continue home regimen of diltiazem and Toprol-XL  - p.r.n. IV metoprolol with parameters

## 2024-12-07 NOTE — NURSING TRANSFER
Nursing Transfer Note      12/6/24  10:45 PM    Nurse giving handoff: Bobbi    Nurse receiving handoff:Eileen    Reason patient is being transferred:  Observation    Transfer From: ED    Transfer via bed    Transfer with N/A    Transported by ED Tech    Transfer Vital Signs:    Blood Pressure:159/69    Heart Rate:103    O2:95    Temperature:97.4    Respirations:16    Telemetry: [id number], [id number]    Order for Tele Monitor? Yes    Patient belongings transferred with patient: Yes    Chart send with patient: Yes    Notified: Daughter-in-Law    Patient reassessed at: 12/06/24,10:45 pm     Upon arrival to floor: cardiac monitor applied, pt oriented to the room, call light within reach, bed in lowest position, continuous NaCl infusing @ 75 mL/HR

## 2024-12-07 NOTE — ASSESSMENT & PLAN NOTE
Patient's most recent potassium results are listed below.   Recent Labs     12/06/24  1654 12/07/24  0518   K 3.1* 3.1*       Plan  - Replete potassium per protocol  - Monitor potassium Daily  - Patient's hypokalemia is  pending reassessment  - patient received potassium chloride 40 mEq p.o. and potassium chloride 10 mEq IV while in the emergency department  -magnesium level pending  -telemetry monitoring    12/07/2024  Continue to replete as necessary  Unable to tolerate PO tablets at this time

## 2024-12-07 NOTE — ASSESSMENT & PLAN NOTE
12/07/2024  --controlled  --home medications restarted  --PRN IV hydralazine 10mg Q6H for sBP > 175 mmHg  --[id number]

## 2024-12-07 NOTE — ASSESSMENT & PLAN NOTE
ZITA is likely due to pre-renal azotemia due to dehydration. Baseline creatinine is  0.8 . Most recent creatinine and eGFR are listed below.  Recent Labs     12/06/24  1654 12/07/24  0518   CREATININE 1.5* 1.2   EGFRNORACEVR 35* 45*        Plan  - ZITA is  pending reassessment  - Avoid nephrotoxins and renally dose meds for GFR listed above  - Monitor urine output, serial BMP, and adjust therapy as needed  - patient received a 1 L normal saline bolus in the emergency department  -continue normal saline at 75 mL/hr  -urinalysis was not consistent with underlying infection  -CT scan of abdomen and pelvis with no acute  process  -p.r.n. bladder scan    12/07/2024  Improving on IVFs  Continue parenteral resuscitation

## 2024-12-07 NOTE — ASSESSMENT & PLAN NOTE
ZITA is likely due to pre-renal azotemia due to dehydration. Baseline creatinine is  0.8 . Most recent creatinine and eGFR are listed below.  Recent Labs     12/06/24  1654   CREATININE 1.5*   EGFRNORACEVR 35*      Plan  - ZITA is  pending reassessment  - Avoid nephrotoxins and renally dose meds for GFR listed above  - Monitor urine output, serial BMP, and adjust therapy as needed  - patient received a 1 L normal saline bolus in the emergency department  -continue normal saline at 75 mL/hr  -urinalysis was not consistent with underlying infection  -CT scan of abdomen and pelvis with no acute  process  -p.r.n. bladder scan

## 2024-12-07 NOTE — HOSPITAL COURSE
12/07/2024  Hematology/Oncology discussed case with patient and family. They seem to have opted for hospice care. Case management consulted to help facilitate. Will initiate [employer] conversations at this time as well. DNR order already in system.

## 2024-12-07 NOTE — NURSING
Copy of discharge instructions given to family, questions answered.   Tele monitor removed and brought to monitor tech.  Iv left in place at the request of the hospice nurse.   [employer] transportation setup by case management.

## 2024-12-07 NOTE — ASSESSMENT & PLAN NOTE
"New diagnosis of pancreatic cancer with metastases to liver on this admission.  Elevated liver enzymes.  History of colon cancer.  -patient presented with complaint of abdominal pain with nausea and vomiting x1 week  -patient with report of altered mental status however she is alert and oriented at the time of my exam--> CT scan of head was negative, urine drug screen negative, urinalysis negative  -CT scan of abdomen and pelvis with 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 Large complex stable fluid collection related to anterior abdominal wall hernia mesh."  -white blood cell count 12.57, lactic acid level 1.9, urinalysis negative for infection, alk phos 298, total bilirubin 1.8, AST 54, ALT 58, influenza a/B negative, lipase 44, afebrile  -NPO except for ice chips and small sips with medications, IV fluids, p.r.n. pain/nausea control  -patient wishes to be do not resuscitate  -check CEA, AFP, and CA 19-9  -consult hematology oncology  -consult palliative care  "

## 2024-12-07 NOTE — ASSESSMENT & PLAN NOTE
Hyponatremia is likely due to Dehydration/hypovolemia. The patient's most recent sodium results are listed below.  Recent Labs     12/06/24  1654   *     Plan  - Correct the sodium by 4-6mEq in 24 hours.   - Obtain the following studies: TSH, T4.  - Will treat the hyponatremia with IV fluids as follows:  Normal saline at 75 mL/hr.  Status post 1 L normal saline bolus in the emergency department.  - Monitor sodium Daily.   - Patient hyponatremia is  pending reassessment

## 2024-12-07 NOTE — SUBJECTIVE & OBJECTIVE
Interval History: see hospital course    Review of Systems  Objective:     Vital Signs (Most Recent):  Temp: 97.6 °F (36.4 °C) (12/07/24 1143)  Pulse: 94 (12/07/24 1143)  Resp: 16 (12/07/24 1143)  BP: (!) 142/63 (12/07/24 1143)  SpO2: (!) 92 % (12/07/24 1143) Vital Signs (24h Range):  Temp:  [96.5 °F (35.8 °C)-97.9 °F (36.6 °C)] 97.6 °F (36.4 °C)  Pulse:  [] 94  Resp:  [15-21] 16  SpO2:  [92 %-98 %] 92 %  BP: (136-205)/() 142/63     Weight: 91.4 kg (201 lb 8 oz)  Body mass index is 33.53 kg/m².    Intake/Output Summary (Last 24 hours) at 12/7/2024 1233  Last data filed at 12/7/2024 0735  Gross per 24 hour   Intake 1622.9 ml   Output 200 ml   Net 1422.9 ml         Physical Exam    Vitals reviewed.   Constitutional:       Appearance: She is obese. She is ill-appearing. She is not diaphoretic.   HENT:      Nose: Nose normal.   Eyes:      Conjunctiva/sclera: Conjunctivae normal.   Cardiovascular:      Rate and Rhythm: Tachycardia present.   Pulmonary:      Effort: Pulmonary effort is normal. No respiratory distress.   Abdominal:      General: There is distension.      Tenderness: There is abdominal tenderness. There is guarding. There is no rebound.   Musculoskeletal:         General: No swelling.   Skin:     General: Skin is warm and dry.   Neurological:      Mental Status: She is alert and oriented to person, place, and time.   Psychiatric:         Mood and Affect: Mood normal.         Behavior: Behavior normal.     Significant Labs: All pertinent labs within the past 24 hours have been reviewed.  BMP:   Recent Labs   Lab 12/07/24  0518   *   *   K 3.1*   CL 94*   CO2 24   BUN 17   CREATININE 1.2   CALCIUM 8.7   MG 1.9     CBC:   Recent Labs   Lab 12/06/24  1654 12/07/24  0518   WBC 12.57 11.11   HGB 13.6 13.6   HCT 39.4 40.9    172     CMP:   Recent Labs   Lab 12/06/24  1654 12/07/24  0518   * 132*   K 3.1* 3.1*   CL 87* 94*   CO2 27 24   * 228*   BUN 18 17   CREATININE  "1.5* 1.2   CALCIUM 9.0 8.7   PROT 7.3 6.9   ALBUMIN 3.1* 3.0*   BILITOT 1.8* 1.3*   ALKPHOS 298* 270*   AST 54* 45*   ALT 58* 52*   ANIONGAP 15 14     Cardiac Markers: No results for input(s): "CKMB", "MYOGLOBIN", "BNP", "TROPISTAT" in the last 48 hours.  Coagulation:   Recent Labs   Lab 12/06/24  2251   INR 1.8*     Lactic Acid:   Recent Labs   Lab 12/06/24  1654   LACTATE 1.9     Magnesium:   Recent Labs   Lab 12/06/24  2121 12/07/24  0518   MG 1.7 1.9     Troponin:   Recent Labs   Lab 12/06/24  1654 12/06/24  2251 12/07/24  0141   TROPONINI 0.128* 0.115* 0.104*     TSH:   Recent Labs   Lab 12/06/24  1654   TSH 0.582     Urine Studies:   Recent Labs   Lab 12/06/24  1634   COLORU Yellow   APPEARANCEUA Clear   PHUR 7.0   SPECGRAV 1.010   PROTEINUA Trace*   GLUCUA 3+*   KETONESU 1+*   BILIRUBINUA Negative   OCCULTUA Negative   NITRITE Negative   UROBILINOGEN Negative   LEUKOCYTESUR Negative   RBCUA 2   WBCUA 3   BACTERIA Rare   HYALINECASTS 4*       Significant Imaging: I have reviewed all pertinent imaging results/findings within the past 24 hours.  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    Large complex stable fluid collection related to anterior abdominal wall hernia mesh.    All CT scans at this facility use dose modulation, iterative reconstructions, and/or weight base dosing when appropriate to reduce radiation dose to as low as reasonably achievable      Electronically signed by: Lea Brownlee MD  Date:    12/06/2024  Time:    19:56               Narrative:    EXAMINATION:  CT ABDOMEN PELVIS WITHOUT CONTRAST    CLINICAL HISTORY:  Abdominal abscess/infection suspected;Abdominal pain, acute, nonlocalized;    TECHNIQUE:  Noncontrast images were obtained.    COMPARISON:  CT " abdomen pelvis, 12/21/2019.    FINDINGS:  Lung bases are clear    Multiple low-density masses involving the liver suspicious for metastatic disease.  There also appears to be a low-density ill-defined mass involving the pancreatic tail which could represent primary adenocarcinoma.  This extends to the region of the splenic hilum.  No definite splenic lesions.    Previous cholecystectomy.  No bile duct dilatation.    The kidneys are normal.  No hydronephrosis.  No masses.  No stones    The aorta and inferior vena cava are unremarkable.    There are no acute bowel abnormalities.     No evidence of appendicitis.  No evidence of diverticulitis.    Previous anterior abdominal wall hernia repair.  The large complex fluid collection related to the hernia mesh measuring 17 x 15 cm on axial images    Bladder is normal. No abnormal masses or fluid collections in the pelvis.    The                                       CT Head Without Contrast (Final result)  Result time 12/06/24 17:49:52      Final result by Lea Brownlee MD (Timothy) (12/06/24 17:49:52)                   Impression:      No acute intracranial abnormality.    All CT scans at this facility use dose modulation, iterative reconstructions, and/or weight base dosing when appropriate to reduce radiation dose to as low as reasonably achievable.      Electronically signed by: Lea Brownlee MD  Date:    12/06/2024  Time:    17:49               Narrative:    EXAMINATION:  CT HEAD WITHOUT CONTRAST    CLINICAL HISTORY:  Mental status change, unknown cause;    TECHNIQUE:  Noncontrast images were obtained    COMPARISON:  None    FINDINGS:  No intracranial acute hemorrhage or acute focal brain parenchymal abnormality is identified.  Mild atrophy.  Calvarium is intact.  Paranasal sinuses are clear.                                       X-Ray Chest AP Portable (Final result)  Result time 12/06/24 17:48:16      Final result by Lea Brownlee MD (Timothy) (12/06/24  17:48:16)                   Impression:      No definite infiltrates.      Electronically signed by: Lea Brownlee MD  Date:    12/06/2024  Time:    17:48               Narrative:    EXAMINATION:  XR CHEST AP PORTABLE    CLINICAL HISTORY:  < altered mental status    COMPARISON:  Chest, 05/09/2024.    FINDINGS:  One view.  Mild cardiomegaly.  No definite infiltrates.  Patient is rotated to left.

## 2024-12-07 NOTE — H&P
Formerly [facility], [facility] - Emergency Dept.  [facility] Medicine  History & Physical    Patient Name: Natalie Greene  MRN: [id number]  Patient Class: IP- Inpatient  Admission Date: 12/6/2024  Attending Physician:  Fariba Wood MD  Primary Care Provider: Annabella Fenton MD         Patient information was obtained from patient, relative(s), ER records, and ER physician .     Subjective:     Principal Problem:Primary pancreatic cancer with metastasis to other site    Chief Complaint:   Chief Complaint   Patient presents with    Abdominal Pain     Per ems pt has had abd pain N/V x 4 days. Ems states she her abd is hard and tender to touch. Pt has not had BM since before Monday.         HPI: 84-year-old white woman with history of atrial fibrillation on Coumadin, hypertension, hyperlipidemia, non-insulin-dependent diabetes mellitus type 2, colon cancer, aortic atherosclerosis, obstructive sleep apnea, periodic limb movement disorder, pulmonary hypertension, coronary artery disease, osteoporosis, and obesity who presents to the emergency department with complaint of abdominal pain over the last week.  Abdominal pain is generalized in nature, constant, severe, worse in the upper quadrant on exam, associated with nausea and vomiting.  Patient denies any fevers or chills.  She does report constipation and denies any diarrhea.  Patient has had some difficulty with urination however bladder appears normal on CT imaging.  Urinalysis was not consistent with infection.  Originally, patient was reported to have altered mental status however she is alert and oriented x3 at the time of my exam and capable of making her own medical decisions and understanding current situation.  CT scan of abdomen and pelvis with pancreatic tail malignancy suspected with liver metastases.  Patient wishes to be do not resuscitate and would like to meet with both Hematology Oncology and palliative care at this time.    Past Medical History:   Diagnosis Date    *Atrial  fibrillation     Abdominal wall seroma 1/16/2019    Seroma developed after her hernia repair in 2012.  Please review the notes in the Care everywhere section of the chart.  There is no luis evidence of infection at this time.  I would recommend checking a erythrocyte sedimentation rate and C reactive protein.  These are significant elevated then aspiration of the seroma fluid would be warranted.  If not the seroma does not require any intervention    Coronary artery disease involving native coronary artery of native heart with angina pectoris 4/10/2018    Diabetes mellitus     DM (diabetes mellitus) 2002     09/06/2017 no medication    Hyperlipidemia     Hypertension     Morbid (severe) obesity due to excess calories 7/23/2013    Obesity (BMI 30-39.9) 2/24/2015    Obstructive sleep apnea        Past Surgical History:   Procedure Laterality Date    CATARACT EXTRACTION Left 12/15/2021    distance    CHOLECYSTECTOMY      COLON SURGERY      HYSTERECTOMY         Review of patient's allergies indicates:  No Known Allergies    No current facility-administered medications on file prior to encounter.     Current Outpatient Medications on File Prior to Encounter   Medication Sig    atorvastatin (LIPITOR) 40 MG tablet Take 1 tablet (40 mg total) by mouth once daily.    diltiaZEM (CARDIZEM CD) 180 MG 24 hr capsule Take 1 capsule (180 mg total) by mouth once daily.    indapamide (LOZOL) 2.5 MG Tab Take 1 tablet (2.5 mg total) by mouth once daily. Do not take if BP is below 110/70 or feeling dry    losartan (COZAAR) 100 MG tablet Take 1 tablet (100 mg total) by mouth once daily.    metFORMIN (GLUCOPHAGE) 500 MG tablet Take 1 tablet (500 mg total) by mouth daily with breakfast.    metoprolol succinate (TOPROL-XL) 100 MG 24 hr tablet Take 1 tablet (100 mg total) by mouth once daily.    potassium chloride SA (K-DUR,KLOR-CON) 20 MEQ tablet Take 2 tablets (40 mEq total) by mouth once daily.    warfarin (COUMADIN) 6 MG tablet  Take 1 tablet (6 mg total) by mouth Daily. Except TAKE ONE HALF TABLET (3 MG) EVERY THURSDAY AS DIRECTED BY THE CC. (Patient taking differently: Take 6 mg by mouth Daily. Except TAKE ONE HALF TABLET (3 MG) EVERY TUESDAY AND THURSDAY AS DIRECTED BY THE CC.)    co-enzyme Q-10 30 mg capsule Take 30 mg by mouth 3 (three) times daily.    furosemide (LASIX) 40 MG tablet Take 1 tablet (40 mg total) by mouth once daily. Do not take if BP is below 110/70    gatifloxacin 0.5 % Drop drops Place 1 drop into the right eye 2 (two) times daily. Eyedrops to start one day before surgery (Patient not taking: Reported on 12/6/2024)    ketorolac 0.5% (ACULAR) 0.5 % Drop Place 1 drop into the right eye 4 (four) times daily. Eyedrops to start one day before surgery (Patient not taking: Reported on 12/6/2024)    magnesium oxide (MAG-OX) 400 mg (241.3 mg magnesium) tablet Take 1 tablet (400 mg total) by mouth 2 (two) times daily.    niacin 50 MG Tab Take 100 mg by mouth every evening.    nitroGLYCERIN (NITROSTAT) 0.4 MG SL tablet Place 1 tablet (0.4 mg total) under the tongue every 5 (five) minutes as needed for Chest pain. Take up to 3 times 5 min apart    ondansetron (ZOFRAN-ODT) 8 MG TbDL Take 1 tablet (8 mg total) by mouth every 12 (twelve) hours as needed (nausea).    prednisoLONE acetate (PRED FORTE) 1 % DrpS Place 1 drop into the right eye 4 (four) times daily. Eyedrops to start one day before surgery (Patient not taking: Reported on 12/6/2024)    vitamin D (VITAMIN D3) 1000 units Tab Take 1,000 Units by mouth once daily.    [DISCONTINUED] ACCU-CHEK SOFTCLIX LANCETS Misc     [DISCONTINUED] TRUE METRIX AIR GLUCOSE METER kit     [DISCONTINUED] TRUE METRIX GLUCOSE TEST STRIP Strp TEST BLOOD SUGAR TWICE DAILY    [DISCONTINUED] TRUE METRIX LEVEL 1 Soln 1 each by Tube route once daily.     Family History       Problem Relation (Age of Onset)    Diabetes Sister, Sister    Hypertension Sister, Mother          Tobacco Use    Smoking status:  Never    Smokeless tobacco: Never   Substance and Sexual Activity    Alcohol use: No    Drug use: No    Sexual activity: Yes     Partners: Male     Review of Systems   Constitutional:  Negative for chills and fever.   Respiratory:  Negative for shortness of breath.    Cardiovascular:  Negative for chest pain.   Gastrointestinal:  Positive for abdominal pain, constipation, nausea and vomiting. Negative for diarrhea.   Genitourinary:  Positive for difficulty urinating. Negative for dysuria, frequency and hematuria.   All other systems reviewed and are negative.    Objective:     Vital Signs (Most Recent):  Temp: 96.5 °F (35.8 °C) (12/06/24 1633)  Pulse: 104 (12/06/24 2100)  Resp: 16 (12/06/24 2100)  BP: (!) 164/70 (12/06/24 2100)  SpO2: 95 % (12/06/24 2100) Vital Signs (24h Range):  Temp:  [96.5 °F (35.8 °C)] 96.5 °F (35.8 °C)  Pulse:  [] 104  Resp:  [15-21] 16  SpO2:  [95 %-98 %] 95 %  BP: (136-205)/() 164/70     Weight: 91.3 kg (201 lb 4.8 oz)  Body mass index is 33.5 kg/m².     Physical Exam  Vitals reviewed.   Constitutional:       Appearance: She is obese. She is ill-appearing. She is not diaphoretic.   HENT:      Nose: Nose normal.   Eyes:      Conjunctiva/sclera: Conjunctivae normal.   Cardiovascular:      Rate and Rhythm: Tachycardia present.   Pulmonary:      Effort: Pulmonary effort is normal. No respiratory distress.   Abdominal:      General: There is distension.      Tenderness: There is abdominal tenderness. There is guarding. There is no rebound.   Musculoskeletal:         General: No swelling.   Skin:     General: Skin is warm and dry.   Neurological:      Mental Status: She is alert and oriented to person, place, and time.   Psychiatric:         Mood and Affect: Mood normal.         Behavior: Behavior normal.                Significant Labs: All pertinent labs within the past 24 hours have been reviewed.  Recent Lab Results  (Last 5 results in the past 24 hours)        12/06/24 2052    12/06/24  1654   12/06/24  1636   12/06/24  1634   12/06/24  1616        Benzodiazepines     Negative           Methadone metabolites     Negative           Phencyclidine     Negative           Albumin   3.1             Alcohol, Serum   <10             ALP   298             ALT   58             Ammonia   27             Amphetamines, Urine     Negative           Anion Gap   15             Appearance, UA       Clear         AST   54             Bacteria, UA       Rare         Barbituates, Urine     Negative           Baso #   0.02             Basophil %   0.2             Bilirubin (UA)       Negative         BILIRUBIN TOTAL   1.8  Comment: For infants and newborns, interpretation of results should be based  on gestational age, weight and in agreement with clinical  observations.    Premature Infant recommended reference ranges:  Up to 24 hours.............<8.0 mg/dL  Up to 48 hours............<12.0 mg/dL  3-5 days..................<15.0 mg/dL  6-29 days.................<15.0 mg/dL               BUN   18             Calcium   9.0             Chloride   87             CO2   27             Cocaine, Urine     Negative           Color, UA       Yellow         Creatinine   1.5             Urine Creatinine     52.8           Differential Method   Automated             eGFR   35             Eos #   0.0             Eos %   0.0             Glucose   311             Glucose, UA       3+         Gran # (ANC)   11.4             Gran %   90.4             Hematocrit   39.4             Hemoglobin   13.6             Hyaline Casts, UA       4         Immature Grans (Abs)   0.06  Comment: Mild elevation in immature granulocytes is non specific and   can be seen in a variety of conditions including stress response,   acute inflammation, trauma and pregnancy. Correlation with other   laboratory and clinical findings is essential.               Immature Granulocytes   0.5             Ketones, UA       1+         Lactic Acid Level    1.9  Comment: Falsely low lactic acid results can be found in samples   containing >=13.0 mg/dL total bilirubin and/or >=3.5 mg/dL   direct bilirubin.               Leukocyte Esterase, UA       Negative         Lipase   44             Lymph #   0.4             Lymph %   3.3             MCH   31.1             MCHC   34.5             MCV   90             Microscopic Comment       SEE COMMENT  Comment: Other formed elements not mentioned in the report are not   present in the microscopic examination.            Mono #   0.7             Mono %   5.6             MPV   12.7             NITRITE UA       Negative         nRBC   0             Blood, UA       Negative         Opiates, Urine     Negative           pH, UA       7.0         Platelet Count   166             POCT Glucose 359         344       Potassium   3.1             PROTEIN TOTAL   7.3             Protein, UA       Trace  Comment: Recommend a 24 hour urine protein or a urine   protein/creatinine ratio if globulin induced proteinuria is  clinically suspected.           RBC   4.37             RBC, UA       2         RDW   13.5             Sodium   129             Spec Grav UA       1.010         Specimen UA       Urine, Catheterized         Marijuana (THC) Metabolite     Negative           Toxicology Information     SEE COMMENT  Comment: This screen includes the following classes of drugs at the listed   cut-off:    Benzodiazepines 200 ng/ml  Methadone 300 ng/ml  Cocaine metabolite 300 ng/ml  Opiates 300 ng/ml  Barbiturates 200 ng/ml  Amphetamines 1000 ng/ml  Marijuana metabs (THC) 50 ng/ml  Phencyclidine (PCP) 25 ng/ml    This is a screening test. If results do not correlate with clinical   presentation, then a confirmatory send out test is advised.     This report is intended for use in clinical monitoring and management   of   patients. It is not intended for use in employment related drug   testing.             Troponin I   0.128  Comment: The reference  interval for Troponin I represents the 99th percentile   cutoff   for our facility and is consistent with 3rd generation assay   performance.               TSH   0.582             UROBILINOGEN UA       Negative         WBC Clumps, UA       Occasional         WBC, UA       3         WBC   12.57             Yeast, UA       None                                Significant Imaging: I have reviewed all pertinent imaging results/findings within the past 24 hours.  CT Abdomen Pelvis  Without Contrast   Final Result      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      Large complex stable fluid collection related to anterior abdominal wall hernia mesh.      All CT scans at this facility use dose modulation, iterative reconstructions, and/or weight base dosing when appropriate to reduce radiation dose to as low as reasonably achievable         Electronically signed by: Lea Brownlee MD   Date:    12/06/2024   Time:    19:56      CT Head Without Contrast   Final Result      No acute intracranial abnormality.      All CT scans at this facility use dose modulation, iterative reconstructions, and/or weight base dosing when appropriate to reduce radiation dose to as low as reasonably achievable.         Electronically signed by: Lea Brownlee MD   Date:    12/06/2024   Time:    17:49      X-Ray Chest AP Portable   Final Result      No definite infiltrates.         Electronically signed by: Lea Brownlee MD   Date:    12/06/2024   Time:    17:48         Assessment/Plan:     * Primary pancreatic cancer with metastasis to other site  New diagnosis of pancreatic cancer with metastases to liver on this admission.  Elevated liver enzymes.  History of colon cancer.  -patient presented with complaint of abdominal pain with nausea and vomiting x1 week  -patient with report of altered mental status however she is alert and oriented at the time of my exam--> CT scan of head was negative, urine drug screen negative,  "urinalysis negative  -CT scan of abdomen and pelvis with 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 Large complex stable fluid collection related to anterior abdominal wall hernia mesh."  -white blood cell count 12.57, lactic acid level 1.9, urinalysis negative for infection, alk phos 298, total bilirubin 1.8, AST 54, ALT 58, influenza a/B negative, lipase 44, afebrile  -NPO except for ice chips and small sips with medications, IV fluids, p.r.n. pain/nausea control  -patient wishes to be do not resuscitate  -check CEA, AFP, and CA 19-9  -consult hematology oncology  -consult palliative care    Atrial fibrillation with RVR  Patient has paroxysmal (<7 days) atrial fibrillation. Patient is currently in atrial fibrillation. [id number] Score: 5. The patients heart rate in the last 24 hours is as follows:  Pulse  Min: 89  Max: 116     Antiarrhythmics  diltiaZEM 24 hr capsule 180 mg, Daily, Oral  metoprolol succinate (TOPROL-XL) 24 hr tablet 100 mg, Daily, Oral  metoprolol injection 5 mg, Every 6 hours PRN, Intravenous    Anticoagulants  enoxaparin injection 90 mg, Every 12 hours, Subcutaneous    Plan  - Replete lytes with a goal of K>4, Mg >2  - Patient is anticoagulated, see medications listed above.  - Patient's afib is currently uncontrolled. Will continue current treatment  - patient received diltiazem 20 mg IV x1 dose in the emergency department with improved rate control  -continue home regimen of diltiazem and Toprol-XL  -p.r.n. IV metoprolol with parameters  -replace electrolytes (potassium), TSH 0.572  -check INR, magnesium level, free T4, and T3  -telemetry monitoring        ZITA (acute kidney injury)  ZITA is likely due to pre-renal azotemia due to dehydration. Baseline creatinine is  0.8 . Most recent creatinine and eGFR are listed below.  Recent Labs     12/06/24  1654   CREATININE 1.5*   EGFRNORACEVR 35*      Plan  - ZITA is  pending reassessment  - Avoid nephrotoxins and renally dose meds for " GFR listed above  - Monitor urine output, serial BMP, and adjust therapy as needed  - patient received a 1 L normal saline bolus in the emergency department  -continue normal saline at 75 mL/hr  -urinalysis was not consistent with underlying infection  -CT scan of abdomen and pelvis with no acute  process  -p.r.n. bladder scan    Hyponatremia  Hyponatremia is likely due to Dehydration/hypovolemia. The patient's most recent sodium results are listed below.  Recent Labs     12/06/24  1654   *     Plan  - Correct the sodium by 4-6mEq in 24 hours.   - Obtain the following studies: TSH, T4.  - Will treat the hyponatremia with IV fluids as follows:  Normal saline at 75 mL/hr.  Status post 1 L normal saline bolus in the emergency department.  - Monitor sodium Daily.   - Patient hyponatremia is  pending reassessment      Hypokalemia  Patient's most recent potassium results are listed below.   Recent Labs     12/06/24  1654   K 3.1*     Plan  - Replete potassium per protocol  - Monitor potassium Daily  - Patient's hypokalemia is  pending reassessment  - patient received potassium chloride 40 mEq p.o. and potassium chloride 10 mEq IV while in the emergency department  -magnesium level pending  -telemetry monitoring    Elevated troponin  See discussion for coronary artery disease      CAD (coronary artery disease)  Coronary artery disease with mildly elevated troponin.  Patient with known CAD which is controlled.  Patient denies chest pain or shortness for breath, monitor for S/Sx of angina/ACS. Continue to monitor on telemetry.   -troponin 0.128  -patient is on no home antiplatelet therapy  -hold statin therapy in the setting of elevated liver enzymes  -echocardiogram 02/22/2023 with EF 55% and nuclear medicine stress test negative  -check serial cardiac enzymes    Type 2 diabetes mellitus with diabetic polyneuropathy, without long-term current use of insulin  Hyperglycemia with non-insulin-dependent diabetes mellitus  type 2  -check A1c  -medium dose sliding scale insulin  -hold Glucophage  -currently NPO with IV fluids      DORA (obstructive sleep apnea)  Nocturnal CPAP      Hyperlipidemia with target LDL less than 100  Hold statin due to elevated LFTs      HTN (hypertension)  Patient's blood pressure range in the last 24 hours was: BP  Min: 136/64  Max: 205/96.The patient's inpatient anti-hypertensive regimen is listed below:  Current Antihypertensives  diltiaZEM 24 hr capsule 180 mg, Daily, Oral  metoprolol succinate (TOPROL-XL) 24 hr tablet 100 mg, Daily, Oral  metoprolol injection 5 mg, Every 6 hours PRN, Intravenous  hydrALAZINE injection 10 mg, ED 1 Time, Intravenous    Plan  - BP is uncontrolled, will adjust as follows:  Continue home regimen of diltiazem and Toprol-XL  - p.r.n. IV metoprolol with parameters      VTE Risk Mitigation (From admission, onward)           Ordered     enoxaparin injection 90 mg  Every 12 hours         12/06/24 2127     Reason for No Pharmacological VTE Prophylaxis  Once        Comments: Tx lovenox added, coumadin held   Question:  Reasons:  Answer:  Already adequately anticoagulated on oral Anticoagulants    12/06/24 2127     IP VTE HIGH RISK PATIENT  Once         12/06/24 2127     Place sequential compression device  Until discontinued         12/06/24 2127                                    Fariba Wood MD  Department of Hospital Medicine  [facility] - Emergency Dept.

## 2024-12-07 NOTE — PROGRESS NOTES
E-advice message sent   Referral to Nutrition Therapy placed       [facility] Medicine  Progress Note    Patient Name: Natalie Greene  MRN: [id number]  Patient Class: IP- Inpatient   Admission Date: 12/6/2024  Length of Stay: 1 days  Attending Physician: Aurelio Ibrahim MD  Primary Care Provider: Annabella Fenton MD        Subjective     Principal Problem:Primary pancreatic cancer with metastasis to other site        HPI:  84-year-old white woman with history of atrial fibrillation on Coumadin, hypertension, hyperlipidemia, non-insulin-dependent diabetes mellitus type 2, colon cancer, aortic atherosclerosis, obstructive sleep apnea, periodic limb movement disorder, pulmonary hypertension, coronary artery disease, osteoporosis, and obesity who presents to the emergency department with complaint of abdominal pain over the last week.  Abdominal pain is generalized in nature, constant, severe, worse in the upper quadrant on exam, associated with nausea and vomiting.  Patient denies any fevers or chills.  She does report constipation and denies any diarrhea.  Patient has had some difficulty with urination however bladder appears normal on CT imaging.  Urinalysis was not consistent with infection.  Originally, patient was reported to have altered mental status however she is alert and oriented x3 at the time of my exam and capable of making her own medical decisions and understanding current situation.  CT scan of abdomen and pelvis with pancreatic tail malignancy suspected with liver metastases.  Patient wishes to be do not resuscitate and would like to meet with both Hematology Oncology and palliative care at this time.    Overview/Hospital Course:  12/07/2024  Hematology/Oncology discussed case with patient and family. They seem to have opted for hospice care. Case management consulted to help facilitate. Will initiate [facility] conversations at this time as well. DNR order already in system.     Interval History: see hospital course    Review of  Systems  Objective:     Vital Signs (Most Recent):  Temp: 97.6 °F (36.4 °C) (12/07/24 1143)  Pulse: 94 (12/07/24 1143)  Resp: 16 (12/07/24 1143)  BP: (!) 142/63 (12/07/24 1143)  SpO2: (!) 92 % (12/07/24 1143) Vital Signs (24h Range):  Temp:  [96.5 °F (35.8 °C)-97.9 °F (36.6 °C)] 97.6 °F (36.4 °C)  Pulse:  [] 94  Resp:  [15-21] 16  SpO2:  [92 %-98 %] 92 %  BP: (136-205)/() 142/63     Weight: 91.4 kg (201 lb 8 oz)  Body mass index is 33.53 kg/m².    Intake/Output Summary (Last 24 hours) at 12/7/2024 1233  Last data filed at 12/7/2024 0735  Gross per 24 hour   Intake 1622.9 ml   Output 200 ml   Net 1422.9 ml         Physical Exam    Vitals reviewed.   Constitutional:       Appearance: She is obese. She is ill-appearing. She is not diaphoretic.   HENT:      Nose: Nose normal.   Eyes:      Conjunctiva/sclera: Conjunctivae normal.   Cardiovascular:      Rate and Rhythm: Tachycardia present.   Pulmonary:      Effort: Pulmonary effort is normal. No respiratory distress.   Abdominal:      General: There is distension.      Tenderness: There is abdominal tenderness. There is guarding. There is no rebound.   Musculoskeletal:         General: No swelling.   Skin:     General: Skin is warm and dry.   Neurological:      Mental Status: She is alert and oriented to person, place, and time.   Psychiatric:         Mood and Affect: Mood normal.         Behavior: Behavior normal.     Significant Labs: All pertinent labs within the past 24 hours have been reviewed.  BMP:   Recent Labs   Lab 12/07/24  0518   *   *   K 3.1*   CL 94*   CO2 24   BUN 17   CREATININE 1.2   CALCIUM 8.7   MG 1.9     CBC:   Recent Labs   Lab 12/06/24  1654 12/07/24  0518   WBC 12.57 11.11   HGB 13.6 13.6   HCT 39.4 40.9    172     CMP:   Recent Labs   Lab 12/06/24  1654 12/07/24  0518   * 132*   K 3.1* 3.1*   CL 87* 94*   CO2 27 24   * 228*   BUN 18 17   CREATININE 1.5* 1.2   CALCIUM 9.0 8.7   PROT 7.3 6.9   ALBUMIN  "3.1* 3.0*   BILITOT 1.8* 1.3*   ALKPHOS 298* 270*   AST 54* 45*   ALT 58* 52*   ANIONGAP 15 14     Cardiac Markers: No results for input(s): "CKMB", "MYOGLOBIN", "BNP", "TROPISTAT" in the last 48 hours.  Coagulation:   Recent Labs   Lab 12/06/24  2251   INR 1.8*     Lactic Acid:   Recent Labs   Lab 12/06/24  1654   LACTATE 1.9     Magnesium:   Recent Labs   Lab 12/06/24  2121 12/07/24  0518   MG 1.7 1.9     Troponin:   Recent Labs   Lab 12/06/24  1654 12/06/24  2251 12/07/24  0141   TROPONINI 0.128* 0.115* 0.104*     TSH:   Recent Labs   Lab 12/06/24  1654   TSH 0.582     Urine Studies:   Recent Labs   Lab 12/06/24  1634   COLORU Yellow   APPEARANCEUA Clear   PHUR 7.0   SPECGRAV 1.010   PROTEINUA Trace*   GLUCUA 3+*   KETONESU 1+*   BILIRUBINUA Negative   OCCULTUA Negative   NITRITE Negative   UROBILINOGEN Negative   LEUKOCYTESUR Negative   RBCUA 2   WBCUA 3   BACTERIA Rare   HYALINECASTS 4*       Significant Imaging: I have reviewed all pertinent imaging results/findings within the past 24 hours.  Imaging Results              CT Abdomen Pelvis  Without Contrast (Final result)  Result time 12/06/24 19:56:54      Final result by Lea Brownlee MD (Timothy) (12/06/24 19:56:54)                   Impression:      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    Large complex stable fluid collection related to anterior abdominal wall hernia mesh.    All CT scans at this facility use dose modulation, iterative reconstructions, and/or weight base dosing when appropriate to reduce radiation dose to as low as reasonably achievable      Electronically signed by: Lea Brownlee MD  Date:    12/06/2024  Time:    19:56               Narrative:    EXAMINATION:  CT ABDOMEN PELVIS WITHOUT CONTRAST    CLINICAL HISTORY:  Abdominal abscess/infection suspected;Abdominal pain, acute, nonlocalized;    TECHNIQUE:  Noncontrast images were obtained.    COMPARISON:  CT abdomen pelvis, 12/21/2019.    FINDINGS:  Lung bases are " clear    Multiple low-density masses involving the liver suspicious for metastatic disease.  There also appears to be a low-density ill-defined mass involving the pancreatic tail which could represent primary adenocarcinoma.  This extends to the region of the splenic hilum.  No definite splenic lesions.    Previous cholecystectomy.  No bile duct dilatation.    The kidneys are normal.  No hydronephrosis.  No masses.  No stones    The aorta and inferior vena cava are unremarkable.    There are no acute bowel abnormalities.     No evidence of appendicitis.  No evidence of diverticulitis.    Previous anterior abdominal wall hernia repair.  The large complex fluid collection related to the hernia mesh measuring 17 x 15 cm on axial images    Bladder is normal. No abnormal masses or fluid collections in the pelvis.    The                                       CT Head Without Contrast (Final result)  Result time 12/06/24 17:49:52      Final result by Lea Brownlee MD (Timothy) (12/06/24 17:49:52)                   Impression:      No acute intracranial abnormality.    All CT scans at this facility use dose modulation, iterative reconstructions, and/or weight base dosing when appropriate to reduce radiation dose to as low as reasonably achievable.      Electronically signed by: Lea Brownlee MD  Date:    12/06/2024  Time:    17:49               Narrative:    EXAMINATION:  CT HEAD WITHOUT CONTRAST    CLINICAL HISTORY:  Mental status change, unknown cause;    TECHNIQUE:  Noncontrast images were obtained    COMPARISON:  None    FINDINGS:  No intracranial acute hemorrhage or acute focal brain parenchymal abnormality is identified.  Mild atrophy.  Calvarium is intact.  Paranasal sinuses are clear.                                       X-Ray Chest AP Portable (Final result)  Result time 12/06/24 17:48:16      Final result by Lea Brownlee MD (Timothy) (12/06/24 17:48:16)                   Impression:      No definite  "infiltrates.      Electronically signed by: Lea Brownlee MD  Date:    12/06/2024  Time:    17:48               Narrative:    EXAMINATION:  XR CHEST AP PORTABLE    CLINICAL HISTORY:  < altered mental status    COMPARISON:  Chest, 05/09/2024.    FINDINGS:  One view.  Mild cardiomegaly.  No definite infiltrates.  Patient is rotated to left.                                        Assessment and Plan     * Primary pancreatic cancer with metastasis to other site  New diagnosis of pancreatic cancer with metastases to liver on this admission.  Elevated liver enzymes.  History of colon cancer.  -patient presented with complaint of abdominal pain with nausea and vomiting x1 week  -patient with report of altered mental status however she is alert and oriented at the time of my exam--> CT scan of head was negative, urine drug screen negative, urinalysis negative  -CT scan of abdomen and pelvis with 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 Large complex stable fluid collection related to anterior abdominal wall hernia mesh."  -white blood cell count 12.57, lactic acid level 1.9, urinalysis negative for infection, alk phos 298, total bilirubin 1.8, AST 54, ALT 58, influenza a/B negative, lipase 44, afebrile  -NPO except for ice chips and small sips with medications, IV fluids, p.r.n. pain/nausea control  -patient wishes to be do not resuscitate  -check CEA, AFP, and CA 19-9  -consult hematology oncology  -consult palliative care    12/07/2024  Discussed case with heme/onc  Patient and family opting for hospice care  Case management consulted to facilitate conversations and decision    Palliative care encounter  12/07/2024  Patient opted for hospice care  Case management consulted to facilitate  Would be a decent candidate for IP hospice      Elevated troponin  See discussion for coronary artery disease      Hypokalemia  Patient's most recent potassium results are listed below.   Recent Labs     " 12/06/24 1654 12/07/24 0518   K 3.1* 3.1*       Plan  - Replete potassium per protocol  - Monitor potassium Daily  - Patient's hypokalemia is  pending reassessment  - patient received potassium chloride 40 mEq p.o. and potassium chloride 10 mEq IV while in the emergency department  -magnesium level pending  -telemetry monitoring    12/07/2024  Continue to replete as necessary  Unable to tolerate PO tablets at this time    Hyponatremia  Hyponatremia is likely due to Dehydration/hypovolemia. The patient's most recent sodium results are listed below.  Recent Labs     12/06/24 1654   *     Plan  - Correct the sodium by 4-6mEq in 24 hours.   - Obtain the following studies: TSH, T4.  - Will treat the hyponatremia with IV fluids as follows:  Normal saline at 75 mL/hr.  Status post 1 L normal saline bolus in the emergency department.  - Monitor sodium Daily.   - Patient hyponatremia is  pending reassessment      ZITA (acute kidney injury)  ZITA is likely due to pre-renal azotemia due to dehydration. Baseline creatinine is  0.8 . Most recent creatinine and eGFR are listed below.  Recent Labs     12/06/24 1654 12/07/24 0518   CREATININE 1.5* 1.2   EGFRNORACEVR 35* 45*        Plan  - ZITA is  pending reassessment  - Avoid nephrotoxins and renally dose meds for GFR listed above  - Monitor urine output, serial BMP, and adjust therapy as needed  - patient received a 1 L normal saline bolus in the emergency department  -continue normal saline at 75 mL/hr  -urinalysis was not consistent with underlying infection  -CT scan of abdomen and pelvis with no acute  process  -p.r.n. bladder scan    12/07/2024  Improving on IVFs  Continue parenteral resuscitation    Atrial fibrillation with RVR  Patient has paroxysmal (<7 days) atrial fibrillation. Patient is currently in atrial fibrillation. [id number] Score: 5. The patients heart rate in the last 24 hours is as follows:  Pulse  Min: 89  Max: 116     Antiarrhythmics  diltiaZEM 24  hr capsule 180 mg, Daily, Oral  metoprolol succinate (TOPROL-XL) 24 hr tablet 100 mg, Daily, Oral  metoprolol injection 5 mg, Every 6 hours PRN, Intravenous    Anticoagulants  enoxaparin injection 90 mg, Every 12 hours, Subcutaneous    Plan  - Replete lytes with a goal of K>4, Mg >2  - Patient is anticoagulated, see medications listed above.  - Patient's afib is currently uncontrolled. Will continue current treatment  - patient received diltiazem 20 mg IV x1 dose in the emergency department with improved rate control  -continue home regimen of diltiazem and Toprol-XL  -p.r.n. IV metoprolol with parameters  -replace electrolytes (potassium), TSH 0.572  -check INR, magnesium level, free T4, and T3  -telemetry monitoring        Type 2 diabetes mellitus with diabetic polyneuropathy, without long-term current use of insulin  Hyperglycemia with non-insulin-dependent diabetes mellitus type 2  -check A1c  -medium dose sliding scale insulin  -hold Glucophage  -currently NPO with IV fluids      CAD (coronary artery disease)  Coronary artery disease with mildly elevated troponin.  Patient with known CAD which is controlled.  Patient denies chest pain or shortness for breath, monitor for S/Sx of angina/ACS. Continue to monitor on telemetry.   -troponin 0.128  -patient is on no home antiplatelet therapy  -hold statin therapy in the setting of elevated liver enzymes  -echocardiogram 02/22/2023 with EF 55% and nuclear medicine stress test negative  -check serial cardiac enzymes    DORA (obstructive sleep apnea)  Nocturnal CPAP      Hyperlipidemia with target LDL less than 100  Hold statin due to elevated LFTs      HTN (hypertension)  12/07/2024  --controlled  --home medications restarted  --PRN IV hydralazine 10mg Q6H for sBP > 175 mmHg  --Q4HVS      VTE Risk Mitigation (From admission, onward)           Ordered     enoxaparin injection 90 mg  Every 12 hours         12/06/24 2127     Reason for No Pharmacological VTE Prophylaxis   Once        Comments: Tx lovenox added, coumadin held   Question:  Reasons:  Answer:  Already adequately anticoagulated on oral Anticoagulants    12/06/24 2127     IP VTE HIGH RISK PATIENT  Once         12/06/24 2127     Place sequential compression device  Until discontinued         12/06/24 2127                    Discharge Planning   MOUSTAPHA:      Code Status: DNR   Medical Readiness for Discharge Date:                            Aurelio Ibrahim MD  Department of Hospital Medicine   [facility])

## 2024-12-07 NOTE — ASSESSMENT & PLAN NOTE
Hyperglycemia with non-insulin-dependent diabetes mellitus type 2  -check A1c  -medium dose sliding scale insulin  -hold Glucophage  -currently NPO with IV fluids

## 2024-12-07 NOTE — PLAN OF CARE
12/07/24 1339   Post-Acute Status   Post-Acute Authorization Hospice   Hospice Status Referrals Sent  ([facility])   Discharge Delays (!) Patient and Family Barriers  (Family meeting & consent signatures needed)   Discharge Plan   Discharge Plan A Inpatient Hospice     Patient was alert & oriented but she deferred to her daughter-in-law Connie Greene for decisions.  Connie's number is [phone].  Patient confirms address & phone number on the face sheet but she does not plan on returning because the trailer has 5-6 steps to get inside.  Patient was interested in inpatient hospice & her brother-in-law had a positive experience with [facility]. I also left a [facility] for the attending to review with the patient.  She is already DNR.  Johana of [facility] said she would reach out to Connie to see if she could meet with her & the patient at bedside.

## 2024-12-07 NOTE — ASSESSMENT & PLAN NOTE
12/07/2024  Patient opted for hospice care  Case management consulted to facilitate  Would be a decent candidate for IP hospice

## 2024-12-07 NOTE — HPI
84-year-old white woman with history of atrial fibrillation on Coumadin, hypertension, hyperlipidemia, non-insulin-dependent diabetes mellitus type 2, colon cancer, aortic atherosclerosis, obstructive sleep apnea, periodic limb movement disorder, pulmonary hypertension, coronary artery disease, osteoporosis, and obesity who presents to the emergency department with complaint of abdominal pain over the last week.  Abdominal pain is generalized in nature, constant, severe, worse in the upper quadrant on exam, associated with nausea and vomiting.  Patient denies any fevers or chills.  She does report constipation and denies any diarrhea.  Patient has had some difficulty with urination however bladder appears normal on CT imaging.  Urinalysis was not consistent with infection.  Originally, patient was reported to have altered mental status however she is alert and oriented x3 at the time of my exam and capable of making her own medical decisions and understanding current situation.  CT scan of abdomen and pelvis with pancreatic tail malignancy suspected with liver metastases.  Patient wishes to be do not resuscitate and would like to meet with both Hematology Oncology and palliative care at this time.

## 2024-12-07 NOTE — ASSESSMENT & PLAN NOTE
"New diagnosis of pancreatic cancer with metastases to liver on this admission.  Elevated liver enzymes.  History of colon cancer.  -patient presented with complaint of abdominal pain with nausea and vomiting x1 week  -patient with report of altered mental status however she is alert and oriented at the time of my exam--> CT scan of head was negative, urine drug screen negative, urinalysis negative  -CT scan of abdomen and pelvis with 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 Large complex stable fluid collection related to anterior abdominal wall hernia mesh."  -white blood cell count 12.57, lactic acid level 1.9, urinalysis negative for infection, alk phos 298, total bilirubin 1.8, AST 54, ALT 58, influenza a/B negative, lipase 44, afebrile  -NPO except for ice chips and small sips with medications, IV fluids, p.r.n. pain/nausea control  -patient wishes to be do not resuscitate  -check CEA, AFP, and CA 19-9  -consult hematology oncology  -consult palliative care    12/07/2024  Discussed case with heme/onc  Patient and family opting for hospice care  Case management consulted to facilitate conversations and decision  "

## 2024-12-07 NOTE — PLAN OF CARE
[facility] - Telemetry (Hospital)  Discharge Final Note    Primary Care Provider: Annabella Fenton MD    Expected Discharge Date: 12/7/2024    Final Discharge Note (most recent)       Final Note - 12/07/24 1617          Final Note    Assessment Type Final Discharge Note     Anticipated Discharge Disposition Hospice/Medical Facility        Post-Acute Status    Post-Acute Authorization Hospice     Hospice Status Set-up Complete/Auth obtained   [facility]    Discharge Delays Ambulance Transport/Facility Transport   ETA is 5:15 pm                    Important Message from Medicare  Discharge orders are in.  Patient / family signed consents for [facility].  Nurse report can be given at [phone].  Ambulance is appropriate as patient has no trunk support.  [facility] estimated their arrival at 5:15 pm.           Contact Info       [facility]   Specialty: Hospice Services, Hospice and Palliative Medicine    [address] [address]   Phone: [phone]       Next Steps: Follow up

## 2024-12-07 NOTE — ASSESSMENT & PLAN NOTE
Coronary artery disease with mildly elevated troponin.  Patient with known CAD which is controlled.  Patient denies chest pain or shortness for breath, monitor for S/Sx of angina/ACS. Continue to monitor on telemetry.   -troponin 0.128  -patient is on no home antiplatelet therapy  -hold statin therapy in the setting of elevated liver enzymes  -echocardiogram 02/22/2023 with EF 55% and nuclear medicine stress test negative  -check serial cardiac enzymes

## 2024-12-07 NOTE — PLAN OF CARE
A236/A236 LEACurtis Greene is a 84 y.o.female admitted on 12/6/2024 for Primary pancreatic cancer with metastasis to other site   Code Status: DNR MRN: [id number]   Review of patient's allergies indicates:  No Known Allergies  Past Medical History:   Diagnosis Date    *Atrial fibrillation     Abdominal wall seroma 1/16/2019    Seroma developed after her hernia repair in 2012.  Please review the notes in the Care everywhere section of the chart.  There is no luis evidence of infection at this time.  I would recommend checking a erythrocyte sedimentation rate and C reactive protein.  These are significant elevated then aspiration of the seroma fluid would be warranted.  If not the seroma does not require any intervention    Coronary artery disease involving native coronary artery of native heart with angina pectoris 4/10/2018    Diabetes mellitus     DM (diabetes mellitus) 2002     09/06/2017 no medication    Hyperlipidemia     Hypertension     Morbid (severe) obesity due to excess calories 7/23/2013    Obesity (BMI 30-39.9) 2/24/2015    Obstructive sleep apnea       PRN meds    acetaminophen, 650 mg, Q6H PRN  bisacodyL, 10 mg, Daily PRN  dextrose 10%, 12.5 g, PRN  dextrose 10%, 12.5 g, PRN  dextrose 10%, 25 g, PRN  dextrose 10%, 25 g, PRN  glucagon (human recombinant), 1 mg, PRN  glucagon (human recombinant), 1 mg, PRN  glucose, 16 g, PRN  glucose, 24 g, PRN  HYDROmorphone, 1 mg, Q4H PRN  insulin aspart U-100, 0-10 Units, Q6H PRN  levalbuterol, 1.2495 mg, Q6H PRN  metoprolol, 5 mg, Q6H PRN  naloxone, 0.02 mg, PRN  ondansetron, 4 mg, Q6H PRN  promethazine, 25 mg, Q6H PRN  sodium chloride 0.9%, 10 mL, Q12H PRN      Chart check completed. Will continue plan of care.         Daren Coma Scale Score: 15     Lead Monitored: Lead II           Last Bowel Movement: 12/02/24  Diet NPO Except for: Sips with Medication, Ice Chips     [place] Score: 18  Fall Risk Score: 10  Accucheck []   Freq?      Lines/Drains/Airways        Peripheral Intravenous Line  Duration                  Peripheral IV - Single Lumen 12/06/24 20 G Posterior;Right Hand 1 day         Peripheral IV - Single Lumen 12/06/24 1655 20 G Left Antecubital <1 day

## 2024-12-07 NOTE — ASSESSMENT & PLAN NOTE
Patient's most recent potassium results are listed below.   Recent Labs     12/06/24  1654   K 3.1*     Plan  - Replete potassium per protocol  - Monitor potassium Daily  - Patient's hypokalemia is  pending reassessment  - patient received potassium chloride 40 mEq p.o. and potassium chloride 10 mEq IV while in the emergency department  -magnesium level pending  -telemetry monitoring   Alert and oriented, no focal deficits, no motor or sensory deficits.

## 2024-12-17 ENCOUNTER — TELEPHONE (OUTPATIENT)
Dept: CARDIOLOGY | Facility: CLINIC | Age: 84
End: 2024-12-17
Payer: MEDICARE

## 2024-12-18 NOTE — DISCHARGE SUMMARY
[facility] Medicine  Discharge Summary      Patient Name: Natalie Greene  MRN: [id number]  MIKALA: [id number]  Patient Class: IP- Inpatient  Admission Date: 12/6/2024  Hospital Length of Stay: 1 days  Discharge Date and Time: 12/7/2024  6:05 PM  Attending Physician: No att. providers found   Discharging Provider: Aurelio Ibrahim MD  Primary Care Provider: Annabella Fenton MD    Primary Care Team: Networked reference to record PCT     HPI:   84-year-old white woman with history of atrial fibrillation on Coumadin, hypertension, hyperlipidemia, non-insulin-dependent diabetes mellitus type 2, colon cancer, aortic atherosclerosis, obstructive sleep apnea, periodic limb movement disorder, pulmonary hypertension, coronary artery disease, osteoporosis, and obesity who presents to the emergency department with complaint of abdominal pain over the last week.  Abdominal pain is generalized in nature, constant, severe, worse in the upper quadrant on exam, associated with nausea and vomiting.  Patient denies any fevers or chills.  She does report constipation and denies any diarrhea.  Patient has had some difficulty with urination however bladder appears normal on CT imaging.  Urinalysis was not consistent with infection.  Originally, patient was reported to have altered mental status however she is alert and oriented x3 at the time of my exam and capable of making her own medical decisions and understanding current situation.  CT scan of abdomen and pelvis with pancreatic tail malignancy suspected with liver metastases.  Patient wishes to be do not resuscitate and would like to meet with both Hematology Oncology and palliative care at this time.    * No surgery found *      Hospital Course:   12/07/2024  Hematology/Oncology discussed case with patient and family. They seem to have opted for hospice care. Case management consulted to help facilitate. Will initiate LAPOST conversations at this time as  "well. DNR order already in system.      Goals of Care Treatment Preferences:  Code Status: DNR      SDOH Screening:  The patient was screened for utility difficulties, food insecurity, transport difficulties, housing insecurity, and interpersonal safety and there were no concerns identified this admission.     Consults:     * Primary pancreatic cancer with metastasis to other site  New diagnosis of pancreatic cancer with metastases to liver on this admission.  Elevated liver enzymes.  History of colon cancer.  -patient presented with complaint of abdominal pain with nausea and vomiting x1 week  -patient with report of altered mental status however she is alert and oriented at the time of my exam--> CT scan of head was negative, urine drug screen negative, urinalysis negative  -CT scan of abdomen and pelvis with 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 Large complex stable fluid collection related to anterior abdominal wall hernia mesh."  -white blood cell count 12.57, lactic acid level 1.9, urinalysis negative for infection, alk phos 298, total bilirubin 1.8, AST 54, ALT 58, influenza a/B negative, lipase 44, afebrile  -NPO except for ice chips and small sips with medications, IV fluids, p.r.n. pain/nausea control  -patient wishes to be do not resuscitate  -check CEA, AFP, and CA 19-9  -consult hematology oncology  -consult palliative care    12/07/2024  Discussed case with heme/onc  Patient and family opting for hospice care  Case management consulted to facilitate conversations and decision    Palliative care encounter  12/07/2024  Patient opted for hospice care  Case management consulted to facilitate  Would be a decent candidate for IP hospice      Elevated troponin  See discussion for coronary artery disease      Hypokalemia  Patient's most recent potassium results are listed below.   Recent Labs     12/06/24  1654 12/07/24  0518   K 3.1* 3.1*       Plan  - Replete potassium per protocol  - " Monitor potassium Daily  - Patient's hypokalemia is  pending reassessment  - patient received potassium chloride 40 mEq p.o. and potassium chloride 10 mEq IV while in the emergency department  -magnesium level pending  -telemetry monitoring    12/07/2024  Continue to replete as necessary  Unable to tolerate PO tablets at this time    Hyponatremia  Hyponatremia is likely due to Dehydration/hypovolemia. The patient's most recent sodium results are listed below.  Recent Labs     12/06/24  1654   *     Plan  - Correct the sodium by 4-6mEq in 24 hours.   - Obtain the following studies: TSH, T4.  - Will treat the hyponatremia with IV fluids as follows:  Normal saline at 75 mL/hr.  Status post 1 L normal saline bolus in the emergency department.  - Monitor sodium Daily.   - Patient hyponatremia is  pending reassessment      ZITA (acute kidney injury)  ZITA is likely due to pre-renal azotemia due to dehydration. Baseline creatinine is  0.8 . Most recent creatinine and eGFR are listed below.  Recent Labs     12/06/24  1654 12/07/24  0518   CREATININE 1.5* 1.2   EGFRNORACEVR 35* 45*        Plan  - ZITA is  pending reassessment  - Avoid nephrotoxins and renally dose meds for GFR listed above  - Monitor urine output, serial BMP, and adjust therapy as needed  - patient received a 1 L normal saline bolus in the emergency department  -continue normal saline at 75 mL/hr  -urinalysis was not consistent with underlying infection  -CT scan of abdomen and pelvis with no acute  process  -p.r.n. bladder scan    12/07/2024  Improving on IVFs  Continue parenteral resuscitation    Atrial fibrillation with RVR  Patient has paroxysmal (<7 days) atrial fibrillation. Patient is currently in atrial fibrillation. [id number] Score: 5. The patients heart rate in the last 24 hours is as follows:  Pulse  Min: 89  Max: 116     Antiarrhythmics  diltiaZEM 24 hr capsule 180 mg, Daily, Oral  metoprolol succinate (TOPROL-XL) 24 hr tablet 100 mg,  Daily, Oral  metoprolol injection 5 mg, Every 6 hours PRN, Intravenous    Anticoagulants  enoxaparin injection 90 mg, Every 12 hours, Subcutaneous    Plan  - Replete lytes with a goal of K>4, Mg >2  - Patient is anticoagulated, see medications listed above.  - Patient's afib is currently uncontrolled. Will continue current treatment  - patient received diltiazem 20 mg IV x1 dose in the emergency department with improved rate control  -continue home regimen of diltiazem and Toprol-XL  -p.r.n. IV metoprolol with parameters  -replace electrolytes (potassium), TSH 0.572  -check INR, magnesium level, free T4, and T3  -telemetry monitoring        Type 2 diabetes mellitus with diabetic polyneuropathy, without long-term current use of insulin  Hyperglycemia with non-insulin-dependent diabetes mellitus type 2  -check A1c  -medium dose sliding scale insulin  -hold Glucophage  -currently NPO with IV fluids      CAD (coronary artery disease)  Coronary artery disease with mildly elevated troponin.  Patient with known CAD which is controlled.  Patient denies chest pain or shortness for breath, monitor for S/Sx of angina/ACS. Continue to monitor on telemetry.   -troponin 0.128  -patient is on no home antiplatelet therapy  -hold statin therapy in the setting of elevated liver enzymes  -echocardiogram 02/22/2023 with EF 55% and nuclear medicine stress test negative  -check serial cardiac enzymes    DORA (obstructive sleep apnea)  Nocturnal CPAP      Hyperlipidemia with target LDL less than 100  Hold statin due to elevated LFTs      HTN (hypertension)  12/07/2024  --controlled  --home medications restarted  --PRN IV hydralazine 10mg Q6H for sBP > 175 mmHg  --Q4HVS      Final Active Diagnoses:    Diagnosis Date Noted POA    PRINCIPAL PROBLEM:  Primary pancreatic cancer with metastasis to other site [C25.9] 12/06/2024 Yes    Secondary liver cancer [C78.7] 12/07/2024 Yes    Palliative care encounter [Z51.5] 12/07/2024 Not Applicable     Atrial fibrillation with rapid ventricular response [I48.91] 12/07/2024 Yes    Atrial fibrillation with RVR [I48.91] 12/06/2024 Yes    ZITA (acute kidney injury) [N17.9] 12/06/2024 Yes    Hyponatremia [E87.1] 12/06/2024 Yes    Hypokalemia [E87.6] 12/06/2024 Yes    Elevated troponin [R79.89] 12/06/2024 Yes    Type 2 diabetes mellitus with diabetic polyneuropathy, without long-term current use of insulin [E11.42] 01/04/2024 Yes    CAD (coronary artery disease) [I25.10]  Yes    DORA (obstructive sleep apnea) [G47.33]  Yes    HTN (hypertension) [I10] 07/23/2013 Yes    Hyperlipidemia with target LDL less than 100 [E78.5] 07/23/2013 Yes      Problems Resolved During this Admission:       Discharged Condition: stable    Disposition: Hospice/Medical Facility    Follow Up:   Follow-up Information       Sepideh Holman [facility].    Specialties: Hospice Services, Hospice and Palliative Medicine  Contact information:  [address] [facility] [address] [phone]                           Patient Instructions:   No discharge procedures on file.    Significant Diagnostic Studies: Labs: All labs within the past 24 hours have been reviewed    Pending Diagnostic Studies:       None           Medications:  Reconciled Home Medications:      Medication List        CONTINUE taking these medications      co-enzyme Q-10 30 mg capsule  Take 30 mg by mouth 3 (three) times daily.     diltiaZEM 180 MG 24 hr capsule  Commonly known as: CARDIZEM CD  Take 1 capsule (180 mg total) by mouth once daily.     furosemide 40 MG tablet  Commonly known as: LASIX  Take 1 tablet (40 mg total) by mouth once daily. Do not take if BP is below 110/70     indapamide 2.5 MG Tab  Commonly known as: LOZOL  Take 1 tablet (2.5 mg total) by mouth once daily. Do not take if BP is below 110/70 or feeling dry     losartan 100 MG tablet  Commonly known as: COZAAR  Take 1 tablet (100 mg total) by mouth once daily.     magnesium oxide 400 mg  (241.3 mg magnesium) tablet  Commonly known as: MAG-OX  Take 1 tablet (400 mg total) by mouth 2 (two) times daily.     metFORMIN 500 MG tablet  Commonly known as: GLUCOPHAGE  Take 1 tablet (500 mg total) by mouth daily with breakfast.     metoprolol succinate 100 MG 24 hr tablet  Commonly known as: TOPROL-XL  Take 1 tablet (100 mg total) by mouth once daily.     niacin 50 MG Tab  Take 100 mg by mouth every evening.     nitroGLYCERIN 0.4 MG SL tablet  Commonly known as: NITROSTAT  Place 1 tablet (0.4 mg total) under the tongue every 5 (five) minutes as needed for Chest pain. Take up to 3 times 5 min apart     ondansetron 8 MG Tbdl  Commonly known as: ZOFRAN-ODT  Take 1 tablet (8 mg total) by mouth every 12 (twelve) hours as needed (nausea).     potassium chloride SA 20 MEQ tablet  Commonly known as: K-DUR,KLOR-CON  Take 2 tablets (40 mEq total) by mouth once daily.     vitamin D 1000 units Tab  Commonly known as: VITAMIN D3  Take 1,000 Units by mouth once daily.            STOP taking these medications      atorvastatin 40 MG tablet  Commonly known as: LIPITOR     gatifloxacin 0.5 % Drop drops     ketorolac 0.5% 0.5 % Drop  Commonly known as: ACULAR     prednisoLONE acetate 1 % Drps  Commonly known as: PRED FORTE     warfarin 6 MG tablet  Commonly known as: COUMADIN              Indwelling Lines/Drains at time of discharge:   Lines/Drains/Airways       None                   Time spent on the discharge of patient: 31 minutes  of time spent on discharge including examining patient, providing discharge instructions, arranging follow-up and documentation.           Aurelio Ibrahim MD  Department of Hospital Medicine  '[facility] - Telemetry ([facility])

## 2025-01-06 ENCOUNTER — TELEPHONE (OUTPATIENT)
Dept: INTERNAL MEDICINE | Facility: CLINIC | Age: 85
End: 2025-01-06
Payer: MEDICARE

## 2025-01-06 NOTE — TELEPHONE ENCOUNTER
----- Message from Tina sent at 1/6/2025  2:10 PM CST -----  Contact: Edkai/   .Type:  Needs Medical Advice    Who Called:  Jun     Would the patient rather a call back or a response via [facility]?  No   Best Call Back Number:    Additional Information: Jun is calling  to inform the provider that pt passed away on 12/09/2024 at 8am    Thanks

## 2025-02-20 NOTE — ED PROVIDER NOTES
Received after hours page at 4254 this morning from patient regarding elevated HR.  States she woke at 2am and her heart was racing.   She woke up to check her vitals  /81, , O2 sat 96-98  She states she feels fine. She is calm and speaking in complete sentences.  H/o open heart surgery 2017  No chest pain, difficulty breathing, dizziness, lightheadedness, nausea, sweating  While we were on the phone her HR decreased to 92  Advised that if her HR becomes elevated again and remains elevated above 120 for greater than 15-20 minutes and/or is accompanied by pain in her chest, difficulty breathing, or any other concerning symptoms she should call 911 or be driven to the ER.   She verbalized understanding.  Additionally I advised she should call her provider's office in the morning to report these symptoms.   SCRIBE #1 NOTE: I, Elvia Hernandez, am scribing for, and in the presence of, Artemio Best NP. I have scribed the entire note.      History      Chief Complaint   Patient presents with    Emesis     abdominal pain and vomiting since yesterday     Abdominal Pain       Review of patient's allergies indicates:  No Known Allergies     HPI   HPI    1/15/2019, 6:39 PM   History obtained from the patient      History of Present Illness: Natalie Greene is a 78 y.o. female patient with PMHx of A-fib, DM, HTN, and PSHx of colon surgery and cholecystectomy who presents to the Emergency Department for evaluation of yellowish emesis which onset gradually yesterday. Symptoms are episodic and moderate in severity. No mitigating or exacerbating factors reported. Associated sxs include generalized abdominal pain. Patient denies any CP, SOB, HA, fever, chills, cough, diarrhea, constipation, dysuria, hematuria, frequency, flank pain, and all other sxs at this time. No further complaints or concerns at this time.         Arrival mode: Personal vehicle     PCP: Annabella Fenton MD       Past Medical History:  Past Medical History:   Diagnosis Date    *Atrial fibrillation     Coronary artery disease involving native coronary artery of native heart with angina pectoris 4/10/2018    Diabetes mellitus     DM (diabetes mellitus) 2002     09/06/2017 no medication    Hyperlipidemia     Hypertension        Past Surgical History:  Past Surgical History:   Procedure Laterality Date    CHOLECYSTECTOMY      COLON SURGERY      HYSTERECTOMY           Family History:  Family History   Problem Relation Age of Onset    Diabetes Sister     Hypertension Sister     Hypertension Mother     Diabetes Sister        Social History:  Social History     Tobacco Use    Smoking status: Never Smoker    Smokeless tobacco: Never Used   Substance and Sexual Activity    Alcohol use: No    Drug use: No    Sexual activity: Not given       ROS    Review of Systems   Constitutional: Negative for chills, diaphoresis and fever.   HENT: Negative for congestion and sore throat.    Respiratory: Negative for cough and shortness of breath.    Cardiovascular: Negative for chest pain.   Gastrointestinal: Positive for abdominal pain, nausea and vomiting. Negative for diarrhea.   Genitourinary: Negative for dysuria, flank pain, hematuria, pelvic pain, vaginal bleeding and vaginal discharge.   Musculoskeletal: Negative for back pain.   Skin: Negative for rash.   Neurological: Negative for dizziness, syncope, weakness, numbness and headaches.   All other systems reviewed and are negative.      Physical Exam      Initial Vitals [01/15/19 1755]   BP Pulse Resp Temp SpO2   (!) 155/89 109 20 97.8 °F (36.6 °C) 98 %      MAP       --          Physical Exam  Nursing Notes and Vital Signs Reviewed.  Constitutional: Patient is in mild distress. Well-developed and well-nourished.  Head: Atraumatic. Normocephalic.  Eyes: PERRL. EOM intact. Conjunctivae are not pale. No scleral icterus.  ENT: Mucous membranes are moist. Oropharynx is clear and symmetric.    Neck: Supple. Full ROM. No lymphadenopathy.  Cardiovascular: Regular rate. Regular rhythm. No murmurs, rubs, or gallops. Distal pulses are 2+ and symmetric.  Pulmonary/Chest: No respiratory distress. Clear to auscultation bilaterally. No wheezing or rales.  Abdominal: Soft and non-distended.  There is generalized lower abdominal tenderness.  No rebound, guarding, or rigidity. Good bowel sounds.  Genitourinary: No CVA tenderness  Musculoskeletal: Moves all extremities. No obvious deformities. No edema. No calf tenderness.  Skin: Warm and dry.  Neurological:  Alert, awake, and appropriate.  Normal speech.  No acute focal neurological deficits are appreciated.  Psychiatric: Normal affect. Good eye contact. Appropriate in content.    ED Course    Procedures  ED Vital Signs:  Vitals:    01/15/19 1755   BP: (!) 155/89   Pulse: 109    Resp: 20   Temp: 97.8 °F (36.6 °C)   TempSrc: Oral   SpO2: 98%       Abnormal Lab Results:  Labs Reviewed   CBC W/ AUTO DIFFERENTIAL - Abnormal; Notable for the following components:       Result Value    WBC 12.90 (*)     RDW 16.2 (*)     Gran # (ANC) 11.1 (*)     Gran% 85.8 (*)     Lymph% 8.4 (*)     All other components within normal limits   COMPREHENSIVE METABOLIC PANEL - Abnormal; Notable for the following components:    Glucose 135 (*)     Total Bilirubin 2.0 (*)     All other components within normal limits   URINALYSIS, REFLEX TO URINE CULTURE - Abnormal; Notable for the following components:    Ketones, UA Trace (*)     All other components within normal limits    Narrative:     Preferred Collection Type->Urine, Clean Catch   AMYLASE   LIPASE   TROPONIN I   APTT   PROTIME-INR   TROPONIN I   TROPONIN I   TYPE & SCREEN        All Lab Results:  Results for orders placed or performed during the hospital encounter of 01/15/19   CBC auto differential   Result Value Ref Range    WBC 12.90 (H) 3.90 - 12.70 K/uL    RBC 4.67 4.00 - 5.40 M/uL    Hemoglobin 14.5 12.0 - 16.0 g/dL    Hematocrit 42.5 37.0 - 48.5 %    MCV 91 82 - 98 fL    MCH 31.0 27.0 - 31.0 pg    MCHC 34.1 32.0 - 36.0 g/dL    RDW 16.2 (H) 11.5 - 14.5 %    Platelets 235 150 - 350 K/uL    MPV 10.8 9.2 - 12.9 fL    Gran # (ANC) 11.1 (H) 1.8 - 7.7 K/uL    Lymph # 1.1 1.0 - 4.8 K/uL    Mono # 0.7 0.3 - 1.0 K/uL    Eos # 0.1 0.0 - 0.5 K/uL    Baso # 0.01 0.00 - 0.20 K/uL    Gran% 85.8 (H) 38.0 - 73.0 %    Lymph% 8.4 (L) 18.0 - 48.0 %    Mono% 5.2 4.0 - 15.0 %    Eosinophil% 0.5 0.0 - 8.0 %    Basophil% 0.1 0.0 - 1.9 %    Differential Method Automated    Comprehensive metabolic panel   Result Value Ref Range    Sodium 136 136 - 145 mmol/L    Potassium 3.9 3.5 - 5.1 mmol/L    Chloride 95 95 - 110 mmol/L    CO2 25 23 - 29 mmol/L    Glucose 135 (H) 70 - 110 mg/dL    BUN, Bld 14 8 - 23 mg/dL    Creatinine 0.8 0.5 - 1.4 mg/dL    Calcium 9.8 8.7 - 10.5 mg/dL     Total Protein 8.3 6.0 - 8.4 g/dL    Albumin 4.2 3.5 - 5.2 g/dL    Total Bilirubin 2.0 (H) 0.1 - 1.0 mg/dL    Alkaline Phosphatase 82 55 - 135 U/L    AST 33 10 - 40 U/L    ALT 24 10 - 44 U/L    Anion Gap 16 8 - 16 mmol/L    eGFR if African American >60 >60 mL/min/1.73 m^2    eGFR if non African American >60 >60 mL/min/1.73 m^2   Urinalysis, Reflex to Urine Culture Urine, Clean Catch   Result Value Ref Range    Specimen UA Urine, Clean Catch     Color, UA Yellow Yellow, Straw, Vanesa    Appearance, UA Clear Clear    pH, UA 7.0 5.0 - 8.0    Specific Gravity, UA 1.010 1.005 - 1.030    Protein, UA Negative Negative    Glucose, UA Negative Negative    Ketones, UA Trace (A) Negative    Bilirubin (UA) Negative Negative    Occult Blood UA Negative Negative    Nitrite, UA Negative Negative    Urobilinogen, UA Negative <2.0 EU/dL    Leukocytes, UA Negative Negative   Amylase   Result Value Ref Range    Amylase 39 20 - 110 U/L   Lipase   Result Value Ref Range    Lipase 34 4 - 60 U/L   Troponin I   Result Value Ref Range    Troponin I <0.006 0.000 - 0.026 ng/mL         Imaging Results:  Imaging Results          X-Ray Chest 1 View (Final result)  Result time 01/15/19 21:50:47    Final result by Jone Dior Jr., MD (01/15/19 21:50:47)                 Impression:      No acute abnormality.  Cardiomegaly.      Electronically signed by: Jone Dior Jr., MD  Date:    01/15/2019  Time:    21:50             Narrative:    EXAMINATION:  XR CHEST 1 VIEW    CLINICAL HISTORY:  Chest pain, unspecified    TECHNIQUE:  Single frontal view of the chest was performed.    COMPARISON:  None    FINDINGS:  The lungs are clear, with normal appearance of pulmonary vasculature and no pleural effusion or pneumothorax.    The cardiac silhouette is enlarged.  Calcific atheromatous change of the aortic knob.  The hilar and mediastinal contours are unremarkable.    Bones are intact.                               CT Abdomen Pelvis With Contrast (Final  result)  Result time 01/15/19 21:35:44    Final result by Duane Betancourt MD (01/15/19 21:35:44)                 Impression:      The organized fluid collections surrounding the ventral abdominal wall mesh from previous hernia repair has significantly increased in size and has become more hyperdense suggesting internal hemorrhage.  Superimposed infection is not excluded.    Nonspecific scattered air-fluid levels throughout the small bowel.  No CT evidence of bowel obstruction.    Colonic diverticulosis.    All CT scans at this facility are performed  using dose modulation techniques as appropriate to performed exam including the following:  automated exposure control; adjustment of mA and/or kV according to the patients size (this includes techniques or standardized protocols for targeted exams where dose is matched to indication/reason for exam: i.e. extremities or head);  iterative reconstruction technique.      Electronically signed by: Duane Betancourt MD  Date:    01/15/2019  Time:    21:35             Narrative:    EXAMINATION:  CT ABDOMEN PELVIS WITH CONTRAST    CLINICAL HISTORY:  abdominal pain;    TECHNIQUE:  Axial CT imaging was performed through the abdomen and pelvis with  75cc  of intravenous contrast. Multiplanar reformats were performed and interpreted.    COMPARISON:  04/21/2016    FINDINGS:  No acute basilar infiltrate detected.    Probable cysts in the left liver measuring less than a cm.  Benign left upper pole renal cyst measuring up to 1.7 cm.  The right kidney is within normal limits.    The spleen, adrenal glands, pancreas are within normal limits.    Status post cholecystectomy.    There has been marked interval large amount of the fluid collection on both sides of the hernia mesh that measures 17.5 x 13.4 x 19.2 compared to 15.5 x 8.4 x 17.7 cm in the previous examination.  The fluid within the collection is heterogeneously hyperdense.  No free air or intra-abdominal abscess identified.  There are  scattered air-fluid levels throughout the small bowel.  No definitive transition point identified.  The patient is status post right hemicolectomy with an ileocolic anastomosis.  Extensive colonic diverticulosis.    Aortic atherosclerosis without evidence of aneurysm.    The uterus has been removed.  No free pelvic fluid or adenopathy.    The urinary bladder is unremarkable.    L2 vertebral body hemangioma.  Chronic moderate central compression fracture of the L4 vertebral body.  Diffuse idiopathic skeletal hyperostosis.                               The EKG was ordered, reviewed, and independently interpreted by the ED provider.  Interpretation time: 21:25  Rate: 96 BPM  Rhythm: atrial fibrillation  Interpretation: Nonspecific ST & T wave abnormality. No STEMI.           The Emergency Provider reviewed the vital signs and test results, which are outlined above.    ED Discussion       8:36 PM: Re-evaluated pt. Pt is resting comfortably and is in no acute distress.  Pt states sxs have improved at this time.  D/w pt all pertinent results. D/w pt any concerns expressed at this time. Answered all questions. Pt expresses understanding at this time.    9:10 PM: X-ray tech informed Artemio Best NP that pt is complaining of chest pain at this time.      9:19 PM: Re-evaluated pt.  Pt states she is still continuing to have chest pain at this time.  D/w pt all pertinent results. D/w pt any concerns expressed at this time. Answered all questions. Pt expresses understanding at this time.    10:00 PM: Discussed pt's case with Dr. Baca (Gen Surg) who recommends the pt to be admitted and he will f/u with patient in the morning.    12:22 AM: Discussed case with Pérez ([facility]). Dr. Ortez agrees with current care and management of pt and accepts admission.   Admitting Service: Hospital medicine   Admitting Physician: Pérez  Admit to: Telemetry        ED Medication(s):  Medications   sodium chloride 0.9% flush 5 mL  (not administered)   morphine injection 2 mg (not administered)   hydromorphone (PF) injection 1 mg (not administered)   ondansetron injection 4 mg (not administered)   acetaminophen tablet 650 mg (not administered)   ondansetron injection 4 mg (4 mg Intravenous Given 1/15/19 2005)   morphine injection 4 mg (4 mg Intravenous Given 1/15/19 2006)   sodium chloride 0.9% bolus 1,000 mL (0 mLs Intravenous Stopped 1/15/19 2212)   omnipaque 350 iohexol 75 mL (75 mLs Intravenous Given 1/15/19 2103)         Medical Decision Making    Medical Decision Making:   Clinical Tests:   Lab Tests: Ordered and Reviewed  Radiological Study: Ordered and Reviewed  Medical Tests: Ordered and Reviewed           Scribe Attestation:   Scribe #1: I performed the above scribed service and the documentation accurately describes the services I performed. I attest to the accuracy of the note.    Attending:   Physician Attestation Statement for Scribe #1: I, , personally performed the services described in this documentation, as scribed by Elvia Hernandez, in my presence, and it is both accurate and complete.          Clinical Impression       ICD-10-CM ICD-9-CM   1. Generalized abdominal pain R10.84 789.07   2. Chest pain R07.9 786.50   3. Abdominal hematoma S30.1XXA 922.2                 Artemio Best Jr., FNP  01/16/19 0130